# Patient Record
Sex: MALE | Race: WHITE | Employment: UNEMPLOYED | URBAN - METROPOLITAN AREA
[De-identification: names, ages, dates, MRNs, and addresses within clinical notes are randomized per-mention and may not be internally consistent; named-entity substitution may affect disease eponyms.]

---

## 2019-04-27 ENCOUNTER — APPOINTMENT (OUTPATIENT)
Dept: GENERAL RADIOLOGY | Age: 64
DRG: 432 | End: 2019-04-27

## 2019-04-27 ENCOUNTER — HOSPITAL ENCOUNTER (INPATIENT)
Age: 64
LOS: 9 days | Discharge: NURSING, REHAB OR LONG-TERM CARE | DRG: 432 | End: 2019-05-06
Attending: EMERGENCY MEDICINE | Admitting: FAMILY MEDICINE
Payer: COMMERCIAL

## 2019-04-27 ENCOUNTER — APPOINTMENT (OUTPATIENT)
Dept: CT IMAGING | Age: 64
DRG: 432 | End: 2019-04-27

## 2019-04-27 DIAGNOSIS — E72.20 HYPERAMMONEMIA (HCC): ICD-10-CM

## 2019-04-27 DIAGNOSIS — E87.20 LACTIC ACIDOSIS: ICD-10-CM

## 2019-04-27 DIAGNOSIS — F10.929 ACUTE ALCOHOLIC INTOXICATION WITH COMPLICATION (HCC): ICD-10-CM

## 2019-04-27 DIAGNOSIS — T79.6XXA TRAUMATIC RHABDOMYOLYSIS, INITIAL ENCOUNTER (HCC): Primary | ICD-10-CM

## 2019-04-27 DIAGNOSIS — E86.0 DEHYDRATION: ICD-10-CM

## 2019-04-27 PROBLEM — M62.82 RHABDOMYOLYSIS: Status: ACTIVE | Noted: 2019-04-27

## 2019-04-27 LAB
ACETAMINOPHEN LEVEL: <5 MCG/ML (ref 10–30)
ALBUMIN SERPL-MCNC: 3.4 G/DL (ref 3.5–5.2)
ALP BLD-CCNC: 143 U/L (ref 40–129)
ALT SERPL-CCNC: 46 U/L (ref 0–40)
AMMONIA: 73 UMOL/L (ref 16–60)
AMPHETAMINE SCREEN, URINE: NOT DETECTED
ANION GAP SERPL CALCULATED.3IONS-SCNC: 23 MMOL/L (ref 7–16)
APTT: 41.3 SEC (ref 24.5–35.1)
AST SERPL-CCNC: 135 U/L (ref 0–39)
BARBITURATE SCREEN URINE: NOT DETECTED
BASOPHILS ABSOLUTE: 0.07 E9/L (ref 0–0.2)
BASOPHILS RELATIVE PERCENT: 0.9 % (ref 0–2)
BENZODIAZEPINE SCREEN, URINE: NOT DETECTED
BILIRUB SERPL-MCNC: 4.2 MG/DL (ref 0–1.2)
BUN BLDV-MCNC: 11 MG/DL (ref 8–23)
BURR CELLS: ABNORMAL
CALCIUM SERPL-MCNC: 8.6 MG/DL (ref 8.6–10.2)
CANNABINOID SCREEN URINE: NOT DETECTED
CHLORIDE BLD-SCNC: 99 MMOL/L (ref 98–107)
CO2: 19 MMOL/L (ref 22–29)
COCAINE METABOLITE SCREEN URINE: NOT DETECTED
CREAT SERPL-MCNC: 0.5 MG/DL (ref 0.7–1.2)
EKG ATRIAL RATE: 101 BPM
EKG P AXIS: 54 DEGREES
EKG P-R INTERVAL: 192 MS
EKG Q-T INTERVAL: 380 MS
EKG QRS DURATION: 86 MS
EKG QTC CALCULATION (BAZETT): 492 MS
EKG R AXIS: 17 DEGREES
EKG T AXIS: 67 DEGREES
EKG VENTRICULAR RATE: 101 BPM
EOSINOPHILS ABSOLUTE: 0 E9/L (ref 0.05–0.5)
EOSINOPHILS RELATIVE PERCENT: 0 % (ref 0–6)
ETHANOL: 168 MG/DL (ref 0–0.08)
GFR AFRICAN AMERICAN: >60
GFR NON-AFRICAN AMERICAN: >60 ML/MIN/1.73
GLUCOSE BLD-MCNC: 123 MG/DL (ref 74–99)
HCT VFR BLD CALC: 39 % (ref 37–54)
HEMOGLOBIN: 13.1 G/DL (ref 12.5–16.5)
IMMATURE GRANULOCYTES #: 0.09 E9/L
IMMATURE GRANULOCYTES %: 1.1 % (ref 0–5)
INR BLD: 1.6
LACTIC ACID: 7.7 MMOL/L (ref 0.5–2.2)
LACTIC ACID: 7.9 MMOL/L (ref 0.5–2.2)
LACTIC ACID: 9.7 MMOL/L (ref 0.5–2.2)
LIPASE: 31 U/L (ref 13–60)
LYMPHOCYTES ABSOLUTE: 0.6 E9/L (ref 1.5–4)
LYMPHOCYTES RELATIVE PERCENT: 7.5 % (ref 20–42)
MAGNESIUM: 1.7 MG/DL (ref 1.6–2.6)
MCH RBC QN AUTO: 36.6 PG (ref 26–35)
MCHC RBC AUTO-ENTMCNC: 33.6 % (ref 32–34.5)
MCV RBC AUTO: 108.9 FL (ref 80–99.9)
METHADONE SCREEN, URINE: NOT DETECTED
MONOCYTES ABSOLUTE: 1.1 E9/L (ref 0.1–0.95)
MONOCYTES RELATIVE PERCENT: 13.8 % (ref 2–12)
NEUTROPHILS ABSOLUTE: 6.13 E9/L (ref 1.8–7.3)
NEUTROPHILS RELATIVE PERCENT: 76.7 % (ref 43–80)
OPIATE SCREEN URINE: NOT DETECTED
PDW BLD-RTO: 15.1 FL (ref 11.5–15)
PHENCYCLIDINE SCREEN URINE: NOT DETECTED
PHOSPHORUS: 3.1 MG/DL (ref 2.5–4.5)
PLATELET # BLD: 87 E9/L (ref 130–450)
PLATELET CONFIRMATION: NORMAL
PMV BLD AUTO: 8.4 FL (ref 7–12)
POIKILOCYTES: ABNORMAL
POTASSIUM SERPL-SCNC: 4.3 MMOL/L (ref 3.5–5)
PROPOXYPHENE SCREEN: NOT DETECTED
PROTHROMBIN TIME: 18 SEC (ref 9.3–12.4)
RBC # BLD: 3.58 E12/L (ref 3.8–5.8)
SALICYLATE, SERUM: <0.3 MG/DL (ref 0–30)
SODIUM BLD-SCNC: 141 MMOL/L (ref 132–146)
TOTAL CK: 2754 U/L (ref 20–200)
TOTAL PROTEIN: 8 G/DL (ref 6.4–8.3)
TRICYCLIC ANTIDEPRESSANTS SCREEN SERUM: NEGATIVE NG/ML
TROPONIN: <0.01 NG/ML (ref 0–0.03)
WBC # BLD: 8 E9/L (ref 4.5–11.5)

## 2019-04-27 PROCEDURE — 71260 CT THORAX DX C+: CPT

## 2019-04-27 PROCEDURE — 85025 COMPLETE CBC W/AUTO DIFF WBC: CPT

## 2019-04-27 PROCEDURE — 85610 PROTHROMBIN TIME: CPT

## 2019-04-27 PROCEDURE — 96361 HYDRATE IV INFUSION ADD-ON: CPT

## 2019-04-27 PROCEDURE — 84484 ASSAY OF TROPONIN QUANT: CPT

## 2019-04-27 PROCEDURE — 6370000000 HC RX 637 (ALT 250 FOR IP): Performed by: FAMILY MEDICINE

## 2019-04-27 PROCEDURE — G0480 DRUG TEST DEF 1-7 CLASSES: HCPCS

## 2019-04-27 PROCEDURE — 83605 ASSAY OF LACTIC ACID: CPT

## 2019-04-27 PROCEDURE — 6360000004 HC RX CONTRAST MEDICATION: Performed by: RADIOLOGY

## 2019-04-27 PROCEDURE — 83690 ASSAY OF LIPASE: CPT

## 2019-04-27 PROCEDURE — 6370000000 HC RX 637 (ALT 250 FOR IP): Performed by: EMERGENCY MEDICINE

## 2019-04-27 PROCEDURE — 36415 COLL VENOUS BLD VENIPUNCTURE: CPT

## 2019-04-27 PROCEDURE — 70450 CT HEAD/BRAIN W/O DYE: CPT

## 2019-04-27 PROCEDURE — 2580000003 HC RX 258: Performed by: FAMILY MEDICINE

## 2019-04-27 PROCEDURE — 2060000000 HC ICU INTERMEDIATE R&B

## 2019-04-27 PROCEDURE — 72125 CT NECK SPINE W/O DYE: CPT

## 2019-04-27 PROCEDURE — 85730 THROMBOPLASTIN TIME PARTIAL: CPT

## 2019-04-27 PROCEDURE — 84100 ASSAY OF PHOSPHORUS: CPT

## 2019-04-27 PROCEDURE — 80053 COMPREHEN METABOLIC PANEL: CPT

## 2019-04-27 PROCEDURE — 96360 HYDRATION IV INFUSION INIT: CPT

## 2019-04-27 PROCEDURE — 99285 EMERGENCY DEPT VISIT HI MDM: CPT

## 2019-04-27 PROCEDURE — 70486 CT MAXILLOFACIAL W/O DYE: CPT

## 2019-04-27 PROCEDURE — 2580000003 HC RX 258: Performed by: EMERGENCY MEDICINE

## 2019-04-27 PROCEDURE — 83735 ASSAY OF MAGNESIUM: CPT

## 2019-04-27 PROCEDURE — 2500000003 HC RX 250 WO HCPCS: Performed by: EMERGENCY MEDICINE

## 2019-04-27 PROCEDURE — 71045 X-RAY EXAM CHEST 1 VIEW: CPT

## 2019-04-27 PROCEDURE — 74177 CT ABD & PELVIS W/CONTRAST: CPT

## 2019-04-27 PROCEDURE — 80307 DRUG TEST PRSMV CHEM ANLYZR: CPT

## 2019-04-27 PROCEDURE — 82550 ASSAY OF CK (CPK): CPT

## 2019-04-27 PROCEDURE — 93005 ELECTROCARDIOGRAM TRACING: CPT | Performed by: EMERGENCY MEDICINE

## 2019-04-27 PROCEDURE — 82140 ASSAY OF AMMONIA: CPT

## 2019-04-27 PROCEDURE — 6360000002 HC RX W HCPCS: Performed by: EMERGENCY MEDICINE

## 2019-04-27 RX ORDER — 0.9 % SODIUM CHLORIDE 0.9 %
1000 INTRAVENOUS SOLUTION INTRAVENOUS ONCE
Status: COMPLETED | OUTPATIENT
Start: 2019-04-27 | End: 2019-04-27

## 2019-04-27 RX ORDER — SODIUM CHLORIDE 0.9 % (FLUSH) 0.9 %
10 SYRINGE (ML) INJECTION PRN
Status: DISCONTINUED | OUTPATIENT
Start: 2019-04-27 | End: 2019-05-06 | Stop reason: HOSPADM

## 2019-04-27 RX ORDER — AMLODIPINE BESYLATE 5 MG/1
5 TABLET ORAL DAILY
Status: DISCONTINUED | OUTPATIENT
Start: 2019-04-27 | End: 2019-04-29

## 2019-04-27 RX ORDER — CLINDAMYCIN PHOSPHATE 300 MG/50ML
300 INJECTION INTRAVENOUS ONCE
Status: COMPLETED | OUTPATIENT
Start: 2019-04-27 | End: 2019-04-27

## 2019-04-27 RX ORDER — ACETAMINOPHEN 325 MG/1
650 TABLET ORAL EVERY 4 HOURS PRN
Status: DISCONTINUED | OUTPATIENT
Start: 2019-04-27 | End: 2019-04-27 | Stop reason: SDUPTHER

## 2019-04-27 RX ORDER — ACETAMINOPHEN 325 MG/1
650 TABLET ORAL EVERY 4 HOURS PRN
Status: DISCONTINUED | OUTPATIENT
Start: 2019-04-27 | End: 2019-05-06 | Stop reason: HOSPADM

## 2019-04-27 RX ORDER — LACTULOSE 10 G/15ML
30 SOLUTION ORAL ONCE
Status: COMPLETED | OUTPATIENT
Start: 2019-04-27 | End: 2019-04-27

## 2019-04-27 RX ORDER — DEXTROSE, SODIUM CHLORIDE, SODIUM LACTATE, POTASSIUM CHLORIDE, AND CALCIUM CHLORIDE 5; .6; .31; .03; .02 G/100ML; G/100ML; G/100ML; G/100ML; G/100ML
INJECTION, SOLUTION INTRAVENOUS CONTINUOUS
Status: DISCONTINUED | OUTPATIENT
Start: 2019-04-27 | End: 2019-04-29

## 2019-04-27 RX ORDER — SODIUM CHLORIDE 0.9 % (FLUSH) 0.9 %
10 SYRINGE (ML) INJECTION EVERY 12 HOURS SCHEDULED
Status: DISCONTINUED | OUTPATIENT
Start: 2019-04-27 | End: 2019-05-06 | Stop reason: HOSPADM

## 2019-04-27 RX ADMIN — LACTULOSE 30 G: 20 SOLUTION ORAL at 18:03

## 2019-04-27 RX ADMIN — AMLODIPINE BESYLATE 5 MG: 5 TABLET ORAL at 19:09

## 2019-04-27 RX ADMIN — FOLIC ACID: 5 INJECTION, SOLUTION INTRAMUSCULAR; INTRAVENOUS; SUBCUTANEOUS at 14:07

## 2019-04-27 RX ADMIN — ACETAMINOPHEN 650 MG: 325 TABLET ORAL at 19:10

## 2019-04-27 RX ADMIN — CLINDAMYCIN PHOSPHATE 300 MG: 6 INJECTION, SOLUTION INTRAMUSCULAR; INTRAVENOUS at 16:56

## 2019-04-27 RX ADMIN — SODIUM CHLORIDE, SODIUM LACTATE, POTASSIUM CHLORIDE, CALCIUM CHLORIDE AND DEXTROSE MONOHYDRATE: 5; 600; 310; 30; 20 INJECTION, SOLUTION INTRAVENOUS at 19:09

## 2019-04-27 RX ADMIN — SODIUM CHLORIDE 1000 ML: 9 INJECTION, SOLUTION INTRAVENOUS at 13:18

## 2019-04-27 RX ADMIN — SODIUM CHLORIDE 1000 ML: 9 INJECTION, SOLUTION INTRAVENOUS at 16:07

## 2019-04-27 RX ADMIN — IOPAMIDOL 80 ML: 755 INJECTION, SOLUTION INTRAVENOUS at 15:53

## 2019-04-27 ASSESSMENT — PAIN DESCRIPTION - PAIN TYPE
TYPE: CHRONIC PAIN
TYPE: ACUTE PAIN

## 2019-04-27 ASSESSMENT — PAIN SCALES - GENERAL
PAINLEVEL_OUTOF10: 10
PAINLEVEL_OUTOF10: 6

## 2019-04-27 ASSESSMENT — PAIN DESCRIPTION - LOCATION
LOCATION: GENERALIZED
LOCATION: GENERALIZED

## 2019-04-27 NOTE — ED NOTES
Bed: 09  Expected date:   Expected time:   Means of arrival:   Comments:  [id number], RN  04/27/19 1231

## 2019-04-27 NOTE — ED PROVIDER NOTES
Patient is a 55-year-old male who presents via EMS from home with a chief complaint of confusion and disordered behavior. Per EMS, they were called to the house because the patient states that he was lost in the woods. When they came to his house, he was walking around the house naked and did not know was going on. The paramedics found multiple empty liquor bottles. The patient had scattered abrasions of his upper or lower extremities, as well as his back. He does have some ecchymosis on his right anterior chest wall per EMS. He had some dry blood around his mouth. The patient states he does not know if he fell or not. He does admit to drinking last night. He complains of body wide pain. The history is provided by the EMS personnel and the patient. No  was used. Review of Systems   Unable to perform ROS: Mental status change       Physical Exam   Constitutional: He is oriented to person, place, and time. He appears well-developed and well-nourished. No distress. Patient is alert and oriented, he keeps complaining that he feels short of breath. HENT:   Head: Normocephalic. She has no obvious signs of head trauma. No hemotympanum present no septal hematoma. Patient does have dried blood around his lips and inside his mouth. Dry oral mucosa. There is dried emesis around bilateral nares. Eyes: Conjunctivae are normal. Right eye exhibits no discharge. Left eye exhibits no discharge. No scleral icterus. Pupils are equal and reactive, no scleral icterus present. Neck: Normal range of motion. Neck supple. No spinous process tenderness palpation. Normal range of motion. No step-offs palpated. Cardiovascular: Regular rhythm. Tachycardia present. No murmur heard. Pulmonary/Chest: Effort normal and breath sounds normal. No stridor. No respiratory distress. He exhibits tenderness. Clear breath sounds in all lung fields. No acute respiratory distress. Pulse ox 98% on room air. Abdominal: Soft. Bowel sounds are normal. He exhibits no distension. There is generalized tenderness. There is no rigidity and no guarding. Rotund abdomen, patient complains of generalized abdominal tenderness. No guarding or rigidity. Abdomen is soft. Musculoskeletal: Normal range of motion. He exhibits no edema or tenderness. Chronic lymphedema bilateral lower extremities. No pitting edema. Lymphadenopathy:     He has no cervical adenopathy. Neurological: He is alert and oriented to person, place, and time. Patient is alert and oriented ×3. He has no focal neurological deficits. Patient follows all commands appropriately. Skin: Skin is warm. Capillary refill takes less than 2 seconds. He is not diaphoretic. No erythema. No pallor. Procedures    MDM         --------------------------------------------- PAST HISTORY ---------------------------------------------  Past Medical History:  has a past medical history of Acute diastolic CHF (congestive heart failure) ([address] Dermatitis, Heart valve problem, Hx of cardiovascular stress test, Hypertension, and Obesity. Past Surgical History:  has a past surgical history that includes hernia repair; Neck surgery; and Carpal tunnel release. Social History:  reports that he has never smoked. He has never used smokeless tobacco. He reports that he drinks alcohol. He reports that he does not use drugs. Family History: family history includes COPD in his father; Parkinsonism in his mother. The patients home medications have been reviewed. Allergies: Latex;  Aldactone [spironolactone]; and Thimerosal    -------------------------------------------------- RESULTS -------------------------------------------------    LABS:  Results for orders placed or performed during the hospital encounter of 04/27/19   CBC Auto Differential   Result Value Ref Range    WBC 8.0 4.5 - 11.5 E9/L    RBC 3.58 (L) 3.80 - 5.80 E12/L    Hemoglobin 13.1 12.5 - 16.5 g/dL Range    Amphetamine Screen, Urine NOT DETECTED Negative <1000 ng/mL    Barbiturate Screen, Ur NOT DETECTED Negative < 200 ng/mL    Benzodiazepine Screen, Urine NOT DETECTED Negative < 200 ng/mL    Cannabinoid Scrn, Ur NOT DETECTED Negative < 50ng/mL    Cocaine Metabolite Screen, Urine NOT DETECTED Negative < 300 ng/mL    Opiate Scrn, Ur NOT DETECTED Negative < 300ng/mL    PCP Screen, Urine NOT DETECTED Negative < 25 ng/mL    Methadone Screen, Urine NOT DETECTED Negative <300 ng/mL    Propoxyphene Scrn, Ur NOT DETECTED Negative <300 ng/mL   Serum Drug Screen   Result Value Ref Range    Ethanol Lvl 168 mg/dL    Acetaminophen Level <5.0 (L) 10.0 - 83.4 mcg/mL    Salicylate, Serum <1.4 0.0 - 30.0 mg/dL   Ammonia   Result Value Ref Range    Ammonia 73.0 (H) 16.0 - 60.0 umol/L   Platelet Confirmation   Result Value Ref Range    Platelet Confirmation CONFIRMED    EKG 12 Lead   Result Value Ref Range    Ventricular Rate 101 BPM    Atrial Rate 101 BPM    P-R Interval 192 ms    QRS Duration 86 ms    Q-T Interval 380 ms    QTc Calculation (Bazett) 492 ms    P Axis 54 degrees    R Axis 17 degrees    T Axis 67 degrees       RADIOLOGY:  Ct Head Wo Contrast    Result Date: 4/27/2019  Location:200 Exam: CT HEAD WO CONTRAST Comparison: None History:  Trauma from fall TECHNIQUE: Spiral CT the brain without contrast. Coronal and sagittal reconstructions were obtained. Automated dose exposure control was utilized for this examination. FINDINGS: The ventricles are normal in size, shape and configuration. The midline structures are midline. The subarachnoid spaces and sulci are also normal in size and configuration. No abnormal parenchymal densities are identified and no abnormal calcifications are seen. There is no mass effect and no abnormal subdural fluid collections are identified. The calvarium as visualized is unremarkable. The visualized sinuses, mastoid air cells, and middle ears are clear.      Computed tomography of the reconstructions were obtained. Automated dose exposure control was utilized for this examination. FINDINGS: No soft tissue swelling or hematoma identified. Cervical vertebrae maintain normal stature and alignment. No evidence of fracture or subluxation. Extensive anterior cervical fusion C3-C7 with corpectomy and bone graft C4-C7. There is no spinal or foraminal stenosis. Facets are normal alignment. C1-C2 articulation is normal. Disc space narrowing at C2-3. All the other disc spaces are surgically absent. Intact anterior cervical fusion from C3 through C7. No evidence of acute fracture or subluxation. Ct Abdomen Pelvis W Iv Contrast Additional Contrast? None    Result Date: 4/27/2019  Location:200 Exam: CT ABDOMEN PELVIS W IV CONTRAST Comparison: February 10, 2016 History:  Found unconscious Contrast: Isovue-370 80 mL IV. FINDINGS:  Spiral CT scan of the abdomen and pelvis performed after IV contrast from the lower chest to symphysis pubis. Automatated dose exposure control was utilized for this examination. Postop changes: None Enlargement left lobe of liver with slight nodularity suggesting cirrhosis. Contracted gallbladder with multiple stones. The spleen, pancreas, adrenal glands, and kidneys are normal.  There is no evidence of adenopathy or ascites. The small bowel is normal. The colon is normal. The appendix is normal. The abdominal aorta is normal in size. The urinary bladder, pelvic organs are normal. No pelvic masses. No fractures of the lumbar spine or pelvic bones. 1. Negative CT scan abdomen pelvis for acute trauma. 2. Probable cirrhotic liver. 3. Contracted gallbladder with gallstones. Xr Chest Portable    Result Date: 4/27/2019  Location:200 Exam: XR CHEST PORTABLE Comparison: July 26, 2017 History:   Short of breath Findings: A single frontal portable view of the chest demonstrates patchy airspace disease in the upper lobes. Large the heart. No pleural effusion. No pneumothorax.  No displaced fractures. Patchy upper lobe airspace disease. No pneumothorax. EKG: This EKG is signed and interpreted by me. Rate: 101  Rhythm: Sinus  Interpretation: Sinus rhythm, prolonged QT. Comparison: No acute ischemic changes. Comparison EKG on 7/26/17, there is slight increase in the prolonged QT on today's EKG.      ------------------------- NURSING NOTES AND VITALS REVIEWED ---------------------------  The nursing notes within the ED encounter and vital signs as below have been reviewed. Patient Vitals for the past 24 hrs:   BP Temp Temp src Pulse Resp SpO2 Height Weight   04/27/19 1610 (!) 150/67 97.9 °F (36.6 °C) CORE 110 28 97 % -- --   04/27/19 1411 137/79 96.6 °F (35.9 °C) CORE 105 20 96 % -- --   04/27/19 1320 (!) 159/105 94.8 °F (34.9 °C) CORE 103 24 100 % -- --   04/27/19 1246 (!) 172/89 -- -- 101 17 100 % 5' 9\" (1.753 m) (!) 364 lb 1 oz (165.1 kg)       Oxygen Saturation Interpretation: Normal    ------------------------------------------ PROGRESS NOTES ------------------------------------------  Re-evaluation(s):  Time: 15:29. History is being taken to CT this time. He is awake and alert. He is conversing. No acute distress. Time: 16:44. Patient continues to be alert and oriented ×3. I ordered lactulose for his elevated ammonia. There is also clindamycin that was ordered, for suspected aspiration pneumonia. Patient did have emesis from both nares. Counseling:  I have spoken with the patient and discussed todays results, in addition to providing specific details for the plan of care and counseling regarding the diagnosis and prognosis. Their questions are answered at this time and they are agreeable with the plan of admission.    --------------------------------- ADDITIONAL PROVIDER NOTES ---------------------------------  Consultations:  Time: 16:53. Spoke with Dr. Yessy Nichols. Discussed case. They will admit the patient.   This patient's ED course included: a personal

## 2019-04-28 ENCOUNTER — APPOINTMENT (OUTPATIENT)
Dept: ULTRASOUND IMAGING | Age: 64
DRG: 432 | End: 2019-04-28

## 2019-04-28 PROBLEM — K70.30 ALCOHOLIC CIRRHOSIS OF LIVER WITHOUT ASCITES (HCC): Status: ACTIVE | Noted: 2019-04-28

## 2019-04-28 PROBLEM — R41.0 MENTAL CONFUSION: Status: ACTIVE | Noted: 2019-04-28

## 2019-04-28 LAB
ABO/RH: NORMAL
ALBUMIN SERPL-MCNC: 2.7 G/DL (ref 3.5–5.2)
ALP BLD-CCNC: 95 U/L (ref 40–129)
ALT SERPL-CCNC: 42 U/L (ref 0–40)
ANION GAP SERPL CALCULATED.3IONS-SCNC: 9 MMOL/L (ref 7–16)
ANTIBODY SCREEN: NORMAL
AST SERPL-CCNC: 140 U/L (ref 0–39)
BILIRUB SERPL-MCNC: 4.2 MG/DL (ref 0–1.2)
BILIRUBIN DIRECT: 1.4 MG/DL (ref 0–0.3)
BUN BLDV-MCNC: 14 MG/DL (ref 8–23)
CALCIUM SERPL-MCNC: 8.4 MG/DL (ref 8.6–10.2)
CHLORIDE BLD-SCNC: 99 MMOL/L (ref 98–107)
CO2: 26 MMOL/L (ref 22–29)
CREAT SERPL-MCNC: 0.5 MG/DL (ref 0.7–1.2)
FERRITIN: 759 NG/ML
GFR AFRICAN AMERICAN: >60
GFR NON-AFRICAN AMERICAN: >60 ML/MIN/1.73
GLUCOSE BLD-MCNC: 193 MG/DL (ref 74–99)
HBA1C MFR BLD: 4.9 % (ref 4–5.6)
HCT VFR BLD CALC: 32.1 % (ref 37–54)
HCT VFR BLD CALC: 32.6 % (ref 37–54)
HCT VFR BLD CALC: 33 % (ref 37–54)
HEMOGLOBIN: 11 G/DL (ref 12.5–16.5)
HEMOGLOBIN: 11 G/DL (ref 12.5–16.5)
HEMOGLOBIN: 11.2 G/DL (ref 12.5–16.5)
IRON SATURATION: 110 % (ref 20–55)
IRON: 191 MCG/DL (ref 59–158)
LACTIC ACID: 1.6 MMOL/L (ref 0.5–2.2)
LACTIC ACID: 1.8 MMOL/L (ref 0.5–2.2)
LACTIC ACID: 2.2 MMOL/L (ref 0.5–2.2)
MCH RBC QN AUTO: 36.5 PG (ref 26–35)
MCHC RBC AUTO-ENTMCNC: 34.3 % (ref 32–34.5)
MCV RBC AUTO: 106.6 FL (ref 80–99.9)
METER GLUCOSE: 183 MG/DL (ref 74–99)
PDW BLD-RTO: 14.7 FL (ref 11.5–15)
PLATELET # BLD: 55 E9/L (ref 130–450)
PLATELET CONFIRMATION: NORMAL
PMV BLD AUTO: 9.2 FL (ref 7–12)
POTASSIUM SERPL-SCNC: 3.8 MMOL/L (ref 3.5–5)
RBC # BLD: 3.01 E12/L (ref 3.8–5.8)
SODIUM BLD-SCNC: 134 MMOL/L (ref 132–146)
TOTAL IRON BINDING CAPACITY: 174 MCG/DL (ref 250–450)
TOTAL PROTEIN: 6.5 G/DL (ref 6.4–8.3)
WBC # BLD: 7 E9/L (ref 4.5–11.5)

## 2019-04-28 PROCEDURE — 80053 COMPREHEN METABOLIC PANEL: CPT

## 2019-04-28 PROCEDURE — 86900 BLOOD TYPING SEROLOGIC ABO: CPT

## 2019-04-28 PROCEDURE — C9113 INJ PANTOPRAZOLE SODIUM, VIA: HCPCS | Performed by: INTERNAL MEDICINE

## 2019-04-28 PROCEDURE — 2580000003 HC RX 258: Performed by: INTERNAL MEDICINE

## 2019-04-28 PROCEDURE — 83605 ASSAY OF LACTIC ACID: CPT

## 2019-04-28 PROCEDURE — 86850 RBC ANTIBODY SCREEN: CPT

## 2019-04-28 PROCEDURE — 85014 HEMATOCRIT: CPT

## 2019-04-28 PROCEDURE — 85027 COMPLETE CBC AUTOMATED: CPT

## 2019-04-28 PROCEDURE — 82962 GLUCOSE BLOOD TEST: CPT

## 2019-04-28 PROCEDURE — 83036 HEMOGLOBIN GLYCOSYLATED A1C: CPT

## 2019-04-28 PROCEDURE — 6360000002 HC RX W HCPCS: Performed by: INTERNAL MEDICINE

## 2019-04-28 PROCEDURE — 36415 COLL VENOUS BLD VENIPUNCTURE: CPT

## 2019-04-28 PROCEDURE — 82105 ALPHA-FETOPROTEIN SERUM: CPT

## 2019-04-28 PROCEDURE — 83540 ASSAY OF IRON: CPT

## 2019-04-28 PROCEDURE — 76705 ECHO EXAM OF ABDOMEN: CPT

## 2019-04-28 PROCEDURE — 99223 1ST HOSP IP/OBS HIGH 75: CPT | Performed by: FAMILY MEDICINE

## 2019-04-28 PROCEDURE — 85018 HEMOGLOBIN: CPT

## 2019-04-28 PROCEDURE — 6370000000 HC RX 637 (ALT 250 FOR IP): Performed by: FAMILY MEDICINE

## 2019-04-28 PROCEDURE — 2060000000 HC ICU INTERMEDIATE R&B

## 2019-04-28 PROCEDURE — 6370000000 HC RX 637 (ALT 250 FOR IP): Performed by: INTERNAL MEDICINE

## 2019-04-28 PROCEDURE — 82728 ASSAY OF FERRITIN: CPT

## 2019-04-28 PROCEDURE — 83550 IRON BINDING TEST: CPT

## 2019-04-28 PROCEDURE — 2580000003 HC RX 258: Performed by: FAMILY MEDICINE

## 2019-04-28 PROCEDURE — 86901 BLOOD TYPING SEROLOGIC RH(D): CPT

## 2019-04-28 PROCEDURE — 82248 BILIRUBIN DIRECT: CPT

## 2019-04-28 RX ORDER — 0.9 % SODIUM CHLORIDE 0.9 %
10 VIAL (ML) INJECTION DAILY
Status: DISCONTINUED | OUTPATIENT
Start: 2019-04-28 | End: 2019-05-06 | Stop reason: HOSPADM

## 2019-04-28 RX ORDER — THIAMINE MONONITRATE (VIT B1) 100 MG
100 TABLET ORAL DAILY
Status: DISCONTINUED | OUTPATIENT
Start: 2019-04-28 | End: 2019-05-06 | Stop reason: HOSPADM

## 2019-04-28 RX ORDER — CHLORDIAZEPOXIDE HYDROCHLORIDE 5 MG/1
20 CAPSULE, GELATIN COATED ORAL 3 TIMES DAILY
Status: DISCONTINUED | OUTPATIENT
Start: 2019-04-28 | End: 2019-04-30

## 2019-04-28 RX ORDER — PANTOPRAZOLE SODIUM 40 MG/10ML
40 INJECTION, POWDER, LYOPHILIZED, FOR SOLUTION INTRAVENOUS DAILY
Status: DISCONTINUED | OUTPATIENT
Start: 2019-04-28 | End: 2019-05-06 | Stop reason: HOSPADM

## 2019-04-28 RX ADMIN — OCTREOTIDE ACETATE 25 MCG/HR: 500 INJECTION, SOLUTION INTRAVENOUS; SUBCUTANEOUS at 16:32

## 2019-04-28 RX ADMIN — AMLODIPINE BESYLATE 5 MG: 5 TABLET ORAL at 08:02

## 2019-04-28 RX ADMIN — WATER 1 G: 1 INJECTION INTRAMUSCULAR; INTRAVENOUS; SUBCUTANEOUS at 15:28

## 2019-04-28 RX ADMIN — THIAMINE HCL TAB 100 MG 100 MG: 100 TAB at 13:05

## 2019-04-28 RX ADMIN — ACETAMINOPHEN 650 MG: 325 TABLET ORAL at 00:23

## 2019-04-28 RX ADMIN — PANTOPRAZOLE SODIUM 40 MG: 40 INJECTION, POWDER, LYOPHILIZED, FOR SOLUTION INTRAVENOUS at 15:30

## 2019-04-28 RX ADMIN — SODIUM CHLORIDE, SODIUM LACTATE, POTASSIUM CHLORIDE, CALCIUM CHLORIDE AND DEXTROSE MONOHYDRATE: 5; 600; 310; 30; 20 INJECTION, SOLUTION INTRAVENOUS at 10:18

## 2019-04-28 RX ADMIN — RIFAXIMIN 550 MG: 550 TABLET ORAL at 20:30

## 2019-04-28 RX ADMIN — CHLORDIAZEPOXIDE HYDROCHLORIDE 20 MG: 5 CAPSULE ORAL at 13:09

## 2019-04-28 RX ADMIN — ACETAMINOPHEN 650 MG: 325 TABLET ORAL at 04:44

## 2019-04-28 RX ADMIN — CHLORDIAZEPOXIDE HYDROCHLORIDE 20 MG: 5 CAPSULE ORAL at 20:24

## 2019-04-28 RX ADMIN — ACETAMINOPHEN 650 MG: 325 TABLET ORAL at 10:19

## 2019-04-28 RX ADMIN — Medication 10 ML: at 15:34

## 2019-04-28 RX ADMIN — RIFAXIMIN 550 MG: 550 TABLET ORAL at 15:30

## 2019-04-28 ASSESSMENT — PAIN - FUNCTIONAL ASSESSMENT: PAIN_FUNCTIONAL_ASSESSMENT: PREVENTS OR INTERFERES SOME ACTIVE ACTIVITIES AND ADLS

## 2019-04-28 ASSESSMENT — PAIN DESCRIPTION - DESCRIPTORS
DESCRIPTORS: ACHING;CONSTANT;DISCOMFORT
DESCRIPTORS: ACHING;CONSTANT;DISCOMFORT
DESCRIPTORS: ACHING;DISCOMFORT

## 2019-04-28 ASSESSMENT — PAIN DESCRIPTION - FREQUENCY: FREQUENCY: CONTINUOUS

## 2019-04-28 ASSESSMENT — PAIN SCALES - GENERAL
PAINLEVEL_OUTOF10: 6
PAINLEVEL_OUTOF10: 5
PAINLEVEL_OUTOF10: 6
PAINLEVEL_OUTOF10: 0

## 2019-04-28 ASSESSMENT — PAIN DESCRIPTION - LOCATION
LOCATION: BACK
LOCATION: BACK

## 2019-04-28 ASSESSMENT — PAIN DESCRIPTION - ORIENTATION
ORIENTATION: RIGHT
ORIENTATION: RIGHT

## 2019-04-28 ASSESSMENT — PAIN DESCRIPTION - PAIN TYPE: TYPE: CHRONIC PAIN

## 2019-04-28 NOTE — PLAN OF CARE
Problem: Risk for Impaired Skin Integrity  Goal: Tissue integrity - skin and mucous membranes  Description  Structural intactness and normal physiological function of skin and  mucous membranes.   Outcome: Met This Shift     Problem: Pain:  Goal: Pain level will decrease  Description  Pain level will decrease  Outcome: Met This Shift     Problem: Pain:  Goal: Control of acute pain  Description  Control of acute pain  Outcome: Met This Shift     Problem: Pain:  Goal: Control of chronic pain  Description  Control of chronic pain  Outcome: Met This Shift

## 2019-04-28 NOTE — H&P
History and Physical    Patient:  Teodora Bower  MRN: [id number]    CHIEF COMPLAINT: mental confusion and weakness for 1 day. History Obtained From:  patient, electronic medical record  PCP: Toro Carlos MD    HISTORY OF PRESENT ILLNESS:   Patient is a 77-year-old male who is admitted VIA ER  with a chief complaint of confusion and disordered behavior. Per EMS, they were called to the house because the patient states that he was lost in the woods. When they came to his house, he was walking around the house naked and did not know was going on. The paramedics found multiple empty liquor bottles. The patient had scattered abrasions of his upper or lower extremities, as well as his back. He does have some ecchymosis on his right anterior chest wall per EMS. He had some dry blood around his mouth. The patient states he does not know if he fell or not. He does admit to drinking last night. He complains of body wide pain. Patient has not been to the office for long time. He does have multiple problems. He has not been taking his medications either,       Past Medical History:        Diagnosis Date    Acute diastolic CHF (congestive heart failure) ([address] 11/17/15    Echo 11/18/15 EF 73%    Dermatitis     due to thimersol    Heart valve problem     leaky    Hx of cardiovascular stress test 12/9/2015    Lexiscan    Hypertension     Obesity        Past Surgical History:        Procedure Laterality Date    CARPAL TUNNEL RELEASE      HERNIA REPAIR      mesh in abd    NECK SURGERY         Medications Prior to Admission:    Prior to Admission medications    Not on File       Allergies:  Latex; Aldactone [spironolactone]; and Thimerosal    Social History:   TOBACCO:   reports that he has never smoked. He has never used smokeless tobacco.  ETOH:   reports that he drinks alcohol.   OCCUPATION:      Family History:       Problem Relation Age of Onset   [facility] Parkinsonism Mother     COPD

## 2019-04-28 NOTE — CONSULTS
Nephrology Consult Note  Patient's Name: Jax Paredes  1:45 PM  4/28/2019    Nephrologist: Franc Lopez MD    Reason for Consult:  Lactic acidosis  Requesting Physician:  Joceline Woodall MD    Chief Complaint:  Confusion    History Obtained From:  patient    History of Present Ilness:    Jax Paredes is a 61 y.o. male with no known history of Lactic Acidosis. Pt states he has chronic neck and back pain post a work accidennt necessitating surgery and he has been on diability since that time. Pt states he does not like to use narcotic pain meds so he drinks Vodka. He says [employer] on Fridays, Saturdays and Sundays. He presented to the ED with confusion per the ED report he called 911 and stated he was lost when the 1st responders came to his house he was wondering about in the house naked and was confused . Several liquior bottles were seen. In the ED initial lactic acid was 9.7 and a repeat was 7.9. He denies any Hx of DM but an AM glucose today was 193 Pt today is awake alert and states in addition to chronic neck and back pain he has bilat flank pain. Past Medical History:   Diagnosis Date    Acute diastolic CHF (congestive heart failure) ([address] 11/17/15    Echo 11/18/15 EF 73%    Dermatitis     due to thimersol    Heart valve problem     leaky    Hx of cardiovascular stress test 12/9/2015    Lexiscan    Hypertension     Obesity        Past Surgical History:   Procedure Laterality Date    CARPAL TUNNEL RELEASE      HERNIA REPAIR      mesh in abd    NECK SURGERY         Family History   Problem Relation Age of Onset    Parkinsonism Mother     COPD Father         reports that he has never smoked. He has never used smokeless tobacco. He reports that he drinks alcohol. He reports that he does not use drugs. Allergies:  Latex;  Aldactone [spironolactone]; and Thimerosal    Current Medications:      vitamin B-1 (THIAMINE) tablet 100 mg Daily   chlordiazePOXIDE (LIBRIUM) capsule 20 mg TID   pantoprazole (PROTONIX) injection 40 mg Daily   And    sodium chloride (PF) 0.9 % injection 10 mL Daily   cefTRIAXone (ROCEPHIN) 1 g in sterile water 10 mL IV syringe Q24H   rifaximin (XIFAXAN) tablet 550 mg BID   octreotide (SANDOSTATIN) 500 mcg in sodium chloride 0.9 % 100 mL infusion Continuous   sodium chloride flush 0.9 % injection 10 mL 2 times per day   sodium chloride flush 0.9 % injection 10 mL PRN   amLODIPine (NORVASC) tablet 5 mg Daily   dextrose 5 % in lactated ringers infusion Continuous   acetaminophen (TYLENOL) tablet 650 mg Q4H PRN       Review of Systems:   Pertinent items are noted in HPI. Remainder of a complete review ofm systems is (-) other than stated above    Physical exam:   Constitutional: Morbidly Obese  Elderly male in NAD  Vitals:   VITALS:  /75   Pulse 101   Temp 98.2 °F (36.8 °C) (Oral)   Resp 10   Ht 5' 9\" (1.753 m)   Wt (!) 359 lb (162.8 kg)   SpO2 95%   BMI 53.02 kg/m²   24HR INTAKE/OUTPUT:    Intake/Output Summary (Last 24 hours) at 4/28/2019 1346  Last data filed at 4/28/2019 0859  Gross per 24 hour   Intake 300 ml   Output 1600 ml   Net -1300 ml     URINARY CATHETER OUTPUT ([place]):  Urethral Catheter Double-lumen-Output (mL): 300 mL  DRAIN/TUBE OUTPUT:     VENT SETTINGS:     Additional Respiratory  Assessments  Pulse: 101  Resp: 10  SpO2: 95 %    Skin: erythema of the legs bilat  Heent:  eomi, mmm, nc,at  Neck: no bruits or jvd noted  Cardiovascular:PMI not able o be appreciated due top obesity  S1, S2 without S3 or a rub  Respiratory: CTA B without w/r/r  Abdomen:  +bs, soft, tender diffusely  Nd, unable to palpate the liver or spleen due to obesity  Ext: 2-3+ bilat LE extending up on to the  Thigh and sacrum   Psychiatric: mood and affect appropriate, cr nr 2-12 grossly intact      Data:   Labs:  CBC:   Lab Results   Component Value Date    WBC 7.0 04/28/2019    RBC 3.01 04/28/2019    HGB 11.0 04/28/2019    HCT 32.1 04/28/2019    .6 04/28/2019    MCH 36.5 04/28/2019 MCHC 34.3 04/28/2019    RDW 14.7 04/28/2019    PLT 55 04/28/2019    MPV 9.2 04/28/2019     CBC with Differential:    Lab Results   Component Value Date    WBC 7.0 04/28/2019    RBC 3.01 04/28/2019    HGB 11.0 04/28/2019    HCT 32.1 04/28/2019    PLT 55 04/28/2019    .6 04/28/2019    MCH 36.5 04/28/2019    MCHC 34.3 04/28/2019    RDW 14.7 04/28/2019    LYMPHOPCT 7.5 04/27/2019    MONOPCT 13.8 04/27/2019    BASOPCT 0.9 04/27/2019    MONOSABS 1.10 04/27/2019    LYMPHSABS 0.60 04/27/2019    EOSABS 0.00 04/27/2019    BASOSABS 0.07 04/27/2019     Platelets:    Lab Results   Component Value Date    PLT 55 04/28/2019     Hemoglobin/Hematocrit:    Lab Results   Component Value Date    HGB 11.0 04/28/2019    HCT 32.1 04/28/2019     CMP:    Lab Results   Component Value Date     04/28/2019    K 3.8 04/28/2019    CL 99 04/28/2019    CO2 26 04/28/2019    BUN 14 04/28/2019    CREATININE 0.5 04/28/2019    GFRAA >60 04/28/2019    LABGLOM >60 04/28/2019    GLUCOSE 193 04/28/2019    PROT 6.5 04/28/2019    LABALBU 2.7 04/28/2019    CALCIUM 8.4 04/28/2019    BILITOT 4.2 04/28/2019    ALKPHOS 95 04/28/2019     04/28/2019    ALT 42 04/28/2019     BMP:    Lab Results   Component Value Date     04/28/2019    K 3.8 04/28/2019    CL 99 04/28/2019    CO2 26 04/28/2019    BUN 14 04/28/2019    LABALBU 2.7 04/28/2019    CREATININE 0.5 04/28/2019    CALCIUM 8.4 04/28/2019    GFRAA >60 04/28/2019    LABGLOM >60 04/28/2019    GLUCOSE 193 04/28/2019     BUN/Creatinine:    Lab Results   Component Value Date    BUN 14 04/28/2019    CREATININE 0.5 04/28/2019     Hepatic Function Panel:    Lab Results   Component Value Date    ALKPHOS 95 04/28/2019    ALT 42 04/28/2019     04/28/2019    PROT 6.5 04/28/2019    BILITOT 4.2 04/28/2019    LABALBU 2.7 04/28/2019     Albumin:    Lab Results   Component Value Date    LABALBU 2.7 04/28/2019     Calcium:    Lab Results   Component Value Date    CALCIUM 8.4 04/28/2019     Ionized Calcium:  No results found for: IONCA  Magnesium:    Lab Results   Component Value Date    MG 1.7 04/27/2019     Phosphorus:    Lab Results   Component Value Date    PHOS 3.1 04/27/2019     LDH:  No results found for: LDH  Uric Acid:  No results found for: LABURIC, URICACID  PT/INR:    Lab Results   Component Value Date    PROTIME 18.0 04/27/2019    INR 1.6 04/27/2019     PTT:    Lab Results   Component Value Date    APTT 41.3 04/27/2019   [APTT}  Troponin:    Lab Results   Component Value Date    TROPONINI <0.01 04/27/2019     U/A:    Lab Results   Component Value Date    NITRITE pos 12/30/2016    COLORU Yellow 03/02/2017    PROTEINU Negative 03/02/2017    PHUR 5.5 03/02/2017    WBCUA NONE 03/02/2017    WBCUA NONE 10/28/2010    RBCUA 5-10 03/02/2017    RBCUA NONE 10/28/2010    MUCUS Present 09/01/2015    BACTERIA NONE 03/02/2017    CLARITYU Clear 03/02/2017    SPECGRAV 1.025 03/02/2017    LEUKOCYTESUR Negative 03/02/2017    UROBILINOGEN 0.2 03/02/2017    BILIRUBINUR Negative 03/02/2017    BILIRUBINUR small 12/30/2016    BILIRUBINUR NEGATIVE 10/28/2010    BLOODU LARGE 03/02/2017    GLUCOSEU 500 03/02/2017    GLUCOSEU NEGATIVE 10/28/2010     ABG:  No results found for: PH, PCO2, PO2, HCO3, BE, THGB, TCO2, O2SAT  HgBA1c:  No results found for: LABA1C  Microalbumen/Creatinine ratio:  No components found for: RUCREAT  FLP:    Lab Results   Component Value Date    TRIG 117 11/03/2016    HDL 65 11/03/2016    LDLCALC 109 11/03/2016    LABVLDL 23 11/03/2016     TSH:    Lab Results   Component Value Date    TSH 2.530 11/18/2015     VITAMIN B12: No components found for: B12  FOLATE:  No results found for: FOLATE  Iron Saturation:  No components found for: PERCENTFE  FERRITIN:  No results found for: FERRITIN  RPR:  No results found for: RPR  HIV:  No results found for: HIV  AMYLASE:  No results found for: AMYLASE  LIPASE:    Lab Results   Component Value Date    LIPASE 31 04/27/2019     Fibrinogen Level:  No components found from the lower chest to symphysis pubis. Automatated dose   exposure control was utilized for this examination.               Postop changes: None       Enlargement left lobe of liver with slight nodularity suggesting   cirrhosis. Contracted gallbladder with multiple stones. The spleen, pancreas, adrenal glands, and kidneys are normal.  There   is no evidence of adenopathy or ascites. The small bowel is normal.   The colon is normal. The appendix is normal. The abdominal aorta is   normal in size. The urinary bladder, pelvic organs are normal. No   pelvic masses. No fractures of the lumbar spine or pelvic bones.           Impression   1. Negative CT scan abdomen pelvis for acute trauma. 2. Probable cirrhotic liver. 3. Contracted gallbladder with gallstones.          Assessment  1-Lactic Acidosis-there is no evidence of hypotension, fever, WBC to suggest sepsis or decreased organ perfusion to generate a Type A Lactic Acidosis-this may represent a Type B Lactic acidosis due to chronic liver dysfunction inn the setting the Cirrhosis and ETOH abuse also with the BMI and the elevated BS this AM he may have DM2 which when uncontrolled will also lead to a Type B Lactic aci,dosis-recheck level this PM continue IVF-check HgB A1c    2-Benign Essn HTN-BP improved today follow on amlodipine    3-Cirrhosis on rifaximin and Octreotide    4- Anemia-Fe++ pending        Thank you Dr. Yumiko Powell MD for allowing us to participate in care of Geraldine Lopez  1:45 PM  4/28/2019

## 2019-04-28 NOTE — PLAN OF CARE
Problem: Pain:  Goal: Control of acute pain  Description  Control of acute pain  4/28/2019 0743 by Farhad Ledezma RN  Outcome: Met This Shift

## 2019-04-29 LAB
ALBUMIN SERPL-MCNC: 2.8 G/DL (ref 3.5–5.2)
ALP BLD-CCNC: 88 U/L (ref 40–129)
ALT SERPL-CCNC: 39 U/L (ref 0–40)
AMMONIA: 105 UMOL/L (ref 16–60)
ANION GAP SERPL CALCULATED.3IONS-SCNC: 5 MMOL/L (ref 7–16)
AST SERPL-CCNC: 108 U/L (ref 0–39)
BILIRUB SERPL-MCNC: 4.5 MG/DL (ref 0–1.2)
BUN BLDV-MCNC: 15 MG/DL (ref 8–23)
CALCIUM SERPL-MCNC: 8.4 MG/DL (ref 8.6–10.2)
CHLORIDE BLD-SCNC: 102 MMOL/L (ref 98–107)
CO2: 29 MMOL/L (ref 22–29)
CREAT SERPL-MCNC: 0.5 MG/DL (ref 0.7–1.2)
GFR AFRICAN AMERICAN: >60
GFR NON-AFRICAN AMERICAN: >60 ML/MIN/1.73
GLUCOSE BLD-MCNC: 210 MG/DL (ref 74–99)
HCT VFR BLD CALC: 33 % (ref 37–54)
HCT VFR BLD CALC: 33.2 % (ref 37–54)
HCT VFR BLD CALC: 37.2 % (ref 37–54)
HEMOGLOBIN: 11 G/DL (ref 12.5–16.5)
HEMOGLOBIN: 11.2 G/DL (ref 12.5–16.5)
HEMOGLOBIN: 12.6 G/DL (ref 12.5–16.5)
MAGNESIUM: 1.7 MG/DL (ref 1.6–2.6)
MCH RBC QN AUTO: 36.6 PG (ref 26–35)
MCHC RBC AUTO-ENTMCNC: 33.7 % (ref 32–34.5)
MCV RBC AUTO: 108.5 FL (ref 80–99.9)
PDW BLD-RTO: 14.6 FL (ref 11.5–15)
PHOSPHORUS: 1.8 MG/DL (ref 2.5–4.5)
PLATELET # BLD: 63 E9/L (ref 130–450)
PLATELET CONFIRMATION: NORMAL
PMV BLD AUTO: 9.7 FL (ref 7–12)
POTASSIUM SERPL-SCNC: 4 MMOL/L (ref 3.5–5)
RBC # BLD: 3.06 E12/L (ref 3.8–5.8)
SODIUM BLD-SCNC: 136 MMOL/L (ref 132–146)
TOTAL PROTEIN: 6.6 G/DL (ref 6.4–8.3)
WBC # BLD: 5.6 E9/L (ref 4.5–11.5)

## 2019-04-29 PROCEDURE — 2060000000 HC ICU INTERMEDIATE R&B

## 2019-04-29 PROCEDURE — 6360000002 HC RX W HCPCS: Performed by: FAMILY MEDICINE

## 2019-04-29 PROCEDURE — 2580000003 HC RX 258: Performed by: FAMILY MEDICINE

## 2019-04-29 PROCEDURE — 97530 THERAPEUTIC ACTIVITIES: CPT

## 2019-04-29 PROCEDURE — 97165 OT EVAL LOW COMPLEX 30 MIN: CPT

## 2019-04-29 PROCEDURE — 2580000003 HC RX 258: Performed by: INTERNAL MEDICINE

## 2019-04-29 PROCEDURE — 85027 COMPLETE CBC AUTOMATED: CPT

## 2019-04-29 PROCEDURE — 97530 THERAPEUTIC ACTIVITIES: CPT | Performed by: PHYSICAL THERAPIST

## 2019-04-29 PROCEDURE — 85014 HEMATOCRIT: CPT

## 2019-04-29 PROCEDURE — 80053 COMPREHEN METABOLIC PANEL: CPT

## 2019-04-29 PROCEDURE — 6370000000 HC RX 637 (ALT 250 FOR IP): Performed by: FAMILY MEDICINE

## 2019-04-29 PROCEDURE — 83735 ASSAY OF MAGNESIUM: CPT

## 2019-04-29 PROCEDURE — 85018 HEMOGLOBIN: CPT

## 2019-04-29 PROCEDURE — 84100 ASSAY OF PHOSPHORUS: CPT

## 2019-04-29 PROCEDURE — 2700000000 HC OXYGEN THERAPY PER DAY

## 2019-04-29 PROCEDURE — 82140 ASSAY OF AMMONIA: CPT

## 2019-04-29 PROCEDURE — 6370000000 HC RX 637 (ALT 250 FOR IP): Performed by: INTERNAL MEDICINE

## 2019-04-29 PROCEDURE — 97162 PT EVAL MOD COMPLEX 30 MIN: CPT | Performed by: PHYSICAL THERAPIST

## 2019-04-29 PROCEDURE — 36415 COLL VENOUS BLD VENIPUNCTURE: CPT

## 2019-04-29 PROCEDURE — 99222 1ST HOSP IP/OBS MODERATE 55: CPT | Performed by: FAMILY MEDICINE

## 2019-04-29 PROCEDURE — 6360000002 HC RX W HCPCS: Performed by: INTERNAL MEDICINE

## 2019-04-29 PROCEDURE — C9113 INJ PANTOPRAZOLE SODIUM, VIA: HCPCS | Performed by: INTERNAL MEDICINE

## 2019-04-29 RX ORDER — FUROSEMIDE 10 MG/ML
40 INJECTION INTRAMUSCULAR; INTRAVENOUS 2 TIMES DAILY
Status: DISCONTINUED | OUTPATIENT
Start: 2019-04-29 | End: 2019-04-30

## 2019-04-29 RX ORDER — AMLODIPINE BESYLATE 2.5 MG/1
2.5 TABLET ORAL DAILY
Status: DISCONTINUED | OUTPATIENT
Start: 2019-04-30 | End: 2019-04-30

## 2019-04-29 RX ADMIN — FUROSEMIDE 40 MG: 10 INJECTION, SOLUTION INTRAMUSCULAR; INTRAVENOUS at 19:18

## 2019-04-29 RX ADMIN — CHLORDIAZEPOXIDE HYDROCHLORIDE 20 MG: 5 CAPSULE ORAL at 13:19

## 2019-04-29 RX ADMIN — Medication 10 ML: at 09:31

## 2019-04-29 RX ADMIN — RIFAXIMIN 550 MG: 550 TABLET ORAL at 09:30

## 2019-04-29 RX ADMIN — Medication 10 ML: at 21:56

## 2019-04-29 RX ADMIN — AMLODIPINE BESYLATE 5 MG: 5 TABLET ORAL at 09:30

## 2019-04-29 RX ADMIN — RIFAXIMIN 550 MG: 550 TABLET ORAL at 21:56

## 2019-04-29 RX ADMIN — ACETAMINOPHEN 650 MG: 325 TABLET ORAL at 06:02

## 2019-04-29 RX ADMIN — CHLORDIAZEPOXIDE HYDROCHLORIDE 20 MG: 5 CAPSULE ORAL at 09:30

## 2019-04-29 RX ADMIN — Medication 10 ML: at 09:30

## 2019-04-29 RX ADMIN — FUROSEMIDE 40 MG: 10 INJECTION, SOLUTION INTRAMUSCULAR; INTRAVENOUS at 13:20

## 2019-04-29 RX ADMIN — OCTREOTIDE ACETATE 25 MCG/HR: 500 INJECTION, SOLUTION INTRAVENOUS; SUBCUTANEOUS at 09:29

## 2019-04-29 RX ADMIN — PANTOPRAZOLE SODIUM 40 MG: 40 INJECTION, POWDER, LYOPHILIZED, FOR SOLUTION INTRAVENOUS at 09:30

## 2019-04-29 RX ADMIN — THIAMINE HCL TAB 100 MG 100 MG: 100 TAB at 09:30

## 2019-04-29 RX ADMIN — CHLORDIAZEPOXIDE HYDROCHLORIDE 20 MG: 5 CAPSULE ORAL at 21:55

## 2019-04-29 RX ADMIN — SODIUM CHLORIDE, SODIUM LACTATE, POTASSIUM CHLORIDE, CALCIUM CHLORIDE AND DEXTROSE MONOHYDRATE: 5; 600; 310; 30; 20 INJECTION, SOLUTION INTRAVENOUS at 08:44

## 2019-04-29 RX ADMIN — WATER 1 G: 1 INJECTION INTRAMUSCULAR; INTRAVENOUS; SUBCUTANEOUS at 13:20

## 2019-04-29 RX ADMIN — ACETAMINOPHEN 650 MG: 325 TABLET ORAL at 15:03

## 2019-04-29 ASSESSMENT — PAIN DESCRIPTION - DESCRIPTORS: DESCRIPTORS: ACHING;DISCOMFORT

## 2019-04-29 ASSESSMENT — PAIN SCALES - GENERAL
PAINLEVEL_OUTOF10: 0
PAINLEVEL_OUTOF10: 6
PAINLEVEL_OUTOF10: 0
PAINLEVEL_OUTOF10: 6

## 2019-04-29 ASSESSMENT — PAIN DESCRIPTION - PAIN TYPE: TYPE: CHRONIC PAIN

## 2019-04-29 ASSESSMENT — PAIN DESCRIPTION - ONSET: ONSET: ON-GOING

## 2019-04-29 ASSESSMENT — PAIN DESCRIPTION - PROGRESSION: CLINICAL_PROGRESSION: GRADUALLY WORSENING

## 2019-04-29 ASSESSMENT — PAIN DESCRIPTION - LOCATION: LOCATION: GENERALIZED

## 2019-04-29 ASSESSMENT — PAIN - FUNCTIONAL ASSESSMENT: PAIN_FUNCTIONAL_ASSESSMENT: PREVENTS OR INTERFERES WITH MANY ACTIVE NOT PASSIVE ACTIVITIES

## 2019-04-29 ASSESSMENT — PAIN DESCRIPTION - FREQUENCY: FREQUENCY: CONTINUOUS

## 2019-04-29 NOTE — PROGRESS NOTES
04/29/2019     CMP:    Lab Results   Component Value Date     04/29/2019    K 4.0 04/29/2019     04/29/2019    CO2 29 04/29/2019    BUN 15 04/29/2019    CREATININE 0.5 04/29/2019    GFRAA >60 04/29/2019    LABGLOM >60 04/29/2019    GLUCOSE 210 04/29/2019    PROT 6.6 04/29/2019    LABALBU 2.8 04/29/2019    CALCIUM 8.4 04/29/2019    BILITOT 4.5 04/29/2019    ALKPHOS 88 04/29/2019     04/29/2019    ALT 39 04/29/2019         ASSESSMENT:    Active Hospital Problems    Diagnosis Date Noted    Mental confusion [R41.0] 04/28/2019     Priority: High     Class: Acute    Alcoholic cirrhosis of liver without ascites ([facility] [K70.30] 04/28/2019     Priority: High     Class: Chronic    Morbid obesity with BMI of 45.0-49.9, adult ([facility] [E66.01, Z68.42] 01/12/2015     Priority: High     Class: Chronic    HTN (hypertension) [I10] 01/12/2015     Priority: High     Class: Chronic    Rhabdomyolysis [M62.82] 04/27/2019       PLAN: CONTINUE CURRENT MEDICATIONS AND Rx.AWAIT GI EVALUATION. D/C IV FLUIDS. START LASIX TO REDUCE GENERALIZED  EDEMA.       Electronically signed by Toro Carlos MD on 4/29/19 at 12:16 PM

## 2019-04-29 NOTE — CARE COORDINATION
Patient on Xifaxan, however is a self-pay. Received script and had physician sign an assistance application; will complete and fax. Patient is a , called [facility] and per [place] patient is not in their system. Patient to have financial assistance screening, however patient did state to me that he lives alone and gets an SSI check for about $1700 to $1800 a month.

## 2019-04-29 NOTE — CONSULTS
Naomy Baires M.D. [facility]  Dr. Ruby Brown M.D.,  Dr. Anthony Constantino M.D.,  Dr. Juliette Dean D.O.,  Dr Delmy Jarvis D.O. ,  Dr Nasir Holland M.D. Claudeen Pale  61 y.o.  male      Re: Cirrhosis of liver  Requesting physician: Dr. Atilio Hurley  Date:2:58 PM 4/29/2019          HPI: 57-year-old male patient seen in the hospital for above described issues. Patient apparently presented yesterday after being found confused at home. Apparently patient was home and acting erratically with paramedics finding multiple empty liquor bottles. Patient admits to drinking liquor shots mostly on the weekend but he cannot be more specific in regards to his alcohol intake. Patient currently appeared to be significantly more coherent. He complains of back and diffuse abdominal pain. He denies nausea vomiting. He reports bowel movements however he is unsure of the exact frequency or quality of his BMs. Patient currently denies any significant chest pain or palpitations. He denies dysphagia but reportedly in the past had trouble swallowing after a neck surgery and has upper endoscopy done in the past however according to the patient woke time ago and he cannot provide details. Patient is unsure of colonoscopy history as well. Upon presentation to the hospital he was noted to have abnormal CT appearance of the liver consistent with cirrhosis. MELD score on presentation 4/27 is calculated at 17. Patient also was noted to be newly anemic with hemoglobin remaining stable since yesterday at the 11 with hematocrit of 33. MCV is 108.5 consistent with history of alcohol abuse. His platelet count is 63. Previous iron studies showed increased iron level of 191 with increased ferritin of 759. Liver profile shows mildly elevated transaminases and normal alkaline phosphatase.  Ultrasound of the liver and gallbladder done yesterday is of poor technical quality with report of common bile duct at 4 nando.    Information sources:   -Patient  -medical record  -health care team    PMHx:  Past Medical History:   Diagnosis Date    Acute diastolic CHF (congestive heart failure) ([address] 11/17/15    Echo 11/18/15 EF 73%    Dermatitis     due to thimersol    Heart valve problem     leaky    Hx of cardiovascular stress test 12/9/2015    Lexiscan    Hypertension     Obesity        PSHx:  Past Surgical History:   Procedure Laterality Date    CARPAL TUNNEL RELEASE      HERNIA REPAIR      mesh in abd    NECK SURGERY         Meds:  Current Facility-Administered Medications   Medication Dose Route Frequency Provider Last Rate Last Dose    furosemide (LASIX) injection 40 mg  40 mg Intravenous BID Marty Flaherty MD   40 mg at 04/29/19 1320    [START ON 4/30/2019] amLODIPine (NORVASC) tablet 2.5 mg  2.5 mg Oral Daily Darylene Reid, MD        vitamin B-1 (THIAMINE) tablet 100 mg  100 mg Oral Daily Marty Flaherty MD   100 mg at 04/29/19 0930    chlordiazePOXIDE (LIBRIUM) capsule 20 mg  20 mg Oral TID Yumiko Powell MD   20 mg at 04/29/19 1319    pantoprazole (PROTONIX) injection 40 mg  40 mg Intravenous Daily Radha Righter, DO   40 mg at 04/29/19 0930    And    sodium chloride (PF) 0.9 % injection 10 mL  10 mL Intravenous Daily Radha Righter, DO   10 mL at 04/29/19 0931    cefTRIAXone (ROCEPHIN) 1 g in sterile water 10 mL IV syringe  1 g Intravenous Q24H Radha Righter, DO   1 g at 04/29/19 1320    rifaximin (XIFAXAN) tablet 550 mg  550 mg Oral BID Radha Righter, DO   550 mg at 04/29/19 0930    octreotide (SANDOSTATIN) 500 mcg in sodium chloride 0.9 % 100 mL infusion  25 mcg/hr Intravenous Continuous Radha Righter, DO 5 mL/hr at 04/29/19 0929 25 mcg/hr at 04/29/19 0929    sodium chloride flush 0.9 % injection 10 mL  10 mL Intravenous 2 times per day Yumiko Powell MD   10 mL at 04/29/19 0930    sodium chloride flush 0.9 % injection 10 mL  10 mL Intravenous PRN Yumiko Powell MD        acetaminophen (TYLENOL) tablet 650 mg  650 mg Oral Q4H PRN aMria Del Rosario Chauhan MD   650 mg at 04/29/19 1503       SocHx:  Social History     Socioeconomic History    Marital status: Single     Spouse name: Not on file    Number of children: Not on file    Years of education: Not on file    Highest education level: Not on file   Occupational History    Not on file   Social Needs    Financial resource strain: Not on file    Food insecurity:     Worry: Not on file     Inability: Not on file    Transportation needs:     Medical: Not on file     Non-medical: Not on file   Tobacco Use    Smoking status: Never Smoker    Smokeless tobacco: Never Used   Substance and Sexual Activity    Alcohol use: Yes    Drug use: No    Sexual activity: Not on file   Lifestyle    Physical activity:     Days per week: Not on file     Minutes per session: Not on file    Stress: Not on file   Relationships    Social connections:     Talks on phone: Not on file     Gets together: Not on file     Attends [employer] service: Not on file     Active member of club or organization: Not on file     Attends meetings of clubs or organizations: Not on file     Relationship status: Not on file    Intimate partner violence:     Fear of current or ex partner: Not on file     Emotionally abused: Not on file     Physically abused: Not on file     Forced sexual activity: Not on file   Other Topics Concern    Not on file   Social History Narrative    Not on file       FamHx:  Family History   Problem Relation Age of Onset    Parkinsonism Mother     COPD Father        Allergy:  Allergies   Allergen Reactions    Latex Itching    Aldactone [Spironolactone] Other (See Comments)     Confusion    Thimerosal Hives     **    ROS: As described in the HPI and in addition is negative upon detailed review of systems or unobtainable unless otherwise stated in this dictation.     PE:  /62   Pulse 92   Temp 98.8 °F (37.1 °C)   Resp 18   Ht 5' 9\" (1.753 m)   Wt (!) 359 lb (162.8 kg)   SpO2 95%   BMI 53.02 kg/m²     Gen. : Morbidly obese  male. NAD. Oriented to self, place and time  Head: Atraumatic/normocephalic  Eyes: EOMI/scleral icterus noted  ENT: Moist oral mucosa/no discharge from nose or ears  Neck: Supple with trachea midline  Chest: CTA B/symmetrical excursions  Cor: Regular/S1 and S2 appreciated without gallop. Distant heart sounds  Abd.: Obese and appearing diffusely tender. BS +. No rebound tenderness or guarding  Extr.: BLE 3+ edema with chronic induration and skin discoloration  Muscles: Decreased tone and bulk throughout  Skin: Warm and dry  Other: Burgess cath is noted with dark yellow urine      DATA:  Stool (measured) : 0 mL  Lab Results   Component Value Date    WBC 5.6 04/29/2019    RBC 3.06 04/29/2019    HGB 11.0 04/29/2019    HCT 33.0 04/29/2019    .5 04/29/2019    MCH 36.6 04/29/2019    MCHC 33.7 04/29/2019    RDW 14.6 04/29/2019    PLT 63 04/29/2019    MPV 9.7 04/29/2019     Lab Results   Component Value Date     04/29/2019    K 4.0 04/29/2019     04/29/2019    CO2 29 04/29/2019    BUN 15 04/29/2019    CREATININE 0.5 04/29/2019    CALCIUM 8.4 04/29/2019    PROT 6.6 04/29/2019    LABALBU 2.8 04/29/2019    BILITOT 4.5 04/29/2019    ALKPHOS 88 04/29/2019     04/29/2019    ALT 39 04/29/2019     Lab Results   Component Value Date    LIPASE 31 04/27/2019     No results found for: AMYLASE    Monitor data reviewed showing normal sinus rhythm    ASSESSMENT/PLAN:  1. Cirrhosis  -Likely alcoholic however additional liver workup/serology will be obtained  -DF calculate the 31.8 based on laboratory work from 27 April - repeat lab work to recalculate DF  -MELD 4/27 calculated at 17 - recalculate    2. Anemia  -new anemia with stable H&H  -Further evaluation with upper endoscopy during hospital admission with plan for colonoscopy as outpatient  -Obtain FOBT and iron studies. - monitor H&H; defer transfusion to admitting    3. Coagulopathy  -Secondary to cirrhosis  -Obtain repeat INR    4. Thrombocytopenia  -Secondary to above with possible direct effect from alcohol  -Monitor labs    5. Morbid obesity  -Per admitting    6. Abnormal 2-D echo  -Consider repeating as likely contributing to patient presentation    7. Lactic acidosis  -Appears improved and likely type B  -Nephrology input appreciated    Thank you for the opportunity to see this patient in consultation    NOTE:  This report was transcribed using voice recognition software. Every effort was made to ensure accuracy; however, inadvertent computerized transcription errors may be present.     Dipak Govea MD  4/29/2019  2:58 PM

## 2019-04-29 NOTE — PROGRESS NOTES
OCCUPATIONAL THERAPY INITIAL EVALUATION      Date:2019  Patient Name: New Salcedo  MRN: [id number]  : 1955  Room: [address] [place] [address]      Evaluating OT: Love Love OTR/L #[id number]    Placement Recommendations: MURALI    AM-PAC Daily Activity Raw Score:     Recommended Adaptive Equipment: To be further assessed  On rehab    Diagnosis:    1. Traumatic rhabdomyolysis, initial encounter ([facility]    2. Dehydration    3. Lactic acidosis    4. [facility] intoxication with complication ([facility]    5. Hyperammonemia (HCC)          Pertinent Medical History: HTN, CHF     Precautions:  Falls, O2,      Home Living: Pt lives alone in a 1 story with 4 step(s) to enter and 2 rail(s); bed/bath on 1st   Bathroom setup: tub shower , raised commode  Equipment owned: ww  Prior Level of Function: Independent  with ADLs , Independent  with IADLs; using no AD for ambulation. Driving: yes  Occupation: retired     Pain Level: 9/10 in R rib/chest area, kidneys, scrotum, pt agreeable to therapy despite pain,  nursing is aware  Cognition: A&O: 3/4; Follows 2 step directions   Memory:  fair    Sequencing:  fair    Problem solving:  poor   Judgement/safety:  fair      Functional Assessment:   Initial Eval Status  Date: 19 Treatment Status  Date: Short Term Goals  Treatment frequency: PRN    Feeding Stand by Assist   Modified [place]    Grooming Moderate Assist   Stand by Assist    UB Dressing Moderate Assist   Stand by Assist    LB Dressing Dependent   Maximal Assist    Bathing Maximal Assist  Minimal Assist    Toileting Dependent       Bed Mobility  Supine to sit: Maximal Assist x 2  Sit to supine: Maximal Assist x 2  Supine to sit: Moderate Assist   Sit to supine: Moderate Assist    Functional Transfers Sit to stand:  Moderate Assist x 2  Stand to sit: Moderate Assist X 2  Stand pivot: NT   Minimal Assist    Functional Mobility Mod A with ww  3 side steps close to bed  Min A with ww     Balance Sitting:     Static: [x]  Therapeutic Exercise  [x]  Visual/Perceptual: []    Delirium prevention/treatment  []   Other:  []    Rehab Potential: Good for established goals    Patient / Family Goal:  Not stated     Patient and/or family were instructed diagnosis, prognosis/goals and plan of care. Demonstrated good- understanding. [] Malnutrition indicators have been identified and nursing has been notified to ensure a dietitian consult is ordered.      Low Evaluation + 23 timed treatment minutes  Treatment Time in: 1017  Treatment Time out: [facility] [address] [place] [address] [id number]

## 2019-04-29 NOTE — PROGRESS NOTES
understanding of education/techniques, requiring additional training/education. At end of session, patient in bed with   call light and phone within reach,   all lines and tubes intact, nursing notified. Pt would benefit f rom continued skilled PT to increase functional independence and quality of life. Rehab Potential: good     Patients Goal: get stronger      ASSESSMENT  Patient exhibits decreased strength, balance, coordination impairing functional mobility. GOALS to be met in 3 days. Bed mobility-  Minimal assist        Transfers-Sit to stand-Minimal assist     Gait:  Patient to ambulate 50 feet using wheeled walker with Minimal assist           Increase rom in affected joints by 10%  Increase strength in affected mm groups by 1/3 grade  Increase balance to allow for improvement towards functional goals. Increase endurance to allow for improvement towards functional goals.         Renan Yen, PT

## 2019-04-29 NOTE — PROGRESS NOTES
Department of Internal Medicine  Nephrology Attending Progress Note        SUBJECTIVE:  The patient complaining of lower back pain over both kidneys states rt side worst than lt, no energy to eat,  States he normally sleeps in chair because of prior back surgery and bed killing his back .      Physical Exam:    Vitals:    04/29/19 0810   BP:    Pulse:    Resp:    Temp:    SpO2: 95%       I/O last 24 hours:  Intake/Output 180/475 inaccurate    Weight:364 (states he was 330 at Dr Conchita Cesar ' office)    General Appearance:  awake, alert, oriented, in mild distress  Skin:  Bilateral stasis dermatitis of lower extremities, bruise over chest wall  Neck:  neck- supple, no mass, non-tender  Lungs:  Distant bs  Heart:  Heart regular rate and rhythm     Abdominal: distended tender to touch + edema of abdominal wall, +penile enlargement and swelling of testicles  Extremities: 3+ edema of  bilateral lower extremities   Peripheral Pulses:  +2     DATA:    CBC with Differential:    Lab Results   Component Value Date    WBC 5.6 04/29/2019    RBC 3.06 04/29/2019    HGB 11.0 04/29/2019    HCT 33.0 04/29/2019    PLT 63 04/29/2019    .5 04/29/2019    MCH 36.6 04/29/2019    MCHC 33.7 04/29/2019    RDW 14.6 04/29/2019    LYMPHOPCT 7.5 04/27/2019    MONOPCT 13.8 04/27/2019    BASOPCT 0.9 04/27/2019    MONOSABS 1.10 04/27/2019    LYMPHSABS 0.60 04/27/2019    EOSABS 0.00 04/27/2019    BASOSABS 0.07 04/27/2019     BMP:    Lab Results   Component Value Date     04/29/2019    K 4.0 04/29/2019     04/29/2019    CO2 29 04/29/2019    BUN 15 04/29/2019    LABALBU 2.8 04/29/2019    CREATININE 0.5 04/29/2019    CALCIUM 8.4 04/29/2019    GFRAA >60 04/29/2019    LABGLOM >60 04/29/2019    GLUCOSE 210 04/29/2019     Magnesium:    Lab Results   Component Value Date    MG 1.7 04/29/2019     Phosphorus:    Lab Results   Component Value Date    PHOS 1.8 04/29/2019            furosemide  40 mg Intravenous BID    vitamin B-1  100 mg Oral Daily    chlordiazePOXIDE  20 mg Oral TID    pantoprazole  40 mg Intravenous Daily    And    sodium chloride (PF)  10 mL Intravenous Daily    cefTRIAXone (ROCEPHIN) IV  1 g Intravenous Q24H    rifaximin  550 mg Oral BID    sodium chloride flush  10 mL Intravenous 2 times per day    amLODIPine  5 mg Oral Daily      octreotide (SANDOSTATIN) infusion 25 mcg/hr (04/29/19 0929)    dextrose 5% in lactated ringers 50 mL/hr at 04/29/19 1317     sodium chloride flush, acetaminophen    IMPRESSION/RECOMMENDATIONS:      Lactic acidosis improving will stop iv fluids  Hypophosphatemia   Hx of cirrhosis on rifaximin and octreotide  HTN some of his edema may be from amlodipine as minimal ascites seen on ct, might benefit from change to beta blocker to lower portal pressures, hopefully octreotide can be stopped soon  Anemia suspect some hemoconcentration on admission      Nedra Osorio MD  4/29/2019 1:58 PM

## 2019-04-30 ENCOUNTER — ANESTHESIA (OUTPATIENT)
Dept: ENDOSCOPY | Age: 64
DRG: 432 | End: 2019-04-30

## 2019-04-30 ENCOUNTER — ANESTHESIA EVENT (OUTPATIENT)
Dept: ENDOSCOPY | Age: 64
DRG: 432 | End: 2019-04-30

## 2019-04-30 VITALS
OXYGEN SATURATION: 92 % | DIASTOLIC BLOOD PRESSURE: 64 MMHG | RESPIRATION RATE: 30 BRPM | SYSTOLIC BLOOD PRESSURE: 132 MMHG

## 2019-04-30 PROBLEM — J18.9 PNEUMONIA OF RIGHT UPPER LOBE DUE TO INFECTIOUS ORGANISM: Status: ACTIVE | Noted: 2019-04-30

## 2019-04-30 LAB
AFP-TUMOR MARKER: 6 NG/ML (ref 0–9)
ALBUMIN SERPL-MCNC: 2.5 G/DL (ref 3.5–5.2)
ALP BLD-CCNC: 90 U/L (ref 40–129)
ALT SERPL-CCNC: 36 U/L (ref 0–40)
AMMONIA: 61 UMOL/L (ref 16–60)
ANION GAP SERPL CALCULATED.3IONS-SCNC: 5 MMOL/L (ref 7–16)
AST SERPL-CCNC: 88 U/L (ref 0–39)
BASOPHILS ABSOLUTE: 0.05 E9/L (ref 0–0.2)
BASOPHILS RELATIVE PERCENT: 0.9 % (ref 0–2)
BILIRUB SERPL-MCNC: 3.7 MG/DL (ref 0–1.2)
BLOOD BANK DISPENSE STATUS: NORMAL
BLOOD BANK DISPENSE STATUS: NORMAL
BLOOD BANK PRODUCT CODE: NORMAL
BLOOD BANK PRODUCT CODE: NORMAL
BPU ID: NORMAL
BPU ID: NORMAL
BUN BLDV-MCNC: 14 MG/DL (ref 8–23)
CALCIUM SERPL-MCNC: 8.4 MG/DL (ref 8.6–10.2)
CHLORIDE BLD-SCNC: 101 MMOL/L (ref 98–107)
CO2: 32 MMOL/L (ref 22–29)
CREAT SERPL-MCNC: 0.6 MG/DL (ref 0.7–1.2)
DESCRIPTION BLOOD BANK: NORMAL
DESCRIPTION BLOOD BANK: NORMAL
EOSINOPHILS ABSOLUTE: 0.11 E9/L (ref 0.05–0.5)
EOSINOPHILS RELATIVE PERCENT: 1.8 % (ref 0–6)
GFR AFRICAN AMERICAN: >60
GFR NON-AFRICAN AMERICAN: >60 ML/MIN/1.73
GLUCOSE BLD-MCNC: 140 MG/DL (ref 74–99)
HAV IGM SER IA-ACNC: NORMAL
HCT VFR BLD CALC: 34.8 % (ref 37–54)
HCT VFR BLD CALC: 35.5 % (ref 37–54)
HCT VFR BLD CALC: 35.6 % (ref 37–54)
HCT VFR BLD CALC: 35.8 % (ref 37–54)
HEMOGLOBIN: 11.6 G/DL (ref 12.5–16.5)
HEMOGLOBIN: 11.7 G/DL (ref 12.5–16.5)
HEMOGLOBIN: 11.9 G/DL (ref 12.5–16.5)
HEMOGLOBIN: 12.1 G/DL (ref 12.5–16.5)
HEPATITIS B CORE IGM ANTIBODY: NORMAL
HEPATITIS B SURFACE ANTIGEN INTERPRETATION: NORMAL
HEPATITIS C ANTIBODY INTERPRETATION: NORMAL
INR BLD: 2
LYMPHOCYTES ABSOLUTE: 1.18 E9/L (ref 1.5–4)
LYMPHOCYTES RELATIVE PERCENT: 19.6 % (ref 20–42)
MAGNESIUM: 1.6 MG/DL (ref 1.6–2.6)
MCH RBC QN AUTO: 36.6 PG (ref 26–35)
MCHC RBC AUTO-ENTMCNC: 32.6 % (ref 32–34.5)
MCV RBC AUTO: 112.3 FL (ref 80–99.9)
MONOCYTES ABSOLUTE: 0.24 E9/L (ref 0.1–0.95)
MONOCYTES RELATIVE PERCENT: 3.6 % (ref 2–12)
NEUTROPHILS ABSOLUTE: 4.37 E9/L (ref 1.8–7.3)
NEUTROPHILS RELATIVE PERCENT: 74.1 % (ref 43–80)
PDW BLD-RTO: 14.6 FL (ref 11.5–15)
PHOSPHORUS: 3.1 MG/DL (ref 2.5–4.5)
PLATELET # BLD: 73 E9/L (ref 130–450)
PLATELET CONFIRMATION: NORMAL
PMV BLD AUTO: 9.1 FL (ref 7–12)
POTASSIUM SERPL-SCNC: 3.8 MMOL/L (ref 3.5–5)
PROTHROMBIN TIME: 22.1 SEC (ref 9.3–12.4)
RBC # BLD: 3.17 E12/L (ref 3.8–5.8)
SODIUM BLD-SCNC: 138 MMOL/L (ref 132–146)
TOTAL PROTEIN: 6.2 G/DL (ref 6.4–8.3)
WBC # BLD: 5.9 E9/L (ref 4.5–11.5)

## 2019-04-30 PROCEDURE — 2700000000 HC OXYGEN THERAPY PER DAY

## 2019-04-30 PROCEDURE — 99232 SBSQ HOSP IP/OBS MODERATE 35: CPT | Performed by: FAMILY MEDICINE

## 2019-04-30 PROCEDURE — 6360000002 HC RX W HCPCS: Performed by: NURSE ANESTHETIST, CERTIFIED REGISTERED

## 2019-04-30 PROCEDURE — 82140 ASSAY OF AMMONIA: CPT

## 2019-04-30 PROCEDURE — 3700000000 HC ANESTHESIA ATTENDED CARE: Performed by: INTERNAL MEDICINE

## 2019-04-30 PROCEDURE — 36430 TRANSFUSION BLD/BLD COMPNT: CPT

## 2019-04-30 PROCEDURE — 6370000000 HC RX 637 (ALT 250 FOR IP): Performed by: INTERNAL MEDICINE

## 2019-04-30 PROCEDURE — 2580000003 HC RX 258: Performed by: NURSE ANESTHETIST, CERTIFIED REGISTERED

## 2019-04-30 PROCEDURE — 82390 ASSAY OF CERULOPLASMIN: CPT

## 2019-04-30 PROCEDURE — 97530 THERAPEUTIC ACTIVITIES: CPT

## 2019-04-30 PROCEDURE — 81256 HFE GENE: CPT

## 2019-04-30 PROCEDURE — 85610 PROTHROMBIN TIME: CPT

## 2019-04-30 PROCEDURE — 80053 COMPREHEN METABOLIC PANEL: CPT

## 2019-04-30 PROCEDURE — 2580000003 HC RX 258: Performed by: INTERNAL MEDICINE

## 2019-04-30 PROCEDURE — P9059 PLASMA, FRZ BETWEEN 8-24HOUR: HCPCS

## 2019-04-30 PROCEDURE — 85018 HEMOGLOBIN: CPT

## 2019-04-30 PROCEDURE — 0DJ08ZZ INSPECTION OF UPPER INTESTINAL TRACT, VIA NATURAL OR ARTIFICIAL OPENING ENDOSCOPIC: ICD-10-PCS | Performed by: INTERNAL MEDICINE

## 2019-04-30 PROCEDURE — 84100 ASSAY OF PHOSPHORUS: CPT

## 2019-04-30 PROCEDURE — 6370000000 HC RX 637 (ALT 250 FOR IP): Performed by: FAMILY MEDICINE

## 2019-04-30 PROCEDURE — 97110 THERAPEUTIC EXERCISES: CPT

## 2019-04-30 PROCEDURE — 2060000000 HC ICU INTERMEDIATE R&B

## 2019-04-30 PROCEDURE — 3700000001 HC ADD 15 MINUTES (ANESTHESIA): Performed by: INTERNAL MEDICINE

## 2019-04-30 PROCEDURE — 6360000002 HC RX W HCPCS: Performed by: FAMILY MEDICINE

## 2019-04-30 PROCEDURE — 7100000011 HC PHASE II RECOVERY - ADDTL 15 MIN: Performed by: INTERNAL MEDICINE

## 2019-04-30 PROCEDURE — 85025 COMPLETE CBC W/AUTO DIFF WBC: CPT

## 2019-04-30 PROCEDURE — 2580000003 HC RX 258: Performed by: HOSPITALIST

## 2019-04-30 PROCEDURE — 7100000010 HC PHASE II RECOVERY - FIRST 15 MIN: Performed by: INTERNAL MEDICINE

## 2019-04-30 PROCEDURE — 3609017100 HC EGD: Performed by: INTERNAL MEDICINE

## 2019-04-30 PROCEDURE — 6360000002 HC RX W HCPCS: Performed by: INTERNAL MEDICINE

## 2019-04-30 PROCEDURE — C9113 INJ PANTOPRAZOLE SODIUM, VIA: HCPCS | Performed by: INTERNAL MEDICINE

## 2019-04-30 PROCEDURE — 85014 HEMATOCRIT: CPT

## 2019-04-30 PROCEDURE — 83735 ASSAY OF MAGNESIUM: CPT

## 2019-04-30 PROCEDURE — 2580000003 HC RX 258: Performed by: FAMILY MEDICINE

## 2019-04-30 PROCEDURE — 80074 ACUTE HEPATITIS PANEL: CPT

## 2019-04-30 PROCEDURE — 2709999900 HC NON-CHARGEABLE SUPPLY: Performed by: INTERNAL MEDICINE

## 2019-04-30 PROCEDURE — 36415 COLL VENOUS BLD VENIPUNCTURE: CPT

## 2019-04-30 PROCEDURE — 97535 SELF CARE MNGMENT TRAINING: CPT

## 2019-04-30 RX ORDER — 0.9 % SODIUM CHLORIDE 0.9 %
250 INTRAVENOUS SOLUTION INTRAVENOUS ONCE
Status: COMPLETED | OUTPATIENT
Start: 2019-04-30 | End: 2019-05-01

## 2019-04-30 RX ORDER — PROPOFOL 10 MG/ML
INJECTION, EMULSION INTRAVENOUS CONTINUOUS PRN
Status: DISCONTINUED | OUTPATIENT
Start: 2019-04-30 | End: 2019-04-30 | Stop reason: SDUPTHER

## 2019-04-30 RX ORDER — SODIUM CHLORIDE 9 MG/ML
INJECTION, SOLUTION INTRAVENOUS CONTINUOUS PRN
Status: DISCONTINUED | OUTPATIENT
Start: 2019-04-30 | End: 2019-04-30 | Stop reason: SDUPTHER

## 2019-04-30 RX ORDER — CHLORDIAZEPOXIDE HYDROCHLORIDE 5 MG/1
10 CAPSULE, GELATIN COATED ORAL 3 TIMES DAILY
Status: DISCONTINUED | OUTPATIENT
Start: 2019-04-30 | End: 2019-05-02

## 2019-04-30 RX ORDER — METOPROLOL SUCCINATE 25 MG/1
25 TABLET, EXTENDED RELEASE ORAL DAILY
Status: DISCONTINUED | OUTPATIENT
Start: 2019-04-30 | End: 2019-05-06 | Stop reason: HOSPADM

## 2019-04-30 RX ORDER — FUROSEMIDE 10 MG/ML
40 INJECTION INTRAMUSCULAR; INTRAVENOUS DAILY
Status: DISCONTINUED | OUTPATIENT
Start: 2019-05-01 | End: 2019-05-03

## 2019-04-30 RX ADMIN — Medication 10 ML: at 22:13

## 2019-04-30 RX ADMIN — SODIUM CHLORIDE 250 ML: 0.9 INJECTION, SOLUTION INTRAVENOUS at 18:23

## 2019-04-30 RX ADMIN — AMLODIPINE BESYLATE 2.5 MG: 2.5 TABLET ORAL at 10:13

## 2019-04-30 RX ADMIN — SODIUM CHLORIDE: 9 INJECTION, SOLUTION INTRAVENOUS at 16:13

## 2019-04-30 RX ADMIN — FUROSEMIDE 40 MG: 10 INJECTION, SOLUTION INTRAMUSCULAR; INTRAVENOUS at 09:18

## 2019-04-30 RX ADMIN — THIAMINE HCL TAB 100 MG 100 MG: 100 TAB at 10:14

## 2019-04-30 RX ADMIN — PANTOPRAZOLE SODIUM 40 MG: 40 INJECTION, POWDER, LYOPHILIZED, FOR SOLUTION INTRAVENOUS at 09:14

## 2019-04-30 RX ADMIN — CHLORDIAZEPOXIDE HYDROCHLORIDE 10 MG: 5 CAPSULE ORAL at 22:12

## 2019-04-30 RX ADMIN — OCTREOTIDE ACETATE 25 MCG/HR: 500 INJECTION, SOLUTION INTRAVENOUS; SUBCUTANEOUS at 01:27

## 2019-04-30 RX ADMIN — Medication 10 ML: at 10:17

## 2019-04-30 RX ADMIN — RIFAXIMIN 550 MG: 550 TABLET ORAL at 10:13

## 2019-04-30 RX ADMIN — CHLORDIAZEPOXIDE HYDROCHLORIDE 20 MG: 5 CAPSULE ORAL at 10:14

## 2019-04-30 RX ADMIN — RIFAXIMIN 550 MG: 550 TABLET ORAL at 22:13

## 2019-04-30 RX ADMIN — CHLORDIAZEPOXIDE HYDROCHLORIDE 10 MG: 5 CAPSULE ORAL at 18:22

## 2019-04-30 RX ADMIN — Medication 10 ML: at 10:20

## 2019-04-30 RX ADMIN — WATER 1 G: 1 INJECTION INTRAMUSCULAR; INTRAVENOUS; SUBCUTANEOUS at 18:23

## 2019-04-30 RX ADMIN — PROPOFOL 50 MCG/KG/MIN: 10 INJECTION, EMULSION INTRAVENOUS at 16:16

## 2019-04-30 RX ADMIN — METOPROLOL SUCCINATE 25 MG: 25 TABLET, EXTENDED RELEASE ORAL at 12:25

## 2019-04-30 ASSESSMENT — PAIN DESCRIPTION - LOCATION
LOCATION: ABDOMEN
LOCATION: FLANK

## 2019-04-30 ASSESSMENT — PAIN SCALES - GENERAL
PAINLEVEL_OUTOF10: 0

## 2019-04-30 ASSESSMENT — PAIN DESCRIPTION - FREQUENCY: FREQUENCY: INTERMITTENT

## 2019-04-30 ASSESSMENT — PAIN DESCRIPTION - DESCRIPTORS: DESCRIPTORS: SPASM;THROBBING

## 2019-04-30 ASSESSMENT — PAIN DESCRIPTION - PAIN TYPE
TYPE: VISCERAL PAIN
TYPE: SURGICAL PAIN

## 2019-04-30 ASSESSMENT — PAIN DESCRIPTION - ORIENTATION: ORIENTATION: LOWER

## 2019-04-30 NOTE — PLAN OF CARE
Problem: Pain:  Goal: Pain level will decrease  Description  Pain level will decrease  4/30/2019 0428 by Jhonatan Parada RN  Outcome: Met This Shift     Problem: Pain:  Goal: Control of acute pain  Description  Control of acute pain  4/30/2019 0428 by Jhonatan Parada RN  Outcome: Met This Shift     Problem: Falls - Risk of:  Goal: Will remain free from falls  Description  Will remain free from falls  4/30/2019 0428 by Jhonatan Parada RN  Outcome: Met This Shift     Problem: Falls - Risk of:  Goal: Absence of physical injury  Description  Absence of physical injury  4/30/2019 0428 by Jhonatan Parada RN  Outcome: Met This Shift

## 2019-04-30 NOTE — PROGRESS NOTES
Blood pressure 115/61, pulse 84, temperature 98 °F (36.7 °C), temperature source Oral, resp. rate 20, height 5' 9\" (1.753 m), weight (!) 359 lb (162.8 kg), SpO2 95 %. Patient seen and examined. H&P review with no changes. Plan:   EGD    Patient is seen and examined. Discussed plan of care with the resident and agreed with assessment and plan outlined above.   Maureen Kenny MD  Gastroenterology

## 2019-04-30 NOTE — PROCEDURES
Bruce Davidson is a 61 y.o. male patient. 1. Traumatic rhabdomyolysis, initial encounter ([facility]    2. Dehydration    3. Lactic acidosis    4. Acute alcoholic intoxication with complication ([facility]    5. Hyperammonemia (HCC)      Past Medical History:   Diagnosis Date    Acute diastolic CHF (congestive heart failure) ([facility] 11/17/15    Echo 11/18/15 EF 73%    Dermatitis     due to thimersol    Heart valve problem     leaky    Hx of cardiovascular stress test 12/9/2015    Lexiscan    Hypertension     Obesity      Blood pressure 120/60, pulse 72, temperature 98.1 °F (36.7 °C), resp. rate 16, height 5' 9\" (1.753 m), weight (!) 359 lb (162.8 kg), SpO2 97 %. Procedures  Procedure:  Esophagogastroduodenoscopy    Indication:    Anemia/Variceal screen  Sedation:  MAC      Endoscope was advanced easily through mouth to second portion of duodenum      Oropharynx views are limited but grossly normal.    Esophagus:   Mucosa is normal.  GEJ at 38 cm. No esophageal varices were noted. Stomach:   Antrum demonstrated moderate gastritis. Gastric body is normal.    Retroflexed views show mosaic pattern of the mucosa throughout the fundus and cardia. No gastric varices noted. Duodenum: Bulb demonstrated duodenitis. Second portion of duodenum is normal.    No biopsies were taken secondary to patient increase INR and thrombocytopenia. IMPRESSION AND PLAN:     1. Gastritis and duodenitis: No obvious source of bleeding or active bleeding noted. No biopsies taken secondary to increase INR and thrombocytopenia. No evidence of gastric or esophageal varices. PHG noted in the cardia and fundus with no active bleeding. Continue to monitor Hgb. Patient will require outpatient Colonoscopy for further evaluation. Continue Protonix 40 mg po daily, general diet, and d/c octreotide.        Pt was seen and procedure was performed with Dr. Cuco Leyva present for the entire procedure    [id number], DO  4/30/2019    I was present for entire duration of procedure; discussed findings with resident and agree with recommendations above    Toro Schwab MD  Gastroenterology

## 2019-04-30 NOTE — H&P
Patient seen and examined. H&P reviewed - no new changes. Vitals:    04/30/19 1200   BP: 115/61   Pulse: 84   Resp: 20   Temp: 98 °F (36.7 °C)   SpO2: 95%       A/P:  Cirrhosis/anemia    -Proceed with upper endoscopy later today    Initial H&P as below:  \"   History and Physical     Patient:  New Salcedo  MRN: [id number]     CHIEF COMPLAINT: mental confusion and weakness for 1 day.     History Obtained From:  patient, electronic medical record  PCP: Cathy Negro MD     HISTORY OF PRESENT ILLNESS:   Patient is a 35-year-old male who is admitted VIA ER  with a chief complaint of confusion and disordered behavior. Per EMS, they were called to the house because the patient states that he was lost in the woods. When they came to his house, he was walking around the house naked and did not know was going on. The paramedics found multiple empty liquor bottles. The patient had scattered abrasions of his upper or lower extremities, as well as his back. He does have some ecchymosis on his right anterior chest wall per EMS. He had some dry blood around his mouth. The patient states he does not know if he fell or not. He does admit to drinking last night. He complains of body wide pain. Patient has not been to the office for long time. He does have multiple problems.  He has not been taking his medications either,         Past Medical History:    Past Medical History             Diagnosis Date    Acute diastolic CHF (congestive heart failure) ([facility] 11/17/15     Echo 11/18/15 EF 73%    Dermatitis       due to thimersol    Heart valve problem       leaky    Hx of cardiovascular stress test 12/9/2015     Lexiscan    Hypertension      Obesity              Past Surgical History:    Past Surgical History             Procedure Laterality Date    CARPAL TUNNEL RELEASE        HERNIA REPAIR         mesh in abd    NECK SURGERY                Medications Prior to Admission:    Home Medications   Prior to Admission medications    Not on File            Allergies:  Latex; Aldactone [spironolactone]; and Thimerosal     Social History:   TOBACCO:   reports that he has never smoked. He has never used smokeless tobacco.  ETOH:   reports that he drinks alcohol. OCCUPATION:       Family History:   Family History             Problem Relation Age of Onset    Parkinsonism Mother      COPD Father              REVIEW OF SYSTEMS:  Negative except for   Pertinent items are noted in HPI. Physical Exam:    Vitals: /75   Pulse 101   Temp 98.2 °F (36.8 °C) (Oral)   Resp 10   Ht 5' 9\" (1.753 m)   Wt (!) 359 lb (162.8 kg)   SpO2 95%   BMI 53.02 kg/m²   General appearance: alert, appears stated age and cooperative  Skin: Skin color, texture, turgor normal. No rashes or lesions  HEENT: Head: Normocephalic, no lesions, without obvious abnormality. Neck: no adenopathy, no carotid bruit, no JVD, supple, symmetrical, trachea midline and thyroid not enlarged, symmetric, no tenderness/mass/nodules  Lungs: clear to auscultation bilaterally  Heart: regular rate and rhythm, S1, S2 normal, no murmur, click, rub or gallop  Abdomen: soft, non-tender; bowel sounds normal; no masses,  no organomegaly  Extremities: extremities normal, atraumatic, no cyanosis . MINIMAL LEG EDEMA.   Neurologic: Mental status: Alert, oriented, thought content appropriate     CBC:        Recent Labs     04/27/19  1310 04/28/19  0623   WBC 8.0 7.0   HGB 13.1 11.0*   PLT 87* 55*      BMP:         Recent Labs     04/27/19  1310 04/28/19  0623    134   K 4.3 3.8   CL 99 99   CO2 19* 26   BUN 11 14   CREATININE 0.5* 0.5*   GLUCOSE 123* 193*      Hepatic:        Recent Labs     04/27/19  1310 04/28/19  0623   * 140*   ALT 46* 42*   BILITOT 4.2* 4.2*   ALKPHOS 143* 95      Troponin:       Recent Labs     04/27/19 1310   TROPONINI <0.01      CT OF HEAD:  Impression:     Computed tomography of the brain within normal limits, as  above.      CT OF CHEST:  Impression:     1. 4 mm subpleural nodules right lung. Recommend follow-up in one  year. 2. Otherwise negative CT of the chest.      CT ABDOMEN:  Impression:     1. Negative CT scan abdomen pelvis for acute trauma. 2. Probable cirrhotic liver. 3. Contracted gallbladder with gallstones.            ASSESSMENT:)        Active Hospital Problems     Diagnosis Date Noted    Mental confusion [R41.0] 04/28/2019       Priority: High       Class: Acute    Morbid obesity with BMI of 45.0-49.9, adult (HCC) [E66.01, Z68.42] 01/12/2015       Priority: High       Class: Chronic    HTN (hypertension) [I10] 01/12/2015       Priority: High       Class: Chronic    Rhabdomyolysis [M62.82] 04/27/2019         PLAN: CONTINUE CURRENT MEDICATIONS. IV FLUIDS, WATCH FOR ALCOHOL WITHDRAWAL SYMPTOMS, NEPHROLOGY INPUT FOR RHABDOMYOLYSIS.     Electronically signed by Mara Pepe MD on 4/28/19 at 12:04 PM\"                    Mona Santana MD  4/30/2019  12:31 PM

## 2019-04-30 NOTE — PROGRESS NOTES
HOSPITAL   PROGRESS NOTE. SUBJECTIVE:  Dariana Lepe, 61 y.o., male C/O  FEELS WEAK BUT SLIGHTLY BETTER TODAY. STILL NOT OUT OF BED. CONDITION DISCUSSED WITH  PATIENT  AND NURSING   STAFF. CONSULT NOTES REVIEWED. [address] . BOWEL MOVEMENTS- NORMAL. NO URINE    PROBLEM. EENT: NORMAL            CVS: NORMAL. NO CHEST PAIN OR PALPITATION. RESPIRATORY:NO SOB. GI/: NO ABDOMINAL PAINS            MUSCULOSKELETAL: NO COMPLAIN            CNS: NO  COMPLAIN            OBJECTIVE:    GENERAL:Temperature:  Current - Temp: 97.9 °F (36.6 °C); Max - Temp  Av °F (36.7 °C)  Min: 97.9 °F (36.6 °C)  Max: 98.3 °F (36.8 °C)  Respiratory Rate : Resp  Av.7  Min: 18  Max: 20  Pulse Range: Pulse  Av.7  Min: 71  Max: 90  Blood Presuure Range:  Systolic (47VNK), UVM:819 , Min:128 , EGU:648   ; Diastolic (17TRV), GBP:81, Min:60, Max:74    Pulse ox Range: SpO2  Av %  Min: 94 %  Max: 97 %  24hr I & O:      Intake/Output Summary (Last 24 hours) at 2019 1133  Last data filed at 2019 0616  Gross per 24 hour   Intake --   Output 4225 ml   Net -4225 ml       CONSTITUTIONALl:OBESE,ORIENTED,CO-OPERATIVE  HEENT:NORMAL. NECK:  supple, symmetrical, trachea midline,Carotids- normal,JVP- flat  HEMATOLOGIC/LYMPHATICS:  no cervical lymphadenopathy  LUNGS:clear  CARDIOVASCULAR:  Normal apical impulse, regular rate and rhythm, normal S1 and S2, no S3 or S4, and no murmur noted  ABDOMEN:  normal bowel sounds, non-distended, non-tender, no masses palpated  EXTREMITIES: ARTHRITIC CHANGES. MOVEMENTS-LIMITED. PEDAL PULSES-FAIR. BILATERAL LEG EDEMA  LESS. NO EDEMA ON HANDS.   SKIN:  normal skin color, texture, turgor    Data    CBC:   Lab Results   Component Value Date    WBC 5.9 2019    RBC 3.17 2019    HGB 11.6 2019    HCT 35.6 2019    .3 2019    MCH 36.6 2019    MCHC 32.6 04/30/2019    RDW 14.6 04/30/2019    PLT 73 04/30/2019    MPV 9.1 04/30/2019     CMP:    Lab Results   Component Value Date     04/30/2019    K 3.8 04/30/2019     04/30/2019    CO2 32 04/30/2019    BUN 14 04/30/2019    CREATININE 0.6 04/30/2019    GFRAA >60 04/30/2019    LABGLOM >60 04/30/2019    GLUCOSE 140 04/30/2019    PROT 6.2 04/30/2019    LABALBU 2.5 04/30/2019    CALCIUM 8.4 04/30/2019    BILITOT 3.7 04/30/2019    ALKPHOS 90 04/30/2019    AST 88 04/30/2019    ALT 36 04/30/2019         ASSESSMENT:    Active Hospital Problems    Diagnosis Date Noted    Pneumonia of right upper lobe due to infectious organism ([facility] [J18.1] 04/30/2019     Priority: High     Class: Acute    Mental confusion [R41.0] 04/28/2019     Priority: High     Class: Acute    Alcoholic cirrhosis of liver without ascites ([facility] [K70.30] 04/28/2019     Priority: High     Class: Chronic    Morbid obesity with BMI of 45.0-49.9, adult ([facility] [E66.01, Z68.42] 01/12/2015     Priority: High     Class: Chronic    HTN (hypertension) [I10] 01/12/2015     Priority: High     Class: Chronic    Rhabdomyolysis [M62.82] 04/27/2019       PLAN: CONTINUE CURRENT MEDICATIONS AND Rx. CHANGE AMLODIPINE TO METOPROLOL AS PER NEPHROLOGY RECOMMENDATION. DECREASE LIBRIUM DOSAGE. SIT UP IN CHAIR AS TOLERATED.        Electronically signed by Rafael Arellano MD on 4/30/19 at 11:33 AM

## 2019-04-30 NOTE — PROGRESS NOTES
Department of Internal Medicine  Nephrology Attending Progress Note        SUBJECTIVE:  The patient up in chair, walked to cart to go for endoscopy, states he feels better in chair    Physical Exam:    Vitals:    04/30/19 1542   BP: 120/60   Pulse: 72   Resp: 16   Temp: 98.1 °F (36.7 °C)   SpO2: 97%       I/O last 24 hours:  Intake/Output intake ?/4225    Weight: not done    General Appearance:  awake, alert, oriented, in mild distress  Skin:  Bilateral stasis dermatitis of lower extremities, bruise over chest wall  Neck:  neck- supple, no mass, non-tender  Lungs:  Distant bs  Heart:  Heart regular rate and rhythm     Abdominal: distended tender to touch + edema of abdominal wall, +penile enlargement and swelling of testicles  Extremities: 3+ edema of  bilateral lower extremities   Peripheral Pulses:  +2         DATA:    CBC with Differential:    Lab Results   Component Value Date    WBC 5.9 04/30/2019    RBC 3.17 04/30/2019    HGB 11.9 04/30/2019    HCT 35.5 04/30/2019    PLT 73 04/30/2019    .3 04/30/2019    MCH 36.6 04/30/2019    MCHC 32.6 04/30/2019    RDW 14.6 04/30/2019    LYMPHOPCT 19.6 04/30/2019    MONOPCT 3.6 04/30/2019    BASOPCT 0.9 04/30/2019    MONOSABS 0.24 04/30/2019    LYMPHSABS 1.18 04/30/2019    EOSABS 0.11 04/30/2019    BASOSABS 0.05 04/30/2019     BMP:    Lab Results   Component Value Date     04/30/2019    K 3.8 04/30/2019     04/30/2019    CO2 32 04/30/2019    BUN 14 04/30/2019    LABALBU 2.5 04/30/2019    CREATININE 0.6 04/30/2019    CALCIUM 8.4 04/30/2019    GFRAA >60 04/30/2019    LABGLOM >60 04/30/2019    GLUCOSE 140 04/30/2019     Magnesium:    Lab Results   Component Value Date    MG 1.6 04/30/2019     Phosphorus:    Lab Results   Component Value Date    PHOS 3.1 04/30/2019            chlordiazePOXIDE  10 mg Oral TID    metoprolol succinate  25 mg Oral Daily    sodium chloride  250 mL Intravenous Once    furosemide  40 mg Intravenous BID    vitamin B-1  100 mg

## 2019-04-30 NOTE — ANESTHESIA PRE PROCEDURE
Department of Anesthesiology  Preprocedure Note       Name:  Charanjit Hahn   Age:  61 y.o.  :  1955                                          MRN:  [id number]         Date:  2019      Surgeon: Robbie Mayorga):  Jess Partida MD    Procedure: EGD ESOPHAGOGASTRODUODENOSCOPY (N/A )    Medications prior to admission:   Prior to Admission medications    Medication Sig Start Date End Date Taking?  Authorizing Provider   rifaximin (XIFAXAN) 550 MG tablet Take 1 tablet by mouth 2 times daily 19  Yes Janelle Mabry MD       Current medications:    Current Facility-Administered Medications   Medication Dose Route Frequency Provider Last Rate Last Dose    chlordiazePOXIDE (LIBRIUM) capsule 10 mg  10 mg Oral TID Silver Reeder MD        metoprolol succinate (TOPROL XL) extended release tablet 25 mg  25 mg Oral Daily Silver Reeder MD   25 mg at 19 1225    0.9 % sodium chloride bolus  250 mL Intravenous Once Janelle Mabry MD        furosemide (LASIX) injection 40 mg  40 mg Intravenous BID Cherry Weinert Stephanie Fernandez MD   40 mg at 19 7632    vitamin B-1 (THIAMINE) tablet 100 mg  100 mg Oral Daily Marty Stephanie Fernandez MD   100 mg at 19 1014    pantoprazole (PROTONIX) injection 40 mg  40 mg Intravenous Daily Dana Deck, DO   40 mg at 19 0078    And    sodium chloride (PF) 0.9 % injection 10 mL  10 mL Intravenous Daily Dana Deck, DO   10 mL at 19 1020    cefTRIAXone (ROCEPHIN) 1 g in sterile water 10 mL IV syringe  1 g Intravenous Q24H Dana Deck, DO   1 g at 19 1320    rifaximin (XIFAXAN) tablet 550 mg  550 mg Oral BID Dana Deck, DO   550 mg at 19 1013    octreotide (SANDOSTATIN) 500 mcg in sodium chloride 0.9 % 100 mL infusion  25 mcg/hr Intravenous Continuous Dana Deck, DO 5 mL/hr at 19 0127 25 mcg/hr at 19 0127    sodium chloride flush 0.9 % injection 10 mL  10 mL Intravenous 2 times per day Silver Reeder MD   10 mL at 19 1017    sodium chloride flush 0.9 % injection 10 mL  10 mL Intravenous PRN Marty Fenton MD        acetaminophen (TYLENOL) tablet 650 mg  650 mg Oral Q4H PRN Marty Fenton MD   650 mg at 04/29/19 1503       Allergies: Allergies   Allergen Reactions    Latex Itching    Aldactone [Spironolactone] Other (See Comments)     Confusion    Thimerosal Hives       Problem List:    Patient Active Problem List   Diagnosis Code    HTN (hypertension) I10    Morbid obesity with BMI of 45.0-49.9, adult ([facility] E66.01, Z68.42    Bilateral leg edema R60.0    Gout M10.9    Acute diastolic CHF (congestive heart failure) (HCC) I50.31    Chest pain R07.9    Bilateral low back pain without sciatica M54.5    Rhabdomyolysis M62.82    Mental confusion C81.3    Alcoholic cirrhosis of liver without ascites (HCC) K70.30    Pneumonia of right upper lobe due to infectious organism ([facility] J18.1       Past Medical History:        Diagnosis Date    Acute diastolic CHF (congestive heart failure) ([facility] 11/17/15    Echo 11/18/15 EF 73%    Dermatitis     due to thimersol    Heart valve problem     leaky    Hx of cardiovascular stress test 12/9/2015    Lexiscan    Hypertension     Obesity        Past Surgical History:        Procedure Laterality Date    CARPAL TUNNEL RELEASE      HERNIA REPAIR      mesh in abd    NECK SURGERY         Social History:    Social History     Tobacco Use    Smoking status: Never Smoker    Smokeless tobacco: Never Used   Substance Use Topics    Alcohol use:  Yes                                Counseling given: Not Answered      Vital Signs (Current):   Vitals:    04/30/19 0830 04/30/19 1200 04/30/19 1530 04/30/19 1542   BP: 128/70 115/61 (!) 116/58 120/60   Pulse: 71 84 71 72   Resp: 20 20 16 16   Temp: 97.9 °F (36.6 °C) 98 °F (36.7 °C) 98.2 °F (36.8 °C) 98.1 °F (36.7 °C)   TempSrc: Oral Oral Oral    SpO2: 97% 95% 97% 97%   Weight:       Height:                                                  BP Readings from Last 3 Encounters:   04/30/19 120/60   04/30/19 (!) 148/72   09/18/17 132/68       NPO Status:                                                                                 BMI:   Wt Readings from Last 3 Encounters:   04/27/19 (!) 359 lb (162.8 kg)   09/18/17 (!) 318 lb (144.2 kg)   09/11/17 (!) 318 lb (144.2 kg)     Body mass index is 53.02 kg/m². CBC:   Lab Results   Component Value Date    WBC 5.9 04/30/2019    RBC 3.17 04/30/2019    HGB 11.9 04/30/2019    HCT 35.5 04/30/2019    .3 04/30/2019    RDW 14.6 04/30/2019    PLT 73 04/30/2019       CMP:   Lab Results   Component Value Date     04/30/2019    K 3.8 04/30/2019     04/30/2019    CO2 32 04/30/2019    BUN 14 04/30/2019    CREATININE 0.6 04/30/2019    GFRAA >60 04/30/2019    LABGLOM >60 04/30/2019    GLUCOSE 140 04/30/2019    PROT 6.2 04/30/2019    CALCIUM 8.4 04/30/2019    BILITOT 3.7 04/30/2019    ALKPHOS 90 04/30/2019    AST 88 04/30/2019    ALT 36 04/30/2019       POC Tests: No results for input(s): POCGLU, POCNA, POCK, POCCL, POCBUN, POCHEMO, POCHCT in the last 72 hours.     Coags:   Lab Results   Component Value Date    PROTIME 22.1 04/30/2019    INR 2.0 04/30/2019    APTT 41.3 04/27/2019       HCG (If Applicable): No results found for: PREGTESTUR, PREGSERUM, HCG, HCGQUANT     ABGs: No results found for: PHART, PO2ART, IHX9BCQ, CEV1BQU, BEART, W4YQPIIY     Type & Screen (If Applicable):  No results found for: LABABO, [address]    Anesthesia Evaluation  Patient summary reviewed  Airway: Mallampati: III  TM distance: >3 FB   Neck ROM: full  Mouth opening: > = 3 FB Dental: normal exam         Pulmonary:normal exam    (+) pneumonia:                             Cardiovascular:    (+) hypertension:, CHF: diastolic,                   Neuro/Psych:   (+) neuromuscular disease:, psychiatric history:            GI/Hepatic/Renal:   (+) liver disease: coagulopathy from hepatic dysfunction, hepatic encephalopathy, morbid obesity

## 2019-04-30 NOTE — PROGRESS NOTES
Physical Therapy  Physical Therapy  Daily Treatment Note  4/30/2019  [id number]                      Mallika Bojorquez   [id number]                              1955    Patient Active Problem List   Diagnosis    HTN (hypertension)    Morbid obesity with BMI of 45.0-49.9, adult ([facility]    Bilateral leg edema    Gout    Acute diastolic CHF (congestive heart failure) (HCC)    Chest pain    Bilateral low back pain without sciatica    Rhabdomyolysis    Mental confusion    Alcoholic cirrhosis of liver without ascites ([facility]    Pneumonia of right upper lobe due to infectious organism [facility])       Recommendation for discharge: Subacute rehab  Equipment prescriptions needed:to be determined    AM-[facility] Mobility Inpatient   How much difficulty turning over in bed?: A Lot  How much difficulty sitting down on / standing up from a chair with arms?: A Lot  How much difficulty moving from lying on back to sitting on side of bed?: A Lot  How much help from another person moving to and from a bed to a chair?: A Lot  How much help from another person needed to walk in hospital room?: Total  How much help from another person for climbing 3-5 steps with a railing?: Total  AM-PAC Inpatient Mobility Raw Score : 10  AM-PAC Inpatient T-Scale Score : 32.29  Mobility Inpatient CMS 0-100% Score: 76.75  Mobility Inpatient CMS G-Code Modifier : CL      Precautions: falls, skin and low air loss bed          S: Patient cleared by nursing for treatment. Patient is agreeable to treatment. Pain status: (measured on a visual analog scale with 0=no pain and 10=excruciating pain) 0/10. O: Pt was instructed in and performed the following:   Bed Mobility- Supine to sit-Maximal A 2   Scooting- Maximal Assist  2   Sit to supine-na   Transfers-sit to stand- Moderate assistance 2     Gait:  Patient ambulated 2 x 15 feet using bariatric Wheeled Walker with Minimal assists 2. Comments:  SpO2 at 94% after ambulation.    Steps:  na  Treatment: Pt. Ambulated to the bathroom, requires assist for hygiene and  Mod/Max A 2 to transfer off commode. Ambulated back to a bariatric chair. Pt. Performed ankle pumps and laq x 10 reps each. Comment: OT also present to work with pt. Comment: Call light left by patient. A: Mary Jane. Well, moves slow and guarded, pleased to have a chair to sit in, states feels better when sitting up. P: Continue with physical therapy   Brown Sanchez PTA  GOALS to be met in 3 days. Bed mobility-  Minimal assist                                                  Transfers-Sit to stand-Minimal assist                Gait:  Patient to ambulate 50 feet using wheeled walker with Minimal assist                       Increase rom in affected joints by 10%  Increase strength in affected mm groups by 1/3 grade  Increase balance to allow for improvement towards functional goals.   Increase endurance to allow for improvement towards functional goals.    '

## 2019-05-01 LAB
ALBUMIN SERPL-MCNC: 2.9 G/DL (ref 3.5–5.2)
ALP BLD-CCNC: 82 U/L (ref 40–129)
ALT SERPL-CCNC: 34 U/L (ref 0–40)
AMMONIA: 40 UMOL/L (ref 16–60)
ANION GAP SERPL CALCULATED.3IONS-SCNC: 7 MMOL/L (ref 7–16)
AST SERPL-CCNC: 80 U/L (ref 0–39)
BILIRUB SERPL-MCNC: 3.2 MG/DL (ref 0–1.2)
BUN BLDV-MCNC: 17 MG/DL (ref 8–23)
CALCIUM SERPL-MCNC: 8.5 MG/DL (ref 8.6–10.2)
CHLORIDE BLD-SCNC: 99 MMOL/L (ref 98–107)
CO2: 33 MMOL/L (ref 22–29)
CREAT SERPL-MCNC: 0.7 MG/DL (ref 0.7–1.2)
GFR AFRICAN AMERICAN: >60
GFR NON-AFRICAN AMERICAN: >60 ML/MIN/1.73
GLUCOSE BLD-MCNC: 122 MG/DL (ref 74–99)
HCT VFR BLD CALC: 35.6 % (ref 37–54)
HEMOGLOBIN: 12.1 G/DL (ref 12.5–16.5)
LACTIC ACID: 1.4 MMOL/L (ref 0.5–2.2)
MAGNESIUM: 1.6 MG/DL (ref 1.6–2.6)
MCH RBC QN AUTO: 37.9 PG (ref 26–35)
MCHC RBC AUTO-ENTMCNC: 34 % (ref 32–34.5)
MCV RBC AUTO: 111.6 FL (ref 80–99.9)
PDW BLD-RTO: 14.4 FL (ref 11.5–15)
PHOSPHORUS: 3.7 MG/DL (ref 2.5–4.5)
PLATELET # BLD: 82 E9/L (ref 130–450)
PLATELET CONFIRMATION: NORMAL
PMV BLD AUTO: 9.1 FL (ref 7–12)
POTASSIUM SERPL-SCNC: 4.7 MMOL/L (ref 3.5–5)
RBC # BLD: 3.19 E12/L (ref 3.8–5.8)
REASON FOR REJECTION: NORMAL
REJECTED TEST: NORMAL
SODIUM BLD-SCNC: 139 MMOL/L (ref 132–146)
TOTAL PROTEIN: 6.8 G/DL (ref 6.4–8.3)
WBC # BLD: 5.5 E9/L (ref 4.5–11.5)

## 2019-05-01 PROCEDURE — 83735 ASSAY OF MAGNESIUM: CPT

## 2019-05-01 PROCEDURE — 6370000000 HC RX 637 (ALT 250 FOR IP): Performed by: INTERNAL MEDICINE

## 2019-05-01 PROCEDURE — 2580000003 HC RX 258: Performed by: FAMILY MEDICINE

## 2019-05-01 PROCEDURE — 1200000000 HC SEMI PRIVATE

## 2019-05-01 PROCEDURE — 99232 SBSQ HOSP IP/OBS MODERATE 35: CPT | Performed by: FAMILY MEDICINE

## 2019-05-01 PROCEDURE — 85027 COMPLETE CBC AUTOMATED: CPT

## 2019-05-01 PROCEDURE — 2700000000 HC OXYGEN THERAPY PER DAY

## 2019-05-01 PROCEDURE — C9113 INJ PANTOPRAZOLE SODIUM, VIA: HCPCS | Performed by: INTERNAL MEDICINE

## 2019-05-01 PROCEDURE — 80053 COMPREHEN METABOLIC PANEL: CPT

## 2019-05-01 PROCEDURE — 97530 THERAPEUTIC ACTIVITIES: CPT

## 2019-05-01 PROCEDURE — 36415 COLL VENOUS BLD VENIPUNCTURE: CPT

## 2019-05-01 PROCEDURE — 97116 GAIT TRAINING THERAPY: CPT

## 2019-05-01 PROCEDURE — 6360000002 HC RX W HCPCS: Performed by: INTERNAL MEDICINE

## 2019-05-01 PROCEDURE — 84100 ASSAY OF PHOSPHORUS: CPT

## 2019-05-01 PROCEDURE — 6370000000 HC RX 637 (ALT 250 FOR IP): Performed by: FAMILY MEDICINE

## 2019-05-01 PROCEDURE — 83605 ASSAY OF LACTIC ACID: CPT

## 2019-05-01 PROCEDURE — 82140 ASSAY OF AMMONIA: CPT

## 2019-05-01 PROCEDURE — 2580000003 HC RX 258: Performed by: INTERNAL MEDICINE

## 2019-05-01 RX ADMIN — PANTOPRAZOLE SODIUM 40 MG: 40 INJECTION, POWDER, LYOPHILIZED, FOR SOLUTION INTRAVENOUS at 09:01

## 2019-05-01 RX ADMIN — RIFAXIMIN 550 MG: 550 TABLET ORAL at 09:00

## 2019-05-01 RX ADMIN — Medication 10 ML: at 09:01

## 2019-05-01 RX ADMIN — Medication 10 ML: at 09:09

## 2019-05-01 RX ADMIN — CHLORDIAZEPOXIDE HYDROCHLORIDE 10 MG: 5 CAPSULE ORAL at 15:24

## 2019-05-01 RX ADMIN — THIAMINE HCL TAB 100 MG 100 MG: 100 TAB at 09:00

## 2019-05-01 RX ADMIN — CHLORDIAZEPOXIDE HYDROCHLORIDE 10 MG: 5 CAPSULE ORAL at 09:00

## 2019-05-01 RX ADMIN — METOPROLOL SUCCINATE 25 MG: 25 TABLET, EXTENDED RELEASE ORAL at 09:00

## 2019-05-01 RX ADMIN — FUROSEMIDE 40 MG: 10 INJECTION, SOLUTION INTRAMUSCULAR; INTRAVENOUS at 09:01

## 2019-05-01 RX ADMIN — WATER 1 G: 1 INJECTION INTRAMUSCULAR; INTRAVENOUS; SUBCUTANEOUS at 14:04

## 2019-05-01 RX ADMIN — CHLORDIAZEPOXIDE HYDROCHLORIDE 10 MG: 5 CAPSULE ORAL at 20:17

## 2019-05-01 RX ADMIN — RIFAXIMIN 550 MG: 550 TABLET ORAL at 20:17

## 2019-05-01 ASSESSMENT — PAIN SCALES - GENERAL
PAINLEVEL_OUTOF10: 0

## 2019-05-01 NOTE — PLAN OF CARE
Problem: Pain:  Goal: Pain level will decrease  Description  Pain level will decrease  Outcome: Met This Shift     Problem: Falls - Risk of:  Goal: Will remain free from falls  Description  Will remain free from falls  Outcome: Met This Shift     Problem: Falls - Risk of:  Goal: Absence of physical injury  Description  Absence of physical injury  Outcome: Met This Shift

## 2019-05-01 NOTE — PROGRESS NOTES
Consult received,chart reviewed. Discussed with Dr. David Weathers. Patient does not meet criteria for an ARU stay. Will rec: MURALI level of care. Social work, Alex Contreras notified.

## 2019-05-01 NOTE — PROGRESS NOTES
HOSPITAL   PROGRESS NOTE. SUBJECTIVE:  Carey Obrien, 61 y.o., male C/O  FEELS BETTER BUT STILL, TOO WEAK. CONDITION DISCUSSED WITH  PATIENT  AND NURSING   STAFF. CONSULT NOTES REVIEWED. ROS:GENERAL- APPETITE- GOOD. BOWEL MOVEMENTS- NORMAL. NO URINE    PROBLEM. EENT: NORMAL            CVS: NORMAL. NO CHEST PAIN OR PALPITATION. RESPIRATORY:NO SOB. GI/: NO ABDOMINAL PAINS            MUSCULOSKELETAL: NO COMPLAIN            CNS: NO  COMPLAIN            OBJECTIVE:    GENERAL:Temperature:  Current - Temp: 98 °F (36.7 °C); Max - Temp  Av.2 °F (36.8 °C)  Min: 98 °F (36.7 °C)  Max: 98.5 °F (36.9 °C)  Respiratory Rate : Resp  Av.5  Min: 0  Max: 38  Pulse Range: Pulse  Av.7  Min: 62  Max: 78  Blood Presuure Range:  Systolic (13MON), YBQ:551 , Min:114 , YVR:302   ; Diastolic (91UQR), MWX:50, Min:58, Max:74    Pulse ox Range: SpO2  Av.5 %  Min: 90 %  Max: 100 %  24hr I & O:      Intake/Output Summary (Last 24 hours) at 2019 1212  Last data filed at 2019 0549  Gross per 24 hour   Intake 635 ml   Output 850 ml   Net -215 ml       CONSTITUTIONALl:MORBIDLY OBESE,ORIENTED,CO-OPERATIVE  HEENT:NORMAL. NECK:  supple, symmetrical, trachea midline,Carotids- normal,JVP- flat  HEMATOLOGIC/LYMPHATICS:  no cervical lymphadenopathy  LUNGS:clear  CARDIOVASCULAR:  Normal apical impulse, regular rate and rhythm, normal S1 and S2, no S3 or S4, and no murmur noted  ABDOMEN:  normal bowel sounds, non-distended, non-tender, no masses palpated  EXTREMITIES: ARTHRITIC CHANGES. MOVEMENTS-LIMITED. PEDAL PULSES-FAIR.   SKIN:  normal skin color, texture, turgor    Data    CBC:   Lab Results   Component Value Date    WBC 5.5 2019    RBC 3.19 2019    HGB 12.1 2019    HCT 35.6 2019    .6 2019    MCH 37.9 2019    MCHC 34.0 2019    RDW 14.4 2019    PLT 82 2019    MPV 9.1 05/01/2019     CMP:    Lab Results   Component Value Date     05/01/2019    K 4.7 05/01/2019    CL 99 05/01/2019    CO2 33 05/01/2019    BUN 17 05/01/2019    CREATININE 0.7 05/01/2019    GFRAA >60 05/01/2019    LABGLOM >60 05/01/2019    GLUCOSE 122 05/01/2019    PROT 6.8 05/01/2019    LABALBU 2.9 05/01/2019    CALCIUM 8.5 05/01/2019    BILITOT 3.2 05/01/2019    ALKPHOS 82 05/01/2019    AST 80 05/01/2019    ALT 34 05/01/2019         ASSESSMENT:    Active Hospital Problems    Diagnosis Date Noted    Pneumonia of right upper lobe due to infectious organism ([facility] [J18.1] 04/30/2019     Priority: High     Class: Acute    Mental confusion [R41.0] 04/28/2019     Priority: High     Class: Acute    Alcoholic cirrhosis of liver without ascites ([facility] [K70.30] 04/28/2019     Priority: High     Class: Chronic    Morbid obesity with BMI of 45.0-49.9, adult ([facility] [E66.01, Z68.42] 01/12/2015     Priority: High     Class: Chronic    HTN (hypertension) [I10] 01/12/2015     Priority: High     Class: Chronic    Rhabdomyolysis [M62.82] 04/27/2019       PLAN: CONTINUE CURRENT MEDICATIONS AND Rx.ENCOURAGE SITTING UP IN CHAIR.       Electronically signed by Marco Antonio Coronel MD on 5/1/19 at 12:12 PM

## 2019-05-01 NOTE — PROGRESS NOTES
Nephrology Note  Cherri Martin MD          Patient seen and examined. Chart reviewed. No family member is present at bedside. Patient is feeling better. Denies shortness of breath. Appetite good. No nausea or dysguesia. Awake and alert . In no acute distress. Vital SignsBP 132/69   Pulse 62   Temp 98 °F (36.7 °C) (Oral)   Resp 18   Ht 5' 9\" (1.753 m)   Wt (!) 351 lb 11.2 oz (159.5 kg)   SpO2 96%   BMI 51.94 kg/m²   24HR INTAKE/OUTPUT:    Intake/Output Summary (Last 24 hours) at 5/1/2019 0948  Last data filed at 5/1/2019 0549  Gross per 24 hour   Intake 635 ml   Output 4050 ml   Net -3415 ml         Physical Exam    Neck: No JVD  Lungs: Breath sounds decreased at the bases. No rales or ronchi. Heart: Regular rate and rhythm. No S3 gallop. No  murmrur. Sounds are distant  Abdomen: Soft non distended, non tender and normal bowel sounds. Extremeties: +2   bipedal edema with skin changes of chronic venous stasis dermatitis.       ROS:  RESPIRATORY:  negative  CARDIOVASCULAR:  negative  GASTROINTESTINAL:  negative  GENITOURINARY:  Positive scrotal edema    Current Facility-Administered Medications   Medication Dose Route Frequency Provider Last Rate Last Dose    chlordiazePOXIDE (LIBRIUM) capsule 10 mg  10 mg Oral TID Marty Velez MD   10 mg at 05/01/19 0900    metoprolol succinate (TOPROL XL) extended release tablet 25 mg  25 mg Oral Daily Marty Velez MD   25 mg at 05/01/19 0900    furosemide (LASIX) injection 40 mg  40 mg Intravenous Daily Luiza Mueller MD   40 mg at 05/01/19 0901    vitamin B-1 (THIAMINE) tablet 100 mg  100 mg Oral Daily Marty Velez MD   100 mg at 05/01/19 0900    pantoprazole (PROTONIX) injection 40 mg  40 mg Intravenous Daily García Osier, DO   40 mg at 05/01/19 0901    And    sodium chloride (PF) 0.9 % injection 10 mL  10 mL Intravenous Daily García Osier, DO   10 mL at 05/01/19 0901    cefTRIAXone (ROCEPHIN) 1 g in sterile water 10 mL IV syringe  1 g Intravenous Q24H Adam Stuart DO   1 g at 04/30/19 1823    rifaximin (XIFAXAN) tablet 550 mg  550 mg Oral BID Adam Davidson DO   550 mg at 05/01/19 0900    sodium chloride flush 0.9 % injection 10 mL  10 mL Intravenous 2 times per day Jayleen Paz MD   10 mL at 05/01/19 0909    sodium chloride flush 0.9 % injection 10 mL  10 mL Intravenous PRN Marty Miles MD        acetaminophen (TYLENOL) tablet 650 mg  650 mg Oral Q4H PRN Marty Miles MD   650 mg at 04/29/19 1503         Labs:    CBC:   Recent Labs     04/29/19  0522  04/30/19  0500 04/30/19  1225 04/30/19  1948 05/01/19  0734   WBC 5.6  --  5.9  --   --  5.5   HGB 11.2*   < > 11.6* 11.9* 11.7* 12.1*   PLT 63*  --  73*  --   --  82*    < > = values in this interval not displayed. Lab Results   Component Value Date    IRON 191 (H) 04/28/2019    TIBC 174 (L) 04/28/2019    FERRITIN 759 04/28/2019       Lab Results   Component Value Date    CALCIUM 8.5 (L) 05/01/2019    CALCIUM 8.4 (L) 04/30/2019    CALCIUM 8.4 (L) 04/29/2019    PHOS 3.7 05/01/2019    PHOS 3.1 04/30/2019    PHOS 1.8 (L) 04/29/2019    MG 1.6 05/01/2019    MG 1.6 04/30/2019    MG 1.7 04/29/2019       BMP:   Recent Labs     04/29/19  0522 04/30/19  0501 05/01/19  0541    138 139   K 4.0 3.8 4.7    101 99   CO2 29 32* 33*   BUN 15 14 17   CREATININE 0.5* 0.6* 0.7   GLUCOSE 210* 140* 122*             Assessment: / Plan:    1. Lactic acidosis. Lactic acidosis probably secondary to diabetes lactic acidosis due to chronic liver dysfunction in the setting of cirrhosis and alcohol abuse. We'll recheck lactic acid levels. 2.  Benign essential hypertension. Blood pressures are at target. 3.   Anemia. Iron studies fair. 4.   Edema. Continue diuretics.         Comfort Recinos MD  Nephrology  Electronically signed by Comfort Recinos MD on 5/1/2019 at 9:48 AM

## 2019-05-01 NOTE — PLAN OF CARE
Problem: Risk for Impaired Skin Integrity  Goal: Tissue integrity - skin and mucous membranes  Description  Structural intactness and normal physiological function of skin and  mucous membranes.   Outcome: Met This Shift     Problem: Pain:  Goal: Pain level will decrease  Description  Pain level will decrease  5/1/2019 1619 by Jazmine Rutledge RN  Outcome: Met This Shift     Problem: Pain:  Goal: Control of acute pain  Description  Control of acute pain  Outcome: Met This Shift     Problem: Falls - Risk of:  Goal: Will remain free from falls  Description  Will remain free from falls  5/1/2019 1619 by Jazmine Rutledge RN  Outcome: Met This Shift     Problem: Falls - Risk of:  Goal: Absence of physical injury  Description  Absence of physical injury  5/1/2019 1619 by Jazmine Rutledge RN  Outcome: Met This Shift     Problem: Discharge Planning:  Goal: Discharged to appropriate level of care  Description  Discharged to appropriate level of care  Outcome: Met This Shift  Note:   Discharge planning made with patient MD and therapy

## 2019-05-01 NOTE — PROGRESS NOTES
PROGRESS NOTE  By Truman Joseph M.D. [facility]  Dr. Alondra Mai M.D.,  Dr. Camila Galvan M.D.,   Dr. Iraj Biswas D.O.,  Dr. Brown Trotter M.D.,  Dr Viv Bruno D.O. Meliza Anguianos  61 y.o.  male    SUBJECTIVE:  Denies abdominal pain. Denies nausea vomiting    OBJECTIVE:    /69   Pulse 62   Temp 98 °F (36.7 °C) (Oral)   Resp 18   Ht 5' 9\" (1.753 m)   Wt (!) 351 lb 11.2 oz (159.5 kg)   SpO2 96%   BMI 51.94 kg/m²     General: Morbidly obese  male. NAD. AAO ×3  HEENT: Anicteric sclerae/moist oromucosa  Neck: Supple/trachea midline  Chest: Symmetrical excursions/nonlabored respirations  Abd.: Soft and obese. Appears nontender  Extr.:>3+ BLE edema with chronic intervention  Skin: Warm and dry/anicteric    DATA:    Monitor data reviewed - sinus rhythm noted. Stool (measured) : 0 mL  Lab Results   Component Value Date    WBC 5.5 05/01/2019    RBC 3.19 05/01/2019    HGB 12.1 05/01/2019    HCT 35.6 05/01/2019    .6 05/01/2019    MCH 37.9 05/01/2019    MCHC 34.0 05/01/2019    RDW 14.4 05/01/2019    PLT 82 05/01/2019    MPV 9.1 05/01/2019     Lab Results   Component Value Date     05/01/2019    K 4.7 05/01/2019    CL 99 05/01/2019    CO2 33 05/01/2019    BUN 17 05/01/2019    CREATININE 0.7 05/01/2019    CALCIUM 8.5 05/01/2019    PROT 6.8 05/01/2019    LABALBU 2.9 05/01/2019    BILITOT 3.2 05/01/2019    ALKPHOS 82 05/01/2019    AST 80 05/01/2019    ALT 34 05/01/2019     Lab Results   Component Value Date    LIPASE 31 04/27/2019     No results found for: AMYLASE      ASSESSMENT/PLAN:  1. Cirrhosis  -Likely alcoholic however   -additional liver workup/serology . Negative  -DF  31.8 4/27 -   -MELD 4/27 calculated at 17   -Improved ammonia level     2.  Anemia  -new anemia with stable H&H  -S/P EGD 4/30 revealing moderate gastritis but no varices  -Plan for outpatient colonoscopy unless precipitous drop or overt bleed  - monitor H&H; defer transfusion to admitting     3. Coagulopathy  -Secondary to cirrhosis  -Monitor INR     4. Thrombocytopenia  -Secondary to above with possible direct effect from alcohol  -Monitor labs     5. Morbid obesity  -Per admitting     6. Abnormal 2-D echo  -Consider repeating as likely contributing to patient presentation     7. Lactic acidosis  -Appears improved and likely type B  -Nephrology input appreciated        NOTE:  This report was transcribed using voice recognition software. Every effort was made to ensure accuracy; however, inadvertent computerized transcription errors may be present.     Junior Velasquez MD  5/1/2019  12:37 PM

## 2019-05-01 NOTE — CARE COORDINATION
SOCIAL WORK / DISCHARGE PLANNING:  Sw met with pt at bedside to discuss transition to care. Pt resides alone and reports prior to admission pt was ambulatory at times with cane as well as has a walker. He was independent with ADLS/IADLS including driving. PT/OT eval completed , recommend rehab. Pt's insurance has termed and Public Benefits have assessed and determine pt does not qualify for medicaid. Pt is unable to turn self well in bed and would need rehab prior to return home. He is open to this but options are limited due to lack of insurance. Possible consider [facility], referral called to [facility]. Await review for possible acceptance.                    Electronically signed by EVON Reynolds on 5/1/2019 at 12:58 PM

## 2019-05-01 NOTE — PROGRESS NOTES
Physical Therapy  Physical Therapy  Daily Treatment Note  5/1/2019  [id number]                      Bessie Woodall   [id number]                              1955    Patient Active Problem List   Diagnosis    HTN (hypertension)    Morbid obesity with BMI of 45.0-49.9, adult ([facility]    Bilateral leg edema    Gout    Acute diastolic CHF (congestive heart failure) (HCC)    Chest pain    Bilateral low back pain without sciatica    Rhabdomyolysis    Mental confusion    Alcoholic cirrhosis of liver without ascites ([facility]    Pneumonia of right upper lobe due to infectious organism [facility])       Recommendation for discharge: Subacute rehab  Equipment prescriptions needed:to be determined    AM-[facility] Mobility Inpatient   How much difficulty turning over in bed?: A Lot  How much difficulty sitting down on / standing up from a chair with arms?: A Lot  How much difficulty moving from lying on back to sitting on side of bed?: A Lot  How much help from another person moving to and from a bed to a chair?: A Lot  How much help from another person needed to walk in hospital room?: Total  How much help from another person for climbing 3-5 steps with a railing?: Total  AM-P[facility] Inpatient Mobility Raw Score : 10  AM-PAC Inpatient T-Scale Score : 32.29  Mobility Inpatient CMS 0-100% Score: 76.75  Mobility Inpatient CMS G-Code Modifier : CL      Precautions: falls, skin and low air loss bed          S: Patient cleared by nursing for treatment. Patient is agreeable to treatment. Pain status: (measured on a visual analog scale with 0=no pain and 10=excruciating pain) 0/10. O: Pt was instructed in and performed the following:   Bed Mobility- Supine to sit-Maximal A 2   Scooting- Maximal Assist  2   Sit to supine-na   Transfers-sit to stand- Moderate assistance 2     Gait:  Patient ambulated 3-4 feet using bariatric Wheeled Walker with Minimal assists 2.     Steps:  na  Treatment: Pt. Stood at eob x 3-4 minutes for hygiene Ambulated to chair, requires assist for hygiene and  Mod/Max A 2 to transfer out of bed. Comment: Call light left by patient. A: Mary Jane. Well, moves slow and guarded, states feels better when sitting up, wants to eat lunch in chair. P: Continue with physical therapy   Tammy Thompson, CHRISTINA  GOALS to be met in 3 days. Bed mobility-  Minimal assist                                                  Transfers-Sit to stand-Minimal assist                Gait:  Patient to ambulate 50 feet using wheeled walker with Minimal assist                       Increase rom in affected joints by 10%  Increase strength in affected mm groups by 1/3 grade  Increase balance to allow for improvement towards functional goals.   Increase endurance to allow for improvement towards functional goals.    '

## 2019-05-02 PROBLEM — K76.82 HEPATIC ENCEPHALOPATHY: Status: ACTIVE | Noted: 2019-05-02

## 2019-05-02 LAB
ALBUMIN SERPL-MCNC: 2.5 G/DL (ref 3.5–5.2)
ALP BLD-CCNC: 94 U/L (ref 40–129)
ALT SERPL-CCNC: 32 U/L (ref 0–40)
AMMONIA: 61 UMOL/L (ref 16–60)
ANION GAP SERPL CALCULATED.3IONS-SCNC: 4 MMOL/L (ref 7–16)
ANISOCYTOSIS: ABNORMAL
AST SERPL-CCNC: 71 U/L (ref 0–39)
BASOPHILS ABSOLUTE: 0.05 E9/L (ref 0–0.2)
BASOPHILS RELATIVE PERCENT: 0.9 % (ref 0–2)
BILIRUB SERPL-MCNC: 2.8 MG/DL (ref 0–1.2)
BUN BLDV-MCNC: 18 MG/DL (ref 8–23)
CALCIUM SERPL-MCNC: 8.2 MG/DL (ref 8.6–10.2)
CERULOPLASMIN: 23 MG/DL (ref 15–30)
CHLORIDE BLD-SCNC: 98 MMOL/L (ref 98–107)
CO2: 37 MMOL/L (ref 22–29)
CREAT SERPL-MCNC: 0.6 MG/DL (ref 0.7–1.2)
EOSINOPHILS ABSOLUTE: 0.4 E9/L (ref 0.05–0.5)
EOSINOPHILS RELATIVE PERCENT: 7.1 % (ref 0–6)
GFR AFRICAN AMERICAN: >60
GFR NON-AFRICAN AMERICAN: >60 ML/MIN/1.73
GLUCOSE BLD-MCNC: 121 MG/DL (ref 74–99)
HCT VFR BLD CALC: 34.3 % (ref 37–54)
HEMOGLOBIN: 11.3 G/DL (ref 12.5–16.5)
INR BLD: 1.9
LYMPHOCYTES ABSOLUTE: 1.08 E9/L (ref 1.5–4)
LYMPHOCYTES RELATIVE PERCENT: 18.8 % (ref 20–42)
MAGNESIUM: 1.5 MG/DL (ref 1.6–2.6)
MCH RBC QN AUTO: 36.5 PG (ref 26–35)
MCHC RBC AUTO-ENTMCNC: 32.9 % (ref 32–34.5)
MCV RBC AUTO: 110.6 FL (ref 80–99.9)
MONOCYTES ABSOLUTE: 0.57 E9/L (ref 0.1–0.95)
MONOCYTES RELATIVE PERCENT: 9.8 % (ref 2–12)
NEUTROPHILS ABSOLUTE: 3.59 E9/L (ref 1.8–7.3)
NEUTROPHILS RELATIVE PERCENT: 63.4 % (ref 43–80)
PDW BLD-RTO: 14.6 FL (ref 11.5–15)
PHOSPHORUS: 4.2 MG/DL (ref 2.5–4.5)
PLATELET # BLD: 79 E9/L (ref 130–450)
PLATELET CONFIRMATION: NORMAL
PMV BLD AUTO: 8.9 FL (ref 7–12)
POLYCHROMASIA: ABNORMAL
POTASSIUM SERPL-SCNC: 3.5 MMOL/L (ref 3.5–5)
PROTHROMBIN TIME: 21 SEC (ref 9.3–12.4)
RBC # BLD: 3.1 E12/L (ref 3.8–5.8)
SODIUM BLD-SCNC: 139 MMOL/L (ref 132–146)
T4 TOTAL: 5 MCG/DL (ref 4.5–11.7)
TOTAL PROTEIN: 6 G/DL (ref 6.4–8.3)
TSH SERPL DL<=0.05 MIU/L-ACNC: 4.85 UIU/ML (ref 0.27–4.2)
WBC # BLD: 5.7 E9/L (ref 4.5–11.5)

## 2019-05-02 PROCEDURE — 94150 VITAL CAPACITY TEST: CPT

## 2019-05-02 PROCEDURE — 84443 ASSAY THYROID STIM HORMONE: CPT

## 2019-05-02 PROCEDURE — 97535 SELF CARE MNGMENT TRAINING: CPT

## 2019-05-02 PROCEDURE — 2580000003 HC RX 258: Performed by: INTERNAL MEDICINE

## 2019-05-02 PROCEDURE — 97110 THERAPEUTIC EXERCISES: CPT

## 2019-05-02 PROCEDURE — 6370000000 HC RX 637 (ALT 250 FOR IP): Performed by: FAMILY MEDICINE

## 2019-05-02 PROCEDURE — 85025 COMPLETE CBC W/AUTO DIFF WBC: CPT

## 2019-05-02 PROCEDURE — C9113 INJ PANTOPRAZOLE SODIUM, VIA: HCPCS | Performed by: INTERNAL MEDICINE

## 2019-05-02 PROCEDURE — 6360000002 HC RX W HCPCS: Performed by: INTERNAL MEDICINE

## 2019-05-02 PROCEDURE — 6370000000 HC RX 637 (ALT 250 FOR IP): Performed by: INTERNAL MEDICINE

## 2019-05-02 PROCEDURE — 84100 ASSAY OF PHOSPHORUS: CPT

## 2019-05-02 PROCEDURE — 2580000003 HC RX 258: Performed by: FAMILY MEDICINE

## 2019-05-02 PROCEDURE — 36415 COLL VENOUS BLD VENIPUNCTURE: CPT

## 2019-05-02 PROCEDURE — 84436 ASSAY OF TOTAL THYROXINE: CPT

## 2019-05-02 PROCEDURE — 97530 THERAPEUTIC ACTIVITIES: CPT

## 2019-05-02 PROCEDURE — 82140 ASSAY OF AMMONIA: CPT

## 2019-05-02 PROCEDURE — 97116 GAIT TRAINING THERAPY: CPT

## 2019-05-02 PROCEDURE — 80053 COMPREHEN METABOLIC PANEL: CPT

## 2019-05-02 PROCEDURE — 99232 SBSQ HOSP IP/OBS MODERATE 35: CPT | Performed by: FAMILY MEDICINE

## 2019-05-02 PROCEDURE — 2700000000 HC OXYGEN THERAPY PER DAY

## 2019-05-02 PROCEDURE — 1200000000 HC SEMI PRIVATE

## 2019-05-02 PROCEDURE — 85610 PROTHROMBIN TIME: CPT

## 2019-05-02 PROCEDURE — 83735 ASSAY OF MAGNESIUM: CPT

## 2019-05-02 RX ADMIN — Medication 10 ML: at 08:30

## 2019-05-02 RX ADMIN — RIFAXIMIN 550 MG: 550 TABLET ORAL at 20:33

## 2019-05-02 RX ADMIN — RIFAXIMIN 550 MG: 550 TABLET ORAL at 08:29

## 2019-05-02 RX ADMIN — BENZOCAINE AND MENTHOL 1 LOZENGE: 15; 3.6 LOZENGE ORAL at 13:18

## 2019-05-02 RX ADMIN — METOPROLOL SUCCINATE 25 MG: 25 TABLET, EXTENDED RELEASE ORAL at 08:29

## 2019-05-02 RX ADMIN — FUROSEMIDE 40 MG: 10 INJECTION, SOLUTION INTRAMUSCULAR; INTRAVENOUS at 08:29

## 2019-05-02 RX ADMIN — CHLORDIAZEPOXIDE HYDROCHLORIDE 10 MG: 5 CAPSULE ORAL at 08:29

## 2019-05-02 RX ADMIN — THIAMINE HCL TAB 100 MG 100 MG: 100 TAB at 08:29

## 2019-05-02 RX ADMIN — WATER 1 G: 1 INJECTION INTRAMUSCULAR; INTRAVENOUS; SUBCUTANEOUS at 13:18

## 2019-05-02 RX ADMIN — PANTOPRAZOLE SODIUM 40 MG: 40 INJECTION, POWDER, LYOPHILIZED, FOR SOLUTION INTRAVENOUS at 08:29

## 2019-05-02 ASSESSMENT — PAIN DESCRIPTION - LOCATION: LOCATION: GENERALIZED

## 2019-05-02 ASSESSMENT — PAIN DESCRIPTION - DESCRIPTORS: DESCRIPTORS: ACHING

## 2019-05-02 ASSESSMENT — PAIN DESCRIPTION - PAIN TYPE: TYPE: ACUTE PAIN

## 2019-05-02 ASSESSMENT — PAIN SCALES - GENERAL
PAINLEVEL_OUTOF10: 0
PAINLEVEL_OUTOF10: 0
PAINLEVEL_OUTOF10: 6

## 2019-05-02 NOTE — PROGRESS NOTES
Nephrology Note  Cesar Irvin MD          Patient seen and examined. Chart reviewed. No family member is present at bedside. Patient is feeling better. Denies shortness of breath. Appetite good. No nausea or dysguesia. Awake and alert . In no acute distress. Vital SignsBP 132/63   Pulse 65   Temp 98.2 °F (36.8 °C)   Resp 17   Ht 5' 9\" (1.753 m)   Wt (!) 353 lb 11.2 oz (160.4 kg)   SpO2 99%   BMI 52.23 kg/m²   24HR INTAKE/OUTPUT:    Intake/Output Summary (Last 24 hours) at 5/2/2019 1009  Last data filed at 5/2/2019 0549  Gross per 24 hour   Intake 1320 ml   Output 3075 ml   Net -1755 ml         Physical Exam    Neck: No JVD  Lungs: Breath sounds decreased at the bases. No rales or ronchi. Heart: Regular rate and rhythm. No S3 gallop. No  murmrur. Sounds are distant  Abdomen: Soft non distended, non tender and normal bowel sounds.   Extremeties: +2   bipedal edema with skin changes of chronic venous stasis dermatitis.        ROS:  RESPIRATORY:  negative  CARDIOVASCULAR:  negative  GASTROINTESTINAL:  negative  GENITOURINARY:   improved scrotal edema        Current Facility-Administered Medications   Medication Dose Route Frequency Provider Last Rate Last Dose    chlordiazePOXIDE (LIBRIUM) capsule 10 mg  10 mg Oral TID Vazquez Delgado MD   10 mg at 05/02/19 0829    metoprolol succinate (TOPROL XL) extended release tablet 25 mg  25 mg Oral Daily Marty Ornelas MD   25 mg at 05/02/19 0829    furosemide (LASIX) injection 40 mg  40 mg Intravenous Daily Chalo Canela MD   40 mg at 05/02/19 0829    vitamin B-1 (THIAMINE) tablet 100 mg  100 mg Oral Daily Marty Ornelas MD   100 mg at 05/02/19 0829    pantoprazole (PROTONIX) injection 40 mg  40 mg Intravenous Daily Lucetta Dago, DO   40 mg at 05/02/19 1691    And    sodium chloride (PF) 0.9 % injection 10 mL  10 mL Intravenous Daily Lucetta Dago, DO   10 mL at 05/01/19 0901    cefTRIAXone (ROCEPHIN) 1 g in sterile water 10 mL IV syringe  1 g Intravenous Q24H Dana Welsh, DO   1 g at 05/01/19 1404    rifaximin (XIFAXAN) tablet 550 mg  550 mg Oral BID Dana Welsh DO   550 mg at 05/02/19 0829    sodium chloride flush 0.9 % injection 10 mL  10 mL Intravenous 2 times per day Silver Reeder MD   10 mL at 05/02/19 0830    sodium chloride flush 0.9 % injection 10 mL  10 mL Intravenous PRN Marty Fernandez MD        acetaminophen (TYLENOL) tablet 650 mg  650 mg Oral Q4H PRN Marty Fernandez MD   650 mg at 04/29/19 1503         Labs:    CBC:   Recent Labs     04/30/19  0500  04/30/19  1948 05/01/19  0734 05/02/19  0524   WBC 5.9  --   --  5.5 5.7   HGB 11.6*   < > 11.7* 12.1* 11.3*   PLT 73*  --   --  82* 79*    < > = values in this interval not displayed. Lab Results   Component Value Date    IRON 191 (H) 04/28/2019    TIBC 174 (L) 04/28/2019    FERRITIN 759 04/28/2019       Lab Results   Component Value Date    CALCIUM 8.2 (L) 05/02/2019    CALCIUM 8.5 (L) 05/01/2019    CALCIUM 8.4 (L) 04/30/2019    PHOS 4.2 05/02/2019    PHOS 3.7 05/01/2019    PHOS 3.1 04/30/2019    MG 1.5 (L) 05/02/2019    MG 1.6 05/01/2019    MG 1.6 04/30/2019       BMP:   Recent Labs     04/30/19  0501 05/01/19  0541 05/02/19  0524    139 139   K 3.8 4.7 3.5    99 98   CO2 32* 33* 37*   BUN 14 17 18   CREATININE 0.6* 0.7 0.6*   GLUCOSE 140* 122* 121*           Assessment: / Plan:    1. Lactic acidosis. Lactic acidosis probably secondary to diabetes lactic acidosis due to chronic liver dysfunction in the setting of cirrhosis and alcohol abuse. Improved.       2. Benign essential hypertension. Blood pressures are at target.     3. Anemia. Iron studies fair.     4.   Edema.  Continue diuretics.           Iliana Barnes MD  Nephrology  Electronically signed by Iliana Barnes MD on 5/2/2019 at 10:08 AM

## 2019-05-02 NOTE — CARE COORDINATION
SOCIAL WORK / DISCHARGE PLANNING:  Sw met with pt at bedside, cognition continues to appear to improve daily. He did recall this Sw speaking with him about rehab [facility]. [facility], noted to not meet criteria. Sw reviewed this and discussed pt not qualifying for medicaid. Pt recalled meeting with Public Benefits and not qualifying. Sw discussed assets and pt did not divulge exact amount but he did state he has greater than 15K dollars. He is worried about the amount of this hospital bill but did agree needs rehab and will consider [employer] as private pay. He states his emergency contacts are Caitlyn Be, former co worker and Mr. Lei Arteaga who is the person he trusts completely and is executor of his will. Pt states he has a blank check in his wallet which is with security. Discussed [facility] choices, he is familiar with two from placing his father, [address] (does not want) and former [address] [facility] ([address] 65). Agreeable to [facility] [place], referral made to [facility] rep. Await review for acceptance.                      Electronically signed by EVON Portillo on 5/2/2019 at 2:43 PM

## 2019-05-02 NOTE — PROGRESS NOTES
HOSPITAL   PROGRESS NOTE. SUBJECTIVE:  Tracy Moreno, 61 y.o., male C/O . SITTING UP IN [facility]. STILL FEELS WEAK. WANTS LIBRIUM DISCONTINUED AS IT MAKES HIM GROGGY. ALSO REQUEST THROAT LOZANGES BECAUSE OF COUGHING. CONDITION DISCUSSED WITH  PATIENT  AND NURSING   STAFF. CONSULT NOTES REVIEWED. ROS:GENERAL- APPETITE- GOOD. BOWEL MOVEMENTS- NORMAL. NO URINE    PROBLEM. EENT: NORMAL            CVS: NORMAL. NO CHEST PAIN OR PALPITATION. RESPIRATORY:NO SOB. GI/: NO ABDOMINAL PAINS            MUSCULOSKELETAL: NO COMPLAIN            CNS: NO  COMPLAIN            OBJECTIVE:    GENERAL:Temperature:  Current - Temp: 98.2 °F (36.8 °C); Max - Temp  Av.1 °F (36.7 °C)  Min: 98 °F (36.7 °C)  Max: 98.2 °F (36.8 °C)  Respiratory Rate : Resp  Av.7  Min: 17  Max: 18  Pulse Range: Pulse  Av  Min: 65  Max: 73  Blood Presuure Range:  Systolic (02UAS), TXK:094 , Min:109 , QLM:619   ; Diastolic (85OPO), GXN:17, Min:58, Max:63    Pulse ox Range: SpO2  Av.7 %  Min: 93 %  Max: 99 %  24hr I & O:      Intake/Output Summary (Last 24 hours) at 2019 1153  Last data filed at 2019 0549  Gross per 24 hour   Intake 1320 ml   Output 3075 ml   Net -1755 ml       CONSTITUTIONAL: MORBIDLY OBESE,ORIENTED,CO-OPERATIVE  HEENT:NORMAL. NECK:  supple, symmetrical, trachea midline,Carotids- normal,JVP- flat  HEMATOLOGIC/LYMPHATICS:  no cervical lymphadenopathy  LUNGS:clear  CARDIOVASCULAR:  Normal apical impulse, regular rate and rhythm, normal S1 and S2, no S3 or S4, and no murmur noted  ABDOMEN:  normal bowel sounds, non-distended, non-tender, no masses palpated  EXTREMITIES: ARTHRITIC CHANGES. MOVEMENTS-LIMITED. PEDAL PULSES-FAIR.   SKIN:  normal skin color, texture, turgor    Data    CBC:   Lab Results   Component Value Date    WBC 5.7 2019    RBC 3.10 2019    HGB 11.3 2019    HCT 34.3 2019    .6 05/02/2019    MCH 36.5 05/02/2019    MCHC 32.9 05/02/2019    RDW 14.6 05/02/2019    PLT 79 05/02/2019    MPV 8.9 05/02/2019     CMP:    Lab Results   Component Value Date     05/02/2019    K 3.5 05/02/2019    CL 98 05/02/2019    CO2 37 05/02/2019    BUN 18 05/02/2019    CREATININE 0.6 05/02/2019    GFRAA >60 05/02/2019    LABGLOM >60 05/02/2019    GLUCOSE 121 05/02/2019    PROT 6.0 05/02/2019    LABALBU 2.5 05/02/2019    CALCIUM 8.2 05/02/2019    BILITOT 2.8 05/02/2019    ALKPHOS 94 05/02/2019    AST 71 05/02/2019    ALT 32 05/02/2019         ASSESSMENT:    Active Hospital Problems    Diagnosis Date Noted    [facility] [K72.90] 05/02/2019     Priority: High     Class: Acute    Pneumonia of right upper lobe due to infectious organism ([facility] [J18.1] 04/30/2019     Priority: High     Class: Acute    Mental confusion [R41.0] 04/28/2019     Priority: High     Class: Acute    Alcoholic cirrhosis of liver without ascites ([facility] [K70.30] 04/28/2019     Priority: High     Class: Chronic    Morbid obesity with BMI of 45.0-49.9, adult ([facility] [E66.01, Z68.42] 01/12/2015     Priority: High     Class: Chronic    HTN (hypertension) [I10] 01/12/2015     Priority: High     Class: Chronic    Rhabdomyolysis [M62.82] 04/27/2019       PLAN: CONTINUE CURRENT MEDICATIONS AND Rx.CEPACOL LOZENGES. DISCONTINUE LIBRIUM.       Electronically signed by Moriah Oleary MD on 5/2/19 at 11:53 AM

## 2019-05-02 NOTE — PROGRESS NOTES
PROGRESS NOTE  By Yuniel Osorio M.D. [facility]  Dr. Bridger Tafoya M.D.,  Dr. Genny Martinez M.D.,   Dr. Althea Wood DDarrylO.,  Dr. Keshawn Ibarra M.D.,  Dr Oretha Barthel, D.O. Aga Gayle  61 y.o.  male    SUBJECTIVE:  Denies abdominal pain. Denies nausea or vomiting    OBJECTIVE:    BP (!) 111/48   Pulse 72   Temp 97.9 °F (36.6 °C) (Oral)   Resp 24   Ht 5' 9\" (1.753 m)   Wt (!) 353 lb 11.2 oz (160.4 kg)   SpO2 97%   BMI 52.23 kg/m²     General: NAD/morbidly obese  male  HEENT: Moist oral mucosa/anicteric sclera  Neck: Supple with trachea midline  Abd.: Obese/soft/NT  Extr.: BLE 3+ edema with chronic changes  Skin: Warm and dry        DATA:.    Stool (measured) : 0 mL  Lab Results   Component Value Date    WBC 5.7 05/02/2019    RBC 3.10 05/02/2019    HGB 11.3 05/02/2019    HCT 34.3 05/02/2019    .6 05/02/2019    MCH 36.5 05/02/2019    MCHC 32.9 05/02/2019    RDW 14.6 05/02/2019    PLT 79 05/02/2019    MPV 8.9 05/02/2019     Lab Results   Component Value Date     05/02/2019    K 3.5 05/02/2019    CL 98 05/02/2019    CO2 37 05/02/2019    BUN 18 05/02/2019    CREATININE 0.6 05/02/2019    CALCIUM 8.2 05/02/2019    PROT 6.0 05/02/2019    LABALBU 2.5 05/02/2019    BILITOT 2.8 05/02/2019    ALKPHOS 94 05/02/2019    AST 71 05/02/2019    ALT 32 05/02/2019     Lab Results   Component Value Date    LIPASE 31 04/27/2019     No results found for: AMYLASE      ASSESSMENT/PLAN:  1. Cirrhosis  -Likely alcoholic however   -additional liver workup/serology . Negative  -DF  31.8 4/27 -   -MELD 4/27 calculated at 17   -Improved ammonia level     2. Anemia  -new anemia with stable H&H  -S/P EGD 4/30 revealing moderate gastritis but no varices  -Plan for outpatient colonoscopy unless precipitous drop or overt bleed  - monitor H&H; defer transfusion to admitting     3. Coagulopathy  -Secondary to cirrhosis  -Monitor INR     4.  Thrombocytopenia  -Secondary to above with possible direct effect from alcohol  -Monitor labs     5. Morbid obesity  -Per admitting     6. Abnormal 2-D echo  -Consider repeating as likely contributing to patient presentation     7. Lactic acidosis  -Appears improved and likely type B  -Nephrology input appreciated    OK to be discharged from GI POV. Follow-up in the office in 2-3 weeks after discharge or earlier as needed - please, call with questions/concerns for appointment at [phone]. No immediate plans for intervention from GI POV. Will see PRN in the hospital - please, call with questions/concerns. Thank you for the opportunity to participate in the care of Mr. Jose Lockett        NOTE:  This report was transcribed using voice recognition software. Every effort was made to ensure accuracy; however, inadvertent computerized transcription errors may be present.     Shreyas Nicole MD  5/2/2019  2:22 PM

## 2019-05-02 NOTE — PROGRESS NOTES
bariatric wheeled walker. Ambulated to door, and transferred onto cart in hallway. Remains in chair. Comment: Call light left by patient. A: Mary Jane. Well, decreased assist this afternoon. P: Continue with physical therapy   Jorge Alberto Franklin, CHRISTINA  GOALS to be met in 3 days. Bed mobility-  Minimal assist                                                  Transfers-Sit to stand-Minimal assist                Gait:  Patient to ambulate 50 feet using wheeled walker with Minimal assist                       Increase rom in affected joints by 10%  Increase strength in affected mm groups by 1/3 grade  Increase balance to allow for improvement towards functional goals.   Increase endurance to allow for improvement towards functional goals.    '

## 2019-05-02 NOTE — PROGRESS NOTES
Moderate Assist x 2  Stand to sit: Moderate Assist X 2  Stand pivot: NT  MIN A x 2 for sit<>stand from chair to w/w v/c's for hand placement  Minimal Assist    Functional Mobility Mod A with ww  3 side steps close to bed MIN A 1-2 with w/w house hold distances v/c's for walker management   Min A with ww      Balance Sitting:     Static:  SBA    Dynamic:mod A  Standing: mod A Sitting:   Static: SBA  Dynamic: MOD A   Standing: MIN A with w/w      Activity Tolerance poor Fair- fair   Visual/  Perceptual Glasses: yes    Glasses yes                  Pt seated in chair completed UE ex's 1 x 10 reps shoulder flexion/extension, scapula protraction/retraction focusing on AROM, strength/endurance for increased independence for ADL tasks. Comments: Upon arrival pt seated in bed side chair. Pt educated with regards to  functional transfers, functional mobility, hand placement, sequencing, walker safety, bathroom safety, LE dressing AE,  bathing/toilleting AE, DME, grooming, ADL retraining. At end of session seated up in bed side chair all lines and tubes intact, call light within reach. · Pt has made fair progress towards set goals.    · Continue with current plan of care    Time in: 0920  Time out:0950  Total Tx Time: 30 minutes     Leslie YEH/JUAN [id number]

## 2019-05-02 NOTE — PROGRESS NOTES
Physical Therapy  Physical Therapy  Daily Treatment Note  5/2/2019  [id number]                      New Salcedo   [id number]                              1955    Patient Active Problem List   Diagnosis    HTN (hypertension)    Morbid obesity with BMI of 45.0-49.9, adult ([facility]    Bilateral leg edema    Gout    Acute diastolic CHF (congestive heart failure) (HCC)    Chest pain    Bilateral low back pain without sciatica    Rhabdomyolysis    Mental confusion    Alcoholic cirrhosis of liver without ascites ([facility]    Pneumonia of right upper lobe due to infectious organism ([facility]    Hepatic encephalopathy ([facility]       Recommendation for discharge: Subacute rehab  Equipment prescriptions needed:to be determined    [facility]   How much difficulty turning over in bed?: A Lot  How much difficulty sitting down on / standing up from a chair with arms?: A Little  How much difficulty moving from lying on back to sitting on side of bed?: A Lot  How much help from another person moving to and from a bed to a chair?: A Lot  How much help from another person needed to walk in hospital room?: A Lot  How much help from another person for climbing 3-5 steps with a railing?: A Lot  AM-[facility] Inpatient Mobility Raw Score : 13  AM-[facility] Inpatient T-Scale Score : 36.74  Mobility Inpatient CMS 0-100% Score: 64.91  Mobility Inpatient CMS G-Code Modifier : CL      Precautions: falls, skin and low air loss bed          S: Patient cleared by nursing for treatment. Patient is agreeable to treatment. Pain status: (measured on a visual analog scale with 0=no pain and 10=excruciating pain) 0/10. O: Pt was instructed in and performed the following:   Bed Mobility- Supine to sit-na   Scooting- na   Sit to supine-na   Transfers-sit to stand- Minimal assistance 2     Gait:  Patient ambulated 2 x 20 feet using bariatric Wheeled Walker with Minimal assists 1- 2.     Steps:  na  Treatment: Pt. Stood up from chair using bariatric wheeled walker. Ambulated to door, and returned to chair as above. Pt. Performed ankle pumps, laq heel raises x 10-15 reps. Remains in chair. Comment: Call light left by patient. A: Mary Jane. Well, increased ambulation distance today. P: Continue with physical therapy   Yobani Griffin, PTA  GOALS to be met in 3 days. Bed mobility-  Minimal assist                                                  Transfers-Sit to stand-Minimal assist                Gait:  Patient to ambulate 50 feet using wheeled walker with Minimal assist                       Increase rom in affected joints by 10%  Increase strength in affected mm groups by 1/3 grade  Increase balance to allow for improvement towards functional goals.   Increase endurance to allow for improvement towards functional goals.    '

## 2019-05-02 NOTE — DISCHARGE INSTR - COC
Isolation            Nurse Assessment:  Last Vital Signs: BP (!) 111/48   Pulse 72   Temp 97.9 °F (36.6 °C) (Oral)   Resp 24   Ht 5' 9\" (1.753 m)   Wt (!) 353 lb 11.2 oz (160.4 kg)   SpO2 97%   BMI 52.23 kg/m²     Last documented pain score (0-10 scale): Pain Level: 6  Last Weight:   Wt Readings from Last 1 Encounters:   05/01/19 (!) 353 lb 11.2 oz (160.4 kg)     Mental Status:  alert    IV Access:  - None    Nursing Mobility/ADLs:  Walking   Assisted  Transfer  Assisted  Bathing  Assisted  Dressing  Assisted  Toileting  Assisted  Feeding  Assisted  Med Admin  Assisted  Med Delivery   whole    Wound Care Documentation and Therapy:        Elimination:  Continence:   · Bowel: Yes  · Bladder: Yes  Urinary Catheter: None   Colostomy/Ileostomy/Ileal Conduit: No       Date of Last BM: 5/6/2019    Intake/Output Summary (Last 24 hours) at 5/2/2019 1449  Last data filed at 5/2/2019 1355  Gross per 24 hour   Intake 600 ml   Output 2175 ml   Net -1575 ml     I/O last 3 completed shifts: In: 1800 [P.O.:1800]  Out: 3075 [Urine:3075]    Safety Concerns: At Risk for Falls    Impairments/Disabilities:      None    Nutrition Therapy:  Current Nutrition Therapy:   - Oral Diet:  Low Sodium (2gm)    Routes of Feeding: Oral  Liquids:  Thin Liquids  Daily Fluid Restriction: no  Last Modified Barium Swallow with Video (Video Swallowing Test): not done    Rehab Therapies: Physical Therapy and Occupational Therapy  Weight Bearing Status/Restrictions: No weight bearing restirctions  Other Medical Equipment (for information only, NOT a DME order):  walker    Patient's personal belongings (please select all that are sent with patient):  Glasses    RN SIGNATURE:  Electronically signed by Andrew Don RN on 5/6/19 at 11:37 AM    CASE MANAGEMENT/SOCIAL WORK SECTION    Inpatient Status Date: 04/27/19    Readmission Risk Assessment Score:  Readmission Risk              Risk of Unplanned Readmission:        14           Discharging to Facility/ Agency   Name: [facility], [address] [place] [address], fax [phone],  · Address:  · Phone:  · Fax:    Dialysis Facility (if applicable)   · Name:  · Address:  · Dialysis Schedule:  · Phone:  · Fax:    / signature: Electronically signed by EVON Patrick on 5/2/19 at 2:49 PM    PHYSICIAN SECTION    Prognosis: Good    Condition at Discharge: Stable    Rehab Potential (if transferring to Rehab): [address]    Physician Certification: I certify the above information and transfer of Ulysses Garcia  is necessary for the continuing treatment of the diagnosis listed and that [facility] for less 30 days.      Update Admission H&P: No change in H&P    PHYSICIAN SIGNATURE: Electronically signed by Nick Silva MD on 05/06/19 at 11:25 AM

## 2019-05-03 LAB
ALBUMIN SERPL-MCNC: 2.7 G/DL (ref 3.5–5.2)
ALP BLD-CCNC: 91 U/L (ref 40–129)
ALT SERPL-CCNC: 34 U/L (ref 0–40)
AMMONIA: 66 UMOL/L (ref 16–60)
ANION GAP SERPL CALCULATED.3IONS-SCNC: 5 MMOL/L (ref 7–16)
AST SERPL-CCNC: 76 U/L (ref 0–39)
BILIRUB SERPL-MCNC: 3.3 MG/DL (ref 0–1.2)
BUN BLDV-MCNC: 17 MG/DL (ref 8–23)
CALCIUM SERPL-MCNC: 8.2 MG/DL (ref 8.6–10.2)
CHLORIDE BLD-SCNC: 97 MMOL/L (ref 98–107)
CO2: 37 MMOL/L (ref 22–29)
CREAT SERPL-MCNC: 0.6 MG/DL (ref 0.7–1.2)
GFR AFRICAN AMERICAN: >60
GFR NON-AFRICAN AMERICAN: >60 ML/MIN/1.73
GLUCOSE BLD-MCNC: 110 MG/DL (ref 74–99)
HCT VFR BLD CALC: 35 % (ref 37–54)
HEMOGLOBIN: 11.7 G/DL (ref 12.5–16.5)
MAGNESIUM: 1.5 MG/DL (ref 1.6–2.6)
MCH RBC QN AUTO: 36.7 PG (ref 26–35)
MCHC RBC AUTO-ENTMCNC: 33.4 % (ref 32–34.5)
MCV RBC AUTO: 109.7 FL (ref 80–99.9)
PDW BLD-RTO: 14.4 FL (ref 11.5–15)
PHOSPHORUS: 3.7 MG/DL (ref 2.5–4.5)
PLATELET # BLD: 85 E9/L (ref 130–450)
PLATELET CONFIRMATION: NORMAL
PMV BLD AUTO: 8.8 FL (ref 7–12)
POTASSIUM SERPL-SCNC: 3.8 MMOL/L (ref 3.5–5)
RBC # BLD: 3.19 E12/L (ref 3.8–5.8)
SODIUM BLD-SCNC: 139 MMOL/L (ref 132–146)
TOTAL PROTEIN: 6.4 G/DL (ref 6.4–8.3)
WBC # BLD: 5.4 E9/L (ref 4.5–11.5)

## 2019-05-03 PROCEDURE — 6370000000 HC RX 637 (ALT 250 FOR IP): Performed by: INTERNAL MEDICINE

## 2019-05-03 PROCEDURE — 97530 THERAPEUTIC ACTIVITIES: CPT

## 2019-05-03 PROCEDURE — 85027 COMPLETE CBC AUTOMATED: CPT

## 2019-05-03 PROCEDURE — 82140 ASSAY OF AMMONIA: CPT

## 2019-05-03 PROCEDURE — 6360000002 HC RX W HCPCS: Performed by: INTERNAL MEDICINE

## 2019-05-03 PROCEDURE — 97535 SELF CARE MNGMENT TRAINING: CPT

## 2019-05-03 PROCEDURE — 2580000003 HC RX 258: Performed by: INTERNAL MEDICINE

## 2019-05-03 PROCEDURE — C9113 INJ PANTOPRAZOLE SODIUM, VIA: HCPCS | Performed by: INTERNAL MEDICINE

## 2019-05-03 PROCEDURE — 97110 THERAPEUTIC EXERCISES: CPT

## 2019-05-03 PROCEDURE — 84100 ASSAY OF PHOSPHORUS: CPT

## 2019-05-03 PROCEDURE — 99222 1ST HOSP IP/OBS MODERATE 55: CPT | Performed by: FAMILY MEDICINE

## 2019-05-03 PROCEDURE — 2580000003 HC RX 258: Performed by: FAMILY MEDICINE

## 2019-05-03 PROCEDURE — 80053 COMPREHEN METABOLIC PANEL: CPT

## 2019-05-03 PROCEDURE — 36415 COLL VENOUS BLD VENIPUNCTURE: CPT

## 2019-05-03 PROCEDURE — 1200000000 HC SEMI PRIVATE

## 2019-05-03 PROCEDURE — 83735 ASSAY OF MAGNESIUM: CPT

## 2019-05-03 PROCEDURE — 2700000000 HC OXYGEN THERAPY PER DAY

## 2019-05-03 PROCEDURE — 97116 GAIT TRAINING THERAPY: CPT

## 2019-05-03 PROCEDURE — 6370000000 HC RX 637 (ALT 250 FOR IP): Performed by: FAMILY MEDICINE

## 2019-05-03 RX ORDER — FUROSEMIDE 10 MG/ML
40 INJECTION INTRAMUSCULAR; INTRAVENOUS 2 TIMES DAILY
Status: DISCONTINUED | OUTPATIENT
Start: 2019-05-03 | End: 2019-05-06 | Stop reason: HOSPADM

## 2019-05-03 RX ADMIN — THIAMINE HCL TAB 100 MG 100 MG: 100 TAB at 10:08

## 2019-05-03 RX ADMIN — RIFAXIMIN 550 MG: 550 TABLET ORAL at 10:07

## 2019-05-03 RX ADMIN — FUROSEMIDE 40 MG: 10 INJECTION, SOLUTION INTRAMUSCULAR; INTRAVENOUS at 18:32

## 2019-05-03 RX ADMIN — RIFAXIMIN 550 MG: 550 TABLET ORAL at 20:26

## 2019-05-03 RX ADMIN — Medication 400 MG: at 10:08

## 2019-05-03 RX ADMIN — FUROSEMIDE 40 MG: 10 INJECTION, SOLUTION INTRAMUSCULAR; INTRAVENOUS at 10:06

## 2019-05-03 RX ADMIN — METOPROLOL SUCCINATE 25 MG: 25 TABLET, EXTENDED RELEASE ORAL at 10:08

## 2019-05-03 RX ADMIN — PANTOPRAZOLE SODIUM 40 MG: 40 INJECTION, POWDER, LYOPHILIZED, FOR SOLUTION INTRAVENOUS at 10:06

## 2019-05-03 RX ADMIN — WATER 1 G: 1 INJECTION INTRAMUSCULAR; INTRAVENOUS; SUBCUTANEOUS at 14:12

## 2019-05-03 RX ADMIN — Medication 10 ML: at 10:07

## 2019-05-03 RX ADMIN — Medication 10 ML: at 10:08

## 2019-05-03 ASSESSMENT — PAIN SCALES - GENERAL
PAINLEVEL_OUTOF10: 0
PAINLEVEL_OUTOF10: 0

## 2019-05-03 NOTE — PROGRESS NOTES
Type and Reason for Visit: [facility] Nutrition Re-Screen(LOS)    Nutrition Screen:   · Have you recently lost weight without trying? - 0 to 1 pound (0 points)   · Have you been eating poorly because of a decreased appetite? - No (0 points)   · Malnutrition Screening Tool Score - 0    Dietitian Assessment of Nutrition Re-Screen: Pt adequately nourished AEB pt report of good appetite/intake. RUCHI for wt loss d/t frequent wt fluctuations with fluid status.          Electronically signed by Wilton Gay RD, KEM on 5/3/19 at 2:51 PM    Contact Number: [phone] Zofran 1mg administered orally as instructed by MD.

## 2019-05-03 NOTE — PROGRESS NOTES
[facility] Quality Flow/Interdisciplinary Rounds Progress Note        Quality Flow Rounds held on May 3, 2019    Disciplines Attending:  Bedside Nurse, ,  and Nursing Unit Leadership    Allegra Curtis was admitted on 4/27/2019 12:36 PM    Anticipated Discharge Date:  Expected Discharge Date: 05/03/19    Disposition:    Yohan Score:  Yohan Scale Score: 18    Readmission Risk              Risk of Unplanned Readmission:        15           Discussed patient goal for the day, patient clinical progression, and barriers to discharge.   The following Goal(s) of the Day/Commitment(s) have been identified:  [facility] to [facility]  May 3, 2019

## 2019-05-03 NOTE — PLAN OF CARE
Problem: Excessive mineral intake (NI-5.10.2)  Goal: Food and/or Nutrient Delivery  Description  Individualized approach for food/nutrient provision.   Outcome: Met This Shift

## 2019-05-03 NOTE — PROGRESS NOTES
transfer bench. Pt provided with LH sponge   Minimal Assist    Toileting Dependent  Max A for toileting hygiene after use of low commode; cuing for hand placement      Bed Mobility  Supine to sit: Maximal Assist x 2  Sit to supine: Maximal Assist x 2 N/t pt on toilet on arrival and in chair at end of session; nsg aware; all needs within reach Supine to sit: Moderate Assist   Sit to supine: Moderate Assist    Functional Transfers Sit to stand: Moderate Assist x 2  Stand to sit: Moderate Assist X 2  Stand pivot: NT  Mod A for sit to stand transfer from low toilet; min A for  sit to stand transfers to/from w/c and multiple surfaces in clinic with FWW; min A for simulated car transfer with increased time to bring LE's into/out of car Minimal Assist    Functional Mobility Mod A with ww  3 side steps close to bed 10 ft in room; 40 ft in hallway; 20 ft x 2 and 6 ft x 2 in room with FWW and min A  Min A with ww      Balance Sitting:     Static:  SBA    Dynamic:mod A  Standing: mod A Sitting:   Static: Min A progressing to supervision  Dynamic: Min A   Standing: MIN A with w/w      Activity Tolerance poor Fair- fair   Visual/  Perceptual Glasses: yes    Glasses yes                    Comments: Upon arrival pt seated on toilet. Pt educated with regards to  functional transfers, functional mobility, hand placement, sequencing, walker safety, bathroom safety, LE dressing AE,  bathing/toilleting AE, DME,bathroom safety, car transfers,  grooming, ADL retraining. At end of session seated up in bed side chair all lines and tubes intact, call light within reach. Increased time d/t decreased pace, multiple rest breaks and multiple questions answered from therapy standpoint. Liason from SNF present at end of session. · Pt has made fair progress towards set goals.    · Continue with current plan of care    Total Tx Time: 75 minutes     Yulissa YEH/JUAN [id number]

## 2019-05-03 NOTE — PROGRESS NOTES
Physical Therapy  Physical Therapy  Daily Treatment Note  5/3/2019  0315/0315-01                      Jenn Carrillo   [id number]                              1955    Patient Active Problem List   Diagnosis    HTN (hypertension)    Morbid obesity with BMI of 45.0-49.9, adult ([address]    Bilateral leg edema    Gout    Acute diastolic CHF (congestive heart failure) (HCC)    Chest pain    Bilateral low back pain without sciatica    Rhabdomyolysis    Mental confusion    [facility] without ascites [facility]    Pneumonia of right upper lobe due to infectious organism ([facility]    Hepatic encephalopathy ([facility]       Recommendation for discharge: Subacute rehab  Equipment prescriptions needed: to be determined    AM-[facility] Mobility Inpatient   How much difficulty turning over in bed?: A Little  How much difficulty sitting down on / standing up from a chair with arms?: A Little  How much difficulty moving from lying on back to sitting on side of bed?: A Little  How much help from another person moving to and from a bed to a chair?: A Little  How much help from another person needed to walk in hospital room?: A Little  How much help from another person for climbing 3-5 steps with a railing?: A Little  AM-[facility] Inpatient Mobility Raw Score : 18  AM-[facility] Inpatient T-Scale Score : 43.63  Mobility Inpatient CMS 0-100% Score: 46.58  Mobility Inpatient CMS G-Code Modifier : CK      Precautions: falls, skin and low air loss bed    S: Patient cleared by nursing for treatment. Patient is agreeable to treatment. Pt c/o pain in BLE. Pt was sitting on commode in bathroom at start of treatment; nursing was present. Pain status: (measured on a visual analog scale with 0=no pain and 10=excruciating pain) No number assigned to pain/10.    O: Pt was instructed in and performed the following:   Bed Mobility- Supine to sit- na   Scooting- na   Sit to supine- na   Transfers-sit to stand- Minimal assistance     Gait: Patient

## 2019-05-03 NOTE — PROGRESS NOTES
Nephrology Note  Asuncion Sousa MD          Patient seen and examined. Chart reviewed. No family member is present at bedside. Patient is ambulating with physical therapy. Denies shortness of breath. Appetite good. No nausea or dysguesia. Awake and alert . In no acute distress. Vital SignsBP (!) 111/59   Pulse 67   Temp 97.8 °F (36.6 °C) (Oral)   Resp 20   Ht 5' 9\" (1.753 m)   Wt (!) 353 lb (160.1 kg)   SpO2 96%   BMI 52.13 kg/m²   24HR INTAKE/OUTPUT:    Intake/Output Summary (Last 24 hours) at 5/3/2019 0903  Last data filed at 5/3/2019 0524  Gross per 24 hour   Intake 0 ml   Output 1700 ml   Net -1700 ml         Physical Exam    Neck: No JVD  Lungs: Breath sounds decreased at the bases. No rales or ronchi. Heart: Regular rate and rhythm. No S3 gallop. No  murmrur. Sounds are distant  Abdomen: Soft non distended, non tender and normal bowel sounds.   Extremeties: +2   bipedal edema with skin changes of chronic venous stasis dermatitis.        ROS:  RESPIRATORY:  negative  CARDIOVASCULAR:  negative  GASTROINTESTINAL:  negative  GENITOURINARY:   improved scrotal edema           Current Facility-Administered Medications   Medication Dose Route Frequency Provider Last Rate Last Dose    benzocaine-menthol (CEPACOL SORE THROAT) lozenge 1 lozenge  1 lozenge Oral Q2H PRN Santiago Paniagua MD   1 lozenge at 05/02/19 1318    metoprolol succinate (TOPROL XL) extended release tablet 25 mg  25 mg Oral Daily Marty Lewis MD   25 mg at 05/02/19 0829    furosemide (LASIX) injection 40 mg  40 mg Intravenous Daily Jael Bowie MD   40 mg at 05/02/19 0829    vitamin B-1 (THIAMINE) tablet 100 mg  100 mg Oral Daily Marty Lewis MD   100 mg at 05/02/19 0829    pantoprazole (PROTONIX) injection 40 mg  40 mg Intravenous Daily Varsha Barnard DO   40 mg at 05/02/19 5636    And    sodium chloride (PF) 0.9 % injection 10 mL  10 mL Intravenous Daily Varsha Barnard DO   10 mL at 05/01/19 0901    cefTRIAXone

## 2019-05-03 NOTE — CARE COORDINATION
SS Note/Discharge plan:  Notified by CM that per [facility] admissions liaison for [facility] pt did sign a check to private pay for 2 weeks at their facility and may be admitted when medically discharged, SUKUMAR initiated and HENS exemption completed, nursing notified. Electronically signed by EVON Davis on 5/3/2019 at 3:01 PM

## 2019-05-03 NOTE — PROGRESS NOTES
05/03/2019    RDW 14.4 05/03/2019    PLT 85 05/03/2019    MPV 8.8 05/03/2019     CMP:    Lab Results   Component Value Date     05/03/2019    K 3.8 05/03/2019    CL 97 05/03/2019    CO2 37 05/03/2019    BUN 17 05/03/2019    CREATININE 0.6 05/03/2019    GFRAA >60 05/03/2019    LABGLOM >60 05/03/2019    GLUCOSE 110 05/03/2019    PROT 6.4 05/03/2019    LABALBU 2.7 05/03/2019    CALCIUM 8.2 05/03/2019    BILITOT 3.3 05/03/2019    ALKPHOS 91 05/03/2019    AST 76 05/03/2019    ALT 34 05/03/2019         ASSESSMENT:    Active Hospital Problems    Diagnosis Date Noted    Hepatic encephalopathy ([facility] [K72.90] 05/02/2019     Priority: High     Class: Acute    Pneumonia of right upper lobe due to infectious organism ([facility] [J18.1] 04/30/2019     Priority: High     Class: Acute    Mental confusion [R41.0] 04/28/2019     Priority: High     Class: Acute    Alcoholic cirrhosis [facility] liver without ascites ([facility] [K70.30] 04/28/2019     Priority: High     Class: Chronic    Morbid obesity with BMI of 45.0-49.9, adult ([facility] [E66.01, Z68.42] 01/12/2015     Priority: High     Class: Chronic    HTN (hypertension) [I10] 01/12/2015     Priority: High     Class: Chronic    Rhabdomyolysis [M62.82] 04/27/2019       PLAN: CONTINUE CURRENT MEDICATIONS AND Rx.DISCUSS PLANNING WITH NEPHROLOGIST.       Electronically signed by Le Johnson MD on 5/3/19 at 12:20 PM

## 2019-05-04 LAB
ALBUMIN SERPL-MCNC: 2.5 G/DL (ref 3.5–5.2)
ALP BLD-CCNC: 103 U/L (ref 40–129)
ALT SERPL-CCNC: 34 U/L (ref 0–40)
AMMONIA: 72 UMOL/L (ref 16–60)
ANION GAP SERPL CALCULATED.3IONS-SCNC: 4 MMOL/L (ref 7–16)
AST SERPL-CCNC: 75 U/L (ref 0–39)
BILIRUB SERPL-MCNC: 2.7 MG/DL (ref 0–1.2)
BUN BLDV-MCNC: 17 MG/DL (ref 8–23)
CALCIUM SERPL-MCNC: 7.9 MG/DL (ref 8.6–10.2)
CHLORIDE BLD-SCNC: 94 MMOL/L (ref 98–107)
CO2: 38 MMOL/L (ref 22–29)
CREAT SERPL-MCNC: 0.6 MG/DL (ref 0.7–1.2)
GFR AFRICAN AMERICAN: >60
GFR NON-AFRICAN AMERICAN: >60 ML/MIN/1.73
GLUCOSE BLD-MCNC: 129 MG/DL (ref 74–99)
HCT VFR BLD CALC: 32.9 % (ref 37–54)
HEMOGLOBIN: 11.1 G/DL (ref 12.5–16.5)
LACTIC ACID: 1.1 MMOL/L (ref 0.5–2.2)
Lab: NORMAL
MAGNESIUM: 1.6 MG/DL (ref 1.6–2.6)
MCH RBC QN AUTO: 36.9 PG (ref 26–35)
MCHC RBC AUTO-ENTMCNC: 33.7 % (ref 32–34.5)
MCV RBC AUTO: 109.3 FL (ref 80–99.9)
PDW BLD-RTO: 14.2 FL (ref 11.5–15)
PHOSPHORUS: 2.7 MG/DL (ref 2.5–4.5)
PLATELET # BLD: 77 E9/L (ref 130–450)
PLATELET CONFIRMATION: NORMAL
PMV BLD AUTO: 9.2 FL (ref 7–12)
POTASSIUM SERPL-SCNC: 3.5 MMOL/L (ref 3.5–5)
RBC # BLD: 3.01 E12/L (ref 3.8–5.8)
REPORT: NORMAL
SODIUM BLD-SCNC: 136 MMOL/L (ref 132–146)
THIS TEST SENT TO: NORMAL
TOTAL PROTEIN: 6.1 G/DL (ref 6.4–8.3)
WBC # BLD: 4.7 E9/L (ref 4.5–11.5)

## 2019-05-04 PROCEDURE — 84100 ASSAY OF PHOSPHORUS: CPT

## 2019-05-04 PROCEDURE — 85027 COMPLETE CBC AUTOMATED: CPT

## 2019-05-04 PROCEDURE — 1200000000 HC SEMI PRIVATE

## 2019-05-04 PROCEDURE — 6370000000 HC RX 637 (ALT 250 FOR IP): Performed by: FAMILY MEDICINE

## 2019-05-04 PROCEDURE — 82140 ASSAY OF AMMONIA: CPT

## 2019-05-04 PROCEDURE — C9113 INJ PANTOPRAZOLE SODIUM, VIA: HCPCS | Performed by: INTERNAL MEDICINE

## 2019-05-04 PROCEDURE — 36415 COLL VENOUS BLD VENIPUNCTURE: CPT

## 2019-05-04 PROCEDURE — 6360000002 HC RX W HCPCS: Performed by: INTERNAL MEDICINE

## 2019-05-04 PROCEDURE — 6370000000 HC RX 637 (ALT 250 FOR IP): Performed by: INTERNAL MEDICINE

## 2019-05-04 PROCEDURE — 83605 ASSAY OF LACTIC ACID: CPT

## 2019-05-04 PROCEDURE — 2700000000 HC OXYGEN THERAPY PER DAY

## 2019-05-04 PROCEDURE — 99232 SBSQ HOSP IP/OBS MODERATE 35: CPT | Performed by: FAMILY MEDICINE

## 2019-05-04 PROCEDURE — 80053 COMPREHEN METABOLIC PANEL: CPT

## 2019-05-04 PROCEDURE — 2580000003 HC RX 258: Performed by: FAMILY MEDICINE

## 2019-05-04 PROCEDURE — 83735 ASSAY OF MAGNESIUM: CPT

## 2019-05-04 PROCEDURE — 2580000003 HC RX 258: Performed by: INTERNAL MEDICINE

## 2019-05-04 RX ORDER — LACTULOSE 10 G/15ML
20 SOLUTION ORAL 2 TIMES DAILY
Status: DISCONTINUED | OUTPATIENT
Start: 2019-05-04 | End: 2019-05-06

## 2019-05-04 RX ADMIN — PANTOPRAZOLE SODIUM 40 MG: 40 INJECTION, POWDER, LYOPHILIZED, FOR SOLUTION INTRAVENOUS at 10:11

## 2019-05-04 RX ADMIN — LACTULOSE 20 G: 20 SOLUTION ORAL at 13:00

## 2019-05-04 RX ADMIN — Medication 10 ML: at 09:00

## 2019-05-04 RX ADMIN — FUROSEMIDE 40 MG: 10 INJECTION, SOLUTION INTRAMUSCULAR; INTRAVENOUS at 09:30

## 2019-05-04 RX ADMIN — WATER 1 G: 1 INJECTION INTRAMUSCULAR; INTRAVENOUS; SUBCUTANEOUS at 13:30

## 2019-05-04 RX ADMIN — LACTULOSE 20 G: 20 SOLUTION ORAL at 20:42

## 2019-05-04 RX ADMIN — RIFAXIMIN 550 MG: 550 TABLET ORAL at 20:42

## 2019-05-04 RX ADMIN — THIAMINE HCL TAB 100 MG 100 MG: 100 TAB at 09:00

## 2019-05-04 RX ADMIN — Medication 10 ML: at 21:41

## 2019-05-04 RX ADMIN — Medication 400 MG: at 10:11

## 2019-05-04 RX ADMIN — FUROSEMIDE 40 MG: 10 INJECTION, SOLUTION INTRAMUSCULAR; INTRAVENOUS at 17:23

## 2019-05-04 RX ADMIN — RIFAXIMIN 550 MG: 550 TABLET ORAL at 10:11

## 2019-05-04 RX ADMIN — METOPROLOL SUCCINATE 25 MG: 25 TABLET, EXTENDED RELEASE ORAL at 10:11

## 2019-05-04 ASSESSMENT — PAIN DESCRIPTION - PAIN TYPE: TYPE: ACUTE PAIN

## 2019-05-04 ASSESSMENT — PAIN DESCRIPTION - PROGRESSION: CLINICAL_PROGRESSION: GRADUALLY WORSENING

## 2019-05-04 ASSESSMENT — PAIN SCALES - GENERAL
PAINLEVEL_OUTOF10: 0
PAINLEVEL_OUTOF10: 0
PAINLEVEL_OUTOF10: 5
PAINLEVEL_OUTOF10: 0

## 2019-05-04 ASSESSMENT — PAIN DESCRIPTION - FREQUENCY: FREQUENCY: CONTINUOUS

## 2019-05-04 ASSESSMENT — PAIN DESCRIPTION - ONSET: ONSET: ON-GOING

## 2019-05-04 ASSESSMENT — PAIN DESCRIPTION - ORIENTATION: ORIENTATION: RIGHT;LEFT

## 2019-05-04 ASSESSMENT — PAIN DESCRIPTION - DESCRIPTORS: DESCRIPTORS: ACHING;DISCOMFORT;DULL

## 2019-05-04 ASSESSMENT — PAIN DESCRIPTION - LOCATION: LOCATION: RIB CAGE

## 2019-05-04 NOTE — PLAN OF CARE
Problem: Risk for Impaired Skin Integrity  Goal: Tissue integrity - skin and mucous membranes  Description  Structural intactness and normal physiological function of skin and  mucous membranes.   5/3/2019 2133 by Kristan Mcconnell RN  Outcome: Met This Shift     Problem: Pain:  Goal: Pain level will decrease  Description  Pain level will decrease  5/3/2019 2133 by Kristan Mcconnell RN  Outcome: Met This Shift     Problem: Pain:  Goal: Control of acute pain  Description  Control of acute pain  5/3/2019 2133 by Kristan Mcconnell RN  Outcome: Met This Shift     Problem: Pain:  Goal: Control of chronic pain  Description  Control of chronic pain  5/3/2019 2133 by Kristan Mcconnell RN  Outcome: Met This Shift     Problem: Falls - Risk of:  Goal: Will remain free from falls  Description  Will remain free from falls  5/3/2019 2133 by Kristan Mcconnell RN  Outcome: Met This Shift     Problem: Falls - Risk of:  Goal: Absence of physical injury  Description  Absence of physical injury  5/3/2019 2133 by Kristan Mcconnell RN  Outcome: Met This Shift     Problem: Discharge Planning:  Goal: Discharged to appropriate level of care  Description  Discharged to appropriate level of care  5/3/2019 2133 by Kristan Mcconnell RN  Outcome: Met This Shift

## 2019-05-04 NOTE — PLAN OF CARE
Problem: Pain:  Goal: Pain level will decrease  Description  Pain level will decrease  5/4/2019 3042 by Gee Quiros RN  Outcome: Met This Shift     Problem: Pain:  Goal: Control of acute pain  Description  Control of acute pain  5/4/2019 0623 by Gee Quiros RN  Outcome: Met This Shift     Problem: Pain:  Goal: Control of chronic pain  Description  Control of chronic pain  5/4/2019 0623 by Gee Quiros RN  Outcome: Met This Shift     Problem: Falls - Risk of:  Goal: Will remain free from falls  Description  Will remain free from falls  5/4/2019 2930 by Gee Quiros RN  Outcome: Met This Shift

## 2019-05-04 NOTE — PROGRESS NOTES
Nephrology Note  Danielle Rose MD          Patient seen and examined. Chart reviewed. No family member is present at bedside. Patient is feeling better. Denies shortness of breath. Appetite good. No nausea or dysguesia. Awake and alert . In no acute distress. Vital SignsBP 123/76   Pulse 68   Temp 97.9 °F (36.6 °C) (Oral)   Resp 24   Ht 5' 9\" (1.753 m)   Wt (!) 350 lb (158.8 kg)   SpO2 97%   BMI 51.69 kg/m²   24HR INTAKE/OUTPUT:    Intake/Output Summary (Last 24 hours) at 5/4/2019 1416  Last data filed at 5/4/2019 1231  Gross per 24 hour   Intake 780 ml   Output 2250 ml   Net -1470 ml         Physical Exam    Neck: No JVD  Lungs: Breath sounds decreased at the bases. No rales or ronchi. Heart: Regular rate and rhythm. No S3 gallop. No  murmrur. Sounds are distant  Abdomen: Soft non distended, non tender and normal bowel sounds.   Extremeties: +2   bipedal edema with skin changes of chronic venous stasis dermatitis.        ROS:  RESPIRATORY:  negative  CARDIOVASCULAR:  negative  GASTROINTESTINAL:  negative  GENITOURINARY:   improved scrotal edema           Current Facility-Administered Medications   Medication Dose Route Frequency Provider Last Rate Last Dose    lactulose (CHRONULAC) 10 GM/15ML solution 20 g  20 g Oral BID Be Boucher MD        magnesium oxide (MAG-OX) tablet 400 mg  400 mg Oral Daily Ethan Wolf MD   400 mg at 05/04/19 1011    furosemide (LASIX) injection 40 mg  40 mg Intravenous BID Ethan Wolf MD   40 mg at 05/04/19 0930    benzocaine-menthol (CEPACOL SORE THROAT) lozenge 1 lozenge  1 lozenge Oral Q2H PRN Bipin Claudean Fendt, MD   1 lozenge at 05/02/19 1318    metoprolol succinate (TOPROL XL) extended release tablet 25 mg  25 mg Oral Daily Bipin Claudean Fendt, MD   25 mg at 05/04/19 1011    vitamin B-1 (THIAMINE) tablet 100 mg  100 mg Oral Daily Bipin Claudean Fendt, MD   100 mg at 05/04/19 0900    pantoprazole (PROTONIX) injection 40 mg  40 mg Intravenous Daily Reather Reading, DO   40 CHEST W CONTRAST   Final Result   1. 4 mm subpleural nodules right lung. Recommend follow-up in one   year. 2. Otherwise negative CT of the chest.      CT ABDOMEN PELVIS W IV CONTRAST Additional Contrast? None   Final Result   1. Negative CT scan abdomen pelvis for acute trauma. 2. Probable cirrhotic liver. 3. Contracted gallbladder with gallstones. XR CHEST PORTABLE   Final Result   Patchy upper lobe airspace disease. No pneumothorax. Assessment: / Plan:    1.  Lactic acidosis. Lactic acidosis probably secondary to diabetes lactic acidosis due to chronic liver dysfunction in the setting of cirrhosis and alcohol abuse. Improved.   recheck lactic acid in the morning     2.  Benign essential hypertension. Blood pressures are at target.     3.   Anemia. Iron studies fair.     4.  Hypomagnesemia. Hypomagnesemia probably secondary to use of loop diuretics and proton pump inhibitors. Supplement.      5. Edema. Diuretics increased.             Rei Cook MD  Nephrology  Electronically signed by Rei Cook MD on 5/4/2019 at 2:15 PM

## 2019-05-04 NOTE — PROGRESS NOTES
HOSPITAL   PROGRESS NOTE. SUBJECTIVE:  Grey Oppenheim, 61 y.o., male C/O  STILL FEELS WEAK AND TIRED. SITTING UP  IN CHAIR AND WALKS TO THE REST ROOM WITH ASSIST. CONDITION DISCUSSED WITH  PATIENT  AND NURSING   STAFF. CONSULT NOTES REVIEWED. ROS:GENERAL- APPETITE- GOOD. BOWEL MOVEMENTS- NORMAL. NO URINE    PROBLEM. EENT: NORMAL            CVS: NORMAL. NO CHEST PAIN OR PALPITATION. RESPIRATORY:NO SOB. GI/: NO ABDOMINAL PAINS            MUSCULOSKELETAL: NO COMPLAIN            CNS: NO  COMPLAIN            OBJECTIVE:    GENERAL:Temperature:  Current - Temp: 97.9 °F (36.6 °C); Max - Temp  Av.1 °F (36.7 °C)  Min: 97.9 °F (36.6 °C)  Max: 98.3 °F (36.8 °C)  Respiratory Rate : Resp  Av  Min: 20  Max: 24  Pulse Range: Pulse  Av  Min: 66  Max: 68  Blood Presuure Range:  Systolic (60IZI), BENJA:960 , Min:100 , BAB:293   ; Diastolic (51SHL), XKM:70, Min:47, Max:76    Pulse ox Range: SpO2  Av.5 %  Min: 97 %  Max: 98 %  24hr I & O:      Intake/Output Summary (Last 24 hours) at 2019 1124  Last data filed at 2019 0824  Gross per 24 hour   Intake 540 ml   Output 3050 ml   Net -2510 ml       CONSTITUTIONALl:MORBIDLY OBESE,ORIENTED,CO-OPERATIVE  HEENT:NORMAL. NECK:  supple, symmetrical, trachea midline,Carotids- normal,JVP- flat  HEMATOLOGIC/LYMPHATICS:  no cervical lymphadenopathy  LUNGS:clear  CARDIOVASCULAR:  Normal apical impulse, regular rate and rhythm, normal S1 and S2, no S3 or S4, and no murmur noted  ABDOMEN:  normal bowel sounds, non-distended, non-tender, no masses palpated  EXTREMITIES: ARTHRITIC CHANGES. MOVEMENTS-LIMITED. PEDAL PULSES-FAIR.   SKIN:  normal skin color, texture, turgor    Data    CBC:   Lab Results   Component Value Date    WBC 4.7 2019    RBC 3.01 2019    HGB 11.1 2019    HCT 32.9 2019    .3 2019    MCH 36.9 2019    MCHC 33.7

## 2019-05-05 LAB
ALBUMIN SERPL-MCNC: 2.8 G/DL (ref 3.5–5.2)
ALP BLD-CCNC: 99 U/L (ref 40–129)
ALT SERPL-CCNC: 34 U/L (ref 0–40)
AMMONIA: 81 UMOL/L (ref 16–60)
ANION GAP SERPL CALCULATED.3IONS-SCNC: 6 MMOL/L (ref 7–16)
AST SERPL-CCNC: 80 U/L (ref 0–39)
BILIRUB SERPL-MCNC: 2.8 MG/DL (ref 0–1.2)
BUN BLDV-MCNC: 14 MG/DL (ref 8–23)
CALCIUM SERPL-MCNC: 8 MG/DL (ref 8.6–10.2)
CHLORIDE BLD-SCNC: 96 MMOL/L (ref 98–107)
CO2: 36 MMOL/L (ref 22–29)
CREAT SERPL-MCNC: 0.6 MG/DL (ref 0.7–1.2)
GFR AFRICAN AMERICAN: >60
GFR NON-AFRICAN AMERICAN: >60 ML/MIN/1.73
GLUCOSE BLD-MCNC: 124 MG/DL (ref 74–99)
HCT VFR BLD CALC: 34.1 % (ref 37–54)
HEMOGLOBIN: 11.4 G/DL (ref 12.5–16.5)
MAGNESIUM: 1.8 MG/DL (ref 1.6–2.6)
MCH RBC QN AUTO: 36.4 PG (ref 26–35)
MCHC RBC AUTO-ENTMCNC: 33.4 % (ref 32–34.5)
MCV RBC AUTO: 108.9 FL (ref 80–99.9)
PDW BLD-RTO: 14.3 FL (ref 11.5–15)
PHOSPHORUS: 2.8 MG/DL (ref 2.5–4.5)
PLATELET # BLD: 84 E9/L (ref 130–450)
PLATELET CONFIRMATION: NORMAL
PMV BLD AUTO: 9.4 FL (ref 7–12)
POTASSIUM SERPL-SCNC: 3.5 MMOL/L (ref 3.5–5)
RBC # BLD: 3.13 E12/L (ref 3.8–5.8)
SODIUM BLD-SCNC: 138 MMOL/L (ref 132–146)
TOTAL PROTEIN: 6.7 G/DL (ref 6.4–8.3)
WBC # BLD: 4 E9/L (ref 4.5–11.5)

## 2019-05-05 PROCEDURE — 6360000002 HC RX W HCPCS: Performed by: INTERNAL MEDICINE

## 2019-05-05 PROCEDURE — 82140 ASSAY OF AMMONIA: CPT

## 2019-05-05 PROCEDURE — 1200000000 HC SEMI PRIVATE

## 2019-05-05 PROCEDURE — 80053 COMPREHEN METABOLIC PANEL: CPT

## 2019-05-05 PROCEDURE — 85027 COMPLETE CBC AUTOMATED: CPT

## 2019-05-05 PROCEDURE — 6370000000 HC RX 637 (ALT 250 FOR IP): Performed by: FAMILY MEDICINE

## 2019-05-05 PROCEDURE — 83735 ASSAY OF MAGNESIUM: CPT

## 2019-05-05 PROCEDURE — 84100 ASSAY OF PHOSPHORUS: CPT

## 2019-05-05 PROCEDURE — 2580000003 HC RX 258: Performed by: FAMILY MEDICINE

## 2019-05-05 PROCEDURE — 2580000003 HC RX 258: Performed by: INTERNAL MEDICINE

## 2019-05-05 PROCEDURE — 99232 SBSQ HOSP IP/OBS MODERATE 35: CPT | Performed by: FAMILY MEDICINE

## 2019-05-05 PROCEDURE — C9113 INJ PANTOPRAZOLE SODIUM, VIA: HCPCS | Performed by: INTERNAL MEDICINE

## 2019-05-05 PROCEDURE — 6370000000 HC RX 637 (ALT 250 FOR IP): Performed by: INTERNAL MEDICINE

## 2019-05-05 PROCEDURE — 36415 COLL VENOUS BLD VENIPUNCTURE: CPT

## 2019-05-05 RX ORDER — PETROLATUM 430 MG/G
OINTMENT TOPICAL 2 TIMES DAILY
Status: DISCONTINUED | OUTPATIENT
Start: 2019-05-05 | End: 2019-05-06 | Stop reason: HOSPADM

## 2019-05-05 RX ADMIN — THIAMINE HCL TAB 100 MG 100 MG: 100 TAB at 10:19

## 2019-05-05 RX ADMIN — PANTOPRAZOLE SODIUM 40 MG: 40 INJECTION, POWDER, LYOPHILIZED, FOR SOLUTION INTRAVENOUS at 10:25

## 2019-05-05 RX ADMIN — WATER 1 G: 1 INJECTION INTRAMUSCULAR; INTRAVENOUS; SUBCUTANEOUS at 13:30

## 2019-05-05 RX ADMIN — RIFAXIMIN 550 MG: 550 TABLET ORAL at 20:13

## 2019-05-05 RX ADMIN — FUROSEMIDE 40 MG: 10 INJECTION, SOLUTION INTRAMUSCULAR; INTRAVENOUS at 09:30

## 2019-05-05 RX ADMIN — FUROSEMIDE 40 MG: 10 INJECTION, SOLUTION INTRAMUSCULAR; INTRAVENOUS at 17:21

## 2019-05-05 RX ADMIN — Medication 10 ML: at 20:14

## 2019-05-05 RX ADMIN — ACETAMINOPHEN 650 MG: 325 TABLET ORAL at 10:24

## 2019-05-05 RX ADMIN — LACTULOSE 20 G: 20 SOLUTION ORAL at 20:14

## 2019-05-05 RX ADMIN — METOPROLOL SUCCINATE 25 MG: 25 TABLET, EXTENDED RELEASE ORAL at 10:19

## 2019-05-05 RX ADMIN — Medication 10 ML: at 09:00

## 2019-05-05 RX ADMIN — RIFAXIMIN 550 MG: 550 TABLET ORAL at 09:00

## 2019-05-05 RX ADMIN — Medication 400 MG: at 10:24

## 2019-05-05 RX ADMIN — LACTULOSE 20 G: 20 SOLUTION ORAL at 09:00

## 2019-05-05 RX ADMIN — Medication 10 ML: at 10:26

## 2019-05-05 ASSESSMENT — PAIN SCALES - GENERAL
PAINLEVEL_OUTOF10: 0
PAINLEVEL_OUTOF10: 5
PAINLEVEL_OUTOF10: 4
PAINLEVEL_OUTOF10: 0
PAINLEVEL_OUTOF10: 0
PAINLEVEL_OUTOF10: 1

## 2019-05-05 ASSESSMENT — PAIN DESCRIPTION - PAIN TYPE: TYPE: ACUTE PAIN

## 2019-05-05 ASSESSMENT — PAIN DESCRIPTION - DESCRIPTORS: DESCRIPTORS: ACHING;DISCOMFORT;DULL

## 2019-05-05 ASSESSMENT — PAIN DESCRIPTION - LOCATION: LOCATION: RIB CAGE

## 2019-05-05 ASSESSMENT — PAIN DESCRIPTION - PROGRESSION: CLINICAL_PROGRESSION: GRADUALLY WORSENING

## 2019-05-05 ASSESSMENT — PAIN DESCRIPTION - ORIENTATION: ORIENTATION: RIGHT;LEFT

## 2019-05-05 ASSESSMENT — PAIN DESCRIPTION - ONSET: ONSET: ON-GOING

## 2019-05-05 ASSESSMENT — PAIN DESCRIPTION - FREQUENCY: FREQUENCY: INTERMITTENT

## 2019-05-05 ASSESSMENT — PAIN - FUNCTIONAL ASSESSMENT: PAIN_FUNCTIONAL_ASSESSMENT: ACTIVITIES ARE NOT PREVENTED

## 2019-05-05 NOTE — PROGRESS NOTES
HOSPITAL   PROGRESS NOTE. SUBJECTIVE:  Ulysses Garcia, 61 y.o., male C/O STILL WEAK BUT GETTING BETTER. CONDITION DISCUSSED WITH  PATIENT  AND NURSING   STAFF. CONSULT NOTES REVIEWED. ROS:GENERAL- APPETITE- GOOD. BOWEL MOVEMENTS- NORMAL. NO URINE    PROBLEM. EENT: NORMAL            CVS: NORMAL. NO CHEST PAIN OR PALPITATION. RESPIRATORY:NO SOB. GI/: NO ABDOMINAL PAINS            MUSCULOSKELETAL: NO COMPLAIN            CNS: NO  COMPLAIN            OBJECTIVE:    GENERAL:Temperature:  Current - Temp: 97.9 °F (36.6 °C); Max - Temp  Av.8 °F (36.6 °C)  Min: 97.7 °F (36.5 °C)  Max: 97.9 °F (36.6 °C)  Respiratory Rate : Resp  Av.3  Min: 20  Max: 24  Pulse Range: Pulse  Av  Min: 66  Max: 70  Blood Presuure Range:  Systolic (42ABJ), NXU:617 , Min:114 , YBX:892   ; Diastolic (90NWN), ARF:83, Min:60, Max:76    Pulse ox Range: SpO2  Av.8 %  Min: 93 %  Max: 98 %  24hr I & O:      Intake/Output Summary (Last 24 hours) at 2019 0907  Last data filed at 2019 0845  Gross per 24 hour   Intake 810 ml   Output 2450 ml   Net -1640 ml       CONSTITUTIONALl:MORBIDLY OBESE,ORIENTED,CO-OPERATIVE  HEENT:NORMAL. NECK:  supple, symmetrical, trachea midline,Carotids- normal,JVP- flat  HEMATOLOGIC/LYMPHATICS:  no cervical lymphadenopathy  LUNGS:clear  CARDIOVASCULAR:  Normal apical impulse, regular rate and rhythm, normal S1 and S2, no S3 or S4, and no murmur noted  ABDOMEN:  normal bowel sounds, non-distended, non-tender, no masses palpated  EXTREMITIES: ARTHRITIC CHANGES. MOVEMENTS-LIMITED. PEDAL PULSES-FAIR.   SKIN:  normal skin color, texture, turgor    Data    CBC:   Lab Results   Component Value Date    WBC 4.0 2019    RBC 3.13 2019    HGB 11.4 2019    HCT 34.1 2019    .9 2019    MCH 36.4 2019    MCHC 33.4 2019    RDW 14.3 2019    PLT 84 2019    CHERELLE

## 2019-05-05 NOTE — PLAN OF CARE
Problem: Risk for Impaired Skin Integrity  Goal: Tissue integrity - skin and mucous membranes  Description  Structural intactness and normal physiological function of skin and  mucous membranes.   5/4/2019 2144 by Evert Lema RN  Outcome: Met This Shift     Problem: Pain:  Goal: Pain level will decrease  Description  Pain level will decrease  5/4/2019 2144 by Evert Lema RN  Outcome: Met This Shift     Problem: Falls - Risk of:  Goal: Will remain free from falls  Description  Will remain free from falls  5/4/2019 2144 by Evert Lema RN  Outcome: Met This Shift

## 2019-05-05 NOTE — PROGRESS NOTES
PROGRESS NOTE  By Margarita Latham M.D. [facility]  Dr. Rose Marie Constantino M.D.,  Dr. Werner Shields M.D.,   Dr. Alex Cantu D.O.,  Dr. Chloe Miner M.D.,  Dr Sherwin Johnson D.O. Ulysses Garcia  61 y.o.  male    SUBJECTIVE:  Denies abdominal pain. Denies nausea or vomiting. Patient reports bowel movements since yesterday. He reports that yesterday he felt somewhat confused and somnolent but today feels well and at his baseline    OBJECTIVE:    /64   Pulse 70   Temp 97.9 °F (36.6 °C) (Oral)   Resp 20   Ht 5' 9\" (1.753 m)   Wt (!) 345 lb 2 oz (156.5 kg)   SpO2 97%   BMI 50.97 kg/m²     General: NAD/morbidly obese  male. AAO ×3  HEENT: Anicteric sclerae/moist oromucosa  Neck:  supple with trachea midline   Chest: symmetrical excursions/nonlabored respirations   Abd.: Obese/soft/NT   Extr.: BLE over 3+ edema with chronic skin changes   Skin: warm and dry/anicteric       DATA:  Stool (measured) : 0 mL  Lab Results   Component Value Date    WBC 4.0 05/05/2019    RBC 3.13 05/05/2019    HGB 11.4 05/05/2019    HCT 34.1 05/05/2019    .9 05/05/2019    MCH 36.4 05/05/2019    MCHC 33.4 05/05/2019    RDW 14.3 05/05/2019    PLT 84 05/05/2019    MPV 9.4 05/05/2019     Lab Results   Component Value Date     05/05/2019    K 3.5 05/05/2019    CL 96 05/05/2019    CO2 36 05/05/2019    BUN 14 05/05/2019    CREATININE 0.6 05/05/2019    CALCIUM 8.0 05/05/2019    PROT 6.7 05/05/2019    LABALBU 2.8 05/05/2019    BILITOT 2.8 05/05/2019    ALKPHOS 99 05/05/2019    AST 80 05/05/2019    ALT 34 05/05/2019     Lab Results   Component Value Date    LIPASE 31 04/27/2019     No results found for: AMYLASE      ASSESSMENT/PLAN:  1. Cirrhosis  -Likely alcoholic however   -additional liver workup/serology negative  -SG  04.0 4/27   -MELD 4/27 calculated at 17   -Fluctuating ammonia level - mental status appears baseline  -Continue lactulose - increase slightly x1-2d -  and rifaximin     2. Anemia  -new anemia with stable H&H  -S/P EGD 4/30 revealing moderate gastritis but no varices  -Plan for outpatient colonoscopy unless precipitous drop or overt bleed  - monitor H&H; defer transfusion to admitting     3. Coagulopathy  -Secondary to cirrhosis  -Monitor INR     4. Thrombocytopenia  -Secondary to above with possible direct effect from alcohol  -Monitor labs     5. Morbid obesity  -Per admitting     6. Abnormal 2-D echo  -Consider repeating as likely contributing to patient presentation     7. Lactic acidosis  -Appears improved and likely type B  -Nephrology input appreciated           NOTE:  This report was transcribed using voice recognition software. Every effort was made to ensure accuracy; however, inadvertent computerized transcription errors may be present.     Larisa Salguero MD  5/5/2019  11:46 AM

## 2019-05-05 NOTE — PROGRESS NOTES
Nephrology Note  Demetri Rogel MD          Patient seen and examined. Chart reviewed. No family member is present at bedside. Patient is feeling better. Denies shortness of breath. Appetite good. No nausea or dysguesia. Awake and alert . In no acute distress. Vital SignsBP 124/64   Pulse 70   Temp 97.9 °F (36.6 °C) (Oral)   Resp 20   Ht 5' 9\" (1.753 m)   Wt (!) 345 lb 2 oz (156.5 kg)   SpO2 97%   BMI 50.97 kg/m²   24HR INTAKE/OUTPUT:    Intake/Output Summary (Last 24 hours) at 5/5/2019 1232  Last data filed at 5/5/2019 0845  Gross per 24 hour   Intake 570 ml   Output 1250 ml   Net -680 ml         Physical Exam    Neck: No JVD  Lungs: Breath sounds decreased at the bases. No rales or ronchi. Heart: Regular rate and rhythm. No S3 gallop. No  murmrur. Sounds are distant  Abdomen: Soft non distended, non tender and normal bowel sounds.   Extremeties: +2   bipedal edema with skin changes of chronic venous stasis dermatitis.        ROS:  RESPIRATORY:  negative  CARDIOVASCULAR:  negative  GASTROINTESTINAL:  negative  GENITOURINARY:   improved scrotal edema           Current Facility-Administered Medications   Medication Dose Route Frequency Provider Last Rate Last Dose    ALOE VESTA PROTECTIVE ointment OINT   Topical BID Marty Su MD        lactulose (CHRONULAC) 10 GM/15ML solution 20 g  20 g Oral BID Marty Su MD   20 g at 05/05/19 0900    magnesium oxide (MAG-OX) tablet 400 mg  400 mg Oral Daily Ethan Dodd MD   400 mg at 05/05/19 1024    furosemide (LASIX) injection 40 mg  40 mg Intravenous BID Ethan Dodd MD   40 mg at 05/05/19 0930    benzocaine-menthol (CEPACOL SORE THROAT) lozenge 1 lozenge  1 lozenge Oral Q2H PRN Marty Su MD   1 lozenge at 05/02/19 1318    metoprolol succinate (TOPROL XL) extended release tablet 25 mg  25 mg Oral Daily Marty Su MD   25 mg at 05/05/19 1019    vitamin B-1 (THIAMINE) tablet 100 mg  100 mg Oral Daily Marty Su MD   100 mg at 05/05/19 1019    pantoprazole (PROTONIX) injection 40 mg  40 mg Intravenous Daily Reather Reading, DO   40 mg at 05/05/19 1025    And    sodium chloride (PF) 0.9 % injection 10 mL  10 mL Intravenous Daily Reather Reading, DO   10 mL at 05/05/19 1026    cefTRIAXone (ROCEPHIN) 1 g in sterile water 10 mL IV syringe  1 g Intravenous Q24H Reather Reading, DO   1 g at 05/04/19 1330    rifaximin (XIFAXAN) tablet 550 mg  550 mg Oral BID Reather Reading, DO   550 mg at 05/05/19 0900    sodium chloride flush 0.9 % injection 10 mL  10 mL Intravenous 2 times per day Be Boucher MD   10 mL at 05/05/19 0900    sodium chloride flush 0.9 % injection 10 mL  10 mL Intravenous PRN Be Boucher MD        acetaminophen (TYLENOL) tablet 650 mg  650 mg Oral Q4H PRN Bipin Claudean Fendt, MD   650 mg at 05/05/19 1024         Labs:    CBC:   Recent Labs     05/03/19  0651 05/04/19  0426 05/05/19  0419   WBC 5.4 4.7 4.0*   HGB 11.7* 11.1* 11.4*   PLT 85* 77* 84*        Lab Results   Component Value Date    IRON 191 (H) 04/28/2019    TIBC 174 (L) 04/28/2019    FERRITIN 759 04/28/2019       Lab Results   Component Value Date    CALCIUM 8.0 (L) 05/05/2019    CALCIUM 7.9 (L) 05/04/2019    CALCIUM 8.2 (L) 05/03/2019    PHOS 2.8 05/05/2019    PHOS 2.7 05/04/2019    PHOS 3.7 05/03/2019    MG 1.8 05/05/2019    MG 1.6 05/04/2019    MG 1.5 (L) 05/03/2019       BMP:   Recent Labs     05/03/19  0651 05/04/19  0426 05/05/19  0419    136 138   K 3.8 3.5 3.5   CL 97* 94* 96*   CO2 37* 38* 36*   BUN 17 17 14   CREATININE 0.6* 0.6* 0.6*   GLUCOSE 110* 129* 124*       US GALLBLADDER RUQ   Final Result   Near nondiagnostic exam. Consider HIDA scan if there is a concern for   cholecystitis. CT Head WO Contrast   Final Result   Computed tomography of the brain within normal limits, as   above. CT Cervical Spine WO Contrast   Final Result   Intact anterior cervical fusion from C3 through C7. No   evidence of acute fracture or subluxation.

## 2019-05-06 VITALS
HEART RATE: 66 BPM | SYSTOLIC BLOOD PRESSURE: 108 MMHG | HEIGHT: 69 IN | OXYGEN SATURATION: 97 % | TEMPERATURE: 98.4 F | DIASTOLIC BLOOD PRESSURE: 66 MMHG | RESPIRATION RATE: 18 BRPM | BODY MASS INDEX: 46.65 KG/M2 | WEIGHT: 315 LBS

## 2019-05-06 LAB
ALBUMIN SERPL-MCNC: 2.4 G/DL (ref 3.5–5.2)
ALP BLD-CCNC: 107 U/L (ref 40–129)
ALT SERPL-CCNC: 36 U/L (ref 0–40)
AMMONIA: 83 UMOL/L (ref 16–60)
ANION GAP SERPL CALCULATED.3IONS-SCNC: 5 MMOL/L (ref 7–16)
AST SERPL-CCNC: 82 U/L (ref 0–39)
BILIRUB SERPL-MCNC: 2.9 MG/DL (ref 0–1.2)
BUN BLDV-MCNC: 12 MG/DL (ref 8–23)
CALCIUM SERPL-MCNC: 8.1 MG/DL (ref 8.6–10.2)
CHLORIDE BLD-SCNC: 95 MMOL/L (ref 98–107)
CO2: 36 MMOL/L (ref 22–29)
CREAT SERPL-MCNC: 0.5 MG/DL (ref 0.7–1.2)
GFR AFRICAN AMERICAN: >60
GFR NON-AFRICAN AMERICAN: >60 ML/MIN/1.73
GLUCOSE BLD-MCNC: 126 MG/DL (ref 74–99)
HCT VFR BLD CALC: 33.7 % (ref 37–54)
HEMOGLOBIN: 11.4 G/DL (ref 12.5–16.5)
MAGNESIUM: 1.7 MG/DL (ref 1.6–2.6)
MCH RBC QN AUTO: 36.8 PG (ref 26–35)
MCHC RBC AUTO-ENTMCNC: 33.8 % (ref 32–34.5)
MCV RBC AUTO: 108.7 FL (ref 80–99.9)
PDW BLD-RTO: 14.5 FL (ref 11.5–15)
PHOSPHORUS: 2.9 MG/DL (ref 2.5–4.5)
PLATELET # BLD: 92 E9/L (ref 130–450)
PLATELET CONFIRMATION: NORMAL
PMV BLD AUTO: 9.6 FL (ref 7–12)
POTASSIUM SERPL-SCNC: 3.5 MMOL/L (ref 3.5–5)
RBC # BLD: 3.1 E12/L (ref 3.8–5.8)
SODIUM BLD-SCNC: 136 MMOL/L (ref 132–146)
TOTAL PROTEIN: 6.2 G/DL (ref 6.4–8.3)
WBC # BLD: 3.7 E9/L (ref 4.5–11.5)

## 2019-05-06 PROCEDURE — 2580000003 HC RX 258: Performed by: FAMILY MEDICINE

## 2019-05-06 PROCEDURE — 2580000003 HC RX 258: Performed by: INTERNAL MEDICINE

## 2019-05-06 PROCEDURE — C9113 INJ PANTOPRAZOLE SODIUM, VIA: HCPCS | Performed by: INTERNAL MEDICINE

## 2019-05-06 PROCEDURE — 36415 COLL VENOUS BLD VENIPUNCTURE: CPT

## 2019-05-06 PROCEDURE — 6370000000 HC RX 637 (ALT 250 FOR IP): Performed by: INTERNAL MEDICINE

## 2019-05-06 PROCEDURE — 80053 COMPREHEN METABOLIC PANEL: CPT

## 2019-05-06 PROCEDURE — 99239 HOSP IP/OBS DSCHRG MGMT >30: CPT | Performed by: FAMILY MEDICINE

## 2019-05-06 PROCEDURE — 84100 ASSAY OF PHOSPHORUS: CPT

## 2019-05-06 PROCEDURE — 6360000002 HC RX W HCPCS: Performed by: INTERNAL MEDICINE

## 2019-05-06 PROCEDURE — 83735 ASSAY OF MAGNESIUM: CPT

## 2019-05-06 PROCEDURE — 82140 ASSAY OF AMMONIA: CPT

## 2019-05-06 PROCEDURE — 85027 COMPLETE CBC AUTOMATED: CPT

## 2019-05-06 PROCEDURE — 6370000000 HC RX 637 (ALT 250 FOR IP): Performed by: FAMILY MEDICINE

## 2019-05-06 RX ORDER — LANOLIN ALCOHOL/MO/W.PET/CERES
100 CREAM (GRAM) TOPICAL DAILY
Qty: 30 TABLET | Refills: 3 | Status: SHIPPED | OUTPATIENT
Start: 2019-05-07 | End: 2019-12-18 | Stop reason: ALTCHOICE

## 2019-05-06 RX ORDER — METOPROLOL SUCCINATE 25 MG/1
25 TABLET, EXTENDED RELEASE ORAL DAILY
Qty: 30 TABLET | Refills: 3 | Status: SHIPPED | OUTPATIENT
Start: 2019-05-07 | End: 2019-12-18 | Stop reason: ALTCHOICE

## 2019-05-06 RX ORDER — LACTULOSE 10 G/15ML
20 SOLUTION ORAL 3 TIMES DAILY
Status: DISCONTINUED | OUTPATIENT
Start: 2019-05-06 | End: 2019-05-06

## 2019-05-06 RX ORDER — LACTULOSE 10 G/15ML
20 SOLUTION ORAL 2 TIMES DAILY
Status: DISCONTINUED | OUTPATIENT
Start: 2019-05-06 | End: 2019-05-06 | Stop reason: HOSPADM

## 2019-05-06 RX ORDER — TRIAMTERENE AND HYDROCHLOROTHIAZIDE 37.5; 25 MG/1; MG/1
1 CAPSULE ORAL DAILY
Qty: 30 CAPSULE | Refills: 3 | Status: SHIPPED | OUTPATIENT
Start: 2019-05-06 | End: 2019-06-04 | Stop reason: SDUPTHER

## 2019-05-06 RX ORDER — LACTULOSE 10 G/15ML
20 SOLUTION ORAL 2 TIMES DAILY
Qty: 1 BOTTLE | Refills: 3 | Status: SHIPPED | OUTPATIENT
Start: 2019-05-06 | End: 2019-12-18 | Stop reason: ALTCHOICE

## 2019-05-06 RX ORDER — PETROLATUM 430 MG/G
1 OINTMENT TOPICAL 2 TIMES DAILY
Qty: 1 TUBE | Refills: 2 | Status: SHIPPED | OUTPATIENT
Start: 2019-05-06 | End: 2019-05-17 | Stop reason: ALTCHOICE

## 2019-05-06 RX ADMIN — ACETAMINOPHEN 650 MG: 325 TABLET ORAL at 10:27

## 2019-05-06 RX ADMIN — FUROSEMIDE 40 MG: 10 INJECTION, SOLUTION INTRAMUSCULAR; INTRAVENOUS at 10:28

## 2019-05-06 RX ADMIN — METOPROLOL SUCCINATE 25 MG: 25 TABLET, EXTENDED RELEASE ORAL at 10:27

## 2019-05-06 RX ADMIN — Medication 10 ML: at 10:29

## 2019-05-06 RX ADMIN — Medication 10 ML: at 10:28

## 2019-05-06 RX ADMIN — Medication 400 MG: at 10:27

## 2019-05-06 RX ADMIN — PANTOPRAZOLE SODIUM 40 MG: 40 INJECTION, POWDER, LYOPHILIZED, FOR SOLUTION INTRAVENOUS at 10:28

## 2019-05-06 RX ADMIN — RIFAXIMIN 550 MG: 550 TABLET ORAL at 10:27

## 2019-05-06 RX ADMIN — LACTULOSE 20 G: 20 SOLUTION ORAL at 10:28

## 2019-05-06 RX ADMIN — THIAMINE HCL TAB 100 MG 100 MG: 100 TAB at 10:27

## 2019-05-06 ASSESSMENT — PAIN SCALES - GENERAL
PAINLEVEL_OUTOF10: 5
PAINLEVEL_OUTOF10: 0

## 2019-05-06 NOTE — PLAN OF CARE
Problem: Risk for Impaired Skin Integrity  Goal: Tissue integrity - skin and mucous membranes  Description  Structural intactness and normal physiological function of skin and  mucous membranes.   5/6/2019 0439 by Juanita Greenberg RN  Outcome: Met This Shift     Problem: Pain:  Goal: Pain level will decrease  Description  Pain level will decrease  5/6/2019 0439 by Juanita Greenberg RN  Outcome: Met This Shift     Problem: Pain:  Goal: Control of acute pain  Description  Control of acute pain  Outcome: Met This Shift     Problem: Pain:  Goal: Control of chronic pain  Description  Control of chronic pain  Outcome: Met This Shift     Problem: Falls - Risk of:  Goal: Will remain free from falls  Description  Will remain free from falls  5/6/2019 0439 by Juanita Greenberg RN  Outcome: Met This Shift

## 2019-05-06 NOTE — PLAN OF CARE
Problem: Risk for Impaired Skin Integrity  Goal: Tissue integrity - skin and mucous membranes  Description  Structural intactness and normal physiological function of skin and  mucous membranes. 5/5/2019 2140 by Monroe Abel RN  Outcome: Met This Shift     Problem: Pain:  Goal: Pain level will decrease  Description  Pain level will decrease  5/5/2019 2140 by Monroe Abel RN  Outcome: Met This Shift     Problem: Falls - Risk of:  Goal: Will remain free from falls  Description  Will remain free from falls  5/5/2019 2140 by Monroe Abel RN  Outcome: Met This Shift  Note:   No falls. Bed alarm on.  Call light within reach

## 2019-05-06 NOTE — CARE COORDINATION
Discharge Planning:  arranged for 1:30pm [facility] bariatric ambulette to transport pt to [facility], [address] [place] [address], fax [phone]. SW advised pt, pt stated he has no family locally however he will call and notify a couple of his friends.   Electronically signed by EVON Rosa on 5/6/2019 at 12:30 PM

## 2019-05-06 NOTE — DISCHARGE SUMMARY
Discharge Summary    Valente Avina  :  1955  MRN:  [id number]    Admit date:  2019  Discharge date:      Admitting Physician:  Castillo Sloan MD    Admission Diagnoses: Rhabdomyolysis [M62.82]    Discharge Diagnoses: Active Hospital Problems    Diagnosis Date Noted    Hepatic encephalopathy ([facility] [K72.90] 2019     Priority: High     Class: Acute    Pneumonia of right upper lobe due to infectious organism ([facility] [J18.1] 2019     Priority: High     Class: Acute    Mental confusion [R41.0] 2019     Priority: High     Class: Acute    Alcoholic cirrhosis of liver without ascites ([facility] [K70.30] 2019     Priority: High     Class: Chronic    Morbid obesity with BMI of 45.0-49.9, adult ([facility] [E66.01, Z68.42] 2015     Priority: High     Class: Chronic    HTN (hypertension) [I10] 2015     Priority: High     Class: Chronic    Rhabdomyolysis [M62.82] 2019       Consults:  GI and nephrology    HPI/Hospital Course:   IIV DIURESIS, IV ANTIBIOTIC, ALCOHOL WITHDRAWAL CONTROL, LACTULOSE FOR AMMONIA LEVEL CONTROL, PT/OT EVALUATION AND MANAGEMENT.       Discharge Medications:        Medication List      START taking these medications    ALOE VESTA PROTECTIVE Oint ointment  Apply 1 inch topically 2 times daily     benzocaine-menthol 15-3.6 MG lozenge  Commonly known as:  CEPACOL SORE THROAT  Take 1 lozenge by mouth every 2 hours as needed for Sore Throat     lactulose 10 GM/15ML solution  Commonly known as:  CHRONULAC  Take 30 mLs by mouth 2 times daily     magnesium oxide 400 (241.3 Mg) MG Tabs tablet  Commonly known as:  MAG-OX  Take 1 tablet by mouth daily  Start taking on:  2019     metoprolol succinate 25 MG extended release tablet  Commonly known as:  TOPROL XL  Take 1 tablet by mouth daily  Start taking on:  2019     rifaximin 550 MG tablet  Commonly known as:  XIFAXAN  Take 1 tablet by mouth 2 times daily     thiamine 100 MG tablet  Take 1 tablet by mouth daily  Start taking on:  5/7/2019     triamterene-hydrochlorothiazide 37.5-25 MG per capsule  Commonly known as:  DYAZIDE  Take 1 capsule by mouth daily           Where to Get Your Medications      These medications were sent to [facility] [address], [place] - [address]  [address] [place] [address]    Phone:  [phone]   · ALOE VESTA PROTECTIVE Oint ointment  · benzocaine-menthol 15-3.6 MG lozenge  · lactulose 10 GM/15ML solution  · magnesium oxide 400 (241.3 Mg) MG Tabs tablet  · metoprolol succinate 25 MG extended release tablet  · thiamine 100 MG tablet  · triamterene-hydrochlorothiazide 37.5-25 MG per capsule     You can get these medications from any pharmacy    Bring a paper prescription for each of these medications  · rifaximin 550 MG tablet         Disposition:   SNF    CONDITION AT DISCHARGE: STABLE, IMPROVING. Patient Instructions:   Current Discharge Medication List      START taking these medications    Details   metoprolol succinate (TOPROL XL) 25 MG extended release tablet Take 1 tablet by mouth daily  Qty: 30 tablet, Refills: 3      Skin Protectants, Misc.  (ALOE VESTA PROTECTIVE) OINT ointment Apply 1 inch topically 2 times daily  Qty: 1 Tube, Refills: 2      lactulose (CHRONULAC) 10 GM/15ML solution Take 30 mLs by mouth 2 times daily  Qty: 1 Bottle, Refills: 3      magnesium oxide (MAG-OX) 400 (241.3 Mg) MG TABS tablet Take 1 tablet by mouth daily  Qty: 30 tablet, Refills: 1      benzocaine-menthol (CEPACOL SORE THROAT) 15-3.6 MG lozenge Take 1 lozenge by mouth every 2 hours as needed for Sore Throat  Qty: 40 lozenge, Refills: 0      vitamin B-1 100 MG tablet Take 1 tablet by mouth daily  Qty: 30 tablet, Refills: 3      triamterene-hydrochlorothiazide (DYAZIDE) 37.5-25 MG per capsule Take 1 capsule by mouth daily  Qty: 30 capsule, Refills: 3      rifaximin (XIFAXAN) 550 MG tablet Take 1 tablet by mouth 2 times daily  Qty: 60 tablet, Refills: 2         STOP taking these medications       ketoconazole (NIZORAL) 2 % cream Comments:   Reason for Stopping:         ketoconazole (NIZORAL) 2 % cream Comments:   Reason for Stopping:         lisinopril-hydrochlorothiazide (PRINZIDE;ZESTORETIC) 10-12.5 MG per tablet Comments:   Reason for Stopping:         Garlic 838 MG CAPS Comments:   Reason for Stopping:             Activity: activity as tolerated  Diet: diabetic diet    Follow-up with Joceline Woodall in 1 WEEK      Note that over 30 minutes was spent in preparing discharge papers, discussing discharge with patient, medication review, etc.      Signed:  Joceline Woodall  5/6/2019, 11:25 AM

## 2019-05-06 NOTE — PROGRESS NOTES
PROGRESS NOTE  By Stone Lance M.D. [facility]  Dr. Merced Oakley M.D.,  Dr. Aly Slaughter M.D.,   LUIS DANIEL StewartO.,  Dr. Kellie Hester M.D.,  Dr Xochitl Partida D.O. Luis Onofreheim  61 y.o.  male    SUBJECTIVE:  Denies abdominal pain. Denies nausea vomiting. Reportedly is experiencing persistent lower extremity and scrotal edema. OBJECTIVE:    /66   Pulse 66   Temp 98.4 °F (36.9 °C) (Oral)   Resp 18   Ht 5' 9\" (1.753 m)   Wt (!) 346 lb (156.9 kg)   SpO2 97%   BMI 51.10 kg/m²     General: NAD/morbidly obese  male. AAOx3  HEENT: Anicteric sclerae/moist oromucosa  Neck: Supple/trachea midline  Chest: Symmetrical excursions/nonlabored respirations  Abd.: Obese/soft/NT  Extr.: BLE over 3+ edema with chronic changes  Skin: Warm and dry        DATA:  Stool (measured) : 0 mL  Lab Results   Component Value Date    WBC 3.7 05/06/2019    RBC 3.10 05/06/2019    HGB 11.4 05/06/2019    HCT 33.7 05/06/2019    .7 05/06/2019    MCH 36.8 05/06/2019    MCHC 33.8 05/06/2019    RDW 14.5 05/06/2019    PLT 92 05/06/2019    MPV 9.6 05/06/2019     Lab Results   Component Value Date     05/06/2019    K 3.5 05/06/2019    CL 95 05/06/2019    CO2 36 05/06/2019    BUN 12 05/06/2019    CREATININE 0.5 05/06/2019    CALCIUM 8.1 05/06/2019    PROT 6.2 05/06/2019    LABALBU 2.4 05/06/2019    BILITOT 2.9 05/06/2019    ALKPHOS 107 05/06/2019    AST 82 05/06/2019    ALT 36 05/06/2019     Lab Results   Component Value Date    LIPASE 31 04/27/2019     No results found for: AMYLASE      ASSESSMENT/PLAN:  1. Cirrhosis  -Likely alcoholic however   -additional liver workup/serology negative  -KW  95.8 4/27   -MELD 4/27 calculated at 17   -Fluctuating ammonia level - mental status appears baseline  -Continue lactulose tid and rifaximin     2.  Anemia  -new anemia with stable H&H  -S/P EGD 4/30 revealing moderate gastritis but no varices  -Plan for outpatient colonoscopy unless precipitous drop or overt bleed  - monitor H&H; defer transfusion to admitting     3. Coagulopathy  -Secondary to cirrhosis  -Monitor INR     4. Thrombocytopenia  -Secondary to above with possible direct effect from alcohol  -Monitor labs     5. Morbid obesity  -Per admitting     6. Abnormal 2-D echo  -Consider repeating as likely contributing to patient presentation     7. Lactic acidosis  -Appears improved and likely type B  -Nephrology input appreciated    OK to be discharged from  POV. Follow in the office in 2-3 weeks after discharge - call for appointment that [phone]. NOTE:  This report wa transcribed using voice recognition software. Every effort was made to ensure accuracy; however, inadvertent computerized transcription errors may be present.     Link MD Kaushal  5/6/2019  1:31 PM

## 2019-05-10 ENCOUNTER — TELEPHONE (OUTPATIENT)
Dept: FAMILY MEDICINE CLINIC | Age: 64
End: 2019-05-10

## 2019-05-17 ENCOUNTER — HOSPITAL ENCOUNTER (OUTPATIENT)
Age: 64
Discharge: HOME | End: 2019-05-19

## 2019-05-17 ENCOUNTER — OFFICE VISIT (OUTPATIENT)
Dept: FAMILY MEDICINE CLINIC | Age: 64
End: 2019-05-17

## 2019-05-17 VITALS
HEART RATE: 74 BPM | DIASTOLIC BLOOD PRESSURE: 56 MMHG | BODY MASS INDEX: 46.65 KG/M2 | RESPIRATION RATE: 16 BRPM | OXYGEN SATURATION: 96 % | TEMPERATURE: 97.6 F | HEIGHT: 69 IN | SYSTOLIC BLOOD PRESSURE: 122 MMHG | WEIGHT: 315 LBS

## 2019-05-17 DIAGNOSIS — E66.01 MORBID OBESITY WITH BMI OF 45.0-49.9, ADULT (HCC): ICD-10-CM

## 2019-05-17 DIAGNOSIS — J18.9 PNEUMONIA OF RIGHT UPPER LOBE DUE TO INFECTIOUS ORGANISM: Primary | ICD-10-CM

## 2019-05-17 DIAGNOSIS — M62.82 NON-TRAUMATIC RHABDOMYOLYSIS: ICD-10-CM

## 2019-05-17 DIAGNOSIS — J18.9 PNEUMONIA OF RIGHT UPPER LOBE DUE TO INFECTIOUS ORGANISM: ICD-10-CM

## 2019-05-17 DIAGNOSIS — I10 ESSENTIAL HYPERTENSION: ICD-10-CM

## 2019-05-17 DIAGNOSIS — I50.31 ACUTE DIASTOLIC CHF (CONGESTIVE HEART FAILURE) (HCC): ICD-10-CM

## 2019-05-17 DIAGNOSIS — Z13.31 SCREENING FOR DEPRESSION: ICD-10-CM

## 2019-05-17 DIAGNOSIS — R60.0 BILATERAL LEG EDEMA: ICD-10-CM

## 2019-05-17 DIAGNOSIS — K70.30 ALCOHOLIC CIRRHOSIS OF LIVER WITHOUT ASCITES (HCC): ICD-10-CM

## 2019-05-17 PROCEDURE — 1111F DSCHRG MED/CURRENT MED MERGE: CPT | Performed by: FAMILY MEDICINE

## 2019-05-17 PROCEDURE — G0444 DEPRESSION SCREEN ANNUAL: HCPCS | Performed by: FAMILY MEDICINE

## 2019-05-17 PROCEDURE — 99214 OFFICE O/P EST MOD 30 MIN: CPT | Performed by: FAMILY MEDICINE

## 2019-05-17 RX ORDER — ACETAMINOPHEN 325 MG/1
650 TABLET ORAL EVERY 6 HOURS PRN
COMMUNITY
End: 2019-12-18 | Stop reason: DRUGHIGH

## 2019-05-17 ASSESSMENT — PATIENT HEALTH QUESTIONNAIRE - PHQ9
SUM OF ALL RESPONSES TO PHQ QUESTIONS 1-9: 0
2. FEELING DOWN, DEPRESSED OR HOPELESS: 0
SUM OF ALL RESPONSES TO PHQ9 QUESTIONS 1 & 2: 0
1. LITTLE INTEREST OR PLEASURE IN DOING THINGS: 0
SUM OF ALL RESPONSES TO PHQ QUESTIONS 1-9: 0

## 2019-05-17 NOTE — PATIENT INSTRUCTIONS
LOW SALT,LOW CARB. AND LOW FAT DIET. CONTINUE CURRENT MEDICATIONS TAKING REGULARLY. REGULAR WALKING ADVISED. ADVISED WEIGHT REDUCTION. BLOOD DRAW FOR LAB. TESTING. NEXT APPOINTMENT IN 2 WEEKS.

## 2019-05-17 NOTE — PROGRESS NOTES
1 tablet by mouth 2 times daily             Skin Protectants, Misc. (ALOE VESTA PROTECTIVE) OINT ointment  Apply 1 inch topically 2 times daily             triamterene-hydrochlorothiazide (DYAZIDE) 37.5-25 MG per capsule  Take 1 capsule by mouth daily             vitamin B-1 100 MG tablet  Take 1 tablet by mouth daily                   Medications marked \"taking\" at this time  Outpatient Medications Marked as Taking for the 5/17/19 encounter (Office Visit) with Tonia Hicks MD   Medication Sig Dispense Refill    metoprolol succinate (TOPROL XL) 25 MG extended release tablet Take 1 tablet by mouth daily 30 tablet 3    Skin Protectants, Misc. (ALOE VESTA PROTECTIVE) OINT ointment Apply 1 inch topically 2 times daily 1 Tube 2    lactulose (CHRONULAC) 10 GM/15ML solution Take 30 mLs by mouth 2 times daily 1 Bottle 3    magnesium oxide (MAG-OX) 400 (241.3 Mg) MG TABS tablet Take 1 tablet by mouth daily 30 tablet 1    benzocaine-menthol (CEPACOL SORE THROAT) 15-3.6 MG lozenge Take 1 lozenge by mouth every 2 hours as needed for Sore Throat 40 lozenge 0    vitamin B-1 100 MG tablet Take 1 tablet by mouth daily 30 tablet 3    triamterene-hydrochlorothiazide (DYAZIDE) 37.5-25 MG per capsule Take 1 capsule by mouth daily 30 capsule 3    rifaximin (XIFAXAN) 550 MG tablet Take 1 tablet by mouth 2 times daily 60 tablet 2        Medications patient taking as of now reconciled against medications ordered at time of hospital discharge: [address] REQUESTED. Chief Complaint   Patient presents with    Follow-Up from Hospital     pt d/c on 5/13 but we were not notified so this is reagular fu       History of Present illness - Follow up of Hospital diagnosis(es): [address]. WAS TRANSFERRED TO [facility] AFTER STABILIZATION. WAS RELEASED FROM THE NURSING FACILITY ON 5/13/2019.     Inpatient course: Discharge summary reviewed- see normal    Assessment/Plan:  1. Screening for depression  - RI DEPRESSION SCREEN ANNUAL    2. Pneumonia of right upper lobe due to infectious organism ([address]  RESOLVING. 3. Non-traumatic rhabdomyolysis  IMPROVING    4. Essential hypertension  CONTROLLED    5. Morbid obesity with BMI of 45.0-49.9, adult (HCC)  STABLE    6. Bilateral leg edema  PERSIST    7. Alcoholic cirrhosis of liver without ascites (HCC)  STABLE    8. Acute diastolic CHF (congestive heart failure) (HCC)  STABLE.         Medical Decision Making: high complexity

## 2019-06-04 ENCOUNTER — HOSPITAL ENCOUNTER (OUTPATIENT)
Age: 64
Discharge: HOME | End: 2019-06-06

## 2019-06-04 ENCOUNTER — OFFICE VISIT (OUTPATIENT)
Dept: FAMILY MEDICINE CLINIC | Age: 64
End: 2019-06-04

## 2019-06-04 VITALS
BODY MASS INDEX: 46.65 KG/M2 | RESPIRATION RATE: 19 BRPM | WEIGHT: 315 LBS | SYSTOLIC BLOOD PRESSURE: 116 MMHG | HEIGHT: 69 IN | HEART RATE: 92 BPM | DIASTOLIC BLOOD PRESSURE: 56 MMHG | TEMPERATURE: 98 F | OXYGEN SATURATION: 96 %

## 2019-06-04 DIAGNOSIS — E66.01 MORBID OBESITY WITH BMI OF 45.0-49.9, ADULT (HCC): ICD-10-CM

## 2019-06-04 DIAGNOSIS — R60.0 BILATERAL LEG EDEMA: ICD-10-CM

## 2019-06-04 DIAGNOSIS — N50.89 SWELLING OF SCROTUM: ICD-10-CM

## 2019-06-04 DIAGNOSIS — J18.9 PNEUMONIA OF RIGHT UPPER LOBE DUE TO INFECTIOUS ORGANISM: ICD-10-CM

## 2019-06-04 DIAGNOSIS — K70.30 ALCOHOLIC CIRRHOSIS OF LIVER WITHOUT ASCITES (HCC): ICD-10-CM

## 2019-06-04 DIAGNOSIS — I10 ESSENTIAL HYPERTENSION: Primary | ICD-10-CM

## 2019-06-04 LAB
ALBUMIN SERPL-MCNC: 2.8 G/DL (ref 3.5–5.2)
ALP BLD-CCNC: 117 U/L (ref 40–129)
ALT SERPL-CCNC: 23 U/L (ref 0–40)
ANION GAP SERPL CALCULATED.3IONS-SCNC: 7 MMOL/L (ref 7–16)
AST SERPL-CCNC: 82 U/L (ref 0–39)
BILIRUB SERPL-MCNC: 1.8 MG/DL (ref 0–1.2)
BUN BLDV-MCNC: 12 MG/DL (ref 8–23)
CALCIUM SERPL-MCNC: 8.6 MG/DL (ref 8.6–10.2)
CHLORIDE BLD-SCNC: 101 MMOL/L (ref 98–107)
CO2: 25 MMOL/L (ref 22–29)
CREAT SERPL-MCNC: 0.6 MG/DL (ref 0.7–1.2)
GFR AFRICAN AMERICAN: >60
GFR NON-AFRICAN AMERICAN: >60 ML/MIN/1.73
GLUCOSE BLD-MCNC: 113 MG/DL (ref 74–99)
POTASSIUM SERPL-SCNC: 4.8 MMOL/L (ref 3.5–5)
SODIUM BLD-SCNC: 133 MMOL/L (ref 132–146)
TOTAL PROTEIN: 7.8 G/DL (ref 6.4–8.3)

## 2019-06-04 PROCEDURE — 36415 COLL VENOUS BLD VENIPUNCTURE: CPT

## 2019-06-04 PROCEDURE — 80053 COMPREHEN METABOLIC PANEL: CPT

## 2019-06-04 PROCEDURE — 99214 OFFICE O/P EST MOD 30 MIN: CPT | Performed by: FAMILY MEDICINE

## 2019-06-04 RX ORDER — TRIAMTERENE AND HYDROCHLOROTHIAZIDE 37.5; 25 MG/1; MG/1
1 CAPSULE ORAL DAILY
Qty: 30 CAPSULE | Refills: 3 | Status: SHIPPED | OUTPATIENT
Start: 2019-06-04 | End: 2019-12-18 | Stop reason: ALTCHOICE

## 2019-06-04 NOTE — PATIENT INSTRUCTIONS
LOW SALT,LOW CARB. AND LOW FAT DIET. CONTINUE CURRENT MEDICATIONS TAKING REGULARLY. REGULAR WALKING ADVISED. ADVISED WEIGHT REDUCTION. SEE DR. GALINDO AS SCHEDULED. XR CHEST AS ADVISED  FASTING FOR LAB WORK ONE MORNING. NEXT APPOINTMENT IN 1 MONTH.

## 2019-06-04 NOTE — PROGRESS NOTES
loss  Endocrine: no heat or cold intolerance and no polyphagia, polydipsia, or polyuria        OBJECTIVE:     VS:  Wt Readings from Last 3 Encounters:   06/04/19 (!) 363 lb (164.7 kg)   05/17/19 (!) 327 lb 6.4 oz (148.5 kg)   05/06/19 (!) 346 lb (156.9 kg)     Temp Readings from Last 3 Encounters:   06/04/19 98 °F (36.7 °C) (Temporal)   05/17/19 97.6 °F (36.4 °C) (Temporal)   05/06/19 98.4 °F (36.9 °C) (Oral)     BP Readings from Last 3 Encounters:   06/04/19 (!) 116/56   05/17/19 (!) 122/56   05/06/19 108/66        General appearance: Alert, Awake, Oriented times 3, no distress  Skin: Warm and dry  Head: Normocephalic. No masses, lesions or tenderness noted  Eyes: Conjunctivae appear normal. PERLE  Ears: External ears normal  Nose/Sinuses: Nares normal. Septum midline. Mucosa normal. No drainage  Oropharynx: Oropharynx clear with no exudate seen  Neck: Neck supple. No jugular venous distension, lymphadenopathy or thyromegaly Trachea midline  Chest:  Normal. Movements are Normal and Equal.  Lungs: Lungs clear to auscultation bilaterally. No ronchi, crackles or wheezes  Heart: S1 S2  Regular rate and rhythm. No rub, murmur or gallop  Abdomen: Abdomen soft, non-tender. BS normal. No masses, organomegaly. Back: Grossly Normal and Equal. DTR are Normal. SLR is Normal.  Extremities: Arthritic changes are noted. Movements are limited. Pedal pulses are normal.EDEMA BOTH LOWER EXTREMITIES AND LOWER ABDOMEN. Musculoskeletal: Muscular strength appears intact.  No joint effusion, tenderness, swelling or warmth  Neuro:  No focal motor or sensory deficits        ASSESSMENT     Patient Active Problem List    Diagnosis Date Noted    Hepatic encephalopathy ([facility] 05/02/2019     Priority: High     Class: Acute    Pneumonia of right upper lobe due to infectious organism ([facility] 04/30/2019     Priority: High     Class: Acute    Mental confusion 04/28/2019     Priority: High     Class: Acute    Alcoholic cirrhosis of liver without ascites ([facility] 04/28/2019     Priority: High     Class: Chronic    Bilateral low back pain without sciatica 12/03/2015     Priority: High     Class: Chronic    Acute diastolic CHF (congestive heart failure) ([facility] 11/23/2015     Priority: High     Class: Acute    Gout 02/02/2015     Priority: High     Class: Acute    HTN (hypertension) 01/12/2015     Priority: High     Class: Chronic    Morbid obesity with BMI of 45.0-49.9, adult ([facility] 01/12/2015     Priority: High     Class: Chronic    Bilateral leg edema 01/12/2015     Priority: High    Rhabdomyolysis 04/27/2019        Diagnosis:     ICD-10-CM    1. Essential hypertension I10    2. Morbid obesity with BMI of 45.0-49.9, adult ([facility] E66.01     Z68.42    3. Pneumonia of right upper lobe due to infectious organism ([facility] J18.1 XR CHEST STANDARD (2 VW)   4. Bilateral leg edema R60.0    5. Alcoholic cirrhosis of liver without ascites (HCC) K70.30    6. Swelling of scrotum N50.89 External Referral To Urology       PLAN:           Patient Instructions   LOW SALT,LOW CARB. AND LOW FAT DIET. CONTINUE CURRENT MEDICATIONS TAKING REGULARLY. REGULAR WALKING ADVISED. ADVISED WEIGHT REDUCTION. SEE DR. GALINDO AS SCHEDULED. XR CHEST AS ADVISED  FASTING FOR LAB WORK ONE MORNING. NEXT APPOINTMENT IN 1 MONTH. Return in about 1 month (around 7/4/2019). I have reviewed my findings and recommendations with Grey Oppenheim.     Electronically signed by Jayjay Crabtree MD on 6/4/19 at 1:53 PM

## 2019-07-19 ENCOUNTER — TELEPHONE (OUTPATIENT)
Dept: FAMILY MEDICINE CLINIC | Age: 64
End: 2019-07-19

## 2019-08-06 ENCOUNTER — HOSPITAL ENCOUNTER (INPATIENT)
Age: 64
LOS: 5 days | Discharge: HOME | DRG: 728 | End: 2019-08-11
Attending: EMERGENCY MEDICINE | Admitting: HOSPITALIST
Payer: OTHER GOVERNMENT

## 2019-08-06 ENCOUNTER — APPOINTMENT (OUTPATIENT)
Dept: GENERAL RADIOLOGY | Age: 64
DRG: 728 | End: 2019-08-06
Payer: OTHER GOVERNMENT

## 2019-08-06 ENCOUNTER — APPOINTMENT (OUTPATIENT)
Dept: ULTRASOUND IMAGING | Age: 64
DRG: 728 | End: 2019-08-06
Payer: OTHER GOVERNMENT

## 2019-08-06 DIAGNOSIS — K74.60 CIRRHOSIS OF LIVER WITH ASCITES, UNSPECIFIED HEPATIC CIRRHOSIS TYPE (HCC): ICD-10-CM

## 2019-08-06 DIAGNOSIS — R18.8 CIRRHOSIS OF LIVER WITH ASCITES, UNSPECIFIED HEPATIC CIRRHOSIS TYPE (HCC): ICD-10-CM

## 2019-08-06 DIAGNOSIS — E65 ABDOMINAL PANNICULUS: ICD-10-CM

## 2019-08-06 DIAGNOSIS — N49.2 CELLULITIS OF SCROTUM: ICD-10-CM

## 2019-08-06 DIAGNOSIS — I50.9 ACUTE DECOMPENSATED HEART FAILURE (HCC): Primary | ICD-10-CM

## 2019-08-06 DIAGNOSIS — N28.9 RENAL INSUFFICIENCY: ICD-10-CM

## 2019-08-06 LAB
ABO/RH: NORMAL
ACETAMINOPHEN LEVEL: ABNORMAL MCG/ML (ref 10–30)
ALBUMIN SERPL-MCNC: 3.2 G/DL (ref 3.5–5.2)
ALBUMIN SERPL-MCNC: ABNORMAL G/DL (ref 3.5–5.2)
ALP BLD-CCNC: 102 U/L (ref 40–129)
ALP BLD-CCNC: ABNORMAL U/L (ref 40–129)
ALT SERPL-CCNC: 32 U/L (ref 0–40)
ALT SERPL-CCNC: ABNORMAL U/L (ref 0–40)
AMMONIA: 45 UMOL/L (ref 16–60)
AMMONIA: 55 UMOL/L (ref 16–60)
ANGLE (CLOT STRENGTH): ABNORMAL DEGREE (ref 59–74)
ANION GAP SERPL CALCULATED.3IONS-SCNC: 7 MMOL/L (ref 7–16)
ANION GAP SERPL CALCULATED.3IONS-SCNC: ABNORMAL MMOL/L (ref 7–16)
ANISOCYTOSIS: ABNORMAL
ANTIBODY SCREEN: NORMAL
APTT: ABNORMAL SEC (ref 24.5–35.1)
AST SERPL-CCNC: 79 U/L (ref 0–39)
AST SERPL-CCNC: ABNORMAL U/L (ref 0–39)
BASOPHILIC STIPPLING: ABNORMAL
BASOPHILS ABSOLUTE: 0.08 E9/L (ref 0–0.2)
BASOPHILS ABSOLUTE: 0.08 E9/L (ref 0–0.2)
BASOPHILS ABSOLUTE: ABNORMAL E9/L (ref 0–0.2)
BASOPHILS RELATIVE PERCENT: 2.1 % (ref 0–2)
BASOPHILS RELATIVE PERCENT: 2.1 % (ref 0–2)
BASOPHILS RELATIVE PERCENT: ABNORMAL % (ref 0–2)
BILIRUB SERPL-MCNC: 4.4 MG/DL (ref 0–1.2)
BILIRUB SERPL-MCNC: ABNORMAL MG/DL (ref 0–1.2)
BUN BLDV-MCNC: 15 MG/DL (ref 8–23)
BUN BLDV-MCNC: ABNORMAL MG/DL (ref 8–23)
CALCIUM SERPL-MCNC: 9.1 MG/DL (ref 8.6–10.2)
CALCIUM SERPL-MCNC: ABNORMAL MG/DL (ref 8.6–10.2)
CHLORIDE BLD-SCNC: 100 MMOL/L (ref 98–107)
CHLORIDE BLD-SCNC: ABNORMAL MMOL/L (ref 98–107)
CO2: 32 MMOL/L (ref 22–29)
CO2: ABNORMAL MMOL/L (ref 22–29)
CREAT SERPL-MCNC: 0.6 MG/DL (ref 0.7–1.2)
CREAT SERPL-MCNC: ABNORMAL MG/DL (ref 0.7–1.2)
EOSINOPHILS ABSOLUTE: 0.32 E9/L (ref 0.05–0.5)
EOSINOPHILS ABSOLUTE: 0.38 E9/L (ref 0.05–0.5)
EOSINOPHILS ABSOLUTE: ABNORMAL E9/L (ref 0.05–0.5)
EOSINOPHILS RELATIVE PERCENT: 8.2 % (ref 0–6)
EOSINOPHILS RELATIVE PERCENT: 9.8 % (ref 0–6)
EOSINOPHILS RELATIVE PERCENT: ABNORMAL % (ref 0–6)
EPL-TEG: ABNORMAL % (ref 0–15)
ETHANOL: ABNORMAL MG/DL (ref 0–0.08)
G-TEG: ABNORMAL K D/SC (ref 4.5–11)
GFR AFRICAN AMERICAN: >60
GFR AFRICAN AMERICAN: ABNORMAL
GFR NON-AFRICAN AMERICAN: >60 ML/MIN/1.73
GFR NON-AFRICAN AMERICAN: ABNORMAL ML/MIN/1.73
GLUCOSE BLD-MCNC: 104 MG/DL (ref 74–99)
GLUCOSE BLD-MCNC: ABNORMAL MG/DL (ref 74–99)
HCT VFR BLD CALC: 31.3 % (ref 37–54)
HCT VFR BLD CALC: 34.3 % (ref 37–54)
HCT VFR BLD CALC: ABNORMAL % (ref 37–54)
HCT VFR BLD CALC: ABNORMAL % (ref 37–54)
HEMOGLOBIN: 10.6 G/DL (ref 12.5–16.5)
HEMOGLOBIN: 11.4 G/DL (ref 12.5–16.5)
HEMOGLOBIN: ABNORMAL G/DL (ref 12.5–16.5)
HEMOGLOBIN: ABNORMAL G/DL (ref 12.5–16.5)
HYPOCHROMIA: ABNORMAL
IMMATURE GRANULOCYTES #: 0.01 E9/L
IMMATURE GRANULOCYTES #: 0.01 E9/L
IMMATURE GRANULOCYTES %: 0.3 % (ref 0–5)
IMMATURE GRANULOCYTES %: 0.3 % (ref 0–5)
INR BLD: 2
INR BLD: 2.2
INR BLD: ABNORMAL
K (CLOTTING TIME): ABNORMAL MIN (ref 1–3)
LACTIC ACID: ABNORMAL MMOL/L (ref 0.5–2.2)
LIPASE: 72 U/L (ref 13–60)
LY30 (FIBRINOLYSIS): ABNORMAL % (ref 0–8)
LYMPHOCYTES ABSOLUTE: 1.49 E9/L (ref 1.5–4)
LYMPHOCYTES ABSOLUTE: 1.57 E9/L (ref 1.5–4)
LYMPHOCYTES ABSOLUTE: ABNORMAL E9/L (ref 1.5–4)
LYMPHOCYTES RELATIVE PERCENT: 38.2 % (ref 20–42)
LYMPHOCYTES RELATIVE PERCENT: 40.7 % (ref 20–42)
LYMPHOCYTES RELATIVE PERCENT: ABNORMAL % (ref 20–42)
MA (MAX AMPLITUDE): ABNORMAL MM (ref 50–70)
MCH RBC QN AUTO: 37.1 PG (ref 26–35)
MCH RBC QN AUTO: 37.2 PG (ref 26–35)
MCH RBC QN AUTO: ABNORMAL PG (ref 26–35)
MCH RBC QN AUTO: ABNORMAL PG (ref 26–35)
MCHC RBC AUTO-ENTMCNC: 33.2 % (ref 32–34.5)
MCHC RBC AUTO-ENTMCNC: 33.9 % (ref 32–34.5)
MCHC RBC AUTO-ENTMCNC: ABNORMAL % (ref 32–34.5)
MCHC RBC AUTO-ENTMCNC: ABNORMAL % (ref 32–34.5)
MCV RBC AUTO: 109.8 FL (ref 80–99.9)
MCV RBC AUTO: 111.7 FL (ref 80–99.9)
MCV RBC AUTO: ABNORMAL FL (ref 80–99.9)
MCV RBC AUTO: ABNORMAL FL (ref 80–99.9)
METAMYELOCYTES RELATIVE PERCENT: ABNORMAL % (ref 0–1)
MONOCYTES ABSOLUTE: 0.69 E9/L (ref 0.1–0.95)
MONOCYTES ABSOLUTE: 0.71 E9/L (ref 0.1–0.95)
MONOCYTES ABSOLUTE: ABNORMAL E9/L (ref 0.1–0.95)
MONOCYTES RELATIVE PERCENT: 17.9 % (ref 2–12)
MONOCYTES RELATIVE PERCENT: 18.2 % (ref 2–12)
MONOCYTES RELATIVE PERCENT: ABNORMAL % (ref 2–12)
MYELOCYTE PERCENT: ABNORMAL % (ref 0–0)
NEUTROPHILS ABSOLUTE: 1.13 E9/L (ref 1.8–7.3)
NEUTROPHILS ABSOLUTE: 1.29 E9/L (ref 1.8–7.3)
NEUTROPHILS ABSOLUTE: ABNORMAL E9/L (ref 1.8–7.3)
NEUTROPHILS RELATIVE PERCENT: 29.2 % (ref 43–80)
NEUTROPHILS RELATIVE PERCENT: 33 % (ref 43–80)
NEUTROPHILS RELATIVE PERCENT: ABNORMAL % (ref 43–80)
NUCLEATED RED BLOOD CELLS: ABNORMAL /100 WBC
PDW BLD-RTO: 15.9 FL (ref 11.5–15)
PDW BLD-RTO: 16.1 FL (ref 11.5–15)
PDW BLD-RTO: ABNORMAL FL (ref 11.5–15)
PDW BLD-RTO: ABNORMAL FL (ref 11.5–15)
PLATELET # BLD: 91 E9/L (ref 130–450)
PLATELET # BLD: 93 E9/L (ref 130–450)
PLATELET # BLD: ABNORMAL E9/L (ref 130–450)
PLATELET # BLD: ABNORMAL E9/L (ref 130–450)
PLATELET CONFIRMATION: 1
PLATELET CONFIRMATION: NORMAL
PLATELET CONFIRMATION: NORMAL
PMV BLD AUTO: 9.1 FL (ref 7–12)
PMV BLD AUTO: 9.4 FL (ref 7–12)
PMV BLD AUTO: ABNORMAL FL (ref 7–12)
PMV BLD AUTO: ABNORMAL FL (ref 7–12)
POLYCHROMASIA: ABNORMAL
POTASSIUM REFLEX MAGNESIUM: 3.9 MMOL/L (ref 3.5–5)
POTASSIUM SERPL-SCNC: ABNORMAL MMOL/L (ref 3.5–5)
PRO-BNP: 283 PG/ML (ref 0–125)
PROMYELOCYTES PERCENT: ABNORMAL % (ref 0–0)
PROTHROMBIN TIME: 23.2 SEC (ref 9.3–12.4)
PROTHROMBIN TIME: 25.1 SEC (ref 9.3–12.4)
PROTHROMBIN TIME: ABNORMAL SEC (ref 9.3–12.4)
R (REACTION TIME): ABNORMAL MIN (ref 5–10)
RBC # BLD: 2.85 E12/L (ref 3.8–5.8)
RBC # BLD: 3.07 E12/L (ref 3.8–5.8)
RBC # BLD: ABNORMAL E12/L (ref 3.8–5.8)
RBC # BLD: ABNORMAL E12/L (ref 3.8–5.8)
RBC # BLD: NORMAL 10*6/UL
REASON FOR REJECTION: NORMAL
REJECTED TEST: NORMAL
SALICYLATE, SERUM: ABNORMAL MG/DL (ref 0–30)
SODIUM BLD-SCNC: 139 MMOL/L (ref 132–146)
SODIUM BLD-SCNC: ABNORMAL MMOL/L (ref 132–146)
TARGET CELLS: ABNORMAL
TOTAL PROTEIN: 7.9 G/DL (ref 6.4–8.3)
TOTAL PROTEIN: ABNORMAL G/DL (ref 6.4–8.3)
TRICYCLIC ANTIDEPRESSANTS SCREEN SERUM: ABNORMAL NG/ML
TROPONIN: <0.01 NG/ML (ref 0–0.03)
WBC # BLD: 3.9 E9/L (ref 4.5–11.5)
WBC # BLD: 3.9 E9/L (ref 4.5–11.5)
WBC # BLD: ABNORMAL E9/L (ref 4.5–11.5)
WBC # BLD: ABNORMAL E9/L (ref 4.5–11.5)

## 2019-08-06 PROCEDURE — 84484 ASSAY OF TROPONIN QUANT: CPT

## 2019-08-06 PROCEDURE — 94761 N-INVAS EAR/PLS OXIMETRY MLT: CPT

## 2019-08-06 PROCEDURE — 96366 THER/PROPH/DIAG IV INF ADDON: CPT

## 2019-08-06 PROCEDURE — 2580000003 HC RX 258: Performed by: NURSE PRACTITIONER

## 2019-08-06 PROCEDURE — 96368 THER/DIAG CONCURRENT INF: CPT

## 2019-08-06 PROCEDURE — 71045 X-RAY EXAM CHEST 1 VIEW: CPT

## 2019-08-06 PROCEDURE — 51702 INSERT TEMP BLADDER CATH: CPT

## 2019-08-06 PROCEDURE — 93005 ELECTROCARDIOGRAM TRACING: CPT | Performed by: NURSE PRACTITIONER

## 2019-08-06 PROCEDURE — 83880 ASSAY OF NATRIURETIC PEPTIDE: CPT

## 2019-08-06 PROCEDURE — 87040 BLOOD CULTURE FOR BACTERIA: CPT

## 2019-08-06 PROCEDURE — 1200000000 HC SEMI PRIVATE

## 2019-08-06 PROCEDURE — 6360000002 HC RX W HCPCS: Performed by: NURSE PRACTITIONER

## 2019-08-06 PROCEDURE — 80053 COMPREHEN METABOLIC PANEL: CPT

## 2019-08-06 PROCEDURE — 99285 EMERGENCY DEPT VISIT HI MDM: CPT

## 2019-08-06 PROCEDURE — 96375 TX/PRO/DX INJ NEW DRUG ADDON: CPT

## 2019-08-06 PROCEDURE — 76870 US EXAM SCROTUM: CPT

## 2019-08-06 PROCEDURE — 96365 THER/PROPH/DIAG IV INF INIT: CPT

## 2019-08-06 PROCEDURE — 85025 COMPLETE CBC W/AUTO DIFF WBC: CPT

## 2019-08-06 PROCEDURE — 85610 PROTHROMBIN TIME: CPT

## 2019-08-06 PROCEDURE — 87149 DNA/RNA DIRECT PROBE: CPT

## 2019-08-06 PROCEDURE — 93975 VASCULAR STUDY: CPT

## 2019-08-06 PROCEDURE — 83690 ASSAY OF LIPASE: CPT

## 2019-08-06 PROCEDURE — 36415 COLL VENOUS BLD VENIPUNCTURE: CPT

## 2019-08-06 PROCEDURE — 82140 ASSAY OF AMMONIA: CPT

## 2019-08-06 RX ORDER — FUROSEMIDE 10 MG/ML
40 INJECTION INTRAMUSCULAR; INTRAVENOUS ONCE
Status: COMPLETED | OUTPATIENT
Start: 2019-08-06 | End: 2019-08-06

## 2019-08-06 RX ADMIN — FUROSEMIDE 40 MG: 10 INJECTION, SOLUTION INTRAMUSCULAR; INTRAVENOUS at 23:12

## 2019-08-06 RX ADMIN — PIPERACILLIN SODIUM,TAZOBACTAM SODIUM 3.38 G: 3; .375 INJECTION, POWDER, FOR SOLUTION INTRAVENOUS at 23:13

## 2019-08-06 ASSESSMENT — PAIN SCALES - GENERAL: PAINLEVEL_OUTOF10: 7

## 2019-08-06 ASSESSMENT — PAIN DESCRIPTION - LOCATION
LOCATION: GROIN
LOCATION: SCROTUM

## 2019-08-06 ASSESSMENT — PAIN DESCRIPTION - PAIN TYPE: TYPE: ACUTE PAIN

## 2019-08-06 NOTE — ED PROVIDER NOTES
Range    WBC SEE NOTE (AA) 4.5 - 11.5 E9/L    RBC SEE NOTE (AA) 3.80 - 5.80 E12/L    Hemoglobin SEE NOTE (AA) 12.5 - 16.5 g/dL    Hematocrit SEE NOTE (AA) 37.0 - 54.0 %    MCV SEE NOTE (AA) 80.0 - 99.9 fL    MCH SEE NOTE (AA) 26.0 - 35.0 pg    MCHC SEE NOTE (AA) 32.0 - 34.5 %    RDW SEE NOTE (AA) 11.5 - 15.0 fL    Platelets SEE NOTE (AA) 130 - 450 E9/L    MPV SEE NOTE (AA) 7.0 - 12.0 fL   Protime-INR   Result Value Ref Range    Protime SEE NOTE (AA) 9.3 - 12.4 sec    INR SEE NOTE (AA)    APTT   Result Value Ref Range    aPTT SEE NOTE (AA) 24.5 - 35.1 sec   Lactic Acid, Plasma   Result Value Ref Range    Lactic Acid SEE NOTE (AA) 0.5 - 2.2 mmol/L   TEG lab test   Result Value Ref Range    R (Reaction Time) SEE NOTE (AA) 5.0 - 10.0 min    K (Clotting Time) SEE NOTE (AA) 1.0 - 3.0 min    Angle (Clot Strength) SEE NOTE (AA) 59.0 - 74.0 degree    MA (Max Amplitude) SEE NOTE (AA) 50.0 - 70.0 mm    G-TEG SEE NOTE (AA) 4.5 - 11.0 K d/sc    EPL-TEG SEE NOTE (AA) 0.0 - 15.0 %    LY30 (Fibrinolysis) SEE NOTE (AA) 0.0 - 8.0 %   Serum Drug Screen   Result Value Ref Range    Ethanol Lvl SEE NOTE (AA) mg/dL    Acetaminophen Level SEE NOTE (AA) 10.0 - 54.7 mcg/mL    Salicylate, Serum SEE NOTE (AA) 0.0 - 30.0 mg/dL    TCA Scrn SEE NOTE Cutoff:300 ng/mL   Platelet Confirmation   Result Value Ref Range    Platelet Confirmation SEE NOTE    CBC Auto Differential   Result Value Ref Range    WBC 3.9 (L) 4.5 - 11.5 E9/L    RBC 3.07 (L) 3.80 - 5.80 E12/L    Hemoglobin 11.4 (L) 12.5 - 16.5 g/dL    Hematocrit 34.3 (L) 37.0 - 54.0 %    .7 (H) 80.0 - 99.9 fL    MCH 37.1 (H) 26.0 - 35.0 pg    MCHC 33.2 32.0 - 34.5 %    RDW 16.1 (H) 11.5 - 15.0 fL    Platelets 93 (L) 472 - 450 E9/L    MPV 9.1 7.0 - 12.0 fL    Neutrophils % 33.0 (L) 43.0 - 80.0 %    Immature Granulocytes % 0.3 0.0 - 5.0 %    Lymphocytes % 38.2 20.0 - 42.0 %    Monocytes % 18.2 (H) 2.0 - 12.0 %    Eosinophils % 8.2 (H) 0.0 - 6.0 %    Basophils % 2.1 (H) 0.0 - 2.0 % mg/dL   Magnesium   Result Value Ref Range    Magnesium 1.6 1.6 - 2.6 mg/dL   CBC auto differential   Result Value Ref Range    WBC 3.8 (L) 4.5 - 11.5 E9/L    RBC 2.77 (L) 3.80 - 5.80 E12/L    Hemoglobin 10.3 (L) 12.5 - 16.5 g/dL    Hematocrit 30.5 (L) 37.0 - 54.0 %    .1 (H) 80.0 - 99.9 fL    MCH 37.2 (H) 26.0 - 35.0 pg    MCHC 33.8 32.0 - 34.5 %    RDW 15.8 (H) 11.5 - 15.0 fL    Platelets 94 (L) 639 - 450 E9/L    MPV 9.2 7.0 - 12.0 fL    Neutrophils % 46.1 43.0 - 80.0 %    Lymphocytes % 34.8 20.0 - 42.0 %    Monocytes % 3.5 2.0 - 12.0 %    Eosinophils % 13.0 (H) 0.0 - 6.0 %    Basophils % 2.6 (H) 0.0 - 2.0 %    Neutrophils # 1.75 (L) 1.80 - 7.30 E9/L    Lymphocytes # 1.33 (L) 1.50 - 4.00 E9/L    Monocytes # 0.15 0.10 - 0.95 E9/L    Eosinophils # 0.49 0.05 - 0.50 E9/L    Basophils # 0.10 0.00 - 0.20 E9/L    Anisocytosis 2+    Vitamin B12   Result Value Ref Range    Vitamin B-12 958 (H) 211 - 946 pg/mL   Comprehensive metabolic panel   Result Value Ref Range    Potassium 3.3 (L) 3.5 - 5.0 mmol/L    Total Protein 7.0 6.4 - 8.3 g/dL    Alb 3.2 (L) 3.5 - 5.2 g/dL    Total Bilirubin 3.7 (H) 0.0 - 1.2 mg/dL    Alkaline Phosphatase 75 40 - 129 U/L    ALT 25 0 - 40 U/L    AST 65 (H) 0 - 39 U/L   Platelet Confirmation   Result Value Ref Range    Platelet Confirmation CONFIRMED    EKG 12 Lead   Result Value Ref Range    Ventricular Rate 86 BPM    Atrial Rate 86 BPM    P-R Interval 202 ms    QRS Duration 86 ms    Q-T Interval 396 ms    QTc Calculation (Bazett) 473 ms    P Axis 56 degrees    T Axis 69 degrees   TYPE AND SCREEN   Result Value Ref Range    ABO/Rh A POS     Antibody Screen NEG      Imaging: All Radiology results interpreted by Radiologist unless otherwise noted. US SCROTUM AND TESTICLES   Final Result      Severe thickening of the scrotal wall likely reflecting cellulitis. Bilateral hydroceles. No evidence of torsion.       US DUP ABD PEL RETRO SCROT COMPLETE   Final Result      Severe not have to urinate. Denies any changes to his diet, lifestyle or medication regimen otherwise. He admits to some shortness of breath which is positional and worse with supination. Respiratory examination is limited due to the patient's body habitus however chest x-ray does show some pulmonary vascular congestion. He is noted to have abdominal fluid collection as well as lower extremity edema, the abdominal pannus and scrotum are erythematous and warm to the touch with possible superimposed panniculitis and scrotal cellulitis superimposed. Labs are obtained showing worsening renal insufficiency, elevated BNP and abnormal liver functions consistent with patient's history of liver cirrhosis. No other significant abnormalities are noted. Burgess catheter is placed due to the patient's complaints of urinary retention with good urine return although he does urinate around the catheter during insertion as well and accurate collection of the initial volume is not able to be obtained. Plan is to admit for IV diuresis, accurate intake and output management, further care management. Counseling:   I have spoken with the patient and discussed todays results, in addition to providing specific details for the plan of care and counseling regarding the diagnosis and prognosis and are agreeable with the plan. Assessment      1. Acute decompensated heart failure ([address]    2. Renal insufficiency    3. Cirrhosis of liver with ascites, unspecified hepatic cirrhosis type ([address]    4. Abdominal panniculus    5. Cellulitis of scrotum      This patient's ED course included: a personal history and physicial examination, re-evaluation prior to disposition, multiple bedside re-evaluations, IV medications, cardiac monitoring and complex medical decision making and emergency management  This patient has remained hemodynamically stable, improved and been closely monitored during their ED course. Plan   Admit to telemetry.   Patient

## 2019-08-07 LAB
EKG ATRIAL RATE: 86 BPM
EKG P AXIS: 56 DEGREES
EKG P-R INTERVAL: 202 MS
EKG Q-T INTERVAL: 396 MS
EKG QRS DURATION: 86 MS
EKG QTC CALCULATION (BAZETT): 473 MS
EKG T AXIS: 69 DEGREES
EKG VENTRICULAR RATE: 86 BPM

## 2019-08-07 PROCEDURE — 97162 PT EVAL MOD COMPLEX 30 MIN: CPT

## 2019-08-07 PROCEDURE — 2580000003 HC RX 258: Performed by: HOSPITALIST

## 2019-08-07 PROCEDURE — 99222 1ST HOSP IP/OBS MODERATE 55: CPT | Performed by: INTERNAL MEDICINE

## 2019-08-07 PROCEDURE — 97530 THERAPEUTIC ACTIVITIES: CPT

## 2019-08-07 PROCEDURE — 6360000002 HC RX W HCPCS: Performed by: INTERNAL MEDICINE

## 2019-08-07 PROCEDURE — 93010 ELECTROCARDIOGRAM REPORT: CPT | Performed by: INTERNAL MEDICINE

## 2019-08-07 PROCEDURE — 6360000002 HC RX W HCPCS: Performed by: HOSPITALIST

## 2019-08-07 PROCEDURE — P9047 ALBUMIN (HUMAN), 25%, 50ML: HCPCS | Performed by: INTERNAL MEDICINE

## 2019-08-07 PROCEDURE — 6370000000 HC RX 637 (ALT 250 FOR IP): Performed by: SPECIALIST

## 2019-08-07 PROCEDURE — 97535 SELF CARE MNGMENT TRAINING: CPT

## 2019-08-07 PROCEDURE — 2500000003 HC RX 250 WO HCPCS: Performed by: SPECIALIST

## 2019-08-07 PROCEDURE — 6370000000 HC RX 637 (ALT 250 FOR IP): Performed by: HOSPITALIST

## 2019-08-07 PROCEDURE — 97165 OT EVAL LOW COMPLEX 30 MIN: CPT

## 2019-08-07 PROCEDURE — 1200000000 HC SEMI PRIVATE

## 2019-08-07 RX ORDER — CLOTRIMAZOLE AND BETAMETHASONE DIPROPIONATE 10; .64 MG/G; MG/G
CREAM TOPICAL DAILY
Status: DISCONTINUED | OUTPATIENT
Start: 2019-08-07 | End: 2019-08-11 | Stop reason: HOSPADM

## 2019-08-07 RX ORDER — ALBUMIN (HUMAN) 12.5 G/50ML
25 SOLUTION INTRAVENOUS 3 TIMES DAILY
Status: COMPLETED | OUTPATIENT
Start: 2019-08-07 | End: 2019-08-08

## 2019-08-07 RX ORDER — CIPROFLOXACIN 750 MG/1
750 TABLET, FILM COATED ORAL DAILY
Status: ON HOLD | COMMUNITY
End: 2019-08-11 | Stop reason: HOSPADM

## 2019-08-07 RX ORDER — TRIAMTERENE AND HYDROCHLOROTHIAZIDE 37.5; 25 MG/1; MG/1
1 TABLET ORAL DAILY
Status: DISCONTINUED | OUTPATIENT
Start: 2019-08-07 | End: 2019-08-11 | Stop reason: HOSPADM

## 2019-08-07 RX ORDER — NAPROXEN 250 MG/1
250 TABLET ORAL PRN
Status: ON HOLD | COMMUNITY
End: 2019-08-11 | Stop reason: HOSPADM

## 2019-08-07 RX ORDER — SODIUM CHLORIDE 0.9 % (FLUSH) 0.9 %
10 SYRINGE (ML) INJECTION EVERY 12 HOURS SCHEDULED
Status: DISCONTINUED | OUTPATIENT
Start: 2019-08-07 | End: 2019-08-11 | Stop reason: HOSPADM

## 2019-08-07 RX ORDER — ACETAMINOPHEN 325 MG/1
650 TABLET ORAL EVERY 6 HOURS PRN
Status: DISCONTINUED | OUTPATIENT
Start: 2019-08-07 | End: 2019-08-11 | Stop reason: HOSPADM

## 2019-08-07 RX ORDER — AMMONIUM LACTATE 12 G/100G
LOTION TOPICAL DAILY
Status: DISCONTINUED | OUTPATIENT
Start: 2019-08-07 | End: 2019-08-11 | Stop reason: HOSPADM

## 2019-08-07 RX ORDER — FUROSEMIDE 10 MG/ML
40 INJECTION INTRAMUSCULAR; INTRAVENOUS 3 TIMES DAILY
Status: DISCONTINUED | OUTPATIENT
Start: 2019-08-07 | End: 2019-08-09

## 2019-08-07 RX ORDER — METOPROLOL SUCCINATE 25 MG/1
25 TABLET, EXTENDED RELEASE ORAL DAILY
Status: DISCONTINUED | OUTPATIENT
Start: 2019-08-07 | End: 2019-08-11 | Stop reason: HOSPADM

## 2019-08-07 RX ORDER — LACTULOSE 10 G/15ML
20 SOLUTION ORAL 2 TIMES DAILY
Status: DISCONTINUED | OUTPATIENT
Start: 2019-08-07 | End: 2019-08-11 | Stop reason: HOSPADM

## 2019-08-07 RX ORDER — LANOLIN ALCOHOL/MO/W.PET/CERES
400 CREAM (GRAM) TOPICAL DAILY
Status: DISCONTINUED | OUTPATIENT
Start: 2019-08-07 | End: 2019-08-11 | Stop reason: HOSPADM

## 2019-08-07 RX ORDER — CIPROFLOXACIN 750 MG/1
750 TABLET, FILM COATED ORAL DAILY
Status: DISCONTINUED | OUTPATIENT
Start: 2019-08-07 | End: 2019-08-07

## 2019-08-07 RX ORDER — SODIUM CHLORIDE 0.9 % (FLUSH) 0.9 %
10 SYRINGE (ML) INJECTION PRN
Status: DISCONTINUED | OUTPATIENT
Start: 2019-08-07 | End: 2019-08-11 | Stop reason: HOSPADM

## 2019-08-07 RX ORDER — ONDANSETRON 2 MG/ML
4 INJECTION INTRAMUSCULAR; INTRAVENOUS EVERY 6 HOURS PRN
Status: DISCONTINUED | OUTPATIENT
Start: 2019-08-07 | End: 2019-08-11 | Stop reason: HOSPADM

## 2019-08-07 RX ORDER — FLUCONAZOLE 100 MG/1
100 TABLET ORAL DAILY
Status: DISCONTINUED | OUTPATIENT
Start: 2019-08-07 | End: 2019-08-11 | Stop reason: HOSPADM

## 2019-08-07 RX ORDER — FUROSEMIDE 10 MG/ML
60 INJECTION INTRAMUSCULAR; INTRAVENOUS 3 TIMES DAILY
Status: DISCONTINUED | OUTPATIENT
Start: 2019-08-07 | End: 2019-08-07

## 2019-08-07 RX ADMIN — ALBUMIN (HUMAN) 25 G: 0.25 INJECTION, SOLUTION INTRAVENOUS at 16:35

## 2019-08-07 RX ADMIN — Medication 10 ML: at 09:59

## 2019-08-07 RX ADMIN — MICONAZOLE NITRATE: 20.6 POWDER TOPICAL at 21:01

## 2019-08-07 RX ADMIN — CLOTRIMAZOLE AND BETAMETHASONE DIPROPIONATE: 10; .5 CREAM TOPICAL at 20:59

## 2019-08-07 RX ADMIN — CEFAZOLIN SODIUM 2 G: 10 INJECTION, POWDER, FOR SOLUTION INTRAVENOUS at 16:01

## 2019-08-07 RX ADMIN — CIPROFLOXACIN 750 MG: 750 TABLET, FILM COATED ORAL at 09:55

## 2019-08-07 RX ADMIN — CEFAZOLIN SODIUM 2 G: 10 INJECTION, POWDER, FOR SOLUTION INTRAVENOUS at 06:49

## 2019-08-07 RX ADMIN — FUROSEMIDE 60 MG: 10 INJECTION, SOLUTION INTRAMUSCULAR; INTRAVENOUS at 13:37

## 2019-08-07 RX ADMIN — FUROSEMIDE 40 MG: 10 INJECTION, SOLUTION INTRAMUSCULAR; INTRAVENOUS at 21:00

## 2019-08-07 RX ADMIN — FUROSEMIDE 60 MG: 10 INJECTION, SOLUTION INTRAMUSCULAR; INTRAVENOUS at 09:58

## 2019-08-07 RX ADMIN — TRIAMTERENE AND HYDROCHLOROTHIAZIDE 1 TABLET: 37.5; 25 TABLET ORAL at 09:55

## 2019-08-07 RX ADMIN — METOPROLOL SUCCINATE 25 MG: 25 TABLET, EXTENDED RELEASE ORAL at 09:55

## 2019-08-07 RX ADMIN — CEFAZOLIN SODIUM 2 G: 10 INJECTION, POWDER, FOR SOLUTION INTRAVENOUS at 23:43

## 2019-08-07 RX ADMIN — Medication 10 ML: at 20:59

## 2019-08-07 RX ADMIN — RIFAXIMIN 550 MG: 550 TABLET ORAL at 21:00

## 2019-08-07 RX ADMIN — ENOXAPARIN SODIUM 40 MG: 40 INJECTION SUBCUTANEOUS at 09:55

## 2019-08-07 RX ADMIN — Medication 400 MG: at 09:55

## 2019-08-07 RX ADMIN — RIFAXIMIN 550 MG: 550 TABLET ORAL at 09:57

## 2019-08-07 RX ADMIN — FLUCONAZOLE 100 MG: 100 TABLET ORAL at 12:52

## 2019-08-07 RX ADMIN — Medication 10 ML: at 06:49

## 2019-08-07 RX ADMIN — ALBUMIN (HUMAN) 25 G: 0.25 INJECTION, SOLUTION INTRAVENOUS at 20:59

## 2019-08-07 RX ADMIN — Medication: at 20:59

## 2019-08-07 RX ADMIN — MICONAZOLE NITRATE: 20.6 POWDER TOPICAL at 12:48

## 2019-08-07 ASSESSMENT — PAIN DESCRIPTION - PROGRESSION
CLINICAL_PROGRESSION: NOT CHANGED

## 2019-08-07 ASSESSMENT — PAIN - FUNCTIONAL ASSESSMENT: PAIN_FUNCTIONAL_ASSESSMENT: PREVENTS OR INTERFERES SOME ACTIVE ACTIVITIES AND ADLS

## 2019-08-07 ASSESSMENT — PAIN DESCRIPTION - PAIN TYPE: TYPE: ACUTE PAIN

## 2019-08-07 ASSESSMENT — PAIN DESCRIPTION - ONSET: ONSET: ON-GOING

## 2019-08-07 ASSESSMENT — PAIN SCALES - GENERAL
PAINLEVEL_OUTOF10: 0
PAINLEVEL_OUTOF10: 0
PAINLEVEL_OUTOF10: 7

## 2019-08-07 ASSESSMENT — PAIN DESCRIPTION - ORIENTATION: ORIENTATION: MID;RIGHT;LEFT

## 2019-08-07 ASSESSMENT — PAIN DESCRIPTION - FREQUENCY: FREQUENCY: CONTINUOUS

## 2019-08-07 ASSESSMENT — PAIN DESCRIPTION - LOCATION: LOCATION: GROIN

## 2019-08-07 ASSESSMENT — PAIN DESCRIPTION - DESCRIPTORS: DESCRIPTORS: TENDER;SHARP

## 2019-08-07 NOTE — CARE COORDINATION
Received call from Sydney from [facility]. Requested clinicals faxed to [phone].  The documents will be reviewed and it will be determined if the pt will be transferred to [place]

## 2019-08-07 NOTE — PLAN OF CARE
Problem: Pain:  Goal: Pain level will decrease  8/7/2019 1541 by Nicholas George RN  Outcome: Met This Shift  8/7/2019 0945 by Nicholas George RN  Outcome: Met This Shift  8/7/2019 0731 by Clare Estrella RN  Outcome: Ongoing  Goal: Control of acute pain  8/7/2019 1541 by Nicholas George RN  Outcome: Met This Shift  8/7/2019 0945 by Nicholas George RN  Outcome: Met This Shift  8/7/2019 0731 by Clare Estrella RN  Outcome: Met This Shift  Goal: Control of chronic pain  Outcome: Met This Shift     Problem: Risk for Impaired Skin Integrity  Goal: Tissue integrity - skin and mucous membranes  8/7/2019 1541 by Nicholas George RN  Outcome: Met This Shift  8/7/2019 0945 by Nicholas George RN  Outcome: Met This Shift  8/7/2019 0731 by Clare Estrella RN  Outcome: Ongoing     Problem: Falls - Risk of:  Goal: Will remain free from falls  8/7/2019 0731 by Clare Estrella RN  Outcome: Met This Shift

## 2019-08-07 NOTE — PLAN OF CARE
Problem: Pain:  Goal: Pain level will decrease  8/7/2019 0945 by Josh Kearns RN  Outcome: Met This Shift  8/7/2019 0731 by Tristan Gabriel RN  Outcome: Ongoing  Goal: Control of acute pain  8/7/2019 0945 by oJsh Kearns RN  Outcome: Met This Shift  8/7/2019 0731 by Tristan Gabriel RN  Outcome: Met This Shift     Problem: Risk for Impaired Skin Integrity  Goal: Tissue integrity - skin and mucous membranes  8/7/2019 0945 by Josh Kearns RN  Outcome: Met This Shift  8/7/2019 0731 by Tristan Gabriel RN  Outcome: Ongoing     Problem: Falls - Risk of:  Goal: Will remain free from falls  8/7/2019 0731 by Tristan Gabriel RN  Outcome: Met This Shift

## 2019-08-07 NOTE — H&P
bilaterally. Full range of motion without deformity. Skin: anasarca edema, scrotal mild redness, severe edema  Neurologic:  Neurovascularly intact without any focal sensory/motor deficits. Cranial nerves: II-XII intact, grossly non-focal.  Psychiatric:  Alert and oriented, thought content appropriate, normal insight        Labs:     Recent Labs     08/06/19  1625 08/06/19  1655 08/06/19  2152   WBC 3.9*  SEE NOTE* SEE NOTE* 3.9*   HGB 11.4*  SEE NOTE* SEE NOTE* 10.6*   HCT 34.3*  SEE NOTE* SEE NOTE* 31.3*   PLT 93*  SEE NOTE* SEE NOTE* 91*     Recent Labs     08/06/19  1625 08/06/19  1655    SEE NOTE*   K 3.9 SEE NOTE*    SEE NOTE*   CO2 32* SEE NOTE*   BUN 15 SEE NOTE*   CREATININE 0.6* SEE NOTE*   CALCIUM 9.1 SEE NOTE*     Recent Labs     08/06/19  1625 08/06/19  1655   AST 79* SEE NOTE*   ALT 32 SEE NOTE*   BILITOT 4.4* SEE NOTE*   ALKPHOS 102 SEE NOTE*     Recent Labs     08/06/19  1625 08/06/19  1655 08/06/19  2152   INR 2.0 SEE NOTE* 2.2     Recent Labs     08/06/19  1625   TROPONINI <0.01       Urinalysis:      Lab Results   Component Value Date    NITRU Negative 03/02/2017    WBCUA NONE 03/02/2017    WBCUA NONE 10/28/2010    BACTERIA NONE 03/02/2017    RBCUA 5-10 03/02/2017    RBCUA NONE 10/28/2010    BLOODU LARGE 03/02/2017    SPECGRAV 1.025 03/02/2017    GLUCOSEU 500 03/02/2017    GLUCOSEU NEGATIVE 10/28/2010       Radiology:         US SCROTUM AND TESTICLES   Final Result      Severe thickening of the scrotal wall likely reflecting cellulitis. Bilateral hydroceles. No evidence of torsion. US DUP ABD PEL RETRO SCROT COMPLETE   Final Result      Severe thickening of the scrotal wall likely reflecting cellulitis. Bilateral hydroceles. No evidence of torsion.       XR CHEST PORTABLE   Final Result      Cardiomegaly      Mild pulmonary venous congestion             ASSESSMENT:    Active Hospital Problems    Diagnosis Date Noted    Cellulitis of scrotum [N49.2] 08/06/2019       62 yo morbidly obese male hx etoh cirrhosis  chf diastolic, htn came to er with recent progressive swelling of whole body, more legs and unable to sleep , walk due to edema and sob, denies fever chills, no n/v/d/constipation, no confusion on RA, no distress , per ER team he is being admitted for scrotal cellulitis, after given iv abx, INR 2.2 us scrotum showed Severe thickening of the scrotal wall likely reflecting cellulitis. cxr cardiomagaly chf, trop negative    SCROTAL CELLULITIS  ANASARCA EDEMA  CIRRHOSIS  PAIGE CHF  HTN     Admit  Iv diuresing  Renal/id/urology/cards c/s  Home meds/  Iv abx for cellulitis  Cont phx abx for liver cirrhosis    DVT Prophylaxis: lovenox  Diet: DIET LOW SODIUM 2 GM;  Code Status: Full Code    PT/OT Eval Status: ordered    Dispo - ip       Chilango Batista MD    Thank you Liz Olivarez MD for the opportunity to be involved in this patient's care. If you have any questions or concerns please feel free to contact me at [phone].

## 2019-08-07 NOTE — CONSULTS
[address] [address] [facility]    Consultation Note     Admit Date: 8/6/2019  4:42 PM    Reason for Consult:   Cellulitis lower extremity; scrotal cellulitis    Attending Physician:  Toyin Irizarry DO     Chief Complaint: Unable to ambulate  HISTORY OF PRESENT ILLNESS:   The patient is a 61 y.o.  man not known to the Infectious Diseases service. The patient is admitted for lower extremity swelling and inability to ambulate. He mentions he does have pain when he does ambulate because of the swelling of lower extremities. Patient was interviewed and said that he called the [place] they took a look at his legs and admitted him for further management. He lives alone except for his cats. He says he had some fevers and chills he also had complaints of scrotal swelling and scrotal cellulitis. Ultrasound showed thickening of the scrotal wall and chest x-ray showed cardiomegaly. He had some pulmonary venous congestion as well. Patient has alcoholic liver disease stopped drinking about 6 months ago but has blood work that is consistent with liver disease. His white count was 3.9 hemoglobin 10.6 MCV was 109.8 and his platelet count was 81,639. His blood work was not able to be obtained but his ammonia level did show that it was elevated in the 40s. 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   Started on a bizarre combination of antimicrobials including ceftezole and Zosyn vancomycin and Cipro    Past Medical History:        Diagnosis Date    Acute diastolic CHF (congestive heart failure) ([address] 11/17/15    Echo 11/18/15 EF 73%    Dermatitis     due to thimersol    Heart valve problem     leaky    Hx of cardiovascular stress test 12/9/2015    Lexiscan    Hypertension     Obesity      Past Surgical History:        Procedure Laterality Date    CARPAL TUNNEL RELEASE     

## 2019-08-08 LAB
ALBUMIN SERPL-MCNC: 3.2 G/DL (ref 3.5–5.2)
ALP BLD-CCNC: 75 U/L (ref 40–129)
ALT SERPL-CCNC: 25 U/L (ref 0–40)
ANION GAP SERPL CALCULATED.3IONS-SCNC: 11 MMOL/L (ref 7–16)
ANISOCYTOSIS: ABNORMAL
AST SERPL-CCNC: 65 U/L (ref 0–39)
BASOPHILS ABSOLUTE: 0.1 E9/L (ref 0–0.2)
BASOPHILS RELATIVE PERCENT: 2.6 % (ref 0–2)
BILIRUB SERPL-MCNC: 3.7 MG/DL (ref 0–1.2)
BUN BLDV-MCNC: 14 MG/DL (ref 8–23)
CALCIUM SERPL-MCNC: 8.8 MG/DL (ref 8.6–10.2)
CHLORIDE BLD-SCNC: 97 MMOL/L (ref 98–107)
CO2: 31 MMOL/L (ref 22–29)
CREAT SERPL-MCNC: 0.7 MG/DL (ref 0.7–1.2)
EOSINOPHILS ABSOLUTE: 0.49 E9/L (ref 0.05–0.5)
EOSINOPHILS RELATIVE PERCENT: 13 % (ref 0–6)
GFR AFRICAN AMERICAN: >60
GFR NON-AFRICAN AMERICAN: >60 ML/MIN/1.73
GLUCOSE BLD-MCNC: 98 MG/DL (ref 74–99)
HCT VFR BLD CALC: 30.5 % (ref 37–54)
HEMOGLOBIN: 10.3 G/DL (ref 12.5–16.5)
LV EF: 68 %
LVEF MODALITY: NORMAL
LYMPHOCYTES ABSOLUTE: 1.33 E9/L (ref 1.5–4)
LYMPHOCYTES RELATIVE PERCENT: 34.8 % (ref 20–42)
MAGNESIUM: 1.6 MG/DL (ref 1.6–2.6)
MCH RBC QN AUTO: 37.2 PG (ref 26–35)
MCHC RBC AUTO-ENTMCNC: 33.8 % (ref 32–34.5)
MCV RBC AUTO: 110.1 FL (ref 80–99.9)
MONOCYTES ABSOLUTE: 0.15 E9/L (ref 0.1–0.95)
MONOCYTES RELATIVE PERCENT: 3.5 % (ref 2–12)
NEUTROPHILS ABSOLUTE: 1.75 E9/L (ref 1.8–7.3)
NEUTROPHILS RELATIVE PERCENT: 46.1 % (ref 43–80)
PDW BLD-RTO: 15.8 FL (ref 11.5–15)
PLATELET # BLD: 94 E9/L (ref 130–450)
PLATELET CONFIRMATION: NORMAL
PMV BLD AUTO: 9.2 FL (ref 7–12)
POTASSIUM REFLEX MAGNESIUM: 3.3 MMOL/L (ref 3.5–5)
POTASSIUM SERPL-SCNC: 3.3 MMOL/L (ref 3.5–5)
RBC # BLD: 2.77 E12/L (ref 3.8–5.8)
SODIUM BLD-SCNC: 139 MMOL/L (ref 132–146)
TOTAL PROTEIN: 7 G/DL (ref 6.4–8.3)
VITAMIN B-12: 958 PG/ML (ref 211–946)
WBC # BLD: 3.8 E9/L (ref 4.5–11.5)

## 2019-08-08 PROCEDURE — 80053 COMPREHEN METABOLIC PANEL: CPT

## 2019-08-08 PROCEDURE — 83735 ASSAY OF MAGNESIUM: CPT

## 2019-08-08 PROCEDURE — 6360000004 HC RX CONTRAST MEDICATION: Performed by: INTERNAL MEDICINE

## 2019-08-08 PROCEDURE — 6370000000 HC RX 637 (ALT 250 FOR IP): Performed by: INTERNAL MEDICINE

## 2019-08-08 PROCEDURE — 6360000002 HC RX W HCPCS: Performed by: HOSPITALIST

## 2019-08-08 PROCEDURE — 93306 TTE W/DOPPLER COMPLETE: CPT

## 2019-08-08 PROCEDURE — P9047 ALBUMIN (HUMAN), 25%, 50ML: HCPCS | Performed by: INTERNAL MEDICINE

## 2019-08-08 PROCEDURE — 36415 COLL VENOUS BLD VENIPUNCTURE: CPT

## 2019-08-08 PROCEDURE — 80048 BASIC METABOLIC PNL TOTAL CA: CPT

## 2019-08-08 PROCEDURE — 6370000000 HC RX 637 (ALT 250 FOR IP): Performed by: SPECIALIST

## 2019-08-08 PROCEDURE — 82607 VITAMIN B-12: CPT

## 2019-08-08 PROCEDURE — 6360000002 HC RX W HCPCS: Performed by: INTERNAL MEDICINE

## 2019-08-08 PROCEDURE — 1200000000 HC SEMI PRIVATE

## 2019-08-08 PROCEDURE — 2580000003 HC RX 258: Performed by: HOSPITALIST

## 2019-08-08 PROCEDURE — 85025 COMPLETE CBC W/AUTO DIFF WBC: CPT

## 2019-08-08 PROCEDURE — 6370000000 HC RX 637 (ALT 250 FOR IP): Performed by: HOSPITALIST

## 2019-08-08 RX ORDER — POTASSIUM CHLORIDE 20 MEQ/1
20 TABLET, EXTENDED RELEASE ORAL 2 TIMES DAILY WITH MEALS
Status: DISCONTINUED | OUTPATIENT
Start: 2019-08-08 | End: 2019-08-11 | Stop reason: HOSPADM

## 2019-08-08 RX ADMIN — RIFAXIMIN 550 MG: 550 TABLET ORAL at 22:10

## 2019-08-08 RX ADMIN — RIFAXIMIN 550 MG: 550 TABLET ORAL at 09:51

## 2019-08-08 RX ADMIN — Medication 10 ML: at 22:11

## 2019-08-08 RX ADMIN — POTASSIUM CHLORIDE 20 MEQ: 20 TABLET, EXTENDED RELEASE ORAL at 16:51

## 2019-08-08 RX ADMIN — CLOTRIMAZOLE AND BETAMETHASONE DIPROPIONATE: 10; .5 CREAM TOPICAL at 10:01

## 2019-08-08 RX ADMIN — FUROSEMIDE 40 MG: 10 INJECTION, SOLUTION INTRAMUSCULAR; INTRAVENOUS at 13:22

## 2019-08-08 RX ADMIN — METOPROLOL SUCCINATE 25 MG: 25 TABLET, EXTENDED RELEASE ORAL at 09:51

## 2019-08-08 RX ADMIN — ALBUMIN (HUMAN) 25 G: 0.25 INJECTION, SOLUTION INTRAVENOUS at 09:51

## 2019-08-08 RX ADMIN — TRIAMTERENE AND HYDROCHLOROTHIAZIDE 1 TABLET: 37.5; 25 TABLET ORAL at 09:52

## 2019-08-08 RX ADMIN — CEFAZOLIN SODIUM 2 G: 10 INJECTION, POWDER, FOR SOLUTION INTRAVENOUS at 06:36

## 2019-08-08 RX ADMIN — FUROSEMIDE 40 MG: 10 INJECTION, SOLUTION INTRAMUSCULAR; INTRAVENOUS at 09:53

## 2019-08-08 RX ADMIN — MICONAZOLE NITRATE: 20.6 POWDER TOPICAL at 10:01

## 2019-08-08 RX ADMIN — Medication: at 10:01

## 2019-08-08 RX ADMIN — FUROSEMIDE 40 MG: 10 INJECTION, SOLUTION INTRAMUSCULAR; INTRAVENOUS at 22:09

## 2019-08-08 RX ADMIN — ENOXAPARIN SODIUM 40 MG: 40 INJECTION SUBCUTANEOUS at 09:51

## 2019-08-08 RX ADMIN — MICONAZOLE NITRATE: 20.6 POWDER TOPICAL at 22:19

## 2019-08-08 RX ADMIN — Medication 10 ML: at 09:52

## 2019-08-08 RX ADMIN — ALBUMIN (HUMAN) 25 G: 0.25 INJECTION, SOLUTION INTRAVENOUS at 13:22

## 2019-08-08 RX ADMIN — FLUCONAZOLE 100 MG: 100 TABLET ORAL at 09:52

## 2019-08-08 RX ADMIN — Medication 400 MG: at 09:52

## 2019-08-08 RX ADMIN — PERFLUTREN 1.65 MG: 6.52 INJECTION, SUSPENSION INTRAVENOUS at 09:51

## 2019-08-08 ASSESSMENT — PAIN SCALES - GENERAL
PAINLEVEL_OUTOF10: 0
PAINLEVEL_OUTOF10: 0

## 2019-08-08 ASSESSMENT — PAIN DESCRIPTION - PROGRESSION: CLINICAL_PROGRESSION: NOT CHANGED

## 2019-08-08 NOTE — PLAN OF CARE
Problem: Pain:  Goal: Pain level will decrease  Outcome: Met This Shift  Goal: Control of acute pain  Outcome: Met This Shift     Problem: Risk for Impaired Skin Integrity  Goal: Tissue integrity - skin and mucous membranes  Outcome: Met This Shift

## 2019-08-08 NOTE — CARE COORDINATION
Received call from Griselda Merle,  from [facility]. [phone] ext [phone]. She is requesting plan for discharge. Would like to know if the pt will be discharged on PO antibiotics. If so, the pt will remain at [facility]. If IV antibiotics are required, the pt will be transferred to [facility].  Will follow and report back to [place] when ID plan has been determined

## 2019-08-08 NOTE — PROGRESS NOTES
isolated  Final       ASSESSMENT:  · Scrotal edema and cellulitis -tinea  · lymphedema    PLAN:  · Continue diflucan and topical  · Stop cefazolin  · Check final cultures  · Monitor labs    Kalen Daniel  12:09 PM  8/8/2019

## 2019-08-08 NOTE — PROGRESS NOTES
[facility] PROGRESS NOTE                                                                       8/8/2019          SUBJECTIVE:    Pt comfortable with anna  No c/o of scrotal pain    OBJECTIVE:    BP (!) 133/54   Pulse 68   Temp 97.8 °F (36.6 °C) (Temporal)   Resp 18   Ht 5' 9\" (1.753 m)   Wt (!) 360 lb (163.3 kg)   SpO2 95%   BMI 53.16 kg/m²     PHYSICAL EXAMINATION:  Skin: dry, without rashes  Respirations: non-labored, intact  Abdomen: massive with indurated skin  Scrotum grossly enlarged  Skin ok  Anna in place   Legs edematous      Lab Results   Component Value Date    WBC 3.8 (L) 08/08/2019    HGB 10.3 (L) 08/08/2019    HCT 30.5 (L) 08/08/2019    .1 (H) 08/08/2019    PLT 94 (L) 08/08/2019       Lab Results   Component Value Date    CREATININE 0.7 08/08/2019       No results found for: PSA    REVIEW OF SYSTEMS:    CONSTITUTIONAL: negative  HEENT: negative  HEMATOLOGIC: negative  ENDOCRINE: negative  RESPIRATORY: negative  CV: negative  GI: negative  NEURO: negative  ORTHOPEDICS: negative  PSYCHIATRIC: negative  : as above    PAST FAMILY HISTORY:    Family History   Problem Relation Age of Onset    Parkinsonism Mother     COPD Father      PAST SOCIAL HISTORY:    Social History     Socioeconomic History    Marital status: Single     Spouse name: None    Number of children: None    Years of education: None    Highest education level: None   Occupational History    None   Social Needs    Financial resource strain: None    Food insecurity:     Worry: None     Inability: None    Transportation needs:     Medical: None     Non-medical: None   Tobacco Use    Smoking status: Never Smoker    Smokeless tobacco: Never Used   Substance and Sexual Activity    Alcohol use:  Yes    Drug use: No    Sexual activity: None   Lifestyle    Physical activity:     Days per week: None

## 2019-08-08 NOTE — PROGRESS NOTES
diuretics; fluid balance  -10L since admission; continue current dose   2. Scrotal cellulitis   -on diflucan and topical ; ID following   3.  Obesity      Olga Liu MD  2:24 PM  8/8/2019

## 2019-08-09 LAB
ANION GAP SERPL CALCULATED.3IONS-SCNC: 13 MMOL/L (ref 7–16)
BUN BLDV-MCNC: 18 MG/DL (ref 8–23)
CALCIUM SERPL-MCNC: 9.1 MG/DL (ref 8.6–10.2)
CHLORIDE BLD-SCNC: 93 MMOL/L (ref 98–107)
CO2: 30 MMOL/L (ref 22–29)
CREAT SERPL-MCNC: 0.9 MG/DL (ref 0.7–1.2)
GFR AFRICAN AMERICAN: >60
GFR NON-AFRICAN AMERICAN: >60 ML/MIN/1.73
GLUCOSE BLD-MCNC: 121 MG/DL (ref 74–99)
HCT VFR BLD CALC: 31.5 % (ref 37–54)
HEMOGLOBIN: 10.7 G/DL (ref 12.5–16.5)
MCH RBC QN AUTO: 37.7 PG (ref 26–35)
MCHC RBC AUTO-ENTMCNC: 34 % (ref 32–34.5)
MCV RBC AUTO: 110.9 FL (ref 80–99.9)
PDW BLD-RTO: 16 FL (ref 11.5–15)
PLATELET # BLD: 92 E9/L (ref 130–450)
PLATELET CONFIRMATION: NORMAL
PMV BLD AUTO: 9.7 FL (ref 7–12)
POTASSIUM SERPL-SCNC: 4.3 MMOL/L (ref 3.5–5)
RBC # BLD: 2.84 E12/L (ref 3.8–5.8)
SODIUM BLD-SCNC: 136 MMOL/L (ref 132–146)
WBC # BLD: 4.5 E9/L (ref 4.5–11.5)

## 2019-08-09 PROCEDURE — 6360000002 HC RX W HCPCS: Performed by: HOSPITALIST

## 2019-08-09 PROCEDURE — 6360000002 HC RX W HCPCS: Performed by: INTERNAL MEDICINE

## 2019-08-09 PROCEDURE — 97535 SELF CARE MNGMENT TRAINING: CPT

## 2019-08-09 PROCEDURE — 6370000000 HC RX 637 (ALT 250 FOR IP): Performed by: INTERNAL MEDICINE

## 2019-08-09 PROCEDURE — 1200000000 HC SEMI PRIVATE

## 2019-08-09 PROCEDURE — 85027 COMPLETE CBC AUTOMATED: CPT

## 2019-08-09 PROCEDURE — 6370000000 HC RX 637 (ALT 250 FOR IP): Performed by: HOSPITALIST

## 2019-08-09 PROCEDURE — 36415 COLL VENOUS BLD VENIPUNCTURE: CPT

## 2019-08-09 PROCEDURE — 97530 THERAPEUTIC ACTIVITIES: CPT

## 2019-08-09 PROCEDURE — 80048 BASIC METABOLIC PNL TOTAL CA: CPT

## 2019-08-09 PROCEDURE — 2580000003 HC RX 258: Performed by: HOSPITALIST

## 2019-08-09 PROCEDURE — 6370000000 HC RX 637 (ALT 250 FOR IP): Performed by: SPECIALIST

## 2019-08-09 RX ORDER — FUROSEMIDE 40 MG/1
40 TABLET ORAL 2 TIMES DAILY
Status: DISCONTINUED | OUTPATIENT
Start: 2019-08-09 | End: 2019-08-11 | Stop reason: HOSPADM

## 2019-08-09 RX ORDER — FLUCONAZOLE 100 MG/1
100 TABLET ORAL DAILY
Qty: 14 TABLET | Refills: 0 | Status: SHIPPED | OUTPATIENT
Start: 2019-08-10 | End: 2019-08-24

## 2019-08-09 RX ORDER — FUROSEMIDE 10 MG/ML
40 INJECTION INTRAMUSCULAR; INTRAVENOUS 2 TIMES DAILY
Status: DISCONTINUED | OUTPATIENT
Start: 2019-08-09 | End: 2019-08-09

## 2019-08-09 RX ADMIN — POTASSIUM CHLORIDE 20 MEQ: 20 TABLET, EXTENDED RELEASE ORAL at 08:49

## 2019-08-09 RX ADMIN — MICONAZOLE NITRATE: 20.6 POWDER TOPICAL at 21:13

## 2019-08-09 RX ADMIN — FUROSEMIDE 40 MG: 10 INJECTION, SOLUTION INTRAMUSCULAR; INTRAVENOUS at 08:49

## 2019-08-09 RX ADMIN — POTASSIUM CHLORIDE 20 MEQ: 20 TABLET, EXTENDED RELEASE ORAL at 17:40

## 2019-08-09 RX ADMIN — TRIAMTERENE AND HYDROCHLOROTHIAZIDE 1 TABLET: 37.5; 25 TABLET ORAL at 08:49

## 2019-08-09 RX ADMIN — ENOXAPARIN SODIUM 40 MG: 40 INJECTION SUBCUTANEOUS at 08:48

## 2019-08-09 RX ADMIN — FLUCONAZOLE 100 MG: 100 TABLET ORAL at 08:49

## 2019-08-09 RX ADMIN — RIFAXIMIN 550 MG: 550 TABLET ORAL at 21:14

## 2019-08-09 RX ADMIN — Medication 10 ML: at 21:14

## 2019-08-09 RX ADMIN — Medication 400 MG: at 08:49

## 2019-08-09 RX ADMIN — MICONAZOLE NITRATE: 20.6 POWDER TOPICAL at 08:51

## 2019-08-09 RX ADMIN — RIFAXIMIN 550 MG: 550 TABLET ORAL at 08:49

## 2019-08-09 RX ADMIN — Medication 10 ML: at 08:50

## 2019-08-09 RX ADMIN — METOPROLOL SUCCINATE 25 MG: 25 TABLET, EXTENDED RELEASE ORAL at 08:49

## 2019-08-09 RX ADMIN — ONDANSETRON HYDROCHLORIDE 4 MG: 2 SOLUTION INTRAMUSCULAR; INTRAVENOUS at 17:39

## 2019-08-09 RX ADMIN — CLOTRIMAZOLE AND BETAMETHASONE DIPROPIONATE: 10; .5 CREAM TOPICAL at 08:50

## 2019-08-09 ASSESSMENT — PAIN DESCRIPTION - PROGRESSION: CLINICAL_PROGRESSION: NOT CHANGED

## 2019-08-09 ASSESSMENT — PAIN SCALES - GENERAL
PAINLEVEL_OUTOF10: 0
PAINLEVEL_OUTOF10: 0

## 2019-08-09 NOTE — PROGRESS NOTES
Physical Therapy  Facility/Department: [facility]  Daily Treatment Note  NAME: Savanah Hassan  : 1955  MRN: [id number]    Date of Service: 2019  Evaluating PT:  Wilber Rodriguez PT, DPT [id number]  Room #:  4819/9315-Q  Diagnosis:  Cellulitis of Scrotum  PMHx:  Dermatitis, CHF, Cirrhosis  Precautions:  Falls, Anasarca Edema  Procedures:  Equipment Recommended: Bariatric WW     Pt lives with alone in a mobile home with 4 + 1 stairs to enter and bilateral rail. Pt ambulated with SPC independently PTA. Pt reports he sleeps in recliner chair.      Other equipment patient owns: [facility], [employer]                Initial Evaluation  Date: 19 Treatment   Short Term/ Long Term   Goals   AM-PAC 6 Clicks 69/32  34/43     Does pt have pain? Moderate pain in scrotal region due to edema  tolerable pain in scrotum     Bed Mobility  Rolling: Tom  Supine to sit: Tom  Sit to supine: Tom  Scooting: Tom  sba Rolling: Independent     Supine to sit: Independent     Sit to supine: Independent     Scooting: Independent      Transfers Sit to stand: Tom  Stand to sit: Tom  Stand pivot: Tom using SPC  Tom using [employer]  sba Sit to stand: Mod I  Stand to sit: Mod I  Stand pivot: Mod I   Ambulation    50 feet x2 with Tom using WW     75ft with ww sba >150 feet with Mod I using aAD   Stair negotiation: ascended and descended  4 steps with bilateral rail Tom NT  >4 steps with single rail Mod I   ROM BLE:  WNL       Strength BLE:  4/5 grossly   Improve strength 1/3 MMT grade   Balance Sitting EOB:  Supervision  Dynamic Standing:  Tom using [employer]   Sitting EOB:  Independent     Dynamic Standing: Mod Independent         Patient education  Pt was educated on importance of mobility    Patient response to education:   Pt verbalized understanding Pt demonstrated skill Pt requires further education in this area   x x x     Additional Comments: pt supine upon entering room. Assisted pt with donning compression stockings before amb.

## 2019-08-09 NOTE — PROGRESS NOTES
Hospitalist Progress Note      PCP: Edison Leyden, MD    Date of Admission: 8/6/2019    Chief Complaint: edema / painfull testicles    Hospital Course: neg 13 liters since admit. Much less pain. Lasix 40 tid and maxide. Diflucan and topical anti fungals     Subjective: much less pain -- breathing is better       Medications:  Reviewed    Infusion Medications   Scheduled Medications    furosemide  40 mg Oral BID    potassium chloride  20 mEq Oral BID WC    lactulose  20 g Oral BID    magnesium oxide  400 mg Oral Daily    metoprolol succinate  25 mg Oral Daily    rifaximin  550 mg Oral BID    triamterene-hydrochlorothiazide  1 tablet Oral Daily    sodium chloride flush  10 mL Intravenous 2 times per day    enoxaparin  40 mg Subcutaneous Daily    fluconazole  100 mg Oral Daily    miconazole   Topical BID    ammonium lactate   Topical Daily    clotrimazole-betamethasone   Topical Daily     PRN Meds: acetaminophen, benzocaine-menthol, sodium chloride flush, magnesium hydroxide, ondansetron      Intake/Output Summary (Last 24 hours) at 8/9/2019 1459  Last data filed at 8/9/2019 1245  Gross per 24 hour   Intake 430 ml   Output 2550 ml   Net -2120 ml       Exam:    BP (!) 104/53   Pulse 67   Temp 97 °F (36.1 °C) (Temporal)   Resp 18   Ht 5' 9\" (1.753 m)   Wt (!) 341 lb 9.6 oz (154.9 kg)   SpO2 94%   BMI 50.45 kg/m²     General appearance: No apparent distress, appears stated age and cooperative. HEENT: Pupils equal, round, and reactive to light. Conjunctivae/corneas clear. Neck: Supple, with full range of motion. No jugular venous distention. Trachea midline. Respiratory:  Normal respiratory effort. Clear to auscultation, bilaterally without Rales/Wheezes/Rhonchi. Cardiovascular: Regular rate and rhythm with normal S1/S2 without murmurs, rubs or gallops. Abdomen: Soft, non-tender, non-distended with normal bowel sounds. Musculoskeletal: No clubbing, cyanosis or edema bilaterally.   Full range of motion without deformity. Skin: skin of scrotum less inflamed, scrotum still swollen  Neurologic:  Neurovascularly intact without any focal sensory/motor deficits.  Cranial nerves: II-XII intact, grossly non-focal.  Psychiatric: Alert and oriented, thought content appropriate, normal insight  Capillary Refill: Brisk,< 3 seconds   Peripheral Pulses: +2 palpable, equal bilaterally       Labs:   Recent Labs     08/06/19  2152 08/08/19  0534 08/09/19  0439   WBC 3.9* 3.8* 4.5   HGB 10.6* 10.3* 10.7*   HCT 31.3* 30.5* 31.5*   PLT 91* 94* 92*     Recent Labs     08/06/19  1625 08/06/19  1655 08/08/19  0534    SEE NOTE* 139   K 3.9 SEE NOTE* 3.3*  3.3*    SEE NOTE* 97*   CO2 32* SEE NOTE* 31*   BUN 15 SEE NOTE* 14   CREATININE 0.6* SEE NOTE* 0.7   CALCIUM 9.1 SEE NOTE* 8.8     Recent Labs     08/06/19  1625 08/06/19  1655 08/08/19  0534   AST 79* SEE NOTE* 65*   ALT 32 SEE NOTE* 25   BILITOT 4.4* SEE NOTE* 3.7*   ALKPHOS 102 SEE NOTE* 75     Recent Labs     08/06/19  1625 08/06/19  1655 08/06/19  2152   INR 2.0 SEE NOTE* 2.2     Recent Labs     08/06/19  1625   TROPONINI <0.01       Assessment/Plan:    Active Hospital Problems    Diagnosis Date Noted    Cellulitis of scrotum [N49.2] 08/06/2019   edema  Hypoalbuminemia  Obesity  Hypokalemia    Plan  Continue diuretics -- creatinine stable   Down 13 liters --- progressing well  Antifungals for scrotum  Monitor cultures closely  Obtain BMP    DVT Prophylaxis: lovenox  Diet: DIET LOW SODIUM 2 GM;  Code Status: Full Code    PT/OT Eval Status: consulted    Dispo - home    Brianna Oliveros MD

## 2019-08-09 NOTE — PLAN OF CARE
Problem: Pain:  Goal: Pain level will decrease  Outcome: Met This Shift  Goal: Control of acute pain  Outcome: Met This Shift  Goal: Control of chronic pain  Outcome: Met This Shift     Problem: Risk for Impaired Skin Integrity  Goal: Tissue integrity - skin and mucous membranes  Outcome: Met This Shift

## 2019-08-09 NOTE — PROGRESS NOTES
[address] [place] [address] [facility]  Progress Note    SUBJECTIVE:  Chief Complaint   Patient presents with    Shortness of Breath     states he has had a 30# increased in the last 3 weeks    Groin Swelling       Patient is tolerating medications. No reported adverse drug reactions. No nausea, vomiting, diarrhea. Macular rash- drug rash  Review of systems:  As stated above in the chief complaint, otherwise negative. Medications:  Scheduled Meds:   furosemide  40 mg Oral BID    potassium chloride  20 mEq Oral BID WC    lactulose  20 g Oral BID    magnesium oxide  400 mg Oral Daily    metoprolol succinate  25 mg Oral Daily    rifaximin  550 mg Oral BID    triamterene-hydrochlorothiazide  1 tablet Oral Daily    sodium chloride flush  10 mL Intravenous 2 times per day    enoxaparin  40 mg Subcutaneous Daily    fluconazole  100 mg Oral Daily    miconazole   Topical BID    ammonium lactate   Topical Daily    clotrimazole-betamethasone   Topical Daily     Continuous Infusions:  PRN Meds:acetaminophen, benzocaine-menthol, sodium chloride flush, magnesium hydroxide, ondansetron    OBJECTIVE:  BP (!) 104/53   Pulse 67   Temp 97 °F (36.1 °C) (Temporal)   Resp 18   Ht 5' 9\" (1.753 m)   Wt (!) 341 lb 9.6 oz (154.9 kg)   SpO2 94%   BMI 50.45 kg/m²   Temp  Av.6 °F (36.4 °C)  Min: 97 °F (36.1 °C)  Max: 98.1 °F (36.7 °C)  Constitutional: The patient is awake, alert, and oriented. Skin: Warm and dry. macular rashes were noted. Scrotal cellulitis resolved  HEENT: Round and reactive pupils. Moist mucous membranes. No ulcerations or thrush. Neck: Supple to movements. Chest: No use of accessory muscles to breathe. Symmetrical expansion. No wheezing, crackles or rhonchi. Cardiovascular: S1 and S2 are rhythmic and regular. No murmurs appreciated. Abdomen: Positive bowel sounds to auscultation. Benign to palpation. No masses felt. No hepatosplenomegaly.   Extremities: No clubbing, no cyanosis, lymph edema improved; scrotal edema improved  Lines: peripheral    Laboratory and Tests Review:  Lab Results   Component Value Date    WBC 4.5 08/09/2019    WBC 3.8 (L) 08/08/2019    WBC 3.9 (L) 08/06/2019    HGB 10.7 (L) 08/09/2019    HCT 31.5 (L) 08/09/2019    .9 (H) 08/09/2019    PLT 92 (L) 08/09/2019     Lab Results   Component Value Date    NEUTROABS 1.75 (L) 08/08/2019    NEUTROABS 1.13 (L) 08/06/2019    NEUTROABS SEE NOTE (AA) 08/06/2019    NEUTROABS 1.29 (L) 08/06/2019     No results found for: CRPHS  Lab Results   Component Value Date    ALT 25 08/08/2019    AST 65 (H) 08/08/2019    ALKPHOS 75 08/08/2019    BILITOT 3.7 (H) 08/08/2019     Lab Results   Component Value Date     08/08/2019    K 3.3 08/08/2019    K 3.3 08/08/2019    CL 97 08/08/2019    CO2 31 08/08/2019    BUN 14 08/08/2019    CREATININE 0.7 08/08/2019    CREATININE SEE NOTE 08/06/2019    CREATININE 0.6 08/06/2019    GFRAA >60 08/08/2019    LABGLOM >60 08/08/2019    GLUCOSE 98 08/08/2019    PROT 7.0 08/08/2019    LABALBU 3.2 08/08/2019    CALCIUM 8.8 08/08/2019    BILITOT 3.7 08/08/2019    ALKPHOS 75 08/08/2019    AST 65 08/08/2019    ALT 25 08/08/2019     No results found for: CRP  No results found for: [address]  Radiology:      Microbiology:   Lab Results   Component Value Date    [facility]  08/06/2019     24 Hours- no growth  Gram stain performed from blood culture bottle media  Gram positive cocci in clusters  Gram positive rods Diphtheroid-like      BC 24 Hours- no growth 08/06/2019     No results found for: BLOODCULT2, ORG  No results found for: WNDABS  No results found for: RESPSMEAR  No results found for: MPNEUMO, CLAMYDCU, LABLEGI, AFBCX, FUNGSM, LABFUNG  No results found for: CULTRESP  No results found for: CXCATHTIP  No results found for: BFCS  No results found for: CXSURG  Urine Culture, Routine   Date Value Ref Range Status   03/02/2017 Growth not present  Final   02/24/2017 Growth not present  Final

## 2019-08-10 LAB
ANION GAP SERPL CALCULATED.3IONS-SCNC: 8 MMOL/L (ref 7–16)
BUN BLDV-MCNC: 19 MG/DL (ref 8–23)
CALCIUM SERPL-MCNC: 8.8 MG/DL (ref 8.6–10.2)
CHLORIDE BLD-SCNC: 94 MMOL/L (ref 98–107)
CO2: 34 MMOL/L (ref 22–29)
CREAT SERPL-MCNC: 0.8 MG/DL (ref 0.7–1.2)
GFR AFRICAN AMERICAN: >60
GFR NON-AFRICAN AMERICAN: >60 ML/MIN/1.73
GLUCOSE BLD-MCNC: 97 MG/DL (ref 74–99)
HCT VFR BLD CALC: 33.6 % (ref 37–54)
HEMOGLOBIN: 10.8 G/DL (ref 12.5–16.5)
MCH RBC QN AUTO: 36.4 PG (ref 26–35)
MCHC RBC AUTO-ENTMCNC: 32.1 % (ref 32–34.5)
MCV RBC AUTO: 113.1 FL (ref 80–99.9)
PDW BLD-RTO: 15.8 FL (ref 11.5–15)
PLATELET # BLD: 105 E9/L (ref 130–450)
PMV BLD AUTO: 9.3 FL (ref 7–12)
POTASSIUM SERPL-SCNC: 4.2 MMOL/L (ref 3.5–5)
RBC # BLD: 2.97 E12/L (ref 3.8–5.8)
SODIUM BLD-SCNC: 136 MMOL/L (ref 132–146)
WBC # BLD: 4.6 E9/L (ref 4.5–11.5)

## 2019-08-10 PROCEDURE — 6370000000 HC RX 637 (ALT 250 FOR IP): Performed by: HOSPITALIST

## 2019-08-10 PROCEDURE — 6370000000 HC RX 637 (ALT 250 FOR IP): Performed by: INTERNAL MEDICINE

## 2019-08-10 PROCEDURE — 6360000002 HC RX W HCPCS: Performed by: HOSPITALIST

## 2019-08-10 PROCEDURE — 85027 COMPLETE CBC AUTOMATED: CPT

## 2019-08-10 PROCEDURE — 6370000000 HC RX 637 (ALT 250 FOR IP): Performed by: SPECIALIST

## 2019-08-10 PROCEDURE — 80048 BASIC METABOLIC PNL TOTAL CA: CPT

## 2019-08-10 PROCEDURE — 36415 COLL VENOUS BLD VENIPUNCTURE: CPT

## 2019-08-10 PROCEDURE — 1200000000 HC SEMI PRIVATE

## 2019-08-10 PROCEDURE — 2580000003 HC RX 258: Performed by: HOSPITALIST

## 2019-08-10 RX ADMIN — LACTULOSE 20 G: 20 SOLUTION ORAL at 20:10

## 2019-08-10 RX ADMIN — MICONAZOLE NITRATE: 20.6 POWDER TOPICAL at 08:28

## 2019-08-10 RX ADMIN — Medication 10 ML: at 20:10

## 2019-08-10 RX ADMIN — Medication 400 MG: at 08:26

## 2019-08-10 RX ADMIN — FLUCONAZOLE 100 MG: 100 TABLET ORAL at 08:26

## 2019-08-10 RX ADMIN — RIFAXIMIN 550 MG: 550 TABLET ORAL at 08:26

## 2019-08-10 RX ADMIN — POTASSIUM CHLORIDE 20 MEQ: 20 TABLET, EXTENDED RELEASE ORAL at 16:44

## 2019-08-10 RX ADMIN — MICONAZOLE NITRATE: 20.6 POWDER TOPICAL at 20:10

## 2019-08-10 RX ADMIN — RIFAXIMIN 550 MG: 550 TABLET ORAL at 20:10

## 2019-08-10 RX ADMIN — ENOXAPARIN SODIUM 40 MG: 40 INJECTION SUBCUTANEOUS at 08:25

## 2019-08-10 RX ADMIN — TRIAMTERENE AND HYDROCHLOROTHIAZIDE 1 TABLET: 37.5; 25 TABLET ORAL at 08:26

## 2019-08-10 RX ADMIN — FUROSEMIDE 40 MG: 40 TABLET ORAL at 16:44

## 2019-08-10 RX ADMIN — POTASSIUM CHLORIDE 20 MEQ: 20 TABLET, EXTENDED RELEASE ORAL at 08:26

## 2019-08-10 RX ADMIN — Medication 10 ML: at 08:28

## 2019-08-10 RX ADMIN — FUROSEMIDE 40 MG: 40 TABLET ORAL at 08:26

## 2019-08-10 ASSESSMENT — PAIN SCALES - GENERAL
PAINLEVEL_OUTOF10: 0

## 2019-08-10 NOTE — PROGRESS NOTES
Hospitalist Progress Note      PCP: Yusef Ramirez MD    Date of Admission: 8/6/2019    Chief Complaint: edema / painfull testicles    Hospital Course: neg 13 liters since admit. Much less pain. Diflucan and topical anti fungals     Subjective: much less pain -- breathing is better       Medications:  Reviewed    Infusion Medications   Scheduled Medications    furosemide  40 mg Oral BID    potassium chloride  20 mEq Oral BID WC    lactulose  20 g Oral BID    magnesium oxide  400 mg Oral Daily    metoprolol succinate  25 mg Oral Daily    rifaximin  550 mg Oral BID    triamterene-hydrochlorothiazide  1 tablet Oral Daily    sodium chloride flush  10 mL Intravenous 2 times per day    enoxaparin  40 mg Subcutaneous Daily    fluconazole  100 mg Oral Daily    miconazole   Topical BID    ammonium lactate   Topical Daily    clotrimazole-betamethasone   Topical Daily     PRN Meds: acetaminophen, benzocaine-menthol, sodium chloride flush, magnesium hydroxide, ondansetron      Intake/Output Summary (Last 24 hours) at 8/10/2019 1508  Last data filed at 8/10/2019 1315  Gross per 24 hour   Intake 120 ml   Output 1450 ml   Net -1330 ml       Exam:    BP (!) 116/58   Pulse 69   Temp 98.2 °F (36.8 °C) (Temporal)   Resp 18   Ht 5' 9\" (1.753 m)   Wt (!) 343 lb (155.6 kg)   SpO2 97%   BMI 50.65 kg/m²     General appearance: No apparent distress, appears stated age and cooperative. HEENT: Pupils equal, round, and reactive to light. Conjunctivae/corneas clear. Neck: Supple, with full range of motion. No jugular venous distention. Trachea midline. Respiratory:  Normal respiratory effort. Clear to auscultation, bilaterally without Rales/Wheezes/Rhonchi. Cardiovascular: Regular rate and rhythm with normal S1/S2 without murmurs, rubs or gallops. Abdomen: Soft, non-tender, non-distended with normal bowel sounds. Musculoskeletal: No clubbing, cyanosis or edema bilaterally.   Full range of motion without

## 2019-08-10 NOTE — PROGRESS NOTES
Nephrology Progress Note  Patient's Name: Stephanie Peters  10:01 AM  8/10/2019        Reason for Consult:  Volume overload, assist with diuretic management      8/7:History of Present Ilness:    Stephanie Peters is a 61 y.o. male with history of chronic diastolic CHF, obesity, and HTN. Patient presents to hosp with c/o progressively worse generalized edema more pronounced in the legs and scrotum. He has been bedridden for several weeks now. Whenever he attempted to walk he felt pain in the scrotum and knees. He called his [facility] doctors and was advised to come to ED. In the ED initial vitals were stable. Lab data showed WBC 3.9, Hb 11.4,  BUN 15, Cr 0.6 and Alb 3.2; pBNP was lonely 412 and CXR was eported as mild pulm vascjular congestion. He was admitted for further management.     Subjective:    8/8:No new c/o; brisk response to diuretics; says scrotal pain is less  8/9: States that he feels better  8/10: No  New c/o      Current Medications:      furosemide (LASIX) tablet 40 mg BID   potassium chloride (KLOR-CON M) extended release tablet 20 mEq BID WC   acetaminophen (TYLENOL) tablet 650 mg Q6H PRN   benzocaine-menthol (CEPACOL SORE THROAT) lozenge 1 lozenge Q2H PRN   lactulose (CHRONULAC) 10 GM/15ML solution 20 g BID   magnesium oxide (MAG-OX) tablet 400 mg Daily   metoprolol succinate (TOPROL XL) extended release tablet 25 mg Daily   rifaximin (XIFAXAN) tablet 550 mg BID   triamterene-hydrochlorothiazide (MAXZIDE-25) 37.5-25 MG per tablet 1 tablet Daily   sodium chloride flush 0.9 % injection 10 mL 2 times per day   sodium chloride flush 0.9 % injection 10 mL PRN   magnesium hydroxide (MILK OF MAGNESIA) 400 MG/5ML suspension 30 mL Daily PRN   ondansetron (ZOFRAN) injection 4 mg Q6H PRN   enoxaparin (LOVENOX) injection 40 mg Daily   fluconazole (DIFLUCAN) tablet 100 mg Daily   miconazole (MICOTIN) 2 % powder BID   ammonium lactate (LAC-HYDRIN) 12 % lotion Daily   clotrimazole-betamethasone (LOTRISONE) cream Daily bilaterally. There is appropriate Doppler flow within the testes and epididymis bilaterally. Testes and epididymis are normal and homogenous in echogenicity bilaterally. Right testicle measures 3.4 x 2.6 x 2.5 cm. Left testicle measures 3.5 x 2.4 x 2.8 cm. Right epididymis measures 2.0 x 1.1 x 0.7 cm. Left epididymis measures 1.3 x 0.7 x 1.3 cm. Severe thickening of the scrotal wall likely reflecting cellulitis. Bilateral hydroceles. No evidence of torsion. Xr Chest Portable    Result Date: 2019  Patient MRN: [id number] : 1955 Age:  61 years Gender: Male Order Date: 2019 5:00 PM Exam: XR CHEST PORTABLE Number of Images: 1 view Indication:   shortness of breath shortness of breath Comparison: Prior chest radiograph from 2019 is available Findings: Study demonstrate cardiomegaly which appears to be stable. The lung fields are clear. There is mild pulmonary venous congestion seen. There is no pleural effusion or airspace consolidation. There is anterior cervical fusion with orthopedic plate in place. The bony thorax demonstrate no gross abnormality. Cardiomegaly Mild pulmonary venous congestion     Us Dup Abd Pel Retro Scrot Complete    Result Date: 2019  Real-time and Doppler sonography of the scrotum and contents was carried out. There is severe thickening of the scrotal wall. There are bilateral hydroceles. There is no evidence of varicocele bilaterally. There is appropriate Doppler flow within the testes and epididymis bilaterally. Testes and epididymis are normal and homogenous in echogenicity bilaterally. Right testicle measures 3.4 x 2.6 x 2.5 cm. Left testicle measures 3.5 x 2.4 x 2.8 cm. Right epididymis measures 2.0 x 1.1 x 0.7 cm. Left epididymis measures 1.3 x 0.7 x 1.3 cm. Severe thickening of the scrotal wall likely reflecting cellulitis. Bilateral hydroceles. No evidence of torsion. Assessment/Plans   1.  Volume overload with scrotal edema in part due to hypoalbuminemia; component of lymphedema   -good response to diuretics; fluid balance  -14L since admission; wt down about 20lbs ; switched to oral diuretics  2. Scrotal cellulitis   -on diflucan and topical ; ID following   3. Obesity  4.  Hypokalemia, diuretic induced; improved with supplement      Jona Lewis MD  10:01 AM  8/10/2019

## 2019-08-11 VITALS
TEMPERATURE: 97.6 F | WEIGHT: 315 LBS | HEART RATE: 73 BPM | RESPIRATION RATE: 16 BRPM | SYSTOLIC BLOOD PRESSURE: 107 MMHG | OXYGEN SATURATION: 93 % | BODY MASS INDEX: 46.65 KG/M2 | HEIGHT: 69 IN | DIASTOLIC BLOOD PRESSURE: 59 MMHG

## 2019-08-11 LAB
ANION GAP SERPL CALCULATED.3IONS-SCNC: 8 MMOL/L (ref 7–16)
BLOOD CULTURE, ROUTINE: ABNORMAL
BUN BLDV-MCNC: 22 MG/DL (ref 8–23)
CALCIUM SERPL-MCNC: 8.8 MG/DL (ref 8.6–10.2)
CHLORIDE BLD-SCNC: 93 MMOL/L (ref 98–107)
CO2: 33 MMOL/L (ref 22–29)
CREAT SERPL-MCNC: 0.8 MG/DL (ref 0.7–1.2)
GFR AFRICAN AMERICAN: >60
GFR NON-AFRICAN AMERICAN: >60 ML/MIN/1.73
GLUCOSE BLD-MCNC: 89 MG/DL (ref 74–99)
HCT VFR BLD CALC: 33.5 % (ref 37–54)
HEMOGLOBIN: 10.8 G/DL (ref 12.5–16.5)
MCH RBC QN AUTO: 37 PG (ref 26–35)
MCHC RBC AUTO-ENTMCNC: 32.2 % (ref 32–34.5)
MCV RBC AUTO: 114.7 FL (ref 80–99.9)
ORGANISM: ABNORMAL
PDW BLD-RTO: 16 FL (ref 11.5–15)
PLATELET # BLD: 105 E9/L (ref 130–450)
PMV BLD AUTO: 9.3 FL (ref 7–12)
POTASSIUM SERPL-SCNC: 4 MMOL/L (ref 3.5–5)
RBC # BLD: 2.92 E12/L (ref 3.8–5.8)
SODIUM BLD-SCNC: 134 MMOL/L (ref 132–146)
WBC # BLD: 4.2 E9/L (ref 4.5–11.5)

## 2019-08-11 PROCEDURE — 6370000000 HC RX 637 (ALT 250 FOR IP): Performed by: HOSPITALIST

## 2019-08-11 PROCEDURE — 6370000000 HC RX 637 (ALT 250 FOR IP): Performed by: SPECIALIST

## 2019-08-11 PROCEDURE — 6370000000 HC RX 637 (ALT 250 FOR IP): Performed by: INTERNAL MEDICINE

## 2019-08-11 PROCEDURE — 36415 COLL VENOUS BLD VENIPUNCTURE: CPT

## 2019-08-11 PROCEDURE — 6360000002 HC RX W HCPCS: Performed by: HOSPITALIST

## 2019-08-11 PROCEDURE — 80048 BASIC METABOLIC PNL TOTAL CA: CPT

## 2019-08-11 PROCEDURE — 2580000003 HC RX 258: Performed by: HOSPITALIST

## 2019-08-11 PROCEDURE — 85027 COMPLETE CBC AUTOMATED: CPT

## 2019-08-11 RX ORDER — FUROSEMIDE 40 MG/1
40 TABLET ORAL 2 TIMES DAILY
Qty: 60 TABLET | Refills: 3 | Status: ON HOLD | OUTPATIENT
Start: 2019-08-11 | End: 2019-12-24 | Stop reason: HOSPADM

## 2019-08-11 RX ORDER — AMMONIUM LACTATE 12 G/100G
LOTION TOPICAL
Qty: 225 G | Refills: 0 | Status: SHIPPED | OUTPATIENT
Start: 2019-08-12 | End: 2019-12-18 | Stop reason: ALTCHOICE

## 2019-08-11 RX ORDER — POTASSIUM CHLORIDE 20 MEQ/1
20 TABLET, EXTENDED RELEASE ORAL 2 TIMES DAILY WITH MEALS
Qty: 60 TABLET | Refills: 3 | Status: ON HOLD | OUTPATIENT
Start: 2019-08-11 | End: 2020-03-16 | Stop reason: HOSPADM

## 2019-08-11 RX ORDER — CLOTRIMAZOLE AND BETAMETHASONE DIPROPIONATE 10; .64 MG/G; MG/G
CREAM TOPICAL
Qty: 45 G | Refills: 0 | Status: SHIPPED | OUTPATIENT
Start: 2019-08-12 | End: 2019-12-18 | Stop reason: ALTCHOICE

## 2019-08-11 RX ADMIN — ENOXAPARIN SODIUM 40 MG: 40 INJECTION SUBCUTANEOUS at 09:21

## 2019-08-11 RX ADMIN — POTASSIUM CHLORIDE 20 MEQ: 20 TABLET, EXTENDED RELEASE ORAL at 09:21

## 2019-08-11 RX ADMIN — Medication 400 MG: at 09:20

## 2019-08-11 RX ADMIN — TRIAMTERENE AND HYDROCHLOROTHIAZIDE 1 TABLET: 37.5; 25 TABLET ORAL at 09:21

## 2019-08-11 RX ADMIN — CLOTRIMAZOLE AND BETAMETHASONE DIPROPIONATE: 10; .5 CREAM TOPICAL at 09:25

## 2019-08-11 RX ADMIN — FLUCONAZOLE 100 MG: 100 TABLET ORAL at 09:22

## 2019-08-11 RX ADMIN — MICONAZOLE NITRATE: 20.6 POWDER TOPICAL at 09:23

## 2019-08-11 RX ADMIN — FUROSEMIDE 40 MG: 40 TABLET ORAL at 09:22

## 2019-08-11 RX ADMIN — RIFAXIMIN 550 MG: 550 TABLET ORAL at 09:26

## 2019-08-11 RX ADMIN — Medication 10 ML: at 09:21

## 2019-08-11 RX ADMIN — Medication: at 09:00

## 2019-08-11 ASSESSMENT — PAIN SCALES - GENERAL: PAINLEVEL_OUTOF10: 0

## 2019-08-11 NOTE — PROGRESS NOTES
[address] [place] [address] [facility]  Progress Note    SUBJECTIVE:  Chief Complaint   Patient presents with    Shortness of Breath     states he has had a 30# increased in the last 3 weeks    Groin Swelling       Patient has no fever chills, feels tired, overall improvement noticed in his scrotal swelling  Not much change from yesterday    Review of systems:  As stated above in the chief complaint, otherwise negative. Medications:  Scheduled Meds:   furosemide  40 mg Oral BID    potassium chloride  20 mEq Oral BID WC    lactulose  20 g Oral BID    magnesium oxide  400 mg Oral Daily    metoprolol succinate  25 mg Oral Daily    rifaximin  550 mg Oral BID    triamterene-hydrochlorothiazide  1 tablet Oral Daily    sodium chloride flush  10 mL Intravenous 2 times per day    enoxaparin  40 mg Subcutaneous Daily    fluconazole  100 mg Oral Daily    miconazole   Topical BID    ammonium lactate   Topical Daily    clotrimazole-betamethasone   Topical Daily     Continuous Infusions:  PRN Meds:acetaminophen, benzocaine-menthol, sodium chloride flush, magnesium hydroxide, ondansetron    OBJECTIVE:  BP (!) 107/59   Pulse 73   Temp 97.6 °F (36.4 °C) (Temporal)   Resp 16   Ht 5' 9\" (1.753 m)   Wt (!) 343 lb (155.6 kg)   SpO2 93%   BMI 50.65 kg/m²   Temp  Av.7 °F (36.5 °C)  Min: 97.4 °F (36.3 °C)  Max: 98.2 °F (36.8 °C)  Constitutional: The patient is awake, alert, and oriented. Skin: Warm and dry. macular rashes were noted. Scrotal cellulitis resolved  HEENT: Round and reactive pupils. Moist mucous membranes. No ulcerations or thrush. Neck: Supple to movements. Chest: No use of accessory muscles to breathe. Symmetrical expansion. No wheezing, crackles or rhonchi. Cardiovascular: S1 and S2 are rhythmic and regular. No murmurs appreciated. Abdomen: Positive bowel sounds to auscultation. Benign to palpation. No masses felt. No hepatosplenomegaly.   Extremities: No clubbing, not present  Final     MRSA Culture Only   Date Value Ref Range Status   02/24/2017 Methicillin resistant Staph aureus not isolated  Final       ASSESSMENT:  · Scrotal edema and cellulitis -tinea- improved  · lymphedema    PLAN:  · Continue with Diflucan for now there is definitely improvement in her swelling and redness since admission but still significant scrotal swelling noted, mot much change today    Nikki Ervin  8:26 AM  8/11/2019

## 2019-08-12 ENCOUNTER — TELEPHONE (OUTPATIENT)
Dept: ADMINISTRATIVE | Age: 64
End: 2019-08-12

## 2019-08-12 NOTE — TELEPHONE ENCOUNTER
Patient stated that he was at [facility] about a week ago for an infection in the scrotum, where he said that they drained 34 lbs of fluid. He missed his appt in July because he wasn't able to walk at that time. He says now the [place] has him going many places so he is not sure when he might be able to get back in to see doc. Please call him back [phone].  TY

## 2019-08-13 LAB — BLOOD CULTURE, ROUTINE: NORMAL

## 2019-08-19 ENCOUNTER — APPOINTMENT (OUTPATIENT)
Dept: CT IMAGING | Age: 64
End: 2019-08-19

## 2019-08-19 ENCOUNTER — HOSPITAL ENCOUNTER (EMERGENCY)
Age: 64
Discharge: HOME | End: 2019-08-19
Attending: EMERGENCY MEDICINE
Payer: OTHER GOVERNMENT

## 2019-08-19 ENCOUNTER — TELEPHONE (OUTPATIENT)
Dept: OTHER | Facility: CLINIC | Age: 64
End: 2019-08-19

## 2019-08-19 ENCOUNTER — APPOINTMENT (OUTPATIENT)
Dept: GENERAL RADIOLOGY | Age: 64
End: 2019-08-19

## 2019-08-19 VITALS
DIASTOLIC BLOOD PRESSURE: 75 MMHG | HEART RATE: 83 BPM | BODY MASS INDEX: 46.65 KG/M2 | RESPIRATION RATE: 16 BRPM | HEIGHT: 69 IN | OXYGEN SATURATION: 99 % | WEIGHT: 315 LBS | TEMPERATURE: 97.6 F | SYSTOLIC BLOOD PRESSURE: 147 MMHG

## 2019-08-19 DIAGNOSIS — J90 PLEURAL EFFUSION: ICD-10-CM

## 2019-08-19 DIAGNOSIS — R10.9 ABDOMINAL PAIN, UNSPECIFIED ABDOMINAL LOCATION: Primary | ICD-10-CM

## 2019-08-19 LAB
ALBUMIN SERPL-MCNC: 3.8 G/DL (ref 3.5–5.2)
ALP BLD-CCNC: 94 U/L (ref 40–129)
ALT SERPL-CCNC: 37 U/L (ref 0–40)
ANION GAP SERPL CALCULATED.3IONS-SCNC: 10 MMOL/L (ref 7–16)
AST SERPL-CCNC: 79 U/L (ref 0–39)
BASOPHILS ABSOLUTE: 0.1 E9/L (ref 0–0.2)
BASOPHILS RELATIVE PERCENT: 2.3 % (ref 0–2)
BILIRUB SERPL-MCNC: 3.5 MG/DL (ref 0–1.2)
BILIRUBIN URINE: ABNORMAL
BLOOD, URINE: NEGATIVE
BUN BLDV-MCNC: 14 MG/DL (ref 8–23)
CALCIUM SERPL-MCNC: 9.1 MG/DL (ref 8.6–10.2)
CHLORIDE BLD-SCNC: 97 MMOL/L (ref 98–107)
CLARITY: CLEAR
CO2: 29 MMOL/L (ref 22–29)
COLOR: YELLOW
CREAT SERPL-MCNC: 0.5 MG/DL (ref 0.7–1.2)
EKG ATRIAL RATE: 83 BPM
EKG P AXIS: 40 DEGREES
EKG P-R INTERVAL: 220 MS
EKG Q-T INTERVAL: 422 MS
EKG QRS DURATION: 94 MS
EKG QTC CALCULATION (BAZETT): 495 MS
EKG R AXIS: 3 DEGREES
EKG T AXIS: 53 DEGREES
EKG VENTRICULAR RATE: 83 BPM
EOSINOPHILS ABSOLUTE: 0.09 E9/L (ref 0.05–0.5)
EOSINOPHILS RELATIVE PERCENT: 2.1 % (ref 0–6)
GFR AFRICAN AMERICAN: >60
GFR AFRICAN AMERICAN: >60
GFR NON-AFRICAN AMERICAN: >60 ML/MIN/1.73
GFR NON-AFRICAN AMERICAN: >60 ML/MIN/1.73
GLUCOSE BLD-MCNC: 164 MG/DL (ref 74–99)
GLUCOSE BLD-MCNC: 169 MG/DL (ref 74–99)
GLUCOSE URINE: NEGATIVE MG/DL
HCT VFR BLD CALC: 32.1 % (ref 37–54)
HEMOGLOBIN: 11 G/DL (ref 12.5–16.5)
IMMATURE GRANULOCYTES #: 0.01 E9/L
IMMATURE GRANULOCYTES %: 0.2 % (ref 0–5)
KETONES, URINE: NEGATIVE MG/DL
LACTIC ACID: 3.1 MMOL/L (ref 0.5–2.2)
LEUKOCYTE ESTERASE, URINE: NEGATIVE
LYMPHOCYTES ABSOLUTE: 0.81 E9/L (ref 1.5–4)
LYMPHOCYTES RELATIVE PERCENT: 19 % (ref 20–42)
MCH RBC QN AUTO: 37.4 PG (ref 26–35)
MCHC RBC AUTO-ENTMCNC: 34.3 % (ref 32–34.5)
MCV RBC AUTO: 109.2 FL (ref 80–99.9)
MONOCYTES ABSOLUTE: 0.47 E9/L (ref 0.1–0.95)
MONOCYTES RELATIVE PERCENT: 11 % (ref 2–12)
NEUTROPHILS ABSOLUTE: 2.78 E9/L (ref 1.8–7.3)
NEUTROPHILS RELATIVE PERCENT: 65.4 % (ref 43–80)
NITRITE, URINE: NEGATIVE
PDW BLD-RTO: 15.7 FL (ref 11.5–15)
PERFORMED ON: ABNORMAL
PH UA: 5 (ref 5–9)
PLATELET # BLD: 150 E9/L (ref 130–450)
PMV BLD AUTO: 10.9 FL (ref 7–12)
POC CHLORIDE: 104 MMOL/L (ref 100–108)
POC CREATININE: 0.4 MG/DL (ref 0.7–1.2)
POC POTASSIUM: 3.8 MMOL/L (ref 3.5–5)
POC SODIUM: 138 MMOL/L (ref 132–146)
POTASSIUM SERPL-SCNC: 3.9 MMOL/L (ref 3.5–5)
PRO-BNP: 283 PG/ML (ref 0–125)
PRO-BNP: 304 PG/ML (ref 0–125)
PROTEIN UA: NEGATIVE MG/DL
RBC # BLD: 2.94 E12/L (ref 3.8–5.8)
REASON FOR REJECTION: NORMAL
REASON FOR REJECTION: NORMAL
REJECTED TEST: NORMAL
REJECTED TEST: NORMAL
SODIUM BLD-SCNC: 136 MMOL/L (ref 132–146)
SPECIFIC GRAVITY UA: 1.02 (ref 1–1.03)
TOTAL PROTEIN: 8.5 G/DL (ref 6.4–8.3)
TROPONIN: <0.01 NG/ML (ref 0–0.03)
UROBILINOGEN, URINE: 1 E.U./DL
WBC # BLD: 4.3 E9/L (ref 4.5–11.5)

## 2019-08-19 PROCEDURE — 87088 URINE BACTERIA CULTURE: CPT

## 2019-08-19 PROCEDURE — 84295 ASSAY OF SERUM SODIUM: CPT

## 2019-08-19 PROCEDURE — 84132 ASSAY OF SERUM POTASSIUM: CPT

## 2019-08-19 PROCEDURE — 74177 CT ABD & PELVIS W/CONTRAST: CPT

## 2019-08-19 PROCEDURE — 81003 URINALYSIS AUTO W/O SCOPE: CPT

## 2019-08-19 PROCEDURE — 84484 ASSAY OF TROPONIN QUANT: CPT

## 2019-08-19 PROCEDURE — 87040 BLOOD CULTURE FOR BACTERIA: CPT

## 2019-08-19 PROCEDURE — C9113 INJ PANTOPRAZOLE SODIUM, VIA: HCPCS | Performed by: EMERGENCY MEDICINE

## 2019-08-19 PROCEDURE — 93010 ELECTROCARDIOGRAM REPORT: CPT | Performed by: INTERNAL MEDICINE

## 2019-08-19 PROCEDURE — 2580000003 HC RX 258: Performed by: RADIOLOGY

## 2019-08-19 PROCEDURE — 83605 ASSAY OF LACTIC ACID: CPT

## 2019-08-19 PROCEDURE — 2580000003 HC RX 258: Performed by: EMERGENCY MEDICINE

## 2019-08-19 PROCEDURE — 85025 COMPLETE CBC W/AUTO DIFF WBC: CPT

## 2019-08-19 PROCEDURE — 82947 ASSAY GLUCOSE BLOOD QUANT: CPT

## 2019-08-19 PROCEDURE — 36415 COLL VENOUS BLD VENIPUNCTURE: CPT

## 2019-08-19 PROCEDURE — 6370000000 HC RX 637 (ALT 250 FOR IP): Performed by: EMERGENCY MEDICINE

## 2019-08-19 PROCEDURE — 80053 COMPREHEN METABOLIC PANEL: CPT

## 2019-08-19 PROCEDURE — 93005 ELECTROCARDIOGRAM TRACING: CPT | Performed by: EMERGENCY MEDICINE

## 2019-08-19 PROCEDURE — 83880 ASSAY OF NATRIURETIC PEPTIDE: CPT

## 2019-08-19 PROCEDURE — 82565 ASSAY OF CREATININE: CPT

## 2019-08-19 PROCEDURE — 82435 ASSAY OF BLOOD CHLORIDE: CPT

## 2019-08-19 PROCEDURE — 96375 TX/PRO/DX INJ NEW DRUG ADDON: CPT

## 2019-08-19 PROCEDURE — 71045 X-RAY EXAM CHEST 1 VIEW: CPT

## 2019-08-19 PROCEDURE — 99285 EMERGENCY DEPT VISIT HI MDM: CPT

## 2019-08-19 PROCEDURE — 96374 THER/PROPH/DIAG INJ IV PUSH: CPT

## 2019-08-19 PROCEDURE — 6360000002 HC RX W HCPCS: Performed by: EMERGENCY MEDICINE

## 2019-08-19 PROCEDURE — 6360000004 HC RX CONTRAST MEDICATION: Performed by: RADIOLOGY

## 2019-08-19 RX ORDER — ONDANSETRON 4 MG/1
8 TABLET, ORALLY DISINTEGRATING ORAL EVERY 8 HOURS PRN
Qty: 10 TABLET | Refills: 0 | Status: ON HOLD | OUTPATIENT
Start: 2019-08-19 | End: 2020-03-16 | Stop reason: HOSPADM

## 2019-08-19 RX ORDER — PANTOPRAZOLE SODIUM 40 MG/10ML
40 INJECTION, POWDER, LYOPHILIZED, FOR SOLUTION INTRAVENOUS ONCE
Status: COMPLETED | OUTPATIENT
Start: 2019-08-19 | End: 2019-08-19

## 2019-08-19 RX ORDER — ONDANSETRON 2 MG/ML
4 INJECTION INTRAMUSCULAR; INTRAVENOUS ONCE
Status: COMPLETED | OUTPATIENT
Start: 2019-08-19 | End: 2019-08-19

## 2019-08-19 RX ORDER — OXYCODONE HYDROCHLORIDE AND ACETAMINOPHEN 5; 325 MG/1; MG/1
1 TABLET ORAL EVERY 6 HOURS PRN
Qty: 20 TABLET | Refills: 0 | Status: SHIPPED | OUTPATIENT
Start: 2019-08-19 | End: 2019-08-24

## 2019-08-19 RX ORDER — SODIUM CHLORIDE 0.9 % (FLUSH) 0.9 %
10 SYRINGE (ML) INJECTION
Status: COMPLETED | OUTPATIENT
Start: 2019-08-19 | End: 2019-08-19

## 2019-08-19 RX ORDER — MORPHINE SULFATE 4 MG/ML
8 INJECTION, SOLUTION INTRAMUSCULAR; INTRAVENOUS ONCE
Status: COMPLETED | OUTPATIENT
Start: 2019-08-19 | End: 2019-08-19

## 2019-08-19 RX ORDER — 0.9 % SODIUM CHLORIDE 0.9 %
500 INTRAVENOUS SOLUTION INTRAVENOUS ONCE
Status: COMPLETED | OUTPATIENT
Start: 2019-08-19 | End: 2019-08-19

## 2019-08-19 RX ORDER — OXYCODONE HYDROCHLORIDE AND ACETAMINOPHEN 5; 325 MG/1; MG/1
2 TABLET ORAL ONCE
Status: COMPLETED | OUTPATIENT
Start: 2019-08-19 | End: 2019-08-19

## 2019-08-19 RX ADMIN — SODIUM CHLORIDE 500 ML: 9 INJECTION, SOLUTION INTRAVENOUS at 10:35

## 2019-08-19 RX ADMIN — ONDANSETRON HYDROCHLORIDE 4 MG: 2 SOLUTION INTRAMUSCULAR; INTRAVENOUS at 08:50

## 2019-08-19 RX ADMIN — PANTOPRAZOLE SODIUM 40 MG: 40 INJECTION, POWDER, FOR SOLUTION INTRAVENOUS at 14:28

## 2019-08-19 RX ADMIN — LIDOCAINE HYDROCHLORIDE: 20 SOLUTION ORAL; TOPICAL at 14:28

## 2019-08-19 RX ADMIN — IOPAMIDOL 110 ML: 755 INJECTION, SOLUTION INTRAVENOUS at 12:27

## 2019-08-19 RX ADMIN — OXYCODONE HYDROCHLORIDE AND ACETAMINOPHEN 2 TABLET: 5; 325 TABLET ORAL at 15:23

## 2019-08-19 RX ADMIN — Medication 10 ML: at 12:27

## 2019-08-19 RX ADMIN — MORPHINE SULFATE 8 MG: 4 INJECTION, SOLUTION INTRAMUSCULAR; INTRAVENOUS at 08:50

## 2019-08-19 ASSESSMENT — ENCOUNTER SYMPTOMS
BACK PAIN: 0
ABDOMINAL DISTENTION: 1
DIARRHEA: 0
EYE REDNESS: 0
SHORTNESS OF BREATH: 0
SINUS PRESSURE: 0
COUGH: 0
VOMITING: 0
SORE THROAT: 0
NAUSEA: 1
ABDOMINAL PAIN: 1
EYE PAIN: 0
WHEEZING: 0
EYE DISCHARGE: 0

## 2019-08-19 ASSESSMENT — PAIN SCALES - GENERAL
PAINLEVEL_OUTOF10: 10

## 2019-08-19 ASSESSMENT — PAIN DESCRIPTION - LOCATION: LOCATION: ABDOMEN

## 2019-08-19 ASSESSMENT — PAIN DESCRIPTION - ORIENTATION: ORIENTATION: RIGHT

## 2019-08-19 ASSESSMENT — PAIN DESCRIPTION - DESCRIPTORS: DESCRIPTORS: CONSTANT

## 2019-08-19 ASSESSMENT — PAIN DESCRIPTION - PAIN TYPE: TYPE: ACUTE PAIN

## 2019-08-19 NOTE — ED PROVIDER NOTES
normal and breath sounds normal. No respiratory distress. He has no wheezes. He has no rales. Abdominal: Soft. Bowel sounds are normal. He exhibits distension. There is tenderness. There is no rebound and no guarding. Abdomen is moderately tympanic to percussion, hypoactive bowel sounds, tender to palpation diffusely   Musculoskeletal: He exhibits edema. Bilateral lower extremity pitting edema extending past the knee. Neurological: He is alert and oriented to person, place, and time. No cranial nerve deficit. Coordination normal.   Skin: Skin is warm and dry. Nursing note and vitals reviewed. Procedures    MDM  Number of Diagnoses or Management Options  Abdominal pain, unspecified abdominal location:   Pleural effusion:   Diagnosis management comments: Obese male with history of congestive heart failure alcoholic liver cirrhosis and multiple abdominal surgeries presenting to ED with abdominal distention and pain, greatest in the right upper quadrant. Physical exam notable for abdominal distention, patient appears dehydrated. Differential diagnosis at this time oriented towards possible perforation versus small bowel obstruction, also will consider pancreatitis, cholecystitis, incarcerated hernia, ureterolithiasis. Labs showed mild elevation in his lactic acid level, he was given IV fluid. CT imaging showed a small amount of pleural effusion noted in right lower lobe, and some lymphadenopathy, however there were no other acute abnormalities. White blood cell count within normal limits. Pain well controlled with IV pain medication. His case was discussed with his family physician, who felt the patient was okay for discharge to home and close follow-up in the office tomorrow for further evaluation. The patient was discharged with Zofran for nausea medication for pain control until then.          Amount and/or Complexity of Data Reviewed  Decide to obtain previous medical records or to obtain

## 2019-08-19 NOTE — ED NOTES
Bed: 09  Expected date:   Expected time:   Means of arrival:   Comments:   [facility]       [facility]  08/19/19 7521

## 2019-08-20 ENCOUNTER — TELEPHONE (OUTPATIENT)
Dept: FAMILY MEDICINE CLINIC | Age: 64
End: 2019-08-20

## 2019-08-20 ENCOUNTER — TELEPHONE (OUTPATIENT)
Dept: OTHER | Facility: CLINIC | Age: 64
End: 2019-08-20

## 2019-08-21 LAB — URINE CULTURE, ROUTINE: NORMAL

## 2019-08-24 LAB
BLOOD CULTURE, ROUTINE: NORMAL
CULTURE, BLOOD 2: NORMAL

## 2019-12-18 ENCOUNTER — APPOINTMENT (OUTPATIENT)
Dept: GENERAL RADIOLOGY | Age: 64
DRG: 280 | End: 2019-12-18
Payer: MEDICAID

## 2019-12-18 ENCOUNTER — HOSPITAL ENCOUNTER (INPATIENT)
Age: 64
LOS: 9 days | Discharge: NURSING, REHAB OR LONG-TERM CARE | DRG: 280 | End: 2019-12-27
Attending: EMERGENCY MEDICINE | Admitting: INTERNAL MEDICINE
Payer: MEDICAID

## 2019-12-18 DIAGNOSIS — R60.1 ANASARCA: Primary | ICD-10-CM

## 2019-12-18 PROBLEM — I50.33 ACUTE ON CHRONIC DIASTOLIC (CONGESTIVE) HEART FAILURE (HCC): Status: ACTIVE | Noted: 2019-12-18

## 2019-12-18 LAB
ALBUMIN SERPL-MCNC: 2.9 G/DL (ref 3.5–5.2)
ALP BLD-CCNC: 105 U/L (ref 40–129)
ALT SERPL-CCNC: 22 U/L (ref 0–40)
ANION GAP SERPL CALCULATED.3IONS-SCNC: 10 MMOL/L (ref 7–16)
ANISOCYTOSIS: ABNORMAL
AST SERPL-CCNC: 57 U/L (ref 0–39)
BASOPHILS ABSOLUTE: 0.13 E9/L (ref 0–0.2)
BASOPHILS RELATIVE PERCENT: 2.6 % (ref 0–2)
BILIRUB SERPL-MCNC: 3.9 MG/DL (ref 0–1.2)
BILIRUBIN DIRECT: 1.5 MG/DL (ref 0–0.3)
BILIRUBIN, INDIRECT: 2.4 MG/DL (ref 0–1)
BUN BLDV-MCNC: 15 MG/DL (ref 8–23)
CALCIUM SERPL-MCNC: 9 MG/DL (ref 8.6–10.2)
CHLORIDE BLD-SCNC: 96 MMOL/L (ref 98–107)
CO2: 29 MMOL/L (ref 22–29)
CREAT SERPL-MCNC: 0.7 MG/DL (ref 0.7–1.2)
EOSINOPHILS ABSOLUTE: 0.38 E9/L (ref 0.05–0.5)
EOSINOPHILS RELATIVE PERCENT: 7.8 % (ref 0–6)
GFR AFRICAN AMERICAN: >60
GFR NON-AFRICAN AMERICAN: >60 ML/MIN/1.73
GLUCOSE BLD-MCNC: 114 MG/DL (ref 74–99)
HCT VFR BLD CALC: 27.5 % (ref 37–54)
HEMOGLOBIN: 9.2 G/DL (ref 12.5–16.5)
INR BLD: 2.1
LYMPHOCYTES ABSOLUTE: 0.64 E9/L (ref 1.5–4)
LYMPHOCYTES RELATIVE PERCENT: 13 % (ref 20–42)
MCH RBC QN AUTO: 37.1 PG (ref 26–35)
MCHC RBC AUTO-ENTMCNC: 33.5 % (ref 32–34.5)
MCV RBC AUTO: 110.9 FL (ref 80–99.9)
MONOCYTES ABSOLUTE: 0.49 E9/L (ref 0.1–0.95)
MONOCYTES RELATIVE PERCENT: 10.4 % (ref 2–12)
NEUTROPHILS ABSOLUTE: 3.23 E9/L (ref 1.8–7.3)
NEUTROPHILS RELATIVE PERCENT: 66.1 % (ref 43–80)
OVALOCYTES: ABNORMAL
PDW BLD-RTO: 16.3 FL (ref 11.5–15)
PLATELET # BLD: 85 E9/L (ref 130–450)
PLATELET CONFIRMATION: NORMAL
PMV BLD AUTO: 8.2 FL (ref 7–12)
POIKILOCYTES: ABNORMAL
POTASSIUM SERPL-SCNC: 4.3 MMOL/L (ref 3.5–5)
PRO-BNP: 520 PG/ML (ref 0–125)
PROTHROMBIN TIME: 23.5 SEC (ref 9.3–12.4)
RBC # BLD: 2.48 E12/L (ref 3.8–5.8)
SODIUM BLD-SCNC: 135 MMOL/L (ref 132–146)
TARGET CELLS: ABNORMAL
TEAR DROP CELLS: ABNORMAL
TOTAL PROTEIN: 7.7 G/DL (ref 6.4–8.3)
TROPONIN: 0.02 NG/ML (ref 0–0.03)
TROPONIN: <0.01 NG/ML (ref 0–0.03)
WBC # BLD: 4.9 E9/L (ref 4.5–11.5)

## 2019-12-18 PROCEDURE — 96374 THER/PROPH/DIAG INJ IV PUSH: CPT

## 2019-12-18 PROCEDURE — 1200000000 HC SEMI PRIVATE

## 2019-12-18 PROCEDURE — 6370000000 HC RX 637 (ALT 250 FOR IP): Performed by: STUDENT IN AN ORGANIZED HEALTH CARE EDUCATION/TRAINING PROGRAM

## 2019-12-18 PROCEDURE — 85025 COMPLETE CBC W/AUTO DIFF WBC: CPT

## 2019-12-18 PROCEDURE — 84484 ASSAY OF TROPONIN QUANT: CPT

## 2019-12-18 PROCEDURE — 85610 PROTHROMBIN TIME: CPT

## 2019-12-18 PROCEDURE — 6360000002 HC RX W HCPCS: Performed by: STUDENT IN AN ORGANIZED HEALTH CARE EDUCATION/TRAINING PROGRAM

## 2019-12-18 PROCEDURE — 83880 ASSAY OF NATRIURETIC PEPTIDE: CPT

## 2019-12-18 PROCEDURE — 73620 X-RAY EXAM OF FOOT: CPT

## 2019-12-18 PROCEDURE — 6370000000 HC RX 637 (ALT 250 FOR IP): Performed by: INTERNAL MEDICINE

## 2019-12-18 PROCEDURE — 93005 ELECTROCARDIOGRAM TRACING: CPT | Performed by: STUDENT IN AN ORGANIZED HEALTH CARE EDUCATION/TRAINING PROGRAM

## 2019-12-18 PROCEDURE — 71045 X-RAY EXAM CHEST 1 VIEW: CPT

## 2019-12-18 PROCEDURE — 99285 EMERGENCY DEPT VISIT HI MDM: CPT

## 2019-12-18 PROCEDURE — 36415 COLL VENOUS BLD VENIPUNCTURE: CPT

## 2019-12-18 PROCEDURE — 80076 HEPATIC FUNCTION PANEL: CPT

## 2019-12-18 PROCEDURE — 2580000003 HC RX 258: Performed by: INTERNAL MEDICINE

## 2019-12-18 PROCEDURE — 80048 BASIC METABOLIC PNL TOTAL CA: CPT

## 2019-12-18 RX ORDER — LANOLIN ALCOHOL/MO/W.PET/CERES
3 CREAM (GRAM) TOPICAL NIGHTLY PRN
Status: DISCONTINUED | OUTPATIENT
Start: 2019-12-18 | End: 2019-12-27 | Stop reason: HOSPADM

## 2019-12-18 RX ORDER — SODIUM CHLORIDE 0.9 % (FLUSH) 0.9 %
10 SYRINGE (ML) INJECTION EVERY 12 HOURS SCHEDULED
Status: DISCONTINUED | OUTPATIENT
Start: 2019-12-18 | End: 2019-12-27 | Stop reason: HOSPADM

## 2019-12-18 RX ORDER — FUROSEMIDE 10 MG/ML
80 INJECTION INTRAMUSCULAR; INTRAVENOUS ONCE
Status: COMPLETED | OUTPATIENT
Start: 2019-12-18 | End: 2019-12-18

## 2019-12-18 RX ORDER — POTASSIUM CHLORIDE 20 MEQ/1
20 TABLET, EXTENDED RELEASE ORAL 2 TIMES DAILY WITH MEALS
Status: DISCONTINUED | OUTPATIENT
Start: 2019-12-19 | End: 2019-12-21

## 2019-12-18 RX ORDER — ONDANSETRON 2 MG/ML
4 INJECTION INTRAMUSCULAR; INTRAVENOUS EVERY 6 HOURS PRN
Status: DISCONTINUED | OUTPATIENT
Start: 2019-12-18 | End: 2019-12-27 | Stop reason: HOSPADM

## 2019-12-18 RX ORDER — CHOLECALCIFEROL (VITAMIN D3) 25 MCG
3000 TABLET,CHEWABLE ORAL DAILY
Status: ON HOLD | COMMUNITY
End: 2020-03-16 | Stop reason: HOSPADM

## 2019-12-18 RX ORDER — ACETAMINOPHEN 500 MG
1000 TABLET ORAL EVERY 6 HOURS PRN
Status: ON HOLD | COMMUNITY
End: 2019-12-24 | Stop reason: SDUPTHER

## 2019-12-18 RX ORDER — ACETAMINOPHEN 325 MG/1
650 TABLET ORAL EVERY 4 HOURS PRN
Status: DISCONTINUED | OUTPATIENT
Start: 2019-12-18 | End: 2019-12-27 | Stop reason: HOSPADM

## 2019-12-18 RX ORDER — SODIUM CHLORIDE 0.9 % (FLUSH) 0.9 %
10 SYRINGE (ML) INJECTION PRN
Status: DISCONTINUED | OUTPATIENT
Start: 2019-12-18 | End: 2019-12-27 | Stop reason: HOSPADM

## 2019-12-18 RX ADMIN — MELATONIN 3 MG: at 23:57

## 2019-12-18 RX ADMIN — ACETAMINOPHEN 650 MG: 325 TABLET, FILM COATED ORAL at 23:57

## 2019-12-18 RX ADMIN — NITROGLYCERIN 1 INCH: 20 OINTMENT TOPICAL at 17:55

## 2019-12-18 RX ADMIN — FUROSEMIDE 80 MG: 10 INJECTION, SOLUTION INTRAMUSCULAR; INTRAVENOUS at 17:55

## 2019-12-18 RX ADMIN — Medication 10 ML: at 22:01

## 2019-12-18 ASSESSMENT — ENCOUNTER SYMPTOMS
NAUSEA: 0
EYE REDNESS: 0
EYE PAIN: 0
WHEEZING: 0
ABDOMINAL PAIN: 0
SINUS PRESSURE: 0
SHORTNESS OF BREATH: 1
BACK PAIN: 0
SORE THROAT: 0
ABDOMINAL DISTENTION: 1
EYE DISCHARGE: 0
DIARRHEA: 0
VOMITING: 0
COUGH: 0

## 2019-12-18 ASSESSMENT — PAIN DESCRIPTION - PROGRESSION: CLINICAL_PROGRESSION: NOT CHANGED

## 2019-12-18 ASSESSMENT — PAIN DESCRIPTION - PAIN TYPE: TYPE: ACUTE PAIN

## 2019-12-18 ASSESSMENT — PAIN DESCRIPTION - ORIENTATION: ORIENTATION: RIGHT

## 2019-12-18 ASSESSMENT — PAIN SCALES - GENERAL
PAINLEVEL_OUTOF10: 9
PAINLEVEL_OUTOF10: 8

## 2019-12-18 ASSESSMENT — PAIN DESCRIPTION - FREQUENCY: FREQUENCY: CONTINUOUS

## 2019-12-18 ASSESSMENT — PAIN DESCRIPTION - DESCRIPTORS
DESCRIPTORS: CONSTANT
DESCRIPTORS: CONSTANT
DESCRIPTORS: ACHING;DISCOMFORT;CONSTANT

## 2019-12-18 ASSESSMENT — PAIN DESCRIPTION - LOCATION: LOCATION: RIB CAGE

## 2019-12-19 LAB
ALBUMIN SERPL-MCNC: 2.7 G/DL (ref 3.5–5.2)
ALP BLD-CCNC: 95 U/L (ref 40–129)
ALT SERPL-CCNC: 19 U/L (ref 0–40)
ANION GAP SERPL CALCULATED.3IONS-SCNC: 9 MMOL/L (ref 7–16)
AST SERPL-CCNC: 42 U/L (ref 0–39)
BILIRUB SERPL-MCNC: 4.3 MG/DL (ref 0–1.2)
BUN BLDV-MCNC: 15 MG/DL (ref 8–23)
CALCIUM SERPL-MCNC: 9.1 MG/DL (ref 8.6–10.2)
CHLORIDE BLD-SCNC: 99 MMOL/L (ref 98–107)
CHOLESTEROL, TOTAL: 95 MG/DL (ref 0–199)
CO2: 30 MMOL/L (ref 22–29)
CREAT SERPL-MCNC: 0.8 MG/DL (ref 0.7–1.2)
EKG ATRIAL RATE: 85 BPM
EKG P AXIS: 36 DEGREES
EKG P-R INTERVAL: 196 MS
EKG Q-T INTERVAL: 368 MS
EKG QRS DURATION: 88 MS
EKG QTC CALCULATION (BAZETT): 437 MS
EKG R AXIS: -3 DEGREES
EKG T AXIS: 81 DEGREES
EKG VENTRICULAR RATE: 85 BPM
FERRITIN: 354 NG/ML
FOLATE: 4.7 NG/ML (ref 4.8–24.2)
GFR AFRICAN AMERICAN: >60
GFR NON-AFRICAN AMERICAN: >60 ML/MIN/1.73
GLUCOSE BLD-MCNC: 100 MG/DL (ref 74–99)
HBA1C MFR BLD: 4.7 % (ref 4–5.6)
HCT VFR BLD CALC: 26 % (ref 37–54)
HDLC SERPL-MCNC: 39 MG/DL
HEMOGLOBIN: 8.4 G/DL (ref 12.5–16.5)
IMMATURE RETIC FRACT: 22 % (ref 2.3–13.4)
IRON SATURATION: 74 % (ref 20–55)
IRON: 168 MCG/DL (ref 59–158)
LDL CHOLESTEROL CALCULATED: 46 MG/DL (ref 0–99)
MAGNESIUM: 1.8 MG/DL (ref 1.6–2.6)
MCH RBC QN AUTO: 37.7 PG (ref 26–35)
MCHC RBC AUTO-ENTMCNC: 32.3 % (ref 32–34.5)
MCV RBC AUTO: 116.6 FL (ref 80–99.9)
PDW BLD-RTO: 16.7 FL (ref 11.5–15)
PHOSPHORUS: 4.4 MG/DL (ref 2.5–4.5)
PLATELET # BLD: 84 E9/L (ref 130–450)
PLATELET CONFIRMATION: NORMAL
PMV BLD AUTO: 8.4 FL (ref 7–12)
POTASSIUM SERPL-SCNC: 4 MMOL/L (ref 3.5–5)
RBC # BLD: 2.23 E12/L (ref 3.8–5.8)
RETIC HGB EQUIVALENT: 41.9 PG (ref 28.2–36.6)
RETICULOCYTE ABSOLUTE COUNT: 0.08 E12/L
RETICULOCYTE COUNT PCT: 3.6 % (ref 0.4–1.9)
SODIUM BLD-SCNC: 138 MMOL/L (ref 132–146)
TOTAL IRON BINDING CAPACITY: 228 MCG/DL (ref 250–450)
TOTAL PROTEIN: 7.2 G/DL (ref 6.4–8.3)
TRANSFERRIN: 175 MG/DL (ref 200–360)
TRIGL SERPL-MCNC: 49 MG/DL (ref 0–149)
TROPONIN: 0.02 NG/ML (ref 0–0.03)
TSH SERPL DL<=0.05 MIU/L-ACNC: 2.1 UIU/ML (ref 0.27–4.2)
VITAMIN B-12: >2000 PG/ML (ref 211–946)
VLDLC SERPL CALC-MCNC: 10 MG/DL
WBC # BLD: 4.8 E9/L (ref 4.5–11.5)

## 2019-12-19 PROCEDURE — 6370000000 HC RX 637 (ALT 250 FOR IP): Performed by: INTERNAL MEDICINE

## 2019-12-19 PROCEDURE — 83036 HEMOGLOBIN GLYCOSYLATED A1C: CPT

## 2019-12-19 PROCEDURE — 1200000000 HC SEMI PRIVATE

## 2019-12-19 PROCEDURE — 2580000003 HC RX 258: Performed by: INTERNAL MEDICINE

## 2019-12-19 PROCEDURE — 83550 IRON BINDING TEST: CPT

## 2019-12-19 PROCEDURE — 6360000002 HC RX W HCPCS: Performed by: INTERNAL MEDICINE

## 2019-12-19 PROCEDURE — 84466 ASSAY OF TRANSFERRIN: CPT

## 2019-12-19 PROCEDURE — 85027 COMPLETE CBC AUTOMATED: CPT

## 2019-12-19 PROCEDURE — 85045 AUTOMATED RETICULOCYTE COUNT: CPT

## 2019-12-19 PROCEDURE — 84443 ASSAY THYROID STIM HORMONE: CPT

## 2019-12-19 PROCEDURE — 83540 ASSAY OF IRON: CPT

## 2019-12-19 PROCEDURE — 82607 VITAMIN B-12: CPT

## 2019-12-19 PROCEDURE — 80061 LIPID PANEL: CPT

## 2019-12-19 PROCEDURE — 82746 ASSAY OF FOLIC ACID SERUM: CPT

## 2019-12-19 PROCEDURE — 82728 ASSAY OF FERRITIN: CPT

## 2019-12-19 PROCEDURE — 84100 ASSAY OF PHOSPHORUS: CPT

## 2019-12-19 PROCEDURE — 83735 ASSAY OF MAGNESIUM: CPT

## 2019-12-19 PROCEDURE — 84484 ASSAY OF TROPONIN QUANT: CPT

## 2019-12-19 PROCEDURE — 80053 COMPREHEN METABOLIC PANEL: CPT

## 2019-12-19 PROCEDURE — 36415 COLL VENOUS BLD VENIPUNCTURE: CPT

## 2019-12-19 PROCEDURE — 80074 ACUTE HEPATITIS PANEL: CPT

## 2019-12-19 RX ORDER — FOLIC ACID 1 MG/1
1 TABLET ORAL DAILY
Status: DISCONTINUED | OUTPATIENT
Start: 2019-12-19 | End: 2019-12-27 | Stop reason: HOSPADM

## 2019-12-19 RX ADMIN — FUROSEMIDE 40 MG: 10 INJECTION, SOLUTION INTRAMUSCULAR; INTRAVENOUS at 08:26

## 2019-12-19 RX ADMIN — FOLIC ACID 1 MG: 1 TABLET ORAL at 16:54

## 2019-12-19 RX ADMIN — POTASSIUM CHLORIDE 20 MEQ: 20 TABLET, EXTENDED RELEASE ORAL at 08:26

## 2019-12-19 RX ADMIN — FUROSEMIDE 60 MG: 10 INJECTION, SOLUTION INTRAMUSCULAR; INTRAVENOUS at 16:54

## 2019-12-19 RX ADMIN — POTASSIUM CHLORIDE 20 MEQ: 20 TABLET, EXTENDED RELEASE ORAL at 16:54

## 2019-12-19 RX ADMIN — Medication 10 ML: at 08:26

## 2019-12-19 RX ADMIN — Medication 10 ML: at 21:11

## 2019-12-19 ASSESSMENT — PAIN SCALES - GENERAL
PAINLEVEL_OUTOF10: 0

## 2019-12-20 LAB
ALBUMIN SERPL-MCNC: 2.9 G/DL (ref 3.5–5.2)
ALP BLD-CCNC: 90 U/L (ref 40–129)
ALT SERPL-CCNC: 19 U/L (ref 0–40)
AMMONIA: 93 UMOL/L (ref 16–60)
ANION GAP SERPL CALCULATED.3IONS-SCNC: 8 MMOL/L (ref 7–16)
ANISOCYTOSIS: ABNORMAL
AST SERPL-CCNC: 39 U/L (ref 0–39)
BASOPHILS ABSOLUTE: 0.04 E9/L (ref 0–0.2)
BASOPHILS RELATIVE PERCENT: 0.9 % (ref 0–2)
BILIRUB SERPL-MCNC: 3.3 MG/DL (ref 0–1.2)
BUN BLDV-MCNC: 17 MG/DL (ref 8–23)
CALCIUM SERPL-MCNC: 8.9 MG/DL (ref 8.6–10.2)
CHLORIDE BLD-SCNC: 98 MMOL/L (ref 98–107)
CO2: 30 MMOL/L (ref 22–29)
CREAT SERPL-MCNC: 0.9 MG/DL (ref 0.7–1.2)
EOSINOPHILS ABSOLUTE: 0.29 E9/L (ref 0.05–0.5)
EOSINOPHILS RELATIVE PERCENT: 6.2 % (ref 0–6)
GFR AFRICAN AMERICAN: >60
GFR NON-AFRICAN AMERICAN: >60 ML/MIN/1.73
GLUCOSE BLD-MCNC: 109 MG/DL (ref 74–99)
HAV IGM SER IA-ACNC: NORMAL
HCT VFR BLD CALC: 26.6 % (ref 37–54)
HEMOGLOBIN: 8.7 G/DL (ref 12.5–16.5)
HEPATITIS B CORE IGM ANTIBODY: NORMAL
HEPATITIS B SURFACE ANTIGEN INTERPRETATION: NORMAL
HEPATITIS C ANTIBODY INTERPRETATION: NORMAL
IRON SATURATION: 30 % (ref 20–55)
IRON: 64 MCG/DL (ref 59–158)
LYMPHOCYTES ABSOLUTE: 0.55 E9/L (ref 1.5–4)
LYMPHOCYTES RELATIVE PERCENT: 12.4 % (ref 20–42)
MCH RBC QN AUTO: 38.7 PG (ref 26–35)
MCHC RBC AUTO-ENTMCNC: 32.7 % (ref 32–34.5)
MCV RBC AUTO: 118.2 FL (ref 80–99.9)
MONOCYTES ABSOLUTE: 0.18 E9/L (ref 0.1–0.95)
MONOCYTES RELATIVE PERCENT: 4.4 % (ref 2–12)
NEUTROPHILS ABSOLUTE: 3.5 E9/L (ref 1.8–7.3)
NEUTROPHILS RELATIVE PERCENT: 76.1 % (ref 43–80)
OVALOCYTES: ABNORMAL
PDW BLD-RTO: 16.7 FL (ref 11.5–15)
PLATELET # BLD: 78 E9/L (ref 130–450)
PLATELET CONFIRMATION: NORMAL
PMV BLD AUTO: 7.9 FL (ref 7–12)
POIKILOCYTES: ABNORMAL
POTASSIUM SERPL-SCNC: 4.2 MMOL/L (ref 3.5–5)
RBC # BLD: 2.25 E12/L (ref 3.8–5.8)
SODIUM BLD-SCNC: 136 MMOL/L (ref 132–146)
TOTAL IRON BINDING CAPACITY: 216 MCG/DL (ref 250–450)
TOTAL PROTEIN: 7.4 G/DL (ref 6.4–8.3)
WBC # BLD: 4.6 E9/L (ref 4.5–11.5)

## 2019-12-20 PROCEDURE — 2700000000 HC OXYGEN THERAPY PER DAY

## 2019-12-20 PROCEDURE — 6360000002 HC RX W HCPCS: Performed by: INTERNAL MEDICINE

## 2019-12-20 PROCEDURE — 82140 ASSAY OF AMMONIA: CPT

## 2019-12-20 PROCEDURE — 6370000000 HC RX 637 (ALT 250 FOR IP): Performed by: INTERNAL MEDICINE

## 2019-12-20 PROCEDURE — 85025 COMPLETE CBC W/AUTO DIFF WBC: CPT

## 2019-12-20 PROCEDURE — 83540 ASSAY OF IRON: CPT

## 2019-12-20 PROCEDURE — 97535 SELF CARE MNGMENT TRAINING: CPT

## 2019-12-20 PROCEDURE — 97162 PT EVAL MOD COMPLEX 30 MIN: CPT | Performed by: PHYSICAL THERAPIST

## 2019-12-20 PROCEDURE — 83550 IRON BINDING TEST: CPT

## 2019-12-20 PROCEDURE — 80053 COMPREHEN METABOLIC PANEL: CPT

## 2019-12-20 PROCEDURE — 1200000000 HC SEMI PRIVATE

## 2019-12-20 PROCEDURE — 36415 COLL VENOUS BLD VENIPUNCTURE: CPT

## 2019-12-20 PROCEDURE — 97166 OT EVAL MOD COMPLEX 45 MIN: CPT

## 2019-12-20 PROCEDURE — 97530 THERAPEUTIC ACTIVITIES: CPT

## 2019-12-20 PROCEDURE — 97530 THERAPEUTIC ACTIVITIES: CPT | Performed by: PHYSICAL THERAPIST

## 2019-12-20 PROCEDURE — 2580000003 HC RX 258: Performed by: INTERNAL MEDICINE

## 2019-12-20 RX ORDER — METOLAZONE 2.5 MG/1
5 TABLET ORAL DAILY
Status: DISCONTINUED | OUTPATIENT
Start: 2019-12-20 | End: 2019-12-27 | Stop reason: HOSPADM

## 2019-12-20 RX ORDER — LACTULOSE 10 G/15ML
20 SOLUTION ORAL 3 TIMES DAILY
Status: DISCONTINUED | OUTPATIENT
Start: 2019-12-20 | End: 2019-12-21

## 2019-12-20 RX ADMIN — LACTULOSE 20 G: 20 SOLUTION ORAL at 13:13

## 2019-12-20 RX ADMIN — Medication 10 ML: at 09:04

## 2019-12-20 RX ADMIN — FUROSEMIDE 60 MG: 10 INJECTION, SOLUTION INTRAMUSCULAR; INTRAVENOUS at 16:47

## 2019-12-20 RX ADMIN — POTASSIUM CHLORIDE 20 MEQ: 20 TABLET, EXTENDED RELEASE ORAL at 16:47

## 2019-12-20 RX ADMIN — FOLIC ACID 1 MG: 1 TABLET ORAL at 09:04

## 2019-12-20 RX ADMIN — Medication 10 ML: at 20:48

## 2019-12-20 RX ADMIN — LACTULOSE 20 G: 20 SOLUTION ORAL at 20:48

## 2019-12-20 RX ADMIN — FUROSEMIDE 60 MG: 10 INJECTION, SOLUTION INTRAMUSCULAR; INTRAVENOUS at 00:37

## 2019-12-20 RX ADMIN — POTASSIUM CHLORIDE 20 MEQ: 20 TABLET, EXTENDED RELEASE ORAL at 09:04

## 2019-12-20 RX ADMIN — METOLAZONE 5 MG: 2.5 TABLET ORAL at 13:13

## 2019-12-20 RX ADMIN — FUROSEMIDE 60 MG: 10 INJECTION, SOLUTION INTRAMUSCULAR; INTRAVENOUS at 09:04

## 2019-12-20 RX ADMIN — SODIUM CHLORIDE, PRESERVATIVE FREE 10 ML: 5 INJECTION INTRAVENOUS at 00:38

## 2019-12-20 ASSESSMENT — PAIN SCALES - GENERAL: PAINLEVEL_OUTOF10: 0

## 2019-12-21 LAB
ALBUMIN SERPL-MCNC: 2.9 G/DL (ref 3.5–5.2)
ALP BLD-CCNC: 92 U/L (ref 40–129)
ALT SERPL-CCNC: 19 U/L (ref 0–40)
AMMONIA: 71 UMOL/L (ref 16–60)
ANION GAP SERPL CALCULATED.3IONS-SCNC: 10 MMOL/L (ref 7–16)
ANISOCYTOSIS: ABNORMAL
AST SERPL-CCNC: 39 U/L (ref 0–39)
BASOPHILS ABSOLUTE: 0.08 E9/L (ref 0–0.2)
BASOPHILS RELATIVE PERCENT: 1.6 % (ref 0–2)
BILIRUB SERPL-MCNC: 3.5 MG/DL (ref 0–1.2)
BUN BLDV-MCNC: 18 MG/DL (ref 8–23)
CALCIUM SERPL-MCNC: 8.5 MG/DL (ref 8.6–10.2)
CHLORIDE BLD-SCNC: 96 MMOL/L (ref 98–107)
CO2: 30 MMOL/L (ref 22–29)
CREAT SERPL-MCNC: 0.9 MG/DL (ref 0.7–1.2)
EOSINOPHILS ABSOLUTE: 0.68 E9/L (ref 0.05–0.5)
EOSINOPHILS RELATIVE PERCENT: 13.6 % (ref 0–6)
GFR AFRICAN AMERICAN: >60
GFR NON-AFRICAN AMERICAN: >60 ML/MIN/1.73
GLUCOSE BLD-MCNC: 108 MG/DL (ref 74–99)
HCT VFR BLD CALC: 28.2 % (ref 37–54)
HEMOGLOBIN: 9.1 G/DL (ref 12.5–16.5)
IMMATURE GRANULOCYTES #: 0.02 E9/L
IMMATURE GRANULOCYTES %: 0.4 % (ref 0–5)
LYMPHOCYTES ABSOLUTE: 0.96 E9/L (ref 1.5–4)
LYMPHOCYTES RELATIVE PERCENT: 19.2 % (ref 20–42)
MAGNESIUM: 1.8 MG/DL (ref 1.6–2.6)
MCH RBC QN AUTO: 38.1 PG (ref 26–35)
MCHC RBC AUTO-ENTMCNC: 32.3 % (ref 32–34.5)
MCV RBC AUTO: 118 FL (ref 80–99.9)
MONOCYTES ABSOLUTE: 0.7 E9/L (ref 0.1–0.95)
MONOCYTES RELATIVE PERCENT: 14 % (ref 2–12)
NEUTROPHILS ABSOLUTE: 2.56 E9/L (ref 1.8–7.3)
NEUTROPHILS RELATIVE PERCENT: 51.2 % (ref 43–80)
PDW BLD-RTO: 16.8 FL (ref 11.5–15)
PLATELET # BLD: 83 E9/L (ref 130–450)
PLATELET CONFIRMATION: NORMAL
PMV BLD AUTO: 8.4 FL (ref 7–12)
POTASSIUM SERPL-SCNC: 3.6 MMOL/L (ref 3.5–5)
RBC # BLD: 2.39 E12/L (ref 3.8–5.8)
REASON FOR REJECTION: NORMAL
REJECTED TEST: NORMAL
SODIUM BLD-SCNC: 136 MMOL/L (ref 132–146)
TOTAL PROTEIN: 7.6 G/DL (ref 6.4–8.3)
WBC # BLD: 5 E9/L (ref 4.5–11.5)

## 2019-12-21 PROCEDURE — 2580000003 HC RX 258: Performed by: INTERNAL MEDICINE

## 2019-12-21 PROCEDURE — 6370000000 HC RX 637 (ALT 250 FOR IP): Performed by: INTERNAL MEDICINE

## 2019-12-21 PROCEDURE — 36415 COLL VENOUS BLD VENIPUNCTURE: CPT

## 2019-12-21 PROCEDURE — 85025 COMPLETE CBC W/AUTO DIFF WBC: CPT

## 2019-12-21 PROCEDURE — 82140 ASSAY OF AMMONIA: CPT

## 2019-12-21 PROCEDURE — 83735 ASSAY OF MAGNESIUM: CPT

## 2019-12-21 PROCEDURE — 94640 AIRWAY INHALATION TREATMENT: CPT

## 2019-12-21 PROCEDURE — 1200000000 HC SEMI PRIVATE

## 2019-12-21 PROCEDURE — 2700000000 HC OXYGEN THERAPY PER DAY

## 2019-12-21 PROCEDURE — 80053 COMPREHEN METABOLIC PANEL: CPT

## 2019-12-21 PROCEDURE — 6360000002 HC RX W HCPCS: Performed by: INTERNAL MEDICINE

## 2019-12-21 RX ORDER — IPRATROPIUM BROMIDE AND ALBUTEROL SULFATE 2.5; .5 MG/3ML; MG/3ML
1 SOLUTION RESPIRATORY (INHALATION) 4 TIMES DAILY
Status: DISCONTINUED | OUTPATIENT
Start: 2019-12-21 | End: 2019-12-27 | Stop reason: HOSPADM

## 2019-12-21 RX ORDER — POTASSIUM CHLORIDE 20 MEQ/1
20 TABLET, EXTENDED RELEASE ORAL
Status: DISCONTINUED | OUTPATIENT
Start: 2019-12-21 | End: 2019-12-22

## 2019-12-21 RX ORDER — LACTULOSE 10 G/15ML
10 SOLUTION ORAL 2 TIMES DAILY
Status: DISCONTINUED | OUTPATIENT
Start: 2019-12-21 | End: 2019-12-26

## 2019-12-21 RX ORDER — BUMETANIDE 1 MG/1
1 TABLET ORAL 2 TIMES DAILY
Status: DISCONTINUED | OUTPATIENT
Start: 2019-12-21 | End: 2019-12-22

## 2019-12-21 RX ORDER — LACTULOSE 10 G/15ML
10 SOLUTION ORAL 3 TIMES DAILY
Status: DISCONTINUED | OUTPATIENT
Start: 2019-12-21 | End: 2019-12-21

## 2019-12-21 RX ADMIN — POTASSIUM CHLORIDE 20 MEQ: 20 TABLET, EXTENDED RELEASE ORAL at 13:03

## 2019-12-21 RX ADMIN — LACTULOSE 20 G: 20 SOLUTION ORAL at 08:47

## 2019-12-21 RX ADMIN — BUMETANIDE 1 MG: 1 TABLET ORAL at 21:50

## 2019-12-21 RX ADMIN — Medication 10 ML: at 08:47

## 2019-12-21 RX ADMIN — IPRATROPIUM BROMIDE AND ALBUTEROL SULFATE 1 AMPULE: .5; 3 SOLUTION RESPIRATORY (INHALATION) at 18:41

## 2019-12-21 RX ADMIN — FUROSEMIDE 60 MG: 10 INJECTION, SOLUTION INTRAMUSCULAR; INTRAVENOUS at 00:36

## 2019-12-21 RX ADMIN — METOLAZONE 5 MG: 2.5 TABLET ORAL at 08:47

## 2019-12-21 RX ADMIN — FUROSEMIDE 60 MG: 10 INJECTION, SOLUTION INTRAMUSCULAR; INTRAVENOUS at 08:47

## 2019-12-21 RX ADMIN — MOMETASONE FUROATE AND FORMOTEROL FUMARATE DIHYDRATE 2 PUFF: 200; 5 AEROSOL RESPIRATORY (INHALATION) at 18:42

## 2019-12-21 RX ADMIN — MELATONIN 3 MG: at 01:01

## 2019-12-21 RX ADMIN — POTASSIUM CHLORIDE 20 MEQ: 20 TABLET, EXTENDED RELEASE ORAL at 16:48

## 2019-12-21 RX ADMIN — ACETAMINOPHEN 650 MG: 325 TABLET, FILM COATED ORAL at 01:01

## 2019-12-21 RX ADMIN — POTASSIUM CHLORIDE 20 MEQ: 20 TABLET, EXTENDED RELEASE ORAL at 08:47

## 2019-12-21 RX ADMIN — IPRATROPIUM BROMIDE AND ALBUTEROL SULFATE 1 AMPULE: .5; 3 SOLUTION RESPIRATORY (INHALATION) at 14:44

## 2019-12-21 RX ADMIN — BUMETANIDE 1 MG: 1 TABLET ORAL at 13:03

## 2019-12-21 RX ADMIN — Medication 10 ML: at 21:00

## 2019-12-21 RX ADMIN — FOLIC ACID 1 MG: 1 TABLET ORAL at 08:47

## 2019-12-21 ASSESSMENT — PAIN SCALES - GENERAL
PAINLEVEL_OUTOF10: 0
PAINLEVEL_OUTOF10: 6

## 2019-12-22 LAB
ALBUMIN SERPL-MCNC: 2.9 G/DL (ref 3.5–5.2)
ALP BLD-CCNC: 93 U/L (ref 40–129)
ALT SERPL-CCNC: 20 U/L (ref 0–40)
AMMONIA: 74 UMOL/L (ref 16–60)
ANION GAP SERPL CALCULATED.3IONS-SCNC: 13 MMOL/L (ref 7–16)
AST SERPL-CCNC: 42 U/L (ref 0–39)
BILIRUB SERPL-MCNC: 3.4 MG/DL (ref 0–1.2)
BUN BLDV-MCNC: 19 MG/DL (ref 8–23)
CALCIUM SERPL-MCNC: 8.9 MG/DL (ref 8.6–10.2)
CHLORIDE BLD-SCNC: 94 MMOL/L (ref 98–107)
CO2: 30 MMOL/L (ref 22–29)
CREAT SERPL-MCNC: 1 MG/DL (ref 0.7–1.2)
GFR AFRICAN AMERICAN: >60
GFR NON-AFRICAN AMERICAN: >60 ML/MIN/1.73
GLUCOSE BLD-MCNC: 108 MG/DL (ref 74–99)
POTASSIUM SERPL-SCNC: 3.4 MMOL/L (ref 3.5–5)
SODIUM BLD-SCNC: 137 MMOL/L (ref 132–146)
TOTAL PROTEIN: 7.8 G/DL (ref 6.4–8.3)

## 2019-12-22 PROCEDURE — 80053 COMPREHEN METABOLIC PANEL: CPT

## 2019-12-22 PROCEDURE — 2580000003 HC RX 258: Performed by: INTERNAL MEDICINE

## 2019-12-22 PROCEDURE — 6370000000 HC RX 637 (ALT 250 FOR IP): Performed by: INTERNAL MEDICINE

## 2019-12-22 PROCEDURE — 1200000000 HC SEMI PRIVATE

## 2019-12-22 PROCEDURE — 36415 COLL VENOUS BLD VENIPUNCTURE: CPT

## 2019-12-22 PROCEDURE — 94640 AIRWAY INHALATION TREATMENT: CPT

## 2019-12-22 PROCEDURE — 2700000000 HC OXYGEN THERAPY PER DAY

## 2019-12-22 PROCEDURE — 82140 ASSAY OF AMMONIA: CPT

## 2019-12-22 RX ORDER — BUMETANIDE 1 MG/1
1.5 TABLET ORAL 2 TIMES DAILY
Status: DISCONTINUED | OUTPATIENT
Start: 2019-12-22 | End: 2019-12-26

## 2019-12-22 RX ORDER — POTASSIUM CHLORIDE 20 MEQ/1
20 TABLET, EXTENDED RELEASE ORAL
Status: DISCONTINUED | OUTPATIENT
Start: 2019-12-22 | End: 2019-12-27 | Stop reason: HOSPADM

## 2019-12-22 RX ADMIN — LACTULOSE 10 G: 20 SOLUTION ORAL at 20:31

## 2019-12-22 RX ADMIN — Medication 10 ML: at 08:50

## 2019-12-22 RX ADMIN — FOLIC ACID 1 MG: 1 TABLET ORAL at 08:51

## 2019-12-22 RX ADMIN — MOMETASONE FUROATE AND FORMOTEROL FUMARATE DIHYDRATE 2 PUFF: 200; 5 AEROSOL RESPIRATORY (INHALATION) at 18:16

## 2019-12-22 RX ADMIN — POTASSIUM CHLORIDE 20 MEQ: 20 TABLET, EXTENDED RELEASE ORAL at 20:31

## 2019-12-22 RX ADMIN — Medication 10 ML: at 20:32

## 2019-12-22 RX ADMIN — MOMETASONE FUROATE AND FORMOTEROL FUMARATE DIHYDRATE 2 PUFF: 200; 5 AEROSOL RESPIRATORY (INHALATION) at 06:31

## 2019-12-22 RX ADMIN — IPRATROPIUM BROMIDE AND ALBUTEROL SULFATE 1 AMPULE: .5; 3 SOLUTION RESPIRATORY (INHALATION) at 14:16

## 2019-12-22 RX ADMIN — BUMETANIDE 1.5 MG: 1 TABLET ORAL at 20:33

## 2019-12-22 RX ADMIN — POTASSIUM CHLORIDE 20 MEQ: 20 TABLET, EXTENDED RELEASE ORAL at 11:52

## 2019-12-22 RX ADMIN — IPRATROPIUM BROMIDE AND ALBUTEROL SULFATE 1 AMPULE: .5; 3 SOLUTION RESPIRATORY (INHALATION) at 09:33

## 2019-12-22 RX ADMIN — METOLAZONE 5 MG: 2.5 TABLET ORAL at 08:51

## 2019-12-22 RX ADMIN — BUMETANIDE 1 MG: 1 TABLET ORAL at 08:51

## 2019-12-22 RX ADMIN — IPRATROPIUM BROMIDE AND ALBUTEROL SULFATE 1 AMPULE: .5; 3 SOLUTION RESPIRATORY (INHALATION) at 18:16

## 2019-12-22 RX ADMIN — LACTULOSE 10 G: 20 SOLUTION ORAL at 08:51

## 2019-12-22 RX ADMIN — IPRATROPIUM BROMIDE AND ALBUTEROL SULFATE 1 AMPULE: .5; 3 SOLUTION RESPIRATORY (INHALATION) at 06:31

## 2019-12-22 RX ADMIN — POTASSIUM CHLORIDE 20 MEQ: 20 TABLET, EXTENDED RELEASE ORAL at 18:41

## 2019-12-22 RX ADMIN — POTASSIUM CHLORIDE 20 MEQ: 20 TABLET, EXTENDED RELEASE ORAL at 08:51

## 2019-12-22 ASSESSMENT — PAIN SCALES - GENERAL
PAINLEVEL_OUTOF10: 0
PAINLEVEL_OUTOF10: 0

## 2019-12-23 LAB
ANION GAP SERPL CALCULATED.3IONS-SCNC: 7 MMOL/L (ref 7–16)
BUN BLDV-MCNC: 21 MG/DL (ref 8–23)
CALCIUM SERPL-MCNC: 8.8 MG/DL (ref 8.6–10.2)
CHLORIDE BLD-SCNC: 93 MMOL/L (ref 98–107)
CO2: 36 MMOL/L (ref 22–29)
CREAT SERPL-MCNC: 1 MG/DL (ref 0.7–1.2)
GFR AFRICAN AMERICAN: >60
GFR NON-AFRICAN AMERICAN: >60 ML/MIN/1.73
GLUCOSE BLD-MCNC: 108 MG/DL (ref 74–99)
POTASSIUM SERPL-SCNC: 3.1 MMOL/L (ref 3.5–5)
SODIUM BLD-SCNC: 136 MMOL/L (ref 132–146)

## 2019-12-23 PROCEDURE — 6360000002 HC RX W HCPCS: Performed by: INTERNAL MEDICINE

## 2019-12-23 PROCEDURE — 2580000003 HC RX 258: Performed by: INTERNAL MEDICINE

## 2019-12-23 PROCEDURE — 36415 COLL VENOUS BLD VENIPUNCTURE: CPT

## 2019-12-23 PROCEDURE — 6370000000 HC RX 637 (ALT 250 FOR IP): Performed by: INTERNAL MEDICINE

## 2019-12-23 PROCEDURE — 1200000000 HC SEMI PRIVATE

## 2019-12-23 PROCEDURE — 2700000000 HC OXYGEN THERAPY PER DAY

## 2019-12-23 PROCEDURE — 94640 AIRWAY INHALATION TREATMENT: CPT

## 2019-12-23 PROCEDURE — 97530 THERAPEUTIC ACTIVITIES: CPT

## 2019-12-23 PROCEDURE — 97110 THERAPEUTIC EXERCISES: CPT

## 2019-12-23 PROCEDURE — 80048 BASIC METABOLIC PNL TOTAL CA: CPT

## 2019-12-23 RX ADMIN — Medication 10 ML: at 21:12

## 2019-12-23 RX ADMIN — POTASSIUM CHLORIDE 20 MEQ: 20 TABLET, EXTENDED RELEASE ORAL at 08:27

## 2019-12-23 RX ADMIN — POTASSIUM CHLORIDE 20 MEQ: 20 TABLET, EXTENDED RELEASE ORAL at 21:11

## 2019-12-23 RX ADMIN — ACETAMINOPHEN 650 MG: 325 TABLET, FILM COATED ORAL at 21:12

## 2019-12-23 RX ADMIN — LACTULOSE 10 G: 20 SOLUTION ORAL at 21:12

## 2019-12-23 RX ADMIN — IPRATROPIUM BROMIDE AND ALBUTEROL SULFATE 1 AMPULE: .5; 3 SOLUTION RESPIRATORY (INHALATION) at 09:11

## 2019-12-23 RX ADMIN — ONDANSETRON 4 MG: 2 INJECTION INTRAMUSCULAR; INTRAVENOUS at 23:45

## 2019-12-23 RX ADMIN — IPRATROPIUM BROMIDE AND ALBUTEROL SULFATE 1 AMPULE: .5; 3 SOLUTION RESPIRATORY (INHALATION) at 05:19

## 2019-12-23 RX ADMIN — POTASSIUM CHLORIDE 20 MEQ: 20 TABLET, EXTENDED RELEASE ORAL at 12:52

## 2019-12-23 RX ADMIN — BUMETANIDE 1.5 MG: 1 TABLET ORAL at 21:11

## 2019-12-23 RX ADMIN — IPRATROPIUM BROMIDE AND ALBUTEROL SULFATE 1 AMPULE: .5; 3 SOLUTION RESPIRATORY (INHALATION) at 18:06

## 2019-12-23 RX ADMIN — POTASSIUM CHLORIDE 20 MEQ: 20 TABLET, EXTENDED RELEASE ORAL at 21:12

## 2019-12-23 RX ADMIN — LACTULOSE 10 G: 20 SOLUTION ORAL at 08:28

## 2019-12-23 RX ADMIN — BUMETANIDE 1.5 MG: 1 TABLET ORAL at 08:27

## 2019-12-23 RX ADMIN — METOLAZONE 5 MG: 2.5 TABLET ORAL at 12:52

## 2019-12-23 RX ADMIN — IPRATROPIUM BROMIDE AND ALBUTEROL SULFATE 1 AMPULE: .5; 3 SOLUTION RESPIRATORY (INHALATION) at 13:35

## 2019-12-23 RX ADMIN — MOMETASONE FUROATE AND FORMOTEROL FUMARATE DIHYDRATE 2 PUFF: 200; 5 AEROSOL RESPIRATORY (INHALATION) at 05:19

## 2019-12-23 RX ADMIN — MELATONIN 3 MG: at 21:11

## 2019-12-23 RX ADMIN — ACETAMINOPHEN 650 MG: 325 TABLET, FILM COATED ORAL at 08:27

## 2019-12-23 RX ADMIN — FOLIC ACID 1 MG: 1 TABLET ORAL at 08:27

## 2019-12-23 RX ADMIN — MOMETASONE FUROATE AND FORMOTEROL FUMARATE DIHYDRATE 2 PUFF: 200; 5 AEROSOL RESPIRATORY (INHALATION) at 18:06

## 2019-12-23 ASSESSMENT — PAIN DESCRIPTION - PAIN TYPE: TYPE: ACUTE PAIN

## 2019-12-23 ASSESSMENT — PAIN DESCRIPTION - ORIENTATION: ORIENTATION: LEFT

## 2019-12-23 ASSESSMENT — PAIN SCALES - GENERAL
PAINLEVEL_OUTOF10: 4
PAINLEVEL_OUTOF10: 6
PAINLEVEL_OUTOF10: 0
PAINLEVEL_OUTOF10: 8
PAINLEVEL_OUTOF10: 0

## 2019-12-23 ASSESSMENT — PAIN DESCRIPTION - LOCATION: LOCATION: EAR;EYE

## 2019-12-23 ASSESSMENT — PAIN DESCRIPTION - FREQUENCY: FREQUENCY: CONTINUOUS

## 2019-12-23 ASSESSMENT — PAIN DESCRIPTION - PROGRESSION: CLINICAL_PROGRESSION: NOT CHANGED

## 2019-12-23 ASSESSMENT — PAIN DESCRIPTION - ONSET: ONSET: ON-GOING

## 2019-12-23 ASSESSMENT — PAIN DESCRIPTION - DESCRIPTORS: DESCRIPTORS: ACHING;DISCOMFORT;CONSTANT

## 2019-12-24 LAB
ACANTHOCYTES: ABNORMAL
ALBUMIN SERPL-MCNC: 2.9 G/DL (ref 3.5–5.2)
ALP BLD-CCNC: 82 U/L (ref 40–129)
ALT SERPL-CCNC: 17 U/L (ref 0–40)
ANION GAP SERPL CALCULATED.3IONS-SCNC: 10 MMOL/L (ref 7–16)
ANISOCYTOSIS: ABNORMAL
AST SERPL-CCNC: 36 U/L (ref 0–39)
BASOPHILS ABSOLUTE: 0.15 E9/L (ref 0–0.2)
BASOPHILS RELATIVE PERCENT: 3.5 % (ref 0–2)
BILIRUB SERPL-MCNC: 2.8 MG/DL (ref 0–1.2)
BUN BLDV-MCNC: 25 MG/DL (ref 8–23)
CALCIUM SERPL-MCNC: 8.8 MG/DL (ref 8.6–10.2)
CHLORIDE BLD-SCNC: 89 MMOL/L (ref 98–107)
CO2: 36 MMOL/L (ref 22–29)
CREAT SERPL-MCNC: 1.2 MG/DL (ref 0.7–1.2)
EOSINOPHILS ABSOLUTE: 0.49 E9/L (ref 0.05–0.5)
EOSINOPHILS RELATIVE PERCENT: 11.3 % (ref 0–6)
GFR AFRICAN AMERICAN: >60
GFR NON-AFRICAN AMERICAN: >60 ML/MIN/1.73
GLUCOSE BLD-MCNC: 119 MG/DL (ref 74–99)
HCT VFR BLD CALC: 27.2 % (ref 37–54)
HEMOGLOBIN: 8.7 G/DL (ref 12.5–16.5)
LYMPHOCYTES ABSOLUTE: 0.47 E9/L (ref 1.5–4)
LYMPHOCYTES RELATIVE PERCENT: 11.3 % (ref 20–42)
MACRO-OVALOCYTES: ABNORMAL
MCH RBC QN AUTO: 38.5 PG (ref 26–35)
MCHC RBC AUTO-ENTMCNC: 32 % (ref 32–34.5)
MCV RBC AUTO: 120.4 FL (ref 80–99.9)
MONOCYTES ABSOLUTE: 0.22 E9/L (ref 0.1–0.95)
MONOCYTES RELATIVE PERCENT: 5.2 % (ref 2–12)
NEUTROPHILS ABSOLUTE: 2.97 E9/L (ref 1.8–7.3)
NEUTROPHILS RELATIVE PERCENT: 68.7 % (ref 43–80)
OVALOCYTES: ABNORMAL
PDW BLD-RTO: 16.8 FL (ref 11.5–15)
PLATELET # BLD: 71 E9/L (ref 130–450)
PLATELET CONFIRMATION: NORMAL
PMV BLD AUTO: 8.5 FL (ref 7–12)
POIKILOCYTES: ABNORMAL
POLYCHROMASIA: ABNORMAL
POTASSIUM SERPL-SCNC: 3.6 MMOL/L (ref 3.5–5)
RBC # BLD: 2.26 E12/L (ref 3.8–5.8)
SODIUM BLD-SCNC: 135 MMOL/L (ref 132–146)
TARGET CELLS: ABNORMAL
TEAR DROP CELLS: ABNORMAL
TOTAL PROTEIN: 7.8 G/DL (ref 6.4–8.3)
WBC # BLD: 4.3 E9/L (ref 4.5–11.5)

## 2019-12-24 PROCEDURE — 6370000000 HC RX 637 (ALT 250 FOR IP): Performed by: INTERNAL MEDICINE

## 2019-12-24 PROCEDURE — 97530 THERAPEUTIC ACTIVITIES: CPT

## 2019-12-24 PROCEDURE — 85025 COMPLETE CBC W/AUTO DIFF WBC: CPT

## 2019-12-24 PROCEDURE — 36415 COLL VENOUS BLD VENIPUNCTURE: CPT

## 2019-12-24 PROCEDURE — 80053 COMPREHEN METABOLIC PANEL: CPT

## 2019-12-24 PROCEDURE — 2580000003 HC RX 258: Performed by: INTERNAL MEDICINE

## 2019-12-24 PROCEDURE — 2700000000 HC OXYGEN THERAPY PER DAY

## 2019-12-24 PROCEDURE — 1200000000 HC SEMI PRIVATE

## 2019-12-24 PROCEDURE — 94640 AIRWAY INHALATION TREATMENT: CPT

## 2019-12-24 RX ORDER — LANOLIN ALCOHOL/MO/W.PET/CERES
3 CREAM (GRAM) TOPICAL NIGHTLY PRN
Refills: 3 | Status: ON HOLD | COMMUNITY
Start: 2019-12-24 | End: 2020-03-16 | Stop reason: HOSPADM

## 2019-12-24 RX ORDER — ACETAMINOPHEN 500 MG
500 TABLET ORAL EVERY 6 HOURS PRN
Qty: 120 TABLET | Refills: 3 | Status: ON HOLD | COMMUNITY
Start: 2019-12-24 | End: 2020-03-16 | Stop reason: HOSPADM

## 2019-12-24 RX ORDER — BUMETANIDE 0.5 MG/1
1 TABLET ORAL 2 TIMES DAILY
Qty: 30 TABLET | Refills: 3 | DISCHARGE
Start: 2019-12-24 | End: 2019-12-27 | Stop reason: HOSPADM

## 2019-12-24 RX ORDER — IPRATROPIUM BROMIDE AND ALBUTEROL SULFATE 2.5; .5 MG/3ML; MG/3ML
3 SOLUTION RESPIRATORY (INHALATION) 4 TIMES DAILY
Qty: 360 ML | Status: ON HOLD | DISCHARGE
Start: 2019-12-24 | End: 2020-03-16 | Stop reason: HOSPADM

## 2019-12-24 RX ORDER — LACTULOSE 10 G/15ML
20 SOLUTION ORAL 2 TIMES DAILY
Refills: 1 | DISCHARGE
Start: 2019-12-24 | End: 2019-12-27 | Stop reason: HOSPADM

## 2019-12-24 RX ORDER — METOLAZONE 5 MG/1
5 TABLET ORAL
Qty: 30 TABLET | Refills: 3 | Status: ON HOLD | DISCHARGE
Start: 2019-12-25 | End: 2020-03-16 | Stop reason: HOSPADM

## 2019-12-24 RX ORDER — FOLIC ACID 1 MG/1
1 TABLET ORAL DAILY
Qty: 30 TABLET | Refills: 3 | Status: ON HOLD | COMMUNITY
Start: 2019-12-25 | End: 2020-03-16 | Stop reason: HOSPADM

## 2019-12-24 RX ADMIN — Medication 10 ML: at 08:38

## 2019-12-24 RX ADMIN — IPRATROPIUM BROMIDE AND ALBUTEROL SULFATE 1 AMPULE: .5; 3 SOLUTION RESPIRATORY (INHALATION) at 05:40

## 2019-12-24 RX ADMIN — MOMETASONE FUROATE AND FORMOTEROL FUMARATE DIHYDRATE 2 PUFF: 200; 5 AEROSOL RESPIRATORY (INHALATION) at 05:40

## 2019-12-24 RX ADMIN — FOLIC ACID 1 MG: 1 TABLET ORAL at 08:37

## 2019-12-24 RX ADMIN — BUMETANIDE 1.5 MG: 1 TABLET ORAL at 20:41

## 2019-12-24 RX ADMIN — IPRATROPIUM BROMIDE AND ALBUTEROL SULFATE 1 AMPULE: .5; 3 SOLUTION RESPIRATORY (INHALATION) at 08:58

## 2019-12-24 RX ADMIN — POTASSIUM CHLORIDE 20 MEQ: 20 TABLET, EXTENDED RELEASE ORAL at 08:37

## 2019-12-24 RX ADMIN — IPRATROPIUM BROMIDE AND ALBUTEROL SULFATE 1 AMPULE: .5; 3 SOLUTION RESPIRATORY (INHALATION) at 17:30

## 2019-12-24 RX ADMIN — METOLAZONE 5 MG: 2.5 TABLET ORAL at 08:37

## 2019-12-24 RX ADMIN — Medication 10 ML: at 20:41

## 2019-12-24 RX ADMIN — LACTULOSE 10 G: 20 SOLUTION ORAL at 08:37

## 2019-12-24 RX ADMIN — LACTULOSE 10 G: 20 SOLUTION ORAL at 20:43

## 2019-12-24 RX ADMIN — ACETAMINOPHEN 650 MG: 325 TABLET, FILM COATED ORAL at 03:27

## 2019-12-24 RX ADMIN — IPRATROPIUM BROMIDE AND ALBUTEROL SULFATE 1 AMPULE: .5; 3 SOLUTION RESPIRATORY (INHALATION) at 12:23

## 2019-12-24 RX ADMIN — POTASSIUM CHLORIDE 20 MEQ: 20 TABLET, EXTENDED RELEASE ORAL at 12:39

## 2019-12-24 RX ADMIN — POTASSIUM CHLORIDE 20 MEQ: 20 TABLET, EXTENDED RELEASE ORAL at 18:32

## 2019-12-24 RX ADMIN — POTASSIUM CHLORIDE 20 MEQ: 20 TABLET, EXTENDED RELEASE ORAL at 20:41

## 2019-12-24 RX ADMIN — BUMETANIDE 1.5 MG: 1 TABLET ORAL at 08:37

## 2019-12-24 RX ADMIN — MOMETASONE FUROATE AND FORMOTEROL FUMARATE DIHYDRATE 2 PUFF: 200; 5 AEROSOL RESPIRATORY (INHALATION) at 17:30

## 2019-12-24 ASSESSMENT — PAIN SCALES - GENERAL
PAINLEVEL_OUTOF10: 0
PAINLEVEL_OUTOF10: 0
PAINLEVEL_OUTOF10: 9

## 2019-12-25 LAB
ANION GAP SERPL CALCULATED.3IONS-SCNC: 8 MMOL/L (ref 7–16)
BUN BLDV-MCNC: 27 MG/DL (ref 8–23)
CALCIUM SERPL-MCNC: 8.5 MG/DL (ref 8.6–10.2)
CHLORIDE BLD-SCNC: 90 MMOL/L (ref 98–107)
CO2: 36 MMOL/L (ref 22–29)
CREAT SERPL-MCNC: 1.2 MG/DL (ref 0.7–1.2)
GFR AFRICAN AMERICAN: >60
GFR NON-AFRICAN AMERICAN: >60 ML/MIN/1.73
GLUCOSE BLD-MCNC: 143 MG/DL (ref 74–99)
POTASSIUM SERPL-SCNC: 4.3 MMOL/L (ref 3.5–5)
SODIUM BLD-SCNC: 134 MMOL/L (ref 132–146)

## 2019-12-25 PROCEDURE — 80048 BASIC METABOLIC PNL TOTAL CA: CPT

## 2019-12-25 PROCEDURE — 6370000000 HC RX 637 (ALT 250 FOR IP): Performed by: INTERNAL MEDICINE

## 2019-12-25 PROCEDURE — 36415 COLL VENOUS BLD VENIPUNCTURE: CPT

## 2019-12-25 PROCEDURE — 2700000000 HC OXYGEN THERAPY PER DAY

## 2019-12-25 PROCEDURE — 2580000003 HC RX 258: Performed by: INTERNAL MEDICINE

## 2019-12-25 PROCEDURE — 94640 AIRWAY INHALATION TREATMENT: CPT

## 2019-12-25 PROCEDURE — 1200000000 HC SEMI PRIVATE

## 2019-12-25 RX ADMIN — Medication 10 ML: at 08:32

## 2019-12-25 RX ADMIN — IPRATROPIUM BROMIDE AND ALBUTEROL SULFATE 1 AMPULE: .5; 3 SOLUTION RESPIRATORY (INHALATION) at 18:01

## 2019-12-25 RX ADMIN — POTASSIUM CHLORIDE 20 MEQ: 20 TABLET, EXTENDED RELEASE ORAL at 12:44

## 2019-12-25 RX ADMIN — LACTULOSE 10 G: 20 SOLUTION ORAL at 08:32

## 2019-12-25 RX ADMIN — LACTULOSE 10 G: 20 SOLUTION ORAL at 20:32

## 2019-12-25 RX ADMIN — IPRATROPIUM BROMIDE AND ALBUTEROL SULFATE 1 AMPULE: .5; 3 SOLUTION RESPIRATORY (INHALATION) at 09:15

## 2019-12-25 RX ADMIN — FOLIC ACID 1 MG: 1 TABLET ORAL at 08:33

## 2019-12-25 RX ADMIN — BUMETANIDE 1.5 MG: 1 TABLET ORAL at 08:32

## 2019-12-25 RX ADMIN — POTASSIUM CHLORIDE 20 MEQ: 20 TABLET, EXTENDED RELEASE ORAL at 08:33

## 2019-12-25 RX ADMIN — BUMETANIDE 1.5 MG: 1 TABLET ORAL at 20:31

## 2019-12-25 RX ADMIN — Medication 10 ML: at 20:31

## 2019-12-25 RX ADMIN — POTASSIUM CHLORIDE 20 MEQ: 20 TABLET, EXTENDED RELEASE ORAL at 20:31

## 2019-12-25 RX ADMIN — METOLAZONE 5 MG: 2.5 TABLET ORAL at 08:33

## 2019-12-25 RX ADMIN — IPRATROPIUM BROMIDE AND ALBUTEROL SULFATE 1 AMPULE: .5; 3 SOLUTION RESPIRATORY (INHALATION) at 05:58

## 2019-12-25 RX ADMIN — MOMETASONE FUROATE AND FORMOTEROL FUMARATE DIHYDRATE 2 PUFF: 200; 5 AEROSOL RESPIRATORY (INHALATION) at 18:01

## 2019-12-25 RX ADMIN — MOMETASONE FUROATE AND FORMOTEROL FUMARATE DIHYDRATE 2 PUFF: 200; 5 AEROSOL RESPIRATORY (INHALATION) at 05:58

## 2019-12-25 ASSESSMENT — PAIN SCALES - GENERAL
PAINLEVEL_OUTOF10: 0

## 2019-12-26 LAB
ALBUMIN SERPL-MCNC: 2.8 G/DL (ref 3.5–5.2)
ALP BLD-CCNC: 82 U/L (ref 40–129)
ALT SERPL-CCNC: 17 U/L (ref 0–40)
AMMONIA: 97 UMOL/L (ref 16–60)
ANION GAP SERPL CALCULATED.3IONS-SCNC: 7 MMOL/L (ref 7–16)
ANISOCYTOSIS: ABNORMAL
AST SERPL-CCNC: 35 U/L (ref 0–39)
BASOPHILS ABSOLUTE: 0.03 E9/L (ref 0–0.2)
BASOPHILS RELATIVE PERCENT: 0.9 % (ref 0–2)
BILIRUB SERPL-MCNC: 2.6 MG/DL (ref 0–1.2)
BUN BLDV-MCNC: 31 MG/DL (ref 8–23)
CALCIUM SERPL-MCNC: 8.8 MG/DL (ref 8.6–10.2)
CHLORIDE BLD-SCNC: 89 MMOL/L (ref 98–107)
CO2: 39 MMOL/L (ref 22–29)
CREAT SERPL-MCNC: 1.2 MG/DL (ref 0.7–1.2)
EOSINOPHILS ABSOLUTE: 0.33 E9/L (ref 0.05–0.5)
EOSINOPHILS RELATIVE PERCENT: 8.8 % (ref 0–6)
GFR AFRICAN AMERICAN: >60
GFR NON-AFRICAN AMERICAN: >60 ML/MIN/1.73
GLUCOSE BLD-MCNC: 113 MG/DL (ref 74–99)
HCT VFR BLD CALC: 28.1 % (ref 37–54)
HEMOGLOBIN: 8.9 G/DL (ref 12.5–16.5)
LYMPHOCYTES ABSOLUTE: 0.19 E9/L (ref 1.5–4)
LYMPHOCYTES RELATIVE PERCENT: 5.3 % (ref 20–42)
MCH RBC QN AUTO: 38.2 PG (ref 26–35)
MCHC RBC AUTO-ENTMCNC: 31.7 % (ref 32–34.5)
MCV RBC AUTO: 120.6 FL (ref 80–99.9)
MONOCYTES ABSOLUTE: 0.23 E9/L (ref 0.1–0.95)
MONOCYTES RELATIVE PERCENT: 6.2 % (ref 2–12)
NEUTROPHILS ABSOLUTE: 3 E9/L (ref 1.8–7.3)
NEUTROPHILS RELATIVE PERCENT: 78.8 % (ref 43–80)
PDW BLD-RTO: 16 FL (ref 11.5–15)
PLATELET # BLD: 58 E9/L (ref 130–450)
PLATELET CONFIRMATION: NORMAL
PMV BLD AUTO: 8.9 FL (ref 7–12)
POTASSIUM SERPL-SCNC: 3.7 MMOL/L (ref 3.5–5)
RBC # BLD: 2.33 E12/L (ref 3.8–5.8)
SODIUM BLD-SCNC: 135 MMOL/L (ref 132–146)
TOTAL PROTEIN: 7.9 G/DL (ref 6.4–8.3)
WBC # BLD: 3.8 E9/L (ref 4.5–11.5)

## 2019-12-26 PROCEDURE — 97530 THERAPEUTIC ACTIVITIES: CPT

## 2019-12-26 PROCEDURE — 6370000000 HC RX 637 (ALT 250 FOR IP): Performed by: INTERNAL MEDICINE

## 2019-12-26 PROCEDURE — 94640 AIRWAY INHALATION TREATMENT: CPT

## 2019-12-26 PROCEDURE — 85025 COMPLETE CBC W/AUTO DIFF WBC: CPT

## 2019-12-26 PROCEDURE — 97535 SELF CARE MNGMENT TRAINING: CPT

## 2019-12-26 PROCEDURE — 80053 COMPREHEN METABOLIC PANEL: CPT

## 2019-12-26 PROCEDURE — 2700000000 HC OXYGEN THERAPY PER DAY

## 2019-12-26 PROCEDURE — 82140 ASSAY OF AMMONIA: CPT

## 2019-12-26 PROCEDURE — 97116 GAIT TRAINING THERAPY: CPT

## 2019-12-26 PROCEDURE — 36415 COLL VENOUS BLD VENIPUNCTURE: CPT

## 2019-12-26 PROCEDURE — 1200000000 HC SEMI PRIVATE

## 2019-12-26 PROCEDURE — 2580000003 HC RX 258: Performed by: INTERNAL MEDICINE

## 2019-12-26 RX ORDER — BUMETANIDE 1 MG/1
1 TABLET ORAL 2 TIMES DAILY
Status: DISCONTINUED | OUTPATIENT
Start: 2019-12-26 | End: 2019-12-27 | Stop reason: HOSPADM

## 2019-12-26 RX ORDER — LACTULOSE 10 G/15ML
20 SOLUTION ORAL 2 TIMES DAILY
Status: DISCONTINUED | OUTPATIENT
Start: 2019-12-26 | End: 2019-12-27 | Stop reason: HOSPADM

## 2019-12-26 RX ADMIN — LACTULOSE 20 G: 20 SOLUTION ORAL at 20:07

## 2019-12-26 RX ADMIN — FOLIC ACID 1 MG: 1 TABLET ORAL at 08:25

## 2019-12-26 RX ADMIN — IPRATROPIUM BROMIDE AND ALBUTEROL SULFATE 1 AMPULE: .5; 3 SOLUTION RESPIRATORY (INHALATION) at 06:21

## 2019-12-26 RX ADMIN — Medication 10 ML: at 08:24

## 2019-12-26 RX ADMIN — IPRATROPIUM BROMIDE AND ALBUTEROL SULFATE 1 AMPULE: .5; 3 SOLUTION RESPIRATORY (INHALATION) at 13:50

## 2019-12-26 RX ADMIN — POTASSIUM CHLORIDE 20 MEQ: 20 TABLET, EXTENDED RELEASE ORAL at 13:41

## 2019-12-26 RX ADMIN — LACTULOSE 10 G: 20 SOLUTION ORAL at 08:25

## 2019-12-26 RX ADMIN — POTASSIUM CHLORIDE 20 MEQ: 20 TABLET, EXTENDED RELEASE ORAL at 19:00

## 2019-12-26 RX ADMIN — POTASSIUM CHLORIDE 20 MEQ: 20 TABLET, EXTENDED RELEASE ORAL at 08:25

## 2019-12-26 RX ADMIN — BUMETANIDE 1.5 MG: 1 TABLET ORAL at 08:24

## 2019-12-26 RX ADMIN — BUMETANIDE 1 MG: 1 TABLET ORAL at 16:51

## 2019-12-26 RX ADMIN — Medication 10 ML: at 20:07

## 2019-12-26 RX ADMIN — IPRATROPIUM BROMIDE AND ALBUTEROL SULFATE 1 AMPULE: .5; 3 SOLUTION RESPIRATORY (INHALATION) at 17:18

## 2019-12-26 RX ADMIN — POTASSIUM CHLORIDE 20 MEQ: 20 TABLET, EXTENDED RELEASE ORAL at 20:07

## 2019-12-26 RX ADMIN — IPRATROPIUM BROMIDE AND ALBUTEROL SULFATE 1 AMPULE: .5; 3 SOLUTION RESPIRATORY (INHALATION) at 09:28

## 2019-12-26 RX ADMIN — MOMETASONE FUROATE AND FORMOTEROL FUMARATE DIHYDRATE 2 PUFF: 200; 5 AEROSOL RESPIRATORY (INHALATION) at 06:22

## 2019-12-26 RX ADMIN — ACETAMINOPHEN 650 MG: 325 TABLET, FILM COATED ORAL at 08:24

## 2019-12-26 RX ADMIN — METOLAZONE 5 MG: 2.5 TABLET ORAL at 08:25

## 2019-12-26 RX ADMIN — MOMETASONE FUROATE AND FORMOTEROL FUMARATE DIHYDRATE 2 PUFF: 200; 5 AEROSOL RESPIRATORY (INHALATION) at 17:19

## 2019-12-26 ASSESSMENT — PAIN SCALES - GENERAL
PAINLEVEL_OUTOF10: 7
PAINLEVEL_OUTOF10: 0
PAINLEVEL_OUTOF10: 8
PAINLEVEL_OUTOF10: 9

## 2019-12-26 ASSESSMENT — PAIN DESCRIPTION - LOCATION: LOCATION: GENERALIZED

## 2019-12-26 ASSESSMENT — PAIN DESCRIPTION - PAIN TYPE: TYPE: ACUTE PAIN

## 2019-12-27 VITALS
WEIGHT: 315 LBS | BODY MASS INDEX: 46.65 KG/M2 | SYSTOLIC BLOOD PRESSURE: 120 MMHG | HEART RATE: 92 BPM | RESPIRATION RATE: 20 BRPM | TEMPERATURE: 97.9 F | HEIGHT: 69 IN | DIASTOLIC BLOOD PRESSURE: 62 MMHG | OXYGEN SATURATION: 96 %

## 2019-12-27 LAB
ALBUMIN SERPL-MCNC: 2.9 G/DL (ref 3.5–5.2)
ALP BLD-CCNC: 80 U/L (ref 40–129)
ALT SERPL-CCNC: 17 U/L (ref 0–40)
ANION GAP SERPL CALCULATED.3IONS-SCNC: 11 MMOL/L (ref 7–16)
ANISOCYTOSIS: ABNORMAL
AST SERPL-CCNC: 37 U/L (ref 0–39)
BASOPHILS ABSOLUTE: 0.04 E9/L (ref 0–0.2)
BASOPHILS RELATIVE PERCENT: 0.9 % (ref 0–2)
BILIRUB SERPL-MCNC: 3.6 MG/DL (ref 0–1.2)
BUN BLDV-MCNC: 35 MG/DL (ref 8–23)
CALCIUM SERPL-MCNC: 8.9 MG/DL (ref 8.6–10.2)
CHLORIDE BLD-SCNC: 89 MMOL/L (ref 98–107)
CO2: 38 MMOL/L (ref 22–29)
CREAT SERPL-MCNC: 1.2 MG/DL (ref 0.7–1.2)
EOSINOPHILS ABSOLUTE: 0.24 E9/L (ref 0.05–0.5)
EOSINOPHILS RELATIVE PERCENT: 5.2 % (ref 0–6)
GFR AFRICAN AMERICAN: >60
GFR NON-AFRICAN AMERICAN: >60 ML/MIN/1.73
GLUCOSE BLD-MCNC: 118 MG/DL (ref 74–99)
HCT VFR BLD CALC: 26.5 % (ref 37–54)
HEMOGLOBIN: 8.6 G/DL (ref 12.5–16.5)
LYMPHOCYTES ABSOLUTE: 0.69 E9/L (ref 1.5–4)
LYMPHOCYTES RELATIVE PERCENT: 14.7 % (ref 20–42)
MCH RBC QN AUTO: 38.2 PG (ref 26–35)
MCHC RBC AUTO-ENTMCNC: 32.5 % (ref 32–34.5)
MCV RBC AUTO: 117.8 FL (ref 80–99.9)
MONOCYTES ABSOLUTE: 0.83 E9/L (ref 0.1–0.95)
MONOCYTES RELATIVE PERCENT: 18.1 % (ref 2–12)
NEUTROPHILS ABSOLUTE: 2.81 E9/L (ref 1.8–7.3)
NEUTROPHILS RELATIVE PERCENT: 61.2 % (ref 43–80)
OVALOCYTES: ABNORMAL
PDW BLD-RTO: 16.4 FL (ref 11.5–15)
PLATELET # BLD: 65 E9/L (ref 130–450)
PLATELET CONFIRMATION: NORMAL
PMV BLD AUTO: 8.8 FL (ref 7–12)
POIKILOCYTES: ABNORMAL
POTASSIUM SERPL-SCNC: 3.7 MMOL/L (ref 3.5–5)
RBC # BLD: 2.25 E12/L (ref 3.8–5.8)
SCHISTOCYTES: ABNORMAL
SODIUM BLD-SCNC: 138 MMOL/L (ref 132–146)
TARGET CELLS: ABNORMAL
TOTAL PROTEIN: 7.8 G/DL (ref 6.4–8.3)
WBC # BLD: 4.6 E9/L (ref 4.5–11.5)

## 2019-12-27 PROCEDURE — 97116 GAIT TRAINING THERAPY: CPT

## 2019-12-27 PROCEDURE — 97530 THERAPEUTIC ACTIVITIES: CPT

## 2019-12-27 PROCEDURE — 2580000003 HC RX 258: Performed by: INTERNAL MEDICINE

## 2019-12-27 PROCEDURE — 94640 AIRWAY INHALATION TREATMENT: CPT

## 2019-12-27 PROCEDURE — 85025 COMPLETE CBC W/AUTO DIFF WBC: CPT

## 2019-12-27 PROCEDURE — 36415 COLL VENOUS BLD VENIPUNCTURE: CPT

## 2019-12-27 PROCEDURE — 97535 SELF CARE MNGMENT TRAINING: CPT

## 2019-12-27 PROCEDURE — 6370000000 HC RX 637 (ALT 250 FOR IP): Performed by: INTERNAL MEDICINE

## 2019-12-27 PROCEDURE — 2700000000 HC OXYGEN THERAPY PER DAY

## 2019-12-27 PROCEDURE — 97110 THERAPEUTIC EXERCISES: CPT

## 2019-12-27 PROCEDURE — 80053 COMPREHEN METABOLIC PANEL: CPT

## 2019-12-27 RX ORDER — LACTULOSE 10 G/15ML
20 SOLUTION ORAL 2 TIMES DAILY
Refills: 1 | Status: ON HOLD | DISCHARGE
Start: 2019-12-27 | End: 2020-03-16 | Stop reason: HOSPADM

## 2019-12-27 RX ORDER — BUMETANIDE 1 MG/1
1 TABLET ORAL 2 TIMES DAILY
Qty: 30 TABLET | Refills: 3 | Status: ON HOLD | DISCHARGE
Start: 2019-12-27 | End: 2020-03-16 | Stop reason: HOSPADM

## 2019-12-27 RX ADMIN — MOMETASONE FUROATE AND FORMOTEROL FUMARATE DIHYDRATE 2 PUFF: 200; 5 AEROSOL RESPIRATORY (INHALATION) at 05:27

## 2019-12-27 RX ADMIN — Medication 10 ML: at 10:17

## 2019-12-27 RX ADMIN — POTASSIUM CHLORIDE 20 MEQ: 20 TABLET, EXTENDED RELEASE ORAL at 13:00

## 2019-12-27 RX ADMIN — FOLIC ACID 1 MG: 1 TABLET ORAL at 10:16

## 2019-12-27 RX ADMIN — BUMETANIDE 1 MG: 1 TABLET ORAL at 15:32

## 2019-12-27 RX ADMIN — IPRATROPIUM BROMIDE AND ALBUTEROL SULFATE 1 AMPULE: .5; 3 SOLUTION RESPIRATORY (INHALATION) at 12:34

## 2019-12-27 RX ADMIN — IPRATROPIUM BROMIDE AND ALBUTEROL SULFATE 1 AMPULE: .5; 3 SOLUTION RESPIRATORY (INHALATION) at 16:15

## 2019-12-27 RX ADMIN — POTASSIUM CHLORIDE 20 MEQ: 20 TABLET, EXTENDED RELEASE ORAL at 10:16

## 2019-12-27 RX ADMIN — METOLAZONE 5 MG: 2.5 TABLET ORAL at 10:16

## 2019-12-27 RX ADMIN — MOMETASONE FUROATE AND FORMOTEROL FUMARATE DIHYDRATE 2 PUFF: 200; 5 AEROSOL RESPIRATORY (INHALATION) at 16:15

## 2019-12-27 RX ADMIN — IPRATROPIUM BROMIDE AND ALBUTEROL SULFATE 1 AMPULE: .5; 3 SOLUTION RESPIRATORY (INHALATION) at 09:06

## 2019-12-27 RX ADMIN — LACTULOSE 20 G: 20 SOLUTION ORAL at 10:15

## 2019-12-27 RX ADMIN — IPRATROPIUM BROMIDE AND ALBUTEROL SULFATE 1 AMPULE: .5; 3 SOLUTION RESPIRATORY (INHALATION) at 05:27

## 2019-12-27 RX ADMIN — BUMETANIDE 1 MG: 1 TABLET ORAL at 10:16

## 2019-12-27 ASSESSMENT — PAIN SCALES - GENERAL
PAINLEVEL_OUTOF10: 0

## 2019-12-29 ENCOUNTER — APPOINTMENT (OUTPATIENT)
Dept: GENERAL RADIOLOGY | Age: 64
End: 2019-12-29
Payer: OTHER GOVERNMENT

## 2019-12-29 ENCOUNTER — HOSPITAL ENCOUNTER (EMERGENCY)
Age: 64
Discharge: HOME | End: 2019-12-29
Attending: EMERGENCY MEDICINE
Payer: OTHER GOVERNMENT

## 2019-12-29 VITALS
HEART RATE: 78 BPM | OXYGEN SATURATION: 98 % | TEMPERATURE: 97.5 F | HEIGHT: 69 IN | RESPIRATION RATE: 18 BRPM | SYSTOLIC BLOOD PRESSURE: 129 MMHG | BODY MASS INDEX: 46.65 KG/M2 | WEIGHT: 315 LBS | DIASTOLIC BLOOD PRESSURE: 70 MMHG

## 2019-12-29 LAB
ALBUMIN SERPL-MCNC: 2.9 G/DL (ref 3.5–5.2)
ALP BLD-CCNC: 90 U/L (ref 40–129)
ALT SERPL-CCNC: 19 U/L (ref 0–40)
ANION GAP SERPL CALCULATED.3IONS-SCNC: 9 MMOL/L (ref 7–16)
AST SERPL-CCNC: 44 U/L (ref 0–39)
B.E.: 21.3 MMOL/L (ref -3–3)
BILIRUB SERPL-MCNC: 2.6 MG/DL (ref 0–1.2)
BILIRUBIN DIRECT: 1.3 MG/DL (ref 0–0.3)
BILIRUBIN, INDIRECT: 1.3 MG/DL (ref 0–1)
BUN BLDV-MCNC: 36 MG/DL (ref 8–23)
CALCIUM SERPL-MCNC: 9.1 MG/DL (ref 8.6–10.2)
CHLORIDE BLD-SCNC: 90 MMOL/L (ref 98–107)
CO2: 40 MMOL/L (ref 22–29)
COHB: 0.6 % (ref 0–1.5)
CREAT SERPL-MCNC: 1 MG/DL (ref 0.7–1.2)
CRITICAL: ABNORMAL
DATE ANALYZED: ABNORMAL
DATE OF COLLECTION: ABNORMAL
GFR AFRICAN AMERICAN: >60
GFR NON-AFRICAN AMERICAN: >60 ML/MIN/1.73
GLUCOSE BLD-MCNC: 139 MG/DL (ref 74–99)
HCO3: 48.1 MMOL/L (ref 22–26)
HCT VFR BLD CALC: 27.7 % (ref 37–54)
HEMOGLOBIN: 9.2 G/DL (ref 12.5–16.5)
HHB: 1.3 % (ref 0–5)
LAB: ABNORMAL
Lab: ABNORMAL
MAGNESIUM: 1.9 MG/DL (ref 1.6–2.6)
MCH RBC QN AUTO: 39.8 PG (ref 26–35)
MCHC RBC AUTO-ENTMCNC: 33.2 % (ref 32–34.5)
MCV RBC AUTO: 119.9 FL (ref 80–99.9)
METHB: 0.4 % (ref 0–1.5)
MODE: ABNORMAL
O2 CONTENT: 14.2 ML/DL
O2 SATURATION: 98.7 % (ref 92–98.5)
O2HB: 97.7 % (ref 94–97)
OPERATOR ID: 30
PATIENT TEMP: 37 C
PCO2: 67.6 MMHG (ref 35–45)
PDW BLD-RTO: 16.6 FL (ref 11.5–15)
PH BLOOD GAS: 7.47 (ref 7.35–7.45)
PLATELET # BLD: 102 E9/L (ref 130–450)
PMV BLD AUTO: 11 FL (ref 7–12)
PO2: 159.1 MMHG (ref 60–100)
POTASSIUM SERPL-SCNC: 3.4 MMOL/L (ref 3.5–5)
PRO-BNP: 1134 PG/ML (ref 0–125)
RBC # BLD: 2.31 E12/L (ref 3.8–5.8)
REASON FOR REJECTION: NORMAL
REJECTED TEST: NORMAL
SODIUM BLD-SCNC: 139 MMOL/L (ref 132–146)
SOURCE, BLOOD GAS: ABNORMAL
THB: 10.1 G/DL (ref 11.5–16.5)
TIME ANALYZED: 1629
TOTAL PROTEIN: 7.9 G/DL (ref 6.4–8.3)
TROPONIN: 0.03 NG/ML (ref 0–0.03)
WBC # BLD: 4.9 E9/L (ref 4.5–11.5)

## 2019-12-29 PROCEDURE — 80076 HEPATIC FUNCTION PANEL: CPT

## 2019-12-29 PROCEDURE — 71045 X-RAY EXAM CHEST 1 VIEW: CPT

## 2019-12-29 PROCEDURE — 85027 COMPLETE CBC AUTOMATED: CPT

## 2019-12-29 PROCEDURE — 6360000002 HC RX W HCPCS: Performed by: EMERGENCY MEDICINE

## 2019-12-29 PROCEDURE — 84484 ASSAY OF TROPONIN QUANT: CPT

## 2019-12-29 PROCEDURE — 82805 BLOOD GASES W/O2 SATURATION: CPT

## 2019-12-29 PROCEDURE — 96374 THER/PROPH/DIAG INJ IV PUSH: CPT

## 2019-12-29 PROCEDURE — 83880 ASSAY OF NATRIURETIC PEPTIDE: CPT

## 2019-12-29 PROCEDURE — 6370000000 HC RX 637 (ALT 250 FOR IP): Performed by: EMERGENCY MEDICINE

## 2019-12-29 PROCEDURE — 80048 BASIC METABOLIC PNL TOTAL CA: CPT

## 2019-12-29 PROCEDURE — 93005 ELECTROCARDIOGRAM TRACING: CPT | Performed by: EMERGENCY MEDICINE

## 2019-12-29 PROCEDURE — 36415 COLL VENOUS BLD VENIPUNCTURE: CPT

## 2019-12-29 PROCEDURE — 83735 ASSAY OF MAGNESIUM: CPT

## 2019-12-29 PROCEDURE — 99285 EMERGENCY DEPT VISIT HI MDM: CPT

## 2019-12-29 PROCEDURE — 94644 CONT INHLJ TX 1ST HOUR: CPT

## 2019-12-29 RX ORDER — FUROSEMIDE 10 MG/ML
40 INJECTION INTRAMUSCULAR; INTRAVENOUS ONCE
Status: COMPLETED | OUTPATIENT
Start: 2019-12-29 | End: 2019-12-29

## 2019-12-29 RX ORDER — IPRATROPIUM BROMIDE AND ALBUTEROL SULFATE 2.5; .5 MG/3ML; MG/3ML
3 SOLUTION RESPIRATORY (INHALATION) ONCE
Status: COMPLETED | OUTPATIENT
Start: 2019-12-29 | End: 2019-12-29

## 2019-12-29 RX ADMIN — FUROSEMIDE 40 MG: 10 INJECTION, SOLUTION INTRAMUSCULAR; INTRAVENOUS at 18:57

## 2019-12-29 RX ADMIN — IPRATROPIUM BROMIDE AND ALBUTEROL SULFATE 3 AMPULE: .5; 3 SOLUTION RESPIRATORY (INHALATION) at 16:06

## 2019-12-29 ASSESSMENT — PAIN DESCRIPTION - FREQUENCY: FREQUENCY: CONTINUOUS

## 2019-12-29 ASSESSMENT — ENCOUNTER SYMPTOMS
EYE PAIN: 0
SHORTNESS OF BREATH: 1
HEMOPTYSIS: 0
COUGH: 0
DIARRHEA: 0
SINUS PRESSURE: 0
NAUSEA: 0
EYE DISCHARGE: 0
EYE REDNESS: 0
BACK PAIN: 0
WHEEZING: 0
ABDOMINAL PAIN: 0
SPUTUM PRODUCTION: 0
SORE THROAT: 0
VOMITING: 0

## 2019-12-29 ASSESSMENT — PAIN DESCRIPTION - ONSET: ONSET: ON-GOING

## 2019-12-29 ASSESSMENT — PAIN DESCRIPTION - PAIN TYPE: TYPE: CHRONIC PAIN

## 2019-12-29 ASSESSMENT — PAIN SCALES - GENERAL: PAINLEVEL_OUTOF10: 8

## 2019-12-29 ASSESSMENT — PAIN DESCRIPTION - DESCRIPTORS: DESCRIPTORS: ACHING

## 2019-12-29 NOTE — ED PROVIDER NOTES
Patient with history of congestive heart failure and anasarca. Recently discharged from this hospital and sent to acute care rehab. He is been there about 5 days. He states that the bed is uncomfortable the way it sits him up causes him to lose his breath. He does use supplemental oxygen. He states that our bed at the hospital here is fine and that he is able to breathe easily in our ED bed. When questioned about neck pain, he denies neck pain as listed in his chief complaint. However, he does say that if he takes a deep breath, he does get pain at the top of both his lungs. The history is provided by the patient. Shortness of Breath   Severity:  Moderate  Onset quality:  Gradual  Duration:  5 days  Timing:  Constant  Progression:  Worsening  Chronicity:  Recurrent  Context: activity    Relieved by:  None tried  Worsened by:  Nothing  Ineffective treatments:  None tried  Associated symptoms: no abdominal pain, no chest pain, no claudication, no cough, no ear pain, no fever, no headaches, no hemoptysis, no rash, no sore throat, no sputum production, no vomiting and no wheezing         Review of Systems   Constitutional: Negative for chills and fever. HENT: Negative for ear pain, sinus pressure and sore throat. Eyes: Negative for pain, discharge and redness. Respiratory: Positive for shortness of breath. Negative for cough, hemoptysis, sputum production and wheezing. Cardiovascular: Negative for chest pain and claudication. Gastrointestinal: Negative for abdominal pain, diarrhea, nausea and vomiting. Genitourinary: Negative for dysuria and frequency. Musculoskeletal: Negative for arthralgias and back pain. Skin: Negative for rash and wound. Neurological: Negative for weakness and headaches. Hematological: Negative for adenopathy. All other systems reviewed and are negative. Physical Exam  Vitals signs and nursing note reviewed.    Constitutional:       Appearance: He is well-developed. He is obese. HENT:      Head: Normocephalic and atraumatic. Eyes:      Pupils: Pupils are equal, round, and reactive to light. Neck:      Musculoskeletal: Normal range of motion and neck supple. Cardiovascular:      Rate and Rhythm: Normal rate and regular rhythm. Heart sounds: Normal heart sounds. No murmur. Pulmonary:      Effort: Pulmonary effort is normal. No respiratory distress. Breath sounds: Decreased breath sounds present. No wheezing or rales. Abdominal:      General: Bowel sounds are normal.      Palpations: Abdomen is soft. Tenderness: There is no tenderness. There is no guarding or rebound. Musculoskeletal:         General: Swelling present. Comments: Lower extremities with significant edema consistent with lymphedema. Skin:     General: Skin is warm and dry. Neurological:      Mental Status: He is alert and oriented to person, place, and time. Cranial Nerves: No cranial nerve deficit. Coordination: Coordination normal.          Procedures     MDM     EKG: This EKG is signed and interpreted by me. Rate: 82  Rhythm: Sinus  Interpretation: no acute changes, non-specific EKG and 1st degree AV block  Comparison: stable as compared to patient's most recent EKG           --------------------------------------------- PAST HISTORY ---------------------------------------------  Past Medical History:  has a past medical history of Acute diastolic CHF (congestive heart failure) ([facility] [address] Dermatitis, Heart valve problem, Hx of cardiovascular stress test, Hypertension, and Obesity. Past Surgical History:  has a past surgical history that includes hernia repair; Neck surgery; Carpal tunnel release; and Upper gastrointestinal endoscopy (N/A, 4/30/2019). Social History:  reports that he has never smoked. He has never used smokeless tobacco. He reports current alcohol use. He reports that he does not use drugs.     Family History: family history includes COPD in his father; Parkinsonism in his mother. The patients home medications have been reviewed. Allergies: Latex;  Aldactone [spironolactone]; and Thimerosal    -------------------------------------------------- RESULTS -------------------------------------------------  Labs:  Results for orders placed or performed during the hospital encounter of 12/29/19   CBC   Result Value Ref Range    WBC 4.9 4.5 - 11.5 E9/L    RBC 2.31 (L) 3.80 - 5.80 E12/L    Hemoglobin 9.2 (L) 12.5 - 16.5 g/dL    Hematocrit 27.7 (L) 37.0 - 54.0 %    .9 (H) 80.0 - 99.9 fL    MCH 39.8 (H) 26.0 - 35.0 pg    MCHC 33.2 32.0 - 34.5 %    RDW 16.6 (H) 11.5 - 15.0 fL    Platelets 418 (L) 763 - 450 E9/L    MPV 11.0 7.0 - 12.0 fL   Brain Natriuretic Peptide   Result Value Ref Range    Pro-BNP 1,134 (H) 0 - 125 pg/mL   Magnesium   Result Value Ref Range    Magnesium 1.9 1.6 - 2.6 mg/dL   Blood Gas, Arterial   Result Value Ref Range    Date Analyzed 20191229     Time Analyzed 1629     Source: Blood Arterial     pH, Blood Gas 7.470 (H) 7.350 - 7.450    PCO2 67.6 (HH) 35.0 - 45.0 mmHg    PO2 159.1 (H) 60.0 - 100.0 mmHg    HCO3 48.1 (H) 22.0 - 26.0 mmol/L    B.E. 21.3 (H) -3.0 - 3.0 mmol/L    O2 Sat 98.7 (H) 92.0 - 98.5 %    O2Hb 97.7 (H) 94.0 - 97.0 %    COHb 0.6 0.0 - 1.5 %    MetHb 0.4 0.0 - 1.5 %    O2 Content 14.2 mL/dL    HHb 1.3 0.0 - 5.0 %    tHb (est) 10.1 (L) 11.5 - 16.5 g/dL    Mode NC- 3 L     Date Of Collection      Time Collected      Pt Temp 37.0 C     ID 30     Lab [id number]     Critical(s) Notified Handed report to Dr/RN    SPECIMEN REJECTION   Result Value Ref Range    Rejected Test trop liver bmp     Reason for Rejection see below    Hepatic function panel   Result Value Ref Range    Total Protein 7.9 6.4 - 8.3 g/dL    Alb 2.9 (L) 3.5 - 5.2 g/dL    Alkaline Phosphatase 90 40 - 129 U/L    ALT 19 0 - 40 U/L    AST 44 (H) 0 - 39 U/L    Total Bilirubin 2.6 (H) 0.0 - 1.2 mg/dL    Bilirubin, Direct 1.3 (H) 0.0 - 0.3 so at this point. I have spoken with the patient and discussed todays results, in addition to providing specific details for the plan of care and counseling regarding the diagnosis and prognosis. Their questions are answered at this time and they are agreeable with the plan. I discussed at length with them reasons for immediate return here for re evaluation. They will followup with their primary care physician by calling their office tomorrow. --------------------------------- ADDITIONAL PROVIDER NOTES ---------------------------------  At this time the patient is without objective evidence of an acute process requiring hospitalization or inpatient management. They have remained hemodynamically stable throughout their entire ED visit and are stable for discharge with outpatient follow-up. The plan has been discussed in detail and they are aware of the specific conditions for emergent return, as well as the importance of follow-up. New Prescriptions    No medications on file       Diagnosis:  1. Acute on chronic congestive heart failure, unspecified heart failure type ([facility] [address]    2. Metabolic alkalosis with respiratory acidosis    3. Hypokalemia        Disposition:  Patient's disposition: Discharge to home  Patient's condition is stable.            Brandon Leone [place]  12/29/19 1941

## 2019-12-30 LAB
EKG ATRIAL RATE: 82 BPM
EKG P AXIS: 31 DEGREES
EKG P-R INTERVAL: 260 MS
EKG Q-T INTERVAL: 398 MS
EKG QRS DURATION: 100 MS
EKG QTC CALCULATION (BAZETT): 464 MS
EKG R AXIS: 37 DEGREES
EKG T AXIS: 49 DEGREES
EKG VENTRICULAR RATE: 82 BPM

## 2019-12-30 PROCEDURE — 93010 ELECTROCARDIOGRAM REPORT: CPT | Performed by: INTERNAL MEDICINE

## 2020-01-29 ENCOUNTER — APPOINTMENT (OUTPATIENT)
Dept: GENERAL RADIOLOGY | Age: 65
DRG: 720 | End: 2020-01-29
Payer: COMMERCIAL

## 2020-01-29 ENCOUNTER — HOSPITAL ENCOUNTER (INPATIENT)
Age: 65
LOS: 14 days | Discharge: NURSING, REHAB OR LONG-TERM CARE | DRG: 720 | End: 2020-02-12
Attending: EMERGENCY MEDICINE | Admitting: FAMILY MEDICINE
Payer: COMMERCIAL

## 2020-01-29 PROBLEM — J18.9 SEPSIS DUE TO PNEUMONIA (HCC): Status: ACTIVE | Noted: 2020-01-29

## 2020-01-29 PROBLEM — A41.9 SEPSIS DUE TO PNEUMONIA (HCC): Status: ACTIVE | Noted: 2020-01-29

## 2020-01-29 LAB
ABO/RH: NORMAL
ALBUMIN SERPL-MCNC: 2.4 G/DL (ref 3.5–5.2)
ALP BLD-CCNC: 101 U/L (ref 40–129)
ALT SERPL-CCNC: 23 U/L (ref 0–40)
AMMONIA: 101 UMOL/L (ref 16–60)
ANION GAP SERPL CALCULATED.3IONS-SCNC: 14 MMOL/L (ref 7–16)
ANISOCYTOSIS: ABNORMAL
ANTIBODY SCREEN: NORMAL
AST SERPL-CCNC: 57 U/L (ref 0–39)
B.E.: 11.6 MMOL/L (ref -3–3)
BASOPHILIC STIPPLING: ABNORMAL
BASOPHILS ABSOLUTE: 0 E9/L (ref 0–0.2)
BASOPHILS RELATIVE PERCENT: 0.2 % (ref 0–2)
BILIRUB SERPL-MCNC: 4.5 MG/DL (ref 0–1.2)
BILIRUBIN DIRECT: 2.2 MG/DL (ref 0–0.3)
BILIRUBIN, INDIRECT: 2.3 MG/DL (ref 0–1)
BUN BLDV-MCNC: 77 MG/DL (ref 8–23)
CALCIUM SERPL-MCNC: 9.1 MG/DL (ref 8.6–10.2)
CHLORIDE BLD-SCNC: 88 MMOL/L (ref 98–107)
CO2: 34 MMOL/L (ref 22–29)
CREAT SERPL-MCNC: 1.9 MG/DL (ref 0.7–1.2)
DELIVERY SYSTEMS: ABNORMAL
DEVICE: ABNORMAL
EOSINOPHILS ABSOLUTE: 0.14 E9/L (ref 0.05–0.5)
EOSINOPHILS RELATIVE PERCENT: 0.9 % (ref 0–6)
FIO2 ARTERIAL: 100
GFR AFRICAN AMERICAN: 43
GFR NON-AFRICAN AMERICAN: 36 ML/MIN/1.73
GLUCOSE BLD-MCNC: 79 MG/DL (ref 74–99)
HCO3 ARTERIAL: 37.5 MMOL/L (ref 22–26)
HCT VFR BLD CALC: 20.7 % (ref 37–54)
HEMOGLOBIN: 6.9 G/DL (ref 12.5–16.5)
HYPOCHROMIA: ABNORMAL
INFLUENZA A BY PCR: NOT DETECTED
INFLUENZA B BY PCR: NOT DETECTED
INR BLD: 1.9
LACTIC ACID: 4.6 MMOL/L (ref 0.5–2.2)
LYMPHOCYTES ABSOLUTE: 0.48 E9/L (ref 1.5–4)
LYMPHOCYTES RELATIVE PERCENT: 2.6 % (ref 20–42)
MAGNESIUM: 2 MG/DL (ref 1.6–2.6)
MCH RBC QN AUTO: 39 PG (ref 26–35)
MCHC RBC AUTO-ENTMCNC: 33.3 % (ref 32–34.5)
MCV RBC AUTO: 116.9 FL (ref 80–99.9)
MONOCYTES ABSOLUTE: 0 E9/L (ref 0.1–0.95)
MONOCYTES RELATIVE PERCENT: 4 % (ref 2–12)
MYELOCYTE PERCENT: 0.9 % (ref 0–0)
NEUTROPHILS ABSOLUTE: 15.52 E9/L (ref 1.8–7.3)
NEUTROPHILS RELATIVE PERCENT: 95.7 % (ref 43–80)
O2 SATURATION: 98.8 % (ref 92–98.5)
OPERATOR ID: 4809
PATIENT TEMP: 37
PCO2 (TEMP CORRECTED): 58.8 MMHG (ref 35–45)
PDW BLD-RTO: 15.2 FL (ref 11.5–15)
PH (TEMPERATURE CORRECTED): 7.41 (ref 7.35–7.45)
PLATELET # BLD: 63 E9/L (ref 130–450)
PLATELET CONFIRMATION: NORMAL
PMV BLD AUTO: 8.9 FL (ref 7–12)
PO2 (TEMP CORRECTED): 126.2 MMHG (ref 60–80)
POTASSIUM SERPL-SCNC: 4.1 MMOL/L (ref 3.5–5)
PRO-BNP: 2098 PG/ML (ref 0–125)
PROTHROMBIN TIME: 22 SEC (ref 9.3–12.4)
RBC # BLD: 1.77 E12/L (ref 3.8–5.8)
SODIUM BLD-SCNC: 136 MMOL/L (ref 132–146)
SOURCE, BLOOD GAS: ABNORMAL
TOTAL PROTEIN: 7.3 G/DL (ref 6.4–8.3)
TROPONIN: 0.14 NG/ML (ref 0–0.03)
WBC # BLD: 16 E9/L (ref 4.5–11.5)

## 2020-01-29 PROCEDURE — 99285 EMERGENCY DEPT VISIT HI MDM: CPT

## 2020-01-29 PROCEDURE — 82140 ASSAY OF AMMONIA: CPT

## 2020-01-29 PROCEDURE — 93005 ELECTROCARDIOGRAM TRACING: CPT | Performed by: STUDENT IN AN ORGANIZED HEALTH CARE EDUCATION/TRAINING PROGRAM

## 2020-01-29 PROCEDURE — 83605 ASSAY OF LACTIC ACID: CPT

## 2020-01-29 PROCEDURE — 94644 CONT INHLJ TX 1ST HOUR: CPT

## 2020-01-29 PROCEDURE — 87150 DNA/RNA AMPLIFIED PROBE: CPT

## 2020-01-29 PROCEDURE — P9059 PLASMA, FRZ BETWEEN 8-24HOUR: HCPCS

## 2020-01-29 PROCEDURE — 82803 BLOOD GASES ANY COMBINATION: CPT

## 2020-01-29 PROCEDURE — 0100U HC RESPIRPTHGN MULT REV TRANS & AMP PRB TECH 21 TRGT: CPT

## 2020-01-29 PROCEDURE — 71045 X-RAY EXAM CHEST 1 VIEW: CPT

## 2020-01-29 PROCEDURE — 2500000003 HC RX 250 WO HCPCS: Performed by: STUDENT IN AN ORGANIZED HEALTH CARE EDUCATION/TRAINING PROGRAM

## 2020-01-29 PROCEDURE — 83735 ASSAY OF MAGNESIUM: CPT

## 2020-01-29 PROCEDURE — 84484 ASSAY OF TROPONIN QUANT: CPT

## 2020-01-29 PROCEDURE — 86900 BLOOD TYPING SEROLOGIC ABO: CPT

## 2020-01-29 PROCEDURE — 87502 INFLUENZA DNA AMP PROBE: CPT

## 2020-01-29 PROCEDURE — 96375 TX/PRO/DX INJ NEW DRUG ADDON: CPT

## 2020-01-29 PROCEDURE — 85610 PROTHROMBIN TIME: CPT

## 2020-01-29 PROCEDURE — 36600 WITHDRAWAL OF ARTERIAL BLOOD: CPT

## 2020-01-29 PROCEDURE — 87186 SC STD MICRODIL/AGAR DIL: CPT

## 2020-01-29 PROCEDURE — 87077 CULTURE AEROBIC IDENTIFY: CPT

## 2020-01-29 PROCEDURE — 2580000003 HC RX 258

## 2020-01-29 PROCEDURE — 6370000000 HC RX 637 (ALT 250 FOR IP): Performed by: STUDENT IN AN ORGANIZED HEALTH CARE EDUCATION/TRAINING PROGRAM

## 2020-01-29 PROCEDURE — 96365 THER/PROPH/DIAG IV INF INIT: CPT

## 2020-01-29 PROCEDURE — 36415 COLL VENOUS BLD VENIPUNCTURE: CPT

## 2020-01-29 PROCEDURE — 87040 BLOOD CULTURE FOR BACTERIA: CPT

## 2020-01-29 PROCEDURE — 85025 COMPLETE CBC W/AUTO DIFF WBC: CPT

## 2020-01-29 PROCEDURE — 80048 BASIC METABOLIC PNL TOTAL CA: CPT

## 2020-01-29 PROCEDURE — 2580000003 HC RX 258: Performed by: STUDENT IN AN ORGANIZED HEALTH CARE EDUCATION/TRAINING PROGRAM

## 2020-01-29 PROCEDURE — 86901 BLOOD TYPING SEROLOGIC RH(D): CPT

## 2020-01-29 PROCEDURE — 94660 CPAP INITIATION&MGMT: CPT

## 2020-01-29 PROCEDURE — 86923 COMPATIBILITY TEST ELECTRIC: CPT

## 2020-01-29 PROCEDURE — 86850 RBC ANTIBODY SCREEN: CPT

## 2020-01-29 PROCEDURE — 6360000002 HC RX W HCPCS: Performed by: STUDENT IN AN ORGANIZED HEALTH CARE EDUCATION/TRAINING PROGRAM

## 2020-01-29 PROCEDURE — 80076 HEPATIC FUNCTION PANEL: CPT

## 2020-01-29 PROCEDURE — 83880 ASSAY OF NATRIURETIC PEPTIDE: CPT

## 2020-01-29 PROCEDURE — P9016 RBC LEUKOCYTES REDUCED: HCPCS

## 2020-01-29 PROCEDURE — 2000000000 HC ICU R&B

## 2020-01-29 RX ORDER — 0.9 % SODIUM CHLORIDE 0.9 %
500 INTRAVENOUS SOLUTION INTRAVENOUS ONCE
Status: COMPLETED | OUTPATIENT
Start: 2020-01-29 | End: 2020-01-29

## 2020-01-29 RX ORDER — 0.9 % SODIUM CHLORIDE 0.9 %
500 INTRAVENOUS SOLUTION INTRAVENOUS ONCE
Status: DISCONTINUED | OUTPATIENT
Start: 2020-01-29 | End: 2020-01-29

## 2020-01-29 RX ORDER — IPRATROPIUM BROMIDE AND ALBUTEROL SULFATE 2.5; .5 MG/3ML; MG/3ML
1 SOLUTION RESPIRATORY (INHALATION)
Status: COMPLETED | OUTPATIENT
Start: 2020-01-29 | End: 2020-01-29

## 2020-01-29 RX ORDER — 0.9 % SODIUM CHLORIDE 0.9 %
250 INTRAVENOUS SOLUTION INTRAVENOUS ONCE
Status: DISCONTINUED | OUTPATIENT
Start: 2020-01-29 | End: 2020-02-08

## 2020-01-29 RX ORDER — SODIUM CHLORIDE 9 MG/ML
INJECTION, SOLUTION INTRAVENOUS
Status: COMPLETED
Start: 2020-01-29 | End: 2020-01-29

## 2020-01-29 RX ORDER — BUMETANIDE 0.25 MG/ML
1 INJECTION, SOLUTION INTRAMUSCULAR; INTRAVENOUS ONCE
Status: COMPLETED | OUTPATIENT
Start: 2020-01-29 | End: 2020-01-29

## 2020-01-29 RX ADMIN — NOREPINEPHRINE BITARTRATE 2 MCG/MIN: 1 INJECTION INTRAVENOUS at 23:14

## 2020-01-29 RX ADMIN — CEFEPIME HYDROCHLORIDE 2 G: 2 INJECTION, POWDER, FOR SOLUTION INTRAVENOUS at 21:24

## 2020-01-29 RX ADMIN — BUMETANIDE 1 MG: 0.25 INJECTION INTRAMUSCULAR; INTRAVENOUS at 21:24

## 2020-01-29 RX ADMIN — Medication 500 ML: at 22:11

## 2020-01-29 RX ADMIN — IPRATROPIUM BROMIDE AND ALBUTEROL SULFATE 1 AMPULE: .5; 3 SOLUTION RESPIRATORY (INHALATION) at 20:06

## 2020-01-29 RX ADMIN — SODIUM CHLORIDE 500 ML: 900 INJECTION, SOLUTION INTRAVENOUS at 22:11

## 2020-01-29 RX ADMIN — IPRATROPIUM BROMIDE AND ALBUTEROL SULFATE 1 AMPULE: .5; 3 SOLUTION RESPIRATORY (INHALATION) at 20:08

## 2020-01-29 RX ADMIN — IPRATROPIUM BROMIDE AND ALBUTEROL SULFATE 1 AMPULE: .5; 3 SOLUTION RESPIRATORY (INHALATION) at 20:07

## 2020-01-29 RX ADMIN — VANCOMYCIN HYDROCHLORIDE 3000 MG: 1 INJECTION, POWDER, LYOPHILIZED, FOR SOLUTION INTRAVENOUS at 22:34

## 2020-01-29 ASSESSMENT — ENCOUNTER SYMPTOMS
VOMITING: 0
NAUSEA: 0
SHORTNESS OF BREATH: 1
ABDOMINAL PAIN: 0
WHEEZING: 0
COUGH: 1
DIARRHEA: 0
COLOR CHANGE: 0
CONSTIPATION: 0

## 2020-01-30 LAB
ADENOVIRUS BY PCR: NOT DETECTED
ALBUMIN SERPL-MCNC: 2.4 G/DL (ref 3.5–5.2)
ALP BLD-CCNC: 103 U/L (ref 40–129)
ALT SERPL-CCNC: 25 U/L (ref 0–40)
ANION GAP SERPL CALCULATED.3IONS-SCNC: 13 MMOL/L (ref 7–16)
ANISOCYTOSIS: ABNORMAL
APTT: 104.3 SEC (ref 24.5–35.1)
APTT: 39.6 SEC (ref 24.5–35.1)
AST SERPL-CCNC: 67 U/L (ref 0–39)
BACTERIA: ABNORMAL /HPF
BASOPHILS ABSOLUTE: 0 E9/L (ref 0–0.2)
BASOPHILS RELATIVE PERCENT: 0.2 % (ref 0–2)
BILIRUB SERPL-MCNC: 5.3 MG/DL (ref 0–1.2)
BILIRUBIN URINE: NEGATIVE
BLOOD, URINE: ABNORMAL
BORDETELLA PARAPERTUSSIS BY PCR: NOT DETECTED
BORDETELLA PERTUSSIS BY PCR: NOT DETECTED
BUN BLDV-MCNC: 82 MG/DL (ref 8–23)
CALCIUM SERPL-MCNC: 8.8 MG/DL (ref 8.6–10.2)
CHLAMYDOPHILIA PNEUMONIAE BY PCR: NOT DETECTED
CHLORIDE BLD-SCNC: 86 MMOL/L (ref 98–107)
CHLORIDE URINE RANDOM: 62 MMOL/L
CLARITY: ABNORMAL
CO2: 33 MMOL/L (ref 22–29)
COLOR: YELLOW
CORONAVIRUS 229E BY PCR: NOT DETECTED
CORONAVIRUS HKU1 BY PCR: NOT DETECTED
CORONAVIRUS NL63 BY PCR: NOT DETECTED
CORONAVIRUS OC43 BY PCR: NOT DETECTED
CREAT SERPL-MCNC: 2.1 MG/DL (ref 0.7–1.2)
EKG ATRIAL RATE: 109 BPM
EKG P AXIS: 37 DEGREES
EKG P-R INTERVAL: 192 MS
EKG Q-T INTERVAL: 330 MS
EKG QRS DURATION: 88 MS
EKG QTC CALCULATION (BAZETT): 444 MS
EKG R AXIS: 10 DEGREES
EKG T AXIS: 87 DEGREES
EKG VENTRICULAR RATE: 109 BPM
EOSINOPHILS ABSOLUTE: 0 E9/L (ref 0.05–0.5)
EOSINOPHILS RELATIVE PERCENT: 0.1 % (ref 0–6)
GFR AFRICAN AMERICAN: 39
GFR NON-AFRICAN AMERICAN: 32 ML/MIN/1.73
GLUCOSE BLD-MCNC: 134 MG/DL (ref 74–99)
GLUCOSE URINE: NEGATIVE MG/DL
HCT VFR BLD CALC: 21.2 % (ref 37–54)
HCT VFR BLD CALC: 21.2 % (ref 37–54)
HEMOGLOBIN: 7 G/DL (ref 12.5–16.5)
HEMOGLOBIN: 7.1 G/DL (ref 12.5–16.5)
HUMAN METAPNEUMOVIRUS BY PCR: NOT DETECTED
HUMAN RHINOVIRUS/ENTEROVIRUS BY PCR: NOT DETECTED
HYALINE CASTS: ABNORMAL /LPF (ref 0–2)
HYPOCHROMIA: ABNORMAL
INFLUENZA A BY PCR: NOT DETECTED
INFLUENZA B BY PCR: NOT DETECTED
KETONES, URINE: NEGATIVE MG/DL
LACTIC ACID: 2.4 MMOL/L (ref 0.5–2.2)
LACTIC ACID: 3.2 MMOL/L (ref 0.5–2.2)
LACTIC ACID: 4.5 MMOL/L (ref 0.5–2.2)
LACTIC ACID: 5.3 MMOL/L (ref 0.5–2.2)
LEUKOCYTE ESTERASE, URINE: ABNORMAL
LV EF: 55 %
LVEF MODALITY: NORMAL
LYMPHOCYTES ABSOLUTE: 0.92 E9/L (ref 1.5–4)
LYMPHOCYTES RELATIVE PERCENT: 3.5 % (ref 20–42)
MAGNESIUM: 1.9 MG/DL (ref 1.6–2.6)
MCH RBC QN AUTO: 38.3 PG (ref 26–35)
MCH RBC QN AUTO: 39.4 PG (ref 26–35)
MCHC RBC AUTO-ENTMCNC: 33 % (ref 32–34.5)
MCHC RBC AUTO-ENTMCNC: 33.5 % (ref 32–34.5)
MCV RBC AUTO: 115.8 FL (ref 80–99.9)
MCV RBC AUTO: 117.8 FL (ref 80–99.9)
MONOCYTES ABSOLUTE: 0.92 E9/L (ref 0.1–0.95)
MONOCYTES RELATIVE PERCENT: 3.5 % (ref 2–12)
MYCOPLASMA PNEUMONIAE BY PCR: NOT DETECTED
NEUTROPHILS ABSOLUTE: 21.48 E9/L (ref 1.8–7.3)
NEUTROPHILS RELATIVE PERCENT: 93 % (ref 43–80)
NITRITE, URINE: NEGATIVE
OVALOCYTES: ABNORMAL
PARAINFLUENZA VIRUS 1 BY PCR: NOT DETECTED
PARAINFLUENZA VIRUS 2 BY PCR: NOT DETECTED
PARAINFLUENZA VIRUS 3 BY PCR: NOT DETECTED
PARAINFLUENZA VIRUS 4 BY PCR: NOT DETECTED
PDW BLD-RTO: 15.3 FL (ref 11.5–15)
PDW BLD-RTO: 15.6 FL (ref 11.5–15)
PH UA: 6 (ref 5–9)
PHOSPHORUS: 4.4 MG/DL (ref 2.5–4.5)
PLATELET # BLD: 74 E9/L (ref 130–450)
PLATELET # BLD: 91 E9/L (ref 130–450)
PLATELET CONFIRMATION: NORMAL
PLATELET CONFIRMATION: NORMAL
PMV BLD AUTO: 8.9 FL (ref 7–12)
PMV BLD AUTO: 9.3 FL (ref 7–12)
POIKILOCYTES: ABNORMAL
POTASSIUM SERPL-SCNC: 4.4 MMOL/L (ref 3.5–5)
POTASSIUM, UR: 66.4 MMOL/L
PROTEIN UA: 30 MG/DL
RBC # BLD: 1.8 E12/L (ref 3.8–5.8)
RBC # BLD: 1.83 E12/L (ref 3.8–5.8)
RBC UA: >20 /HPF (ref 0–2)
RESPIRATORY SYNCYTIAL VIRUS BY PCR: NOT DETECTED
SODIUM BLD-SCNC: 132 MMOL/L (ref 132–146)
SODIUM URINE: 26 MMOL/L
SPECIFIC GRAVITY UA: 1.01 (ref 1–1.03)
TARGET CELLS: ABNORMAL
TEAR DROP CELLS: ABNORMAL
TOTAL PROTEIN: 7.7 G/DL (ref 6.4–8.3)
TROPONIN: 0.29 NG/ML (ref 0–0.03)
TROPONIN: 0.36 NG/ML (ref 0–0.03)
TROPONIN: 0.37 NG/ML (ref 0–0.03)
TROPONIN: 0.38 NG/ML (ref 0–0.03)
UROBILINOGEN, URINE: 0.2 E.U./DL
WBC # BLD: 23.1 E9/L (ref 4.5–11.5)
WBC # BLD: 25 E9/L (ref 4.5–11.5)
WBC UA: ABNORMAL /HPF (ref 0–5)

## 2020-01-30 PROCEDURE — 36592 COLLECT BLOOD FROM PICC: CPT

## 2020-01-30 PROCEDURE — 84484 ASSAY OF TROPONIN QUANT: CPT

## 2020-01-30 PROCEDURE — 93306 TTE W/DOPPLER COMPLETE: CPT

## 2020-01-30 PROCEDURE — 2500000003 HC RX 250 WO HCPCS: Performed by: INTERNAL MEDICINE

## 2020-01-30 PROCEDURE — 82436 ASSAY OF URINE CHLORIDE: CPT

## 2020-01-30 PROCEDURE — 94660 CPAP INITIATION&MGMT: CPT

## 2020-01-30 PROCEDURE — 85025 COMPLETE CBC W/AUTO DIFF WBC: CPT

## 2020-01-30 PROCEDURE — 87081 CULTURE SCREEN ONLY: CPT

## 2020-01-30 PROCEDURE — 99222 1ST HOSP IP/OBS MODERATE 55: CPT | Performed by: INTERNAL MEDICINE

## 2020-01-30 PROCEDURE — 84100 ASSAY OF PHOSPHORUS: CPT

## 2020-01-30 PROCEDURE — 85027 COMPLETE CBC AUTOMATED: CPT

## 2020-01-30 PROCEDURE — 36556 INSERT NON-TUNNEL CV CATH: CPT

## 2020-01-30 PROCEDURE — 85730 THROMBOPLASTIN TIME PARTIAL: CPT

## 2020-01-30 PROCEDURE — 6370000000 HC RX 637 (ALT 250 FOR IP): Performed by: INTERNAL MEDICINE

## 2020-01-30 PROCEDURE — 87088 URINE BACTERIA CULTURE: CPT

## 2020-01-30 PROCEDURE — 83735 ASSAY OF MAGNESIUM: CPT

## 2020-01-30 PROCEDURE — 84300 ASSAY OF URINE SODIUM: CPT

## 2020-01-30 PROCEDURE — 99223 1ST HOSP IP/OBS HIGH 75: CPT | Performed by: FAMILY MEDICINE

## 2020-01-30 PROCEDURE — 87186 SC STD MICRODIL/AGAR DIL: CPT

## 2020-01-30 PROCEDURE — 2580000003 HC RX 258: Performed by: INTERNAL MEDICINE

## 2020-01-30 PROCEDURE — 81001 URINALYSIS AUTO W/SCOPE: CPT

## 2020-01-30 PROCEDURE — 6360000002 HC RX W HCPCS: Performed by: INTERNAL MEDICINE

## 2020-01-30 PROCEDURE — 84133 ASSAY OF URINE POTASSIUM: CPT

## 2020-01-30 PROCEDURE — 83605 ASSAY OF LACTIC ACID: CPT

## 2020-01-30 PROCEDURE — 2000000000 HC ICU R&B

## 2020-01-30 PROCEDURE — 36415 COLL VENOUS BLD VENIPUNCTURE: CPT

## 2020-01-30 PROCEDURE — 80053 COMPREHEN METABOLIC PANEL: CPT

## 2020-01-30 PROCEDURE — 87077 CULTURE AEROBIC IDENTIFY: CPT

## 2020-01-30 PROCEDURE — 51702 INSERT TEMP BLADDER CATH: CPT

## 2020-01-30 RX ORDER — SODIUM CHLORIDE 9 MG/ML
INJECTION, SOLUTION INTRAVENOUS EVERY 8 HOURS
Status: DISCONTINUED | OUTPATIENT
Start: 2020-01-30 | End: 2020-01-31

## 2020-01-30 RX ORDER — SODIUM CHLORIDE 9 MG/ML
INJECTION, SOLUTION INTRAVENOUS CONTINUOUS
Status: DISCONTINUED | OUTPATIENT
Start: 2020-01-30 | End: 2020-01-30 | Stop reason: SDUPTHER

## 2020-01-30 RX ORDER — ASPIRIN 81 MG/1
81 TABLET, CHEWABLE ORAL DAILY
Status: DISCONTINUED | OUTPATIENT
Start: 2020-01-31 | End: 2020-02-12 | Stop reason: HOSPADM

## 2020-01-30 RX ORDER — SODIUM CHLORIDE 0.9 % (FLUSH) 0.9 %
SYRINGE (ML) INJECTION
Status: DISPENSED
Start: 2020-01-30 | End: 2020-01-31

## 2020-01-30 RX ORDER — HEPARIN SODIUM 1000 [USP'U]/ML
2000 INJECTION, SOLUTION INTRAVENOUS; SUBCUTANEOUS PRN
Status: DISCONTINUED | OUTPATIENT
Start: 2020-01-30 | End: 2020-01-31

## 2020-01-30 RX ORDER — SODIUM CHLORIDE 9 MG/ML
INJECTION, SOLUTION INTRAVENOUS CONTINUOUS
Status: DISCONTINUED | OUTPATIENT
Start: 2020-01-30 | End: 2020-02-01

## 2020-01-30 RX ORDER — HEPARIN SODIUM 1000 [USP'U]/ML
4000 INJECTION, SOLUTION INTRAVENOUS; SUBCUTANEOUS ONCE
Status: COMPLETED | OUTPATIENT
Start: 2020-01-30 | End: 2020-01-30

## 2020-01-30 RX ORDER — HEPARIN SODIUM 10000 [USP'U]/100ML
1000 INJECTION, SOLUTION INTRAVENOUS CONTINUOUS
Status: DISCONTINUED | OUTPATIENT
Start: 2020-01-30 | End: 2020-01-31

## 2020-01-30 RX ORDER — SODIUM CHLORIDE 9 MG/ML
INJECTION, SOLUTION INTRAVENOUS EVERY 12 HOURS
Status: DISCONTINUED | OUTPATIENT
Start: 2020-01-30 | End: 2020-01-30

## 2020-01-30 RX ORDER — HEPARIN SODIUM 1000 [USP'U]/ML
4000 INJECTION, SOLUTION INTRAVENOUS; SUBCUTANEOUS PRN
Status: DISCONTINUED | OUTPATIENT
Start: 2020-01-30 | End: 2020-01-31

## 2020-01-30 RX ADMIN — HEPARIN SODIUM 4000 UNITS: 1000 INJECTION INTRAVENOUS; SUBCUTANEOUS at 11:54

## 2020-01-30 RX ADMIN — SODIUM CHLORIDE: 9 INJECTION, SOLUTION INTRAVENOUS at 15:02

## 2020-01-30 RX ADMIN — NOREPINEPHRINE BITARTRATE 20 MCG/MIN: 1 INJECTION INTRAVENOUS at 15:55

## 2020-01-30 RX ADMIN — ASPIRIN 325 MG: 325 TABLET, DELAYED RELEASE ORAL at 04:35

## 2020-01-30 RX ADMIN — CEFEPIME HYDROCHLORIDE 2 G: 2 INJECTION, POWDER, FOR SOLUTION INTRAVENOUS at 11:09

## 2020-01-30 RX ADMIN — CEFEPIME HYDROCHLORIDE 2 G: 2 INJECTION, POWDER, FOR SOLUTION INTRAVENOUS at 20:53

## 2020-01-30 RX ADMIN — SODIUM CHLORIDE: 9 INJECTION, SOLUTION INTRAVENOUS at 14:07

## 2020-01-30 RX ADMIN — HEPARIN SODIUM 1000 UNITS/HR: 10000 INJECTION, SOLUTION INTRAVENOUS at 11:52

## 2020-01-30 ASSESSMENT — PAIN SCALES - GENERAL
PAINLEVEL_OUTOF10: 0

## 2020-01-30 NOTE — H&P
lungs 4 times daily  mometasone-formoterol (DULERA) 200-5 MCG/ACT inhaler, Inhale 2 puffs into the lungs 2 times daily  metOLazone (ZAROXOLYN) 5 MG tablet, Take 1 tablet by mouth three times a week  folic acid (FOLVITE) 1 MG tablet, Take 1 tablet by mouth daily  melatonin 3 MG TABS tablet, Take 1 tablet by mouth nightly as needed (sleep)  Respiratory Therapy Supplies (FULL KIT NEBULIZER SET) MISC, Use as directed with nebulized medication. Cyanocobalamin (B-12) 3000 MCG CAPS, Take 3,000 mg by mouth daily  ondansetron (ZOFRAN ODT) 4 MG disintegrating tablet, Place 2 tablets under the tongue every 8 hours as needed for Nausea or Vomiting  potassium chloride (KLOR-CON M) 20 MEQ extended release tablet, Take 1 tablet by mouth 2 times daily (with meals)  magnesium oxide (MAG-OX) 400 (241.3 Mg) MG TABS tablet, Take 1 tablet by mouth daily    Allergies:    Latex; Aldactone [spironolactone]; and Thimerosal    Social History:    reports that he has never smoked. He has never used smokeless tobacco. He reports current alcohol use. He reports that he does not use drugs. Family History:   family history includes COPD in his father; Parkinsonism in his mother. REVIEW OF SYSTEMS:  EENT: NO COMPLAIN. CVS: NO CHEST PAIN. SOB PRESENT. RS: SOB  PRESENT. COUGHING PRESENT. MUSCULOSKELETAL: AMBULATORY. NO JOINT PAINS. CNS: NO SYMPTOMS EXPRESSED. PHYSICAL EXAM:    Vitals:  BP (!) 107/50   Pulse 99   Temp 97.7 °F (36.5 °C) (Core)   Resp 17   Ht 5' 9\" (1.753 m)   Wt (!) 341 lb 1.6 oz (154.7 kg)   SpO2 96%   BMI 50.37 kg/m²     General:  Awake, alert, oriented X 3. Well developed, well nourished, well groomed. No apparent distress. HEENT:  Normocephalic, atraumatic. Pupils equal, round, reactive to light. No scleral icterus. No conjunctival injection. Normal lips, teeth, and gums. No nasal discharge.   Neck:  Supple  Heart:  RRR, no murmurs, gallops, rubs  Lungs:  CTA bilaterally, bilat symmetrical expansion, no

## 2020-01-30 NOTE — CONSULTS
(L/min): 15 L/min  O2 Device: PAP (positive airway pressure)        LABS:  WBC   Date Value Ref Range Status   01/30/2020 25.0 (H) 4.5 - 11.5 E9/L Final   01/30/2020 23.1 (H) 4.5 - 11.5 E9/L Final   01/29/2020 16.0 (H) 4.5 - 11.5 E9/L Final     Hemoglobin   Date Value Ref Range Status   01/30/2020 7.0 (L) 12.5 - 16.5 g/dL Final   01/30/2020 7.1 (L) 12.5 - 16.5 g/dL Final   01/29/2020 6.9 (LL) 12.5 - 16.5 g/dL Final     Hematocrit   Date Value Ref Range Status   01/30/2020 21.2 (L) 37.0 - 54.0 % Final   01/30/2020 21.2 (L) 37.0 - 54.0 % Final   01/29/2020 20.7 (L) 37.0 - 54.0 % Final     MCV   Date Value Ref Range Status   01/30/2020 115.8 (H) 80.0 - 99.9 fL Final   01/30/2020 117.8 (H) 80.0 - 99.9 fL Final   01/29/2020 116.9 (H) 80.0 - 99.9 fL Final     Platelets   Date Value Ref Range Status   01/30/2020 91 (L) 130 - 450 E9/L Final   01/30/2020 74 (L) 130 - 450 E9/L Final   01/29/2020 63 (L) 130 - 450 E9/L Final     Sodium   Date Value Ref Range Status   01/30/2020 132 132 - 146 mmol/L Final   01/29/2020 136 132 - 146 mmol/L Final   12/29/2019 139 132 - 146 mmol/L Final     Potassium   Date Value Ref Range Status   01/30/2020 4.4 3.5 - 5.0 mmol/L Final   01/29/2020 4.1 3.5 - 5.0 mmol/L Final   12/29/2019 3.4 (L) 3.5 - 5.0 mmol/L Final     Potassium reflex Magnesium   Date Value Ref Range Status   08/08/2019 3.3 (L) 3.5 - 5.0 mmol/L Final   08/06/2019 3.9 3.5 - 5.0 mmol/L Final     Chloride   Date Value Ref Range Status   01/30/2020 86 (L) 98 - 107 mmol/L Final   01/29/2020 88 (L) 98 - 107 mmol/L Final   12/29/2019 90 (L) 98 - 107 mmol/L Final     CO2   Date Value Ref Range Status   01/30/2020 33 (H) 22 - 29 mmol/L Final   01/29/2020 34 (H) 22 - 29 mmol/L Final   12/29/2019 40 (H) 22 - 29 mmol/L Final     BUN   Date Value Ref Range Status   01/30/2020 82 (H) 8 - 23 mg/dL Final   01/29/2020 77 (H) 8 - 23 mg/dL Final   12/29/2019 36 (H) 8 - 23 mg/dL Final     CREATININE   Date Value Ref Range Status   01/30/2020 2.1 (H) 0.7 - 1.2 mg/dL Final   01/29/2020 1.9 (H) 0.7 - 1.2 mg/dL Final   12/29/2019 1.0 0.7 - 1.2 mg/dL Final     Glucose   Date Value Ref Range Status   01/30/2020 134 (H) 74 - 99 mg/dL Final   01/29/2020 79 74 - 99 mg/dL Final   12/29/2019 139 (H) 74 - 99 mg/dL Final     Calcium   Date Value Ref Range Status   01/30/2020 8.8 8.6 - 10.2 mg/dL Final   01/29/2020 9.1 8.6 - 10.2 mg/dL Final   12/29/2019 9.1 8.6 - 10.2 mg/dL Final     Total Protein   Date Value Ref Range Status   01/30/2020 7.7 6.4 - 8.3 g/dL Final   01/29/2020 7.3 6.4 - 8.3 g/dL Final   12/29/2019 7.9 6.4 - 8.3 g/dL Final     Alb   Date Value Ref Range Status   01/30/2020 2.4 (L) 3.5 - 5.2 g/dL Final   01/29/2020 2.4 (L) 3.5 - 5.2 g/dL Final   12/29/2019 2.9 (L) 3.5 - 5.2 g/dL Final     Total Bilirubin   Date Value Ref Range Status   01/30/2020 5.3 (H) 0.0 - 1.2 mg/dL Final   01/29/2020 4.5 (H) 0.0 - 1.2 mg/dL Final   12/29/2019 2.6 (H) 0.0 - 1.2 mg/dL Final     Alkaline Phosphatase   Date Value Ref Range Status   01/30/2020 103 40 - 129 U/L Final   01/29/2020 101 40 - 129 U/L Final   12/29/2019 90 40 - 129 U/L Final     AST   Date Value Ref Range Status   01/30/2020 67 (H) 0 - 39 U/L Final   01/29/2020 57 (H) 0 - 39 U/L Final   12/29/2019 44 (H) 0 - 39 U/L Final     ALT   Date Value Ref Range Status   01/30/2020 25 0 - 40 U/L Final   01/29/2020 23 0 - 40 U/L Final   12/29/2019 19 0 - 40 U/L Final     GFR Non-   Date Value Ref Range Status   01/30/2020 32 >=60 mL/min/1.73 Final     Comment:     Chronic Kidney Disease: less than 60 ml/min/1.73 sq.m. Kidney Failure: less than 15 ml/min/1.73 sq.m. Results valid for patients 18 years and older. 01/29/2020 36 >=60 mL/min/1.73 Final     Comment:     Chronic Kidney Disease: less than 60 ml/min/1.73 sq.m. Kidney Failure: less than 15 ml/min/1.73 sq.m. Results valid for patients 18 years and older.      12/29/2019 >60 >=60 mL/min/1.73 Final     Comment:     Chronic Kidney

## 2020-01-30 NOTE — DISCHARGE INSTR - COC
Continuity of Care Form    Patient Name: Zack Vargas   :  1955  MRN:  [id number]    Admit date:  2020  Discharge date:  2020    Code Status Order: Full Code   Advance Directives:     Admitting Physician:  Callie Morel MD  PCP: Callie Morel MD    Discharging Nurse: [address] Unit/Room#: 0621/0621-02  Discharging Unit Phone Number: [phone]    Emergency Contact:   Extended Emergency Contact Information  Primary Emergency Contact: Renetta Hester,  [facility] Phone: [phone]  Relation: Other  Secondary Emergency Contact: Konstantin Galvan  Home Phone: [phone]  Mobile Phone: [phone]  Relation: Other    Past Surgical History:  Past Surgical History:   Procedure Laterality Date    CARPAL TUNNEL RELEASE      HERNIA REPAIR      mesh in abd    NECK SURGERY      UPPER GASTROINTESTINAL ENDOSCOPY N/A 2019    EGD ESOPHAGOGASTRODUODENOSCOPY performed by Edyta Rocha MD at [facility] ENDOSCOPY       Immunization History: There is no immunization history on file for this patient.     Active Problems:  Patient Active Problem List   Diagnosis Code    HTN (hypertension) I10    Morbid obesity with BMI of 45.0-49.9, adult ([facility] E66.01, Z68.42    Bilateral leg edema R60.0    Gout M10.9    Acute diastolic CHF (congestive heart failure) (HCC) I50.31    Bilateral low back pain without sciatica M54.5    Rhabdomyolysis M62.82    Mental confusion J24.0    Alcoholic cirrhosis of liver without ascites (HCC) K70.30    Pneumonia of right upper lobe due to infectious organism (HCC) J18.1    Hepatic encephalopathy (HCC) K72.90    Cellulitis of scrotum N49.2    Acute on chronic diastolic (congestive) heart failure (HCC) I50.33    Sepsis due to pneumonia (HCC) J18.9, A41.9    Iron deficiency anemia due to chronic blood loss D50.0    Septic shock due to Staphylococcus aureus (HCC) A41.01, R65.21    Sepsis due to Acinetobacter ([facility] A41.59 Isolation/Infection:   Isolation          No Isolation        Patient Infection Status     None to display          Nurse Assessment:  Last Vital Signs: BP (!) 147/58   Pulse 90   Temp 98.2 °F (36.8 °C) (Axillary)   Resp 21   Ht 5' 9\" (1.753 m)   Wt (!) 358 lb 14.4 oz (162.8 kg)   SpO2 95%   BMI 53.00 kg/m²     Last documented pain score (0-10 scale): Pain Level: 0  Last Weight:   Wt Readings from Last 1 Encounters:   02/04/20 (!) 358 lb 14.4 oz (162.8 kg)     Mental Status:  oriented and alert    IV Access:  - PICC - site  left brachial, insertion date: 2/7/2020    Nursing Mobility/ADLs:  Walking   Assisted  Transfer  Assisted  Bathing  Assisted  Dressing  Assisted  Toileting  Assisted  Feeding  Independent  Med Admin  Assisted  Med Delivery   whole    Wound Care Documentation and Therapy:        Elimination:  Continence:   · Bowel: No  · Bladder: Burgess  Urinary Catheter: Insertion Date: 1/29/2020   Colostomy/Ileostomy/Ileal Conduit: No       Date of Last BM: 2/12/2020    Intake/Output Summary (Last 24 hours) at 1/30/2020 0836  Last data filed at 1/30/2020 0636  Gross per 24 hour   Intake 747 ml   Output 310 ml   Net 437 ml     I/O last 3 completed shifts: In: 747 [P.O.:100; I.V.:147; IV Piggyback:500]  Out: 310 [Urine:310]    Safety Concerns: At Risk for Falls    Impairments/Disabilities:      None    Nutrition Therapy:  Current Nutrition Therapy:   - Oral Diet:  Renal    Routes of Feeding: Oral  Liquids: Thin Liquids  Daily Fluid Restriction: no  Last Modified Barium Swallow with Video (Video Swallowing Test): not done    Treatments at the Time of Hospital Discharge:   Respiratory Treatments: see med rec. Oxygen Therapy:  is on oxygen at 4 L/min per nasal cannula.   Ventilator:    - BiPAP   IPAP: 16 cmH20, CPAP/EPAP: 5 cmH2O At HS and prn     Rehab Therapies: Physical Therapy and Occupational Therapy  Weight Bearing Status/Restrictions: No weight bearing restirctions  Other Medical Equipment (for information only, NOT a DME order):  walker and hospital bed  Other Treatments:     Patient's personal belongings (please select all that are sent with patient):  None    RN SIGNATURE:  Electronically signed by Ghazala Scales RN on 2/12/20 at 1:01 PM    CASE MANAGEMENT/SOCIAL WORK SECTION    Inpatient Status Date: 1/29/2020    Readmission Risk Assessment Score:  Readmission Risk              Risk of Unplanned Readmission:        23           Discharging to Facility/ [address]   [phone]  · Name:   · Address:   · Phone:  · Fax       / signature: Electronically signed by King Donnelly RN-BC on 1/30/2020 at 8:37 AM      PHYSICIAN SECTION    Prognosis: {Prognosis:6054118142}    Condition at Discharge: [facility] Patient Condition:[id number]}    Rehab Potential (if transferring to Rehab): {Prognosis:5027818872}    Recommended Labs or Other Treatments After Discharge: ***    Physician Certification: I certify the above information and transfer of Markos Okeefe  is necessary for the continuing treatment of the diagnosis listed and that he requires LTAC for less 30 days.      Update Admission H&P: {CHP DME Changes in PUMES:[id number]}    PHYSICIAN SIGNATURE:  Dr Swetha Stevens MD

## 2020-01-30 NOTE — CARE COORDINATION
1/30/2020  Clinicals faxed to the [facility] as requested per Valentina.  Electronically signed by Dot Narayan RN-BC on 1/30/2020 at 1:56 PM Patient: Yuan Musa  [id number]  Date: 4/3/2019      Chief Complaint: pain    History of Present Illness/Hospital Course:  77-year-old female to history of CAD, diabetes, hyperlipidemia, and recent left humerus fracture who was admitted on 4/1 with repeat falls and back pain. Imaging revealed a healing left humerus fracture and T12 acute compression fracture. She underwent a kyphoplasty on 4/2. Her medical course has been significant for pain in the back and left upper extremity. Patient states that she was living independently in a condo and was in the process of moving to an assisted living facility when she fell while standing on a ladder and reaching for an object which initially set her into this down slide. He was this event that resulted in her left humerus fracture initially. She has had increased pain and right lower extremity occasional weakness that has led to her repeat falls per her report. Therapy evaluations are currently pending. Prior Level of Function:  Independent and living alone    Current Level of Function:  CGA     has a past medical history of Arthritis, CAD (coronary artery disease), Cancer ([address] Cystocele, Diabetes mellitus ([facility] [address] Hepatitis A, History of blood transfusion, Hyperlipidemia, PVC (premature ventricular contraction), Rectocele, Reflux, and Scoliosis. has a past surgical history that includes Appendectomy; joint replacement (Left); hernia repair (6 YRS AGO); Cholecystectomy; shoulder surgery (Right, 7/26/12); Bunionectomy (7/26/12); Breast surgery; Upper gastrointestinal endoscopy (11/2/12); Hysterectomy; bladder suspension; Dilation and curettage of uterus; other surgical history (Left, 09/22/14); Foot surgery; Cardiac catheterization; Colonoscopy (2008); Cystocopy (03/29/2017); other surgical history (Right, 06/28/2018); Cataract removal with implant (Left, 09/13/2018); and pr remv cataract extracap,insert lens (Left, 9/13/2018).      reports that she quit smoking about 24 years ago. She has a 7.50 pack-year smoking history. She has quit using smokeless tobacco. She reports that she drinks alcohol. She reports that she does not use drugs. family history includes Cancer in her maternal grandfather; Diabetes in her maternal grandmother. REVIEW OF SYSTEMS:   CONSTITUTIONAL: negative for fevers, chills, diaphoresis, appetite change, fatigue, night sweats and unexpected weight change. HEENT: negative for hearing loss, tinnitus, ear drainage, sinus pressure, nasal congestion, epistaxis and snoring. RESPIRATORY: Negative for hemoptysis, cough, sputum production. CARDIOVASCULAR: negative for chest pain, palpitations, exertional chest pressure/discomfort, edema, syncope. GASTROINTESTINAL: negative for nausea, vomiting, diarrhea, constipation, blood in stool and abdominal pain. GENITOURINARY: negative for frequency, dysuria, urinary incontinence, decreased urine volume, and hematuria. HEMATOLOGIC/LYMPHATIC: negative for easy bruising, bleeding and lymphadenopathy. ALLERGIC/IMMUNOLOGIC: negative for recurrent infections, angioedema, anaphylaxis and drug reactions. ENDOCRINE: negative for weight changes and diabetic symptoms including polyuria, polydipsia and polyphagia. MUSCULOSKELETAL: positive for pain, joint swelling, decreased range of motion and muscle weakness. NEUROLOGICAL: negative for headaches, slurred speech, unilateral weakness. PSYCHIATRIC/BEHAVIORAL: negative for hallucinations, behavioral problems, confusion and agitation. All pertinent positives are noted in the HPI.     Physical Examination:  Vitals:   Patient Vitals for the past 24 hrs:   BP Temp Temp src Pulse Resp SpO2   04/03/19 0803 132/79 97.5 °F (36.4 °C) Axillary 79 16 96 %   04/03/19 0524 120/71 98.7 °F (37.1 °C) Oral 69 14 98 %   04/03/19 0126 127/73 97.6 °F (36.4 °C) Oral 75 16 96 %   04/02/19 2056 115/71 97.6 °F (36.4 °C) Oral 82 14 96 %   04/02/19 1723 127/67 98.7 °F (37.1 °C) Oral 99 -- 95 %   04/02/19 1445 129/74 97.8 °F (36.6 °C) Oral 84 18 100 %   04/02/19 1300 131/71 97.3 °F (36.3 °C) Oral 80 18 100 %   04/02/19 1230 134/72 97.6 °F (36.4 °C) Oral 77 18 100 %   04/02/19 1200 (!) 148/78 97.5 °F (36.4 °C) Oral 79 18 98 %   04/02/19 1151 (!) 150/76 -- -- 98 (!) 34 100 %     Psych: Stable mood, normal judgement, normal affect. Const: No distress  Eyes: Conjunctiva noninjected, no icterus noted; pupils equal, round. HENT: Atraumatic, normocephalic; Oral mucosa moist  Neck: Trachea midline, neck supple. No thyromegaly noted. CV: No audible murmurs  Resp: No increased WOB, no audible wheezing   GI: Nondistended   Neuro: Alert, oriented, appropriate. Skin: No visible abnormalities  MSK: No joint abnormalities noted. LUE in sling   Ext: No significant edema appreciated. No varicosities.     Lab Results   Component Value Date    WBC 3.8 (L) 04/01/2019    HGB 11.6 (L) 04/01/2019    HCT 34.9 (L) 04/01/2019    MCV 98.2 04/01/2019     04/01/2019     Lab Results   Component Value Date    INR 0.98 04/01/2019    INR 1.01 12/06/2018    INR 1.03 08/05/2010    PROTIME 11.2 04/01/2019    PROTIME 11.5 12/06/2018    PROTIME 11.5 08/05/2010     Lab Results   Component Value Date    CREATININE 1.0 04/01/2019    BUN 13 04/01/2019     04/01/2019    K 4.2 04/01/2019     04/01/2019    CO2 25 04/01/2019     Lab Results   Component Value Date    ALT 13 04/01/2019    AST 22 04/01/2019    ALKPHOS 87 04/01/2019    BILITOT 0.3 04/01/2019     Most recent imaging studies revealed   EXAMINATION:   MRI OF THE LUMBAR SPINE WITHOUT CONTRAST, 4/1/2019 5:06 pm       TECHNIQUE:   Multiplanar multisequence MRI of the lumbar spine was performed without the   administration of intravenous contrast.       COMPARISON:   CT chest 04/01/2019.  MRI lumbar spine 12/16/2017.       HISTORY:   ORDERING SYSTEM PROVIDED HISTORY: fall - abnrml ct   TECHNOLOGIST PROVIDED HISTORY:   Ordering Physician Provided Reason for Exam: Pt states has back problems but   recently had a fall. Severe back pain, had dilaudid before coming to MRI,   still in a lot of pain, best images possible   Acuity: Acute   Type of Exam: Initial       FINDINGS:   BONES/ALIGNMENT: Approximately 25% height loss of T12 vertebral body.  Fluid   and/or hemorrhage present within a large fracture plane, involving the   anterior and posterior vertebral cortex in addition to superior endplate. There is osseous retropulsion at the inferior endplate level measuring   approximately 3 mm.  Extensive vertebral body marrow edema.  No significant   left pedicular edema.  There is right pedicular edema with possible mild   fracture involvement.  Focal linear T2 hyperintensity within the anterior   T11-T12 disc space, likely traumatic.       Chronic height loss of L4 and L5 vertebral bodies.  Lumbar scoliosis with   minimal retrolisthesis present at the L2-L3 and L3-L4 levels.  There is   minimal anterolisthesis at T11-T12 and retrolisthesis at T12-L1.       Disc space and endplate change at N3-F8 favored represent degenerative   process, similar to previous exam.  No evidence of additional acute fracture   in the visualized thoracolumbar spine.       SPINAL CORD: The conus terminates normally.       SOFT TISSUES: Interspinous edema at T12-L1 and L1-L2. No definite ligamentous   discontinuity.       T12-L1: Bilateral facet hypertrophy.  Retrolisthesis.  Retropulsion and disc   bulging/endplate spurring.  Moderate neural foraminal narrowing.  Anterior   and posterior subarachnoid effacement.  Conus/cord flattening.  AP dimension   of the thecal sac measures 7-8 mm at midline.       L1-L2: Mild right neural foraminal narrowing due to facet hypertrophy and   endplate spurring.  No significant spinal canal stenosis.       L2-L3: Mild-moderate right neural foraminal narrowing due to facet   hypertrophy and disc spur complex.  No significant spinal canal stenosis.      L3-L4: Bilateral facet hypertrophy.  Disc bulging and endplate spurring. Moderate-severe left and mild right neural foraminal narrowing.  No   significant spinal canal stenosis.       L4-L5: Bilateral facet hypertrophy.  Mild disc bulging and endplate spurring. Mild bilateral neural foraminal narrowing.  No significant spinal canal   stenosis.       L5-S1: Bilateral facet hypertrophy.  Moderate neural foraminal narrowing,   greater on the left.  No significant spinal canal stenosis.  No evidence of   descending S1 nerve root compression.           Impression   Acute T12 fracture.  There is prominent diastasis of the vertebral fracture   plane, which may involve the right pedicle.       Borderline compression of the distal cord at the T12 inferior endplate.       Interspinous edema, likely traumatic.  In addition, there is suspected   traumatic disruption of the anterior annulus at T11-T12. EXAMINATION:   CT OF THE CERVICAL SPINE WITHOUT CONTRAST 4/1/2019 2:57 pm       TECHNIQUE:   CT of the cervical spine was performed without the administration of   intravenous contrast. Multiplanar reformatted images are provided for review. Dose modulation, iterative reconstruction, and/or weight based adjustment of   the mA/kV was utilized to reduce the radiation dose to as low as reasonably   achievable.       COMPARISON:   12/16/2017 CT       HISTORY:   ORDERING SYSTEM PROVIDED HISTORY: inj   TECHNOLOGIST PROVIDED HISTORY:   Ordering Physician Provided Reason for Exam: Fall (Pt with 4 falls in last 6   weeks, known humerus fx from 6 weeks ago. Pt states right leg/hip get weak   and cause to fall. Pt states fell in rehab few weeks ago and arm has been   hurting worse since lower than origional fracture, no new xrays done.  Pt with   pain in both hips, right knee back. )   Acuity: Acute   Type of Exam: Initial       FINDINGS:   BONES/ALIGNMENT: Severe kyphosis in the thoracic spine contributes to   straightening of lordotic curvature in the cervical spine. Matilda Laura is an age   indeterminate compression fracture of T5 which is new in comparison to a   prior CT 12/16/2017.  Probable mild compression fracture also seen to the   superior endplate of T6.  No acute fracture or subluxation in the cervical   spine.       DEGENERATIVE CHANGES: Degenerative spondylosis and facet DJD observed   throughout.  No significant spinal canal stenosis.       SOFT TISSUES: Paraspinal soft tissues are normal.  Lung apices are clear. Incidental 6 mm right thyroid nodule.  This appears stable.           Impression   1. No acute abnormality of the cervical spine. 2. Age indeterminate fractures of T5 and likely T6 only partially evaluated   on this cervical spine CT.  These are new since 12/16/2017, and contribute to   kyphosis in the thoracic spine.  Recommend correlation with physical exam for   point tenderness.  A follow-up MRI of the thoracic spine may be considered   for further evaluation if necessary. 3. Incidental right thyroid nodule, stable.  No ultrasound follow-up is   necessary. EXAMINATION:   CT OF THE HEAD WITHOUT CONTRAST  4/1/2019 2:56 pm       TECHNIQUE:   CT of the head was performed without the administration of intravenous   contrast. Dose modulation, iterative reconstruction, and/or weight based   adjustment of the mA/kV was utilized to reduce the radiation dose to as low   as reasonably achievable.       COMPARISON:   August 21, 2018       HISTORY:   ORDERING SYSTEM PROVIDED HISTORY: inj   TECHNOLOGIST PROVIDED HISTORY:   Has a \"code stroke\" or \"stroke alert\" been called? ->No   Ordering Physician Provided Reason for Exam: Fall (Pt with 4 falls in last 6   weeks, known humerus fx from 6 weeks ago. Pt states right leg/hip get weak   and cause to fall. Pt states fell in rehab few weeks ago and arm has been   hurting worse since lower than origional fracture, no new xrays done. Pt with   pain in both hips, right knee back. )   Acuity: Acute   Type of Exam: Initial       FINDINGS:   BRAIN/VENTRICLES: There is no acute intracranial hemorrhage, mass effect or   midline shift.  No abnormal extra-axial fluid collection.  The gray-white   differentiation is maintained without evidence of an acute infarct.  There is   no evidence of hydrocephalus. Mild generalized volume loss.  Mild   periventricular low-attenuation likely related chronic small vessel disease.       ORBITS: The visualized portion of the orbits demonstrate no acute abnormality.       SINUSES: The visualized paranasal sinuses and mastoid air cells demonstrate   no acute abnormality.       SOFT TISSUES/SKULL:  No acute abnormality of the visualized skull or soft   tissues.           Impression   No acute intracranial abnormality. EXAMINATION:   TWO XRAY VIEWS OF THE LEFT HUMERUS       4/1/2019 2:17 pm       COMPARISON:   02/20/2019       HISTORY:   ORDERING SYSTEM PROVIDED HISTORY: inj   TECHNOLOGIST PROVIDED HISTORY:   Reason for exam:->inj   Ordering Physician Provided Reason for Exam: Pt with 4 falls in last 6 weeks,   known humerus fx from 6 weeks ago. Pt states right leg/hip get weak and cause   to fall. Acuity: Acute   Type of Exam: Initial       FINDINGS:   There is a sclerotic, impacted fracture line across the humeral neck.  There   is fragmentation of the greater tuberosity.  There is no dislocation.  Old   left posterolateral rib fractures are noted.           Impression   Healing, impacted fracture of the humeral neck.  No superimposed, acute   fracture is identified.             The above laboratory data have been reviewed. The above imaging data have been reviewed. The above medical testing have been reviewed. Body mass index is 29.59 kg/m². Assessment and Plan:  Acute T12 compression fracture: S/P kyphoplasty 4/2. Pain control. PT/OT. TLSO when OOB  Subacute left humerus fracture: Sling immobilization  CAD  HLD    Dispo:  Therapy evaluations remain pending. Likely patient would be an appropriate ARU candidate. If appropriate during her therapy sessions today, we would be able to accept today. We will continue to follow. Thank you for the consultation. Rufina Velazco MD 4/3/2019, 11:49 AM     * This document was created using dictation software. While all precautions were taken to ensure accuracy, errors may have occurred. Please disregard any typographical errors.

## 2020-01-30 NOTE — PROGRESS NOTES
Pharmacy Consultation Note  (Antibiotic Dosing and Monitoring)    Initial consult date: 1/30/20  Consulting physician: Dr. Claudio Olszewski  Drug(s): Vancomycin  Indication: Septic shock    Ht Readings from Last 1 Encounters:   01/30/20 5' 9\" (1.753 m)     Wt Readings from Last 1 Encounters:   01/30/20 (!) 341 lb 1.6 oz (154.7 kg)     Age/  Gender IBW DW  Allergy Information   59 y.o.   male 70.7 kg 104.3 kg  Latex; Aldactone [spironolactone]; and Thimerosal          Date  Tmax WBC BUN/CR UOP  (mL/kg/hr) Drug/Dose Time   Given Level(s)   (Time) Comments   1/29  (#1) afebrile 16 77/1.9 -- Vancomycin 3000 mg IV x 1 2234 1/30  (#2) afebrile 23.1 82/2.1 310 mL -- --     1/31  (#3)       Random AM level =       (#4)             (#5)             (#6)             (#7)             Estimated Creatinine Clearance: 52 mL/min (A) (based on SCr of 2.1 mg/dL (H)). UOP over the past 24 hours:     Intake/Output Summary (Last 24 hours) at 1/30/2020 1053  Last data filed at 1/30/2020 0636  Gross per 24 hour   Intake 747 ml   Output 310 ml   Net 437 ml     Other anti-infectives: Anti-infective Dose Date Initiated Date Stopped   Cefepime 2g IV q8hr 1/29      Cultures:  available culture and sensitivity results were reviewed in EPIC  Cultures sent and are pending. Culture Date Result    Rapid flu 1/29 Not detected   Blood cx 1 1/29    Blood cx 2 1/29    Respiratory film array 1/29    Urine cx 1/30    MRSA nares 1/30      Assessment:  · Consulted by Dr. Claudio Olszewski to dose/monitor vancomycin  · Goal trough level:  15-20 mcg/mL  · Pt is a 59 yoM who [facility] with septic shock. Hx of cirrhosis, recently admitted with alcohol withdrawal in 12/2019.    · Serum creatinine today: 2.1 mg/dL; CrCl ~ 52 mL/min; baseline Scr ~ 0.7-0.8 mg/dL    Plan:  · Vancomycin 3000 mg IV x 1 given yesterday @ 2234  · Pt is in renal failure, hold off giving any additional vancomycin today  · Random AM level tomorrow  · Follow renal function  · Pharmacist will

## 2020-01-30 NOTE — ED NOTES
Bed: 01  Expected date:   Expected time:   Means of arrival:   Comments:     Vargas Moura RN  01/29/20 1948

## 2020-01-30 NOTE — ED PROVIDER NOTES
No oropharyngeal exudate. Eyes:      Extraocular Movements: Extraocular movements intact. Pupils: Pupils are equal, round, and reactive to light. Neck:      Musculoskeletal: Normal range of motion. Cardiovascular:      Rate and Rhythm: Normal rate and regular rhythm. Heart sounds: Murmur (Plus 3/6 systolic ejection murmur.) present. No friction rub. No gallop. Pulmonary:      Effort: Pulmonary effort is normal. No respiratory distress. Breath sounds: Wheezing (Diffuse expiratory wheezes bilaterally.) present. No rales. Chest:      Chest wall: No tenderness. Abdominal:      General: Bowel sounds are normal. There is no distension. Palpations: Abdomen is soft. There is no mass. Tenderness: There is no abdominal tenderness. There is no guarding or rebound. Hernia: No hernia is present. Musculoskeletal: Normal range of motion. General: No tenderness or deformity. Right lower leg: Edema present. Left lower leg: Edema (2+ bilateral lower extremity pitting edema.) present. Lymphadenopathy:      Cervical: No cervical adenopathy. Skin:     General: Skin is warm and dry. Capillary Refill: Capillary refill takes less than 2 seconds. Coloration: Skin is not pale. Findings: No erythema or rash. Neurological:      General: No focal deficit present. Mental Status: He is alert and oriented to person, place, and time. Cranial Nerves: No cranial nerve deficit. Sensory: No sensory deficit. Comments: Responding to questions appropriately. Psychiatric:         Behavior: Behavior normal.         Thought Content:  Thought content normal.         Judgment: Judgment normal.          Procedures    Central Line Placement Procedure Note    Indication: vascular access and centrally administered medications    Consent: The patient was counseled regarding the procedure, its indications, risks, potential complications and alternatives, and any patients home medications have been reviewed. Allergies: Latex;  Aldactone [spironolactone]; and Thimerosal    -------------------------------------------------- RESULTS -------------------------------------------------    LABS:  Results for orders placed or performed during the hospital encounter of 01/29/20   Rapid influenza A/B antigens   Result Value Ref Range    Influenza A by PCR Not Detected Not Detected    Influenza B by PCR Not Detected Not Detected   CBC auto differential   Result Value Ref Range    WBC 16.0 (H) 4.5 - 11.5 E9/L    RBC 1.77 (L) 3.80 - 5.80 E12/L    Hemoglobin 6.9 (LL) 12.5 - 16.5 g/dL    Hematocrit 20.7 (L) 37.0 - 54.0 %    .9 (H) 80.0 - 99.9 fL    MCH 39.0 (H) 26.0 - 35.0 pg    MCHC 33.3 32.0 - 34.5 %    RDW 15.2 (H) 11.5 - 15.0 fL    Platelets 63 (L) 521 - 450 E9/L    MPV 8.9 7.0 - 12.0 fL    Neutrophils % 95.7 (H) 43.0 - 80.0 %    Lymphocytes % 2.6 (L) 20.0 - 42.0 %    Monocytes % 4.0 2.0 - 12.0 %    Eosinophils % 0.9 0.0 - 6.0 %    Basophils % 0.2 0.0 - 2.0 %    Neutrophils Absolute 15.52 (H) 1.80 - 7.30 E9/L    Lymphocytes Absolute 0.48 (L) 1.50 - 4.00 E9/L    Monocytes Absolute 0.00 (L) 0.10 - 0.95 E9/L    Eosinophils Absolute 0.14 0.05 - 0.50 E9/L    Basophils Absolute 0.00 0.00 - 0.20 E9/L    Myelocyte Percent 0.9 0 - 0 %    Anisocytosis 1+     Hypochromia 1+     Basophilic Stippling 2+    Basic Metabolic Panel   Result Value Ref Range    Sodium 136 132 - 146 mmol/L    Potassium 4.1 3.5 - 5.0 mmol/L    Chloride 88 (L) 98 - 107 mmol/L    CO2 34 (H) 22 - 29 mmol/L    Anion Gap 14 7 - 16 mmol/L    Glucose 79 74 - 99 mg/dL    BUN 77 (H) 8 - 23 mg/dL    CREATININE 1.9 (H) 0.7 - 1.2 mg/dL    GFR Non-African American 36 >=60 mL/min/1.73    GFR African American 43     Calcium 9.1 8.6 - 10.2 mg/dL   Hepatic function panel   Result Value Ref Range    Total Protein 7.3 6.4 - 8.3 g/dL    Alb 2.4 (L) 3.5 - 5.2 g/dL    Alkaline Phosphatase 101 40 - 129 U/L    ALT 23 0 - 40 U/L    AST 57 (H) 0 - 39 U/L    Total Bilirubin 4.5 (H) 0.0 - 1.2 mg/dL    Bilirubin, Direct 2.2 (H) 0.0 - 0.3 mg/dL    Bilirubin, Indirect 2.3 (H) 0.0 - 1.0 mg/dL   Protime-INR   Result Value Ref Range    Protime 22.0 (H) 9.3 - 12.4 sec    INR 1.9    Ammonia   Result Value Ref Range    Ammonia 101.0 (H) 16.0 - 60.0 umol/L   Troponin   Result Value Ref Range    Troponin 0.14 (H) 0.00 - 0.03 ng/mL   Brain Natriuretic Peptide   Result Value Ref Range    Pro-BNP 2,098 (H) 0 - 125 pg/mL   Magnesium   Result Value Ref Range    Magnesium 2.0 1.6 - 2.6 mg/dL   Lactic Acid, Plasma   Result Value Ref Range    Lactic Acid 4.6 (HH) 0.5 - 2.2 mmol/L   Arterial Blood Gas, Respiratory Only   Result Value Ref Range    Source: Arterial     FIO2 Arterial 100.0     Pt Temp 37.0     PH (TEMPERATURE CORRECTED) 7.413 7.350 - 7.450    PCO2 (TEMP CORRECTED) 58.8 (H) 35.0 - 45.0 mmHg    PO2 (TEMP CORRECTED) 126.2 (H) 60.0 - 80.0 mmHg    HCO3, Arterial 37.5 (H) 22.0 - 26.0 mmol/L    B.E. 11.6 (H) -3.0 - 3.0 mmol/L    O2 Sat 98.8 (H) 92.0 - 98.5 %     ID 4,809     DEVICE 15,065,[id number],933     Delivery Systems [id number]\6    Platelet Confirmation   Result Value Ref Range    Platelet Confirmation CONFIRMED    TYPE AND SCREEN   Result Value Ref Range    ABO/Rh A POS     Antibody Screen NEG    PREPARE RBC (CROSSMATCH), 1 Units   Result Value Ref Range    Product Code Blood Bank [id number]     Description Blood Bank Red Blood Cells, Leuko-reduced     Unit Number [id number]     Dispense Status Blood Bank selected        RADIOLOGY:  Xr Chest Portable    Result Date: 1/29/2020  Reading location: [facility] INDICATION: Fever FINDINGS: Portable AP semiupright view the chest compared 12/29/2019. Cardiac enlargement. Suboptimal inspiration. Distended pulmonary vessels. Extensive bilateral pleural-parenchymal opacities, right greater than left appears similar to prior exam.     Extensive bilateral airspace disease and pleural effusions.  Possible congestive heart failure. EKG: This EKG is signed and interpreted by me. Rate: 109  Rhythm: Sinus  Interpretation: sinus tachycardia  Comparison: changes compared to previous EKG      ------------------------- NURSING NOTES AND VITALS REVIEWED ---------------------------  Date / Time Roomed:  1/29/2020  7:47 PM  ED Bed Assignment:  01/01    The nursing notes within the ED encounter and vital signs as below have been reviewed. Patient Vitals for the past 24 hrs:   BP Temp Temp src Pulse Resp SpO2 Height Weight   01/29/20 2101 (!) 78/35 -- -- 105 22 100 % -- --   01/29/20 2056 (!) 79/34 -- -- 104 18 100 % -- --   01/29/20 2033 (!) 81/32 -- -- 105 30 92 % -- --   01/29/20 1950 (!) 79/48 98.8 °F (37.1 °C) Oral 109 24 90 % 5' 10\" (1.778 m) (!) 400 lb (181.4 kg)       Oxygen Saturation Interpretation: Normal    ------------------------------------------ PROGRESS NOTES ------------------------------------------  ED Course as of Jan 29 2255 Wed Jan 29, 2020 2123 Patient remains on BiPAP. Blood pressure while in the room was 92 systolic. His blood pressures have been ranging in the upper 70s to low 80s normally. He is speaking a little bit better feels that his breathing is slightly improved. [SO]   2123 We will plan on admitting to the hospital, we did get verbal consent for a central line, he will receive a small amount of fluids and diuretics as well.    [SO]   2202 Discussed with Dr. Ej Serrano who agreed with admission.    [KS]   2216 Discussed with Dr. Simran Hazel, agrees with admission.    [KS]      ED Course User Index  [KS] Julianne Mir, DO  [SO] Indra Santos, DO       Counseling:  I have spoken with the patient and discussed todays results, in addition to providing specific details for the plan of care and counseling regarding the diagnosis and prognosis.   Their questions are answered at this time and they are agreeable with the plan of admission.    --------------------------------- ADDITIONAL PROVIDER NOTES ---------------------------------  Consultations:  Time: 2202. Spoke with Dr. John George. Discussed case. They will admit the patient. This patient's ED course included: a personal history and physicial examination, re-evaluation prior to disposition, multiple bedside re-evaluations, IV medications, cardiac monitoring, continuous pulse oximetry and complex medical decision making and emergency management    This patient has remained hemodynamically stable during their ED course. Diagnosis:  1. Septic shock ([address]    2. Acute respiratory failure with hypoxia (HCC)    3. Hyperammonemia ([facility]    4. Anasarca    5. Acute on chronic congestive heart failure, unspecified heart failure type ([facility] [address]    6. RONNY (acute kidney injury) ([facility] [address] [facility]        Disposition:  Patient's disposition: Admit to CCU/ICU  Patient's condition is serious. Critical care time: 35 minutes      MDM  Number of Diagnoses or Management Options  Acute on chronic congestive heart failure, unspecified heart failure type [facility]):   Acute respiratory failure with hypoxia ([facility] [address]):   RONNY (acute kidney injury) ([facility] [address]): Anasarca:   Hyperammonemia ([facility] [address]):   Septic shock [facility]):   Diagnosis management comments: Patient is a 55-year-old male who presents the ED for shortness of breath, and cough. Patient evaluated, was hypotensive, tachycardic, and septic in appearance. X-rays did show that he had evidence of acute on chronic congestive heart failure, was placed on BiPAP. Patient does have evidence of RONNY, as well as hyperammonemia. Patient was started on a small dose of IV fluids as the patient does have a history of CHF, and appeared fluid overloaded. Patient was also started on Bumex, and IV antibiotics. Central line was placed in the right IJ, in good position. Discussed with hospitalist, as well as intensivist, and was in agreement with admission. Patient's symptoms most likely related to pneumonia and septic shock.   Patient otherwise was responding to questions

## 2020-01-31 ENCOUNTER — APPOINTMENT (OUTPATIENT)
Dept: CT IMAGING | Age: 65
DRG: 720 | End: 2020-01-31
Payer: COMMERCIAL

## 2020-01-31 ENCOUNTER — APPOINTMENT (OUTPATIENT)
Dept: ULTRASOUND IMAGING | Age: 65
DRG: 720 | End: 2020-01-31
Payer: COMMERCIAL

## 2020-01-31 PROBLEM — D50.0 IRON DEFICIENCY ANEMIA DUE TO CHRONIC BLOOD LOSS: Status: ACTIVE | Noted: 2020-01-31

## 2020-01-31 LAB
ACINETOBACTER BAUMANNII BY PCR: DETECTED
ACINETOBACTER BAUMANNII BY PCR: NOT DETECTED
ALBUMIN SERPL-MCNC: 2.1 G/DL (ref 3.5–5.2)
ALP BLD-CCNC: 84 U/L (ref 40–129)
ALT SERPL-CCNC: 22 U/L (ref 0–40)
ANION GAP SERPL CALCULATED.3IONS-SCNC: 9 MMOL/L (ref 7–16)
ANISOCYTOSIS: ABNORMAL
APTT: 91 SEC (ref 24.5–35.1)
AST SERPL-CCNC: 55 U/L (ref 0–39)
BASOPHILIC STIPPLING: ABNORMAL
BASOPHILS ABSOLUTE: 0.03 E9/L (ref 0–0.2)
BASOPHILS RELATIVE PERCENT: 0.2 % (ref 0–2)
BILIRUB SERPL-MCNC: 3.9 MG/DL (ref 0–1.2)
BLOOD BANK DISPENSE STATUS: NORMAL
BLOOD BANK PRODUCT CODE: NORMAL
BOTTLE TYPE: ABNORMAL
BOTTLE TYPE: ABNORMAL
BPU ID: NORMAL
BUN BLDV-MCNC: 84 MG/DL (ref 8–23)
CALCIUM SERPL-MCNC: 8.7 MG/DL (ref 8.6–10.2)
CANDIDA ALBICANS BY PCR: NOT DETECTED
CANDIDA ALBICANS BY PCR: NOT DETECTED
CANDIDA GLABRATA BY PCR: NOT DETECTED
CANDIDA GLABRATA BY PCR: NOT DETECTED
CANDIDA KRUSEI BY PCR: NOT DETECTED
CANDIDA KRUSEI BY PCR: NOT DETECTED
CANDIDA PARAPSILOSIS BY PCR: NOT DETECTED
CANDIDA PARAPSILOSIS BY PCR: NOT DETECTED
CANDIDA TROPICALIS BY PCR: NOT DETECTED
CANDIDA TROPICALIS BY PCR: NOT DETECTED
CARBAPENEM RESISTANCE KPC BY PCR: NOT DETECTED
CHLORIDE BLD-SCNC: 88 MMOL/L (ref 98–107)
CO2: 36 MMOL/L (ref 22–29)
CREAT SERPL-MCNC: 2.2 MG/DL (ref 0.7–1.2)
DESCRIPTION BLOOD BANK: NORMAL
ENTEROBACTER CLOACAE COMPLEX BY PCR: NOT DETECTED
ENTEROBACTER CLOACAE COMPLEX BY PCR: NOT DETECTED
ENTEROBACTERALES BY PCR: NOT DETECTED
ENTEROBACTERALES BY PCR: NOT DETECTED
ENTEROCOCCUS BY PCR: NOT DETECTED
ENTEROCOCCUS BY PCR: NOT DETECTED
EOSINOPHILS ABSOLUTE: 0.29 E9/L (ref 0.05–0.5)
EOSINOPHILS RELATIVE PERCENT: 1.8 % (ref 0–6)
ESCHERICHIA COLI BY PCR: NOT DETECTED
ESCHERICHIA COLI BY PCR: NOT DETECTED
GFR AFRICAN AMERICAN: 37
GFR NON-AFRICAN AMERICAN: 30 ML/MIN/1.73
GLUCOSE BLD-MCNC: 114 MG/DL (ref 74–99)
HAEMOPHILUS INFLUENZAE BY PCR: NOT DETECTED
HAEMOPHILUS INFLUENZAE BY PCR: NOT DETECTED
HCT VFR BLD CALC: 18.2 % (ref 37–54)
HCT VFR BLD CALC: 23.6 % (ref 37–54)
HEMOGLOBIN: 6.2 G/DL (ref 12.5–16.5)
HEMOGLOBIN: 7.8 G/DL (ref 12.5–16.5)
IMMATURE GRANULOCYTES #: 0.12 E9/L
IMMATURE GRANULOCYTES %: 0.7 % (ref 0–5)
KLEBSIELLA OXYTOCA BY PCR: NOT DETECTED
KLEBSIELLA OXYTOCA BY PCR: NOT DETECTED
KLEBSIELLA PNEUMONIAE GROUP BY PCR: NOT DETECTED
KLEBSIELLA PNEUMONIAE GROUP BY PCR: NOT DETECTED
LISTERIA MONOCYTOGENES BY PCR: NOT DETECTED
LISTERIA MONOCYTOGENES BY PCR: NOT DETECTED
LYMPHOCYTES ABSOLUTE: 1.21 E9/L (ref 1.5–4)
LYMPHOCYTES RELATIVE PERCENT: 7.4 % (ref 20–42)
MACRO-OVALOCYTES: ABNORMAL
MAGNESIUM: 2 MG/DL (ref 1.6–2.6)
MCH RBC QN AUTO: 39.7 PG (ref 26–35)
MCHC RBC AUTO-ENTMCNC: 34.1 % (ref 32–34.5)
MCV RBC AUTO: 116.7 FL (ref 80–99.9)
METHICILLIN RESISTANCE MECA/C  BY PCR: DETECTED
MONOCYTES ABSOLUTE: 1.13 E9/L (ref 0.1–0.95)
MONOCYTES RELATIVE PERCENT: 6.9 % (ref 2–12)
NEISSERIA MENINGITIDIS BY PCR: NOT DETECTED
NEISSERIA MENINGITIDIS BY PCR: NOT DETECTED
NEUTROPHILS ABSOLUTE: 13.5 E9/L (ref 1.8–7.3)
NEUTROPHILS RELATIVE PERCENT: 83 % (ref 43–80)
ORDER NUMBER: ABNORMAL
ORDER NUMBER: ABNORMAL
PDW BLD-RTO: 15.9 FL (ref 11.5–15)
PHOSPHORUS: 4.7 MG/DL (ref 2.5–4.5)
PLATELET # BLD: 71 E9/L (ref 130–450)
PLATELET CONFIRMATION: NORMAL
PMV BLD AUTO: 9.1 FL (ref 7–12)
POIKILOCYTES: ABNORMAL
POTASSIUM SERPL-SCNC: 4 MMOL/L (ref 3.5–5)
PROTEUS BY PCR: NOT DETECTED
PROTEUS BY PCR: NOT DETECTED
PSEUDOMONAS AERUGINOSA BY PCR: NOT DETECTED
PSEUDOMONAS AERUGINOSA BY PCR: NOT DETECTED
RBC # BLD: 1.56 E12/L (ref 3.8–5.8)
SERRATIA MARCESCENS BY PCR: NOT DETECTED
SERRATIA MARCESCENS BY PCR: NOT DETECTED
SODIUM BLD-SCNC: 133 MMOL/L (ref 132–146)
SOURCE OF BLOOD CULTURE: ABNORMAL
SOURCE OF BLOOD CULTURE: ABNORMAL
STAPHYLOCOCCUS AUREUS BY PCR: NOT DETECTED
STAPHYLOCOCCUS AUREUS BY PCR: NOT DETECTED
STAPHYLOCOCCUS SPECIES BY PCR: DETECTED
STAPHYLOCOCCUS SPECIES BY PCR: NOT DETECTED
STREPTOCOCCUS AGALACTIAE BY PCR: NOT DETECTED
STREPTOCOCCUS AGALACTIAE BY PCR: NOT DETECTED
STREPTOCOCCUS PNEUMONIAE BY PCR: NOT DETECTED
STREPTOCOCCUS PNEUMONIAE BY PCR: NOT DETECTED
STREPTOCOCCUS PYOGENES  BY PCR: NOT DETECTED
STREPTOCOCCUS PYOGENES  BY PCR: NOT DETECTED
STREPTOCOCCUS SPECIES BY PCR: NOT DETECTED
STREPTOCOCCUS SPECIES BY PCR: NOT DETECTED
TOTAL PROTEIN: 6.7 G/DL (ref 6.4–8.3)
TROPONIN: 0.37 NG/ML (ref 0–0.03)
VANCOMYCIN RANDOM: 17.3 MCG/ML (ref 5–40)
WBC # BLD: 16.3 E9/L (ref 4.5–11.5)

## 2020-01-31 PROCEDURE — 70486 CT MAXILLOFACIAL W/O DYE: CPT

## 2020-01-31 PROCEDURE — 80053 COMPREHEN METABOLIC PANEL: CPT

## 2020-01-31 PROCEDURE — 85014 HEMATOCRIT: CPT

## 2020-01-31 PROCEDURE — 93005 ELECTROCARDIOGRAM TRACING: CPT | Performed by: INTERNAL MEDICINE

## 2020-01-31 PROCEDURE — 85018 HEMOGLOBIN: CPT

## 2020-01-31 PROCEDURE — 83735 ASSAY OF MAGNESIUM: CPT

## 2020-01-31 PROCEDURE — 6360000002 HC RX W HCPCS: Performed by: SPECIALIST

## 2020-01-31 PROCEDURE — 85730 THROMBOPLASTIN TIME PARTIAL: CPT

## 2020-01-31 PROCEDURE — 6360000002 HC RX W HCPCS: Performed by: INTERNAL MEDICINE

## 2020-01-31 PROCEDURE — 85025 COMPLETE CBC W/AUTO DIFF WBC: CPT

## 2020-01-31 PROCEDURE — 93971 EXTREMITY STUDY: CPT

## 2020-01-31 PROCEDURE — 84100 ASSAY OF PHOSPHORUS: CPT

## 2020-01-31 PROCEDURE — 80202 ASSAY OF VANCOMYCIN: CPT

## 2020-01-31 PROCEDURE — 36415 COLL VENOUS BLD VENIPUNCTURE: CPT

## 2020-01-31 PROCEDURE — 6370000000 HC RX 637 (ALT 250 FOR IP): Performed by: SPECIALIST

## 2020-01-31 PROCEDURE — 2000000000 HC ICU R&B

## 2020-01-31 PROCEDURE — 2580000003 HC RX 258: Performed by: INTERNAL MEDICINE

## 2020-01-31 PROCEDURE — 99232 SBSQ HOSP IP/OBS MODERATE 35: CPT | Performed by: INTERNAL MEDICINE

## 2020-01-31 PROCEDURE — 85027 COMPLETE CBC AUTOMATED: CPT

## 2020-01-31 PROCEDURE — 6370000000 HC RX 637 (ALT 250 FOR IP): Performed by: INTERNAL MEDICINE

## 2020-01-31 PROCEDURE — 2580000003 HC RX 258: Performed by: SPECIALIST

## 2020-01-31 PROCEDURE — 99233 SBSQ HOSP IP/OBS HIGH 50: CPT | Performed by: FAMILY MEDICINE

## 2020-01-31 PROCEDURE — 2500000003 HC RX 250 WO HCPCS: Performed by: INTERNAL MEDICINE

## 2020-01-31 PROCEDURE — 94660 CPAP INITIATION&MGMT: CPT

## 2020-01-31 PROCEDURE — 87040 BLOOD CULTURE FOR BACTERIA: CPT

## 2020-01-31 PROCEDURE — 84484 ASSAY OF TROPONIN QUANT: CPT

## 2020-01-31 RX ORDER — LACTULOSE 10 G/15ML
30 SOLUTION ORAL 2 TIMES DAILY
Status: DISCONTINUED | OUTPATIENT
Start: 2020-01-31 | End: 2020-02-12 | Stop reason: HOSPADM

## 2020-01-31 RX ORDER — HYDROMORPHONE HCL 110MG/55ML
0.3 PATIENT CONTROLLED ANALGESIA SYRINGE INTRAVENOUS ONCE
Status: COMPLETED | OUTPATIENT
Start: 2020-01-31 | End: 2020-01-31

## 2020-01-31 RX ORDER — LEVOFLOXACIN 750 MG/1
750 TABLET ORAL EVERY OTHER DAY
Status: DISCONTINUED | OUTPATIENT
Start: 2020-01-31 | End: 2020-02-02

## 2020-01-31 RX ORDER — SODIUM CHLORIDE 9 MG/ML
25 INJECTION, SOLUTION INTRAVENOUS EVERY 12 HOURS
Status: DISCONTINUED | OUTPATIENT
Start: 2020-02-01 | End: 2020-02-01

## 2020-01-31 RX ORDER — SODIUM CHLORIDE 0.9 % (FLUSH) 0.9 %
SYRINGE (ML) INJECTION
Status: DISPENSED
Start: 2020-01-31 | End: 2020-01-31

## 2020-01-31 RX ORDER — 0.9 % SODIUM CHLORIDE 0.9 %
250 INTRAVENOUS SOLUTION INTRAVENOUS ONCE
Status: DISCONTINUED | OUTPATIENT
Start: 2020-01-31 | End: 2020-01-31

## 2020-01-31 RX ADMIN — SODIUM CHLORIDE: 9 INJECTION, SOLUTION INTRAVENOUS at 00:42

## 2020-01-31 RX ADMIN — HEPARIN SODIUM 1000 UNITS/HR: 10000 INJECTION, SOLUTION INTRAVENOUS at 12:53

## 2020-01-31 RX ADMIN — ASPIRIN 81 MG 81 MG: 81 TABLET ORAL at 08:16

## 2020-01-31 RX ADMIN — LEVOFLOXACIN 750 MG: 750 TABLET, FILM COATED ORAL at 15:45

## 2020-01-31 RX ADMIN — NOREPINEPHRINE BITARTRATE 7 MCG/MIN: 1 INJECTION INTRAVENOUS at 18:15

## 2020-01-31 RX ADMIN — SODIUM CHLORIDE: 9 INJECTION, SOLUTION INTRAVENOUS at 08:12

## 2020-01-31 RX ADMIN — RIFAXIMIN 550 MG: 550 TABLET ORAL at 22:00

## 2020-01-31 RX ADMIN — VANCOMYCIN HYDROCHLORIDE 1250 MG: 10 INJECTION, POWDER, LYOPHILIZED, FOR SOLUTION INTRAVENOUS at 09:50

## 2020-01-31 RX ADMIN — HYDROCORTISONE SODIUM SUCCINATE 50 MG: 100 INJECTION, POWDER, FOR SOLUTION INTRAMUSCULAR; INTRAVENOUS at 15:45

## 2020-01-31 RX ADMIN — CEFEPIME HYDROCHLORIDE 2 G: 2 INJECTION, POWDER, FOR SOLUTION INTRAVENOUS at 11:19

## 2020-01-31 RX ADMIN — HYDROCORTISONE SODIUM SUCCINATE 50 MG: 100 INJECTION, POWDER, FOR SOLUTION INTRAMUSCULAR; INTRAVENOUS at 10:29

## 2020-01-31 RX ADMIN — CEFEPIME HYDROCHLORIDE 2 G: 2 INJECTION, POWDER, FOR SOLUTION INTRAVENOUS at 03:31

## 2020-01-31 RX ADMIN — HYDROMORPHONE HYDROCHLORIDE 0.03 MG: 2 INJECTION, SOLUTION INTRAMUSCULAR; INTRAVENOUS; SUBCUTANEOUS at 10:26

## 2020-01-31 RX ADMIN — HYDROCORTISONE SODIUM SUCCINATE 50 MG: 100 INJECTION, POWDER, FOR SOLUTION INTRAMUSCULAR; INTRAVENOUS at 21:58

## 2020-01-31 RX ADMIN — RIFAXIMIN 550 MG: 550 TABLET ORAL at 15:45

## 2020-01-31 RX ADMIN — AMPICILLIN SODIUM AND SULBACTAM SODIUM 3 G: 1; .5 INJECTION, POWDER, FOR SOLUTION INTRAMUSCULAR; INTRAVENOUS at 21:58

## 2020-01-31 RX ADMIN — LACTULOSE 30 G: 20 SOLUTION ORAL at 21:58

## 2020-01-31 RX ADMIN — SODIUM CHLORIDE: 9 INJECTION, SOLUTION INTRAVENOUS at 10:42

## 2020-01-31 RX ADMIN — AMPICILLIN SODIUM AND SULBACTAM SODIUM 3 G: 1; .5 INJECTION, POWDER, FOR SOLUTION INTRAMUSCULAR; INTRAVENOUS at 15:38

## 2020-01-31 ASSESSMENT — PAIN SCALES - GENERAL
PAINLEVEL_OUTOF10: 0
PAINLEVEL_OUTOF10: 4
PAINLEVEL_OUTOF10: 0

## 2020-01-31 ASSESSMENT — PAIN DESCRIPTION - ONSET: ONSET: SUDDEN

## 2020-01-31 ASSESSMENT — PAIN DESCRIPTION - LOCATION: LOCATION: CHEST

## 2020-01-31 ASSESSMENT — PAIN DESCRIPTION - DESCRIPTORS: DESCRIPTORS: DISCOMFORT;CONSTANT

## 2020-01-31 ASSESSMENT — PAIN DESCRIPTION - FREQUENCY: FREQUENCY: CONTINUOUS

## 2020-01-31 ASSESSMENT — PAIN DESCRIPTION - ORIENTATION: ORIENTATION: MID

## 2020-01-31 ASSESSMENT — PAIN DESCRIPTION - PAIN TYPE: TYPE: ACUTE PAIN

## 2020-01-31 NOTE — CONSULTS
Inpatient Cardiology Consultation      Reason for Consult: chf    Consulting [de-identified]    Requesting Physician: Dr Clare Chew    Date of Consultation: 1/30/2020    HISTORY OF PRESENT ILLNESS:   This is a 77-year-old male appearing older than his stated age who was admitted to the ICU with hypotension multiple comorbidities including hepatic encephalopathy alcoholic cirrhosis hypertension acute respiratory failure secondary to pneumonia and heart failure as well as elevated troponin is likely secondary to demand ischemia. Patient resides at a nursing home. He is currently being treated with fluids antibiotics and is on levophed for hypotension. Please note: past medical records were reviewed per electronic medical record (EMR) - see detailed reports under Past Medical/ Surgical History. Past Medical History:    Past Medical History:   Diagnosis Date    Acute diastolic CHF (congestive heart failure) ([facility] [address] 11/17/15    Echo 11/18/15 EF 73%    Dermatitis     due to thimersol    Heart valve problem     leaky    Hx of cardiovascular stress test 12/9/2015    Lexiscan    Hypertension     Obesity        Past Surgical History:    Past Surgical History:   Procedure Laterality Date    CARPAL TUNNEL RELEASE      HERNIA REPAIR      mesh in abd    NECK SURGERY      UPPER GASTROINTESTINAL ENDOSCOPY N/A 4/30/2019    EGD ESOPHAGOGASTRODUODENOSCOPY performed by Wen Matthews MD at [facility]       Medications Prior to admit:  Prior to Admission medications    Medication Sig Start Date End Date Taking?  Authorizing Provider   bumetanide (BUMEX) 1 MG tablet Take 1 tablet by mouth 2 times daily 12/27/19   Jenny Mcneil, DO   lactulose [facility]) 10 GM/15ML solution Take 30 mLs by mouth 2 times daily 12/27/19   Jenny Mcneil,    acetaminophen (TYLENOL) 500 MG tablet Take 1 tablet by mouth every 6 hours as needed for Pain 12/24/19   Jenny Mcneil, DO   ipratropium-albuterol (DUONEB) 0.5-2.5 (3) intermittent dosing (placeholder), , Other, RX Placeholder  0.9 % sodium chloride infusion, , Intravenous, Continuous  sodium chloride flush 0.9 % injection, , ,   0.9 % sodium chloride bolus, 250 mL, Intravenous, Once    Allergies:  Latex; Aldactone [spironolactone]; and Thimerosal    Social History:    Social History     Socioeconomic History    Marital status: Single     Spouse name: Not on file    Number of children: Not on file    Years of education: Not on file    Highest education level: Not on file   Occupational History    Not on file   Social Needs    Financial resource strain: Not on file    Food insecurity:     Worry: Not on file     Inability: Not on file    Transportation needs:     Medical: Not on file     Non-medical: Not on file   Tobacco Use    Smoking status: Never Smoker    Smokeless tobacco: Never Used   Substance and Sexual Activity    Alcohol use:  Yes    Drug use: No    Sexual activity: Not on file   Lifestyle    Physical activity:     Days per week: Not on file     Minutes per session: Not on file    Stress: Not on file   Relationships    Social connections:     Talks on phone: Not on file     Gets together: Not on file     Attends Zoroastrianism service: Not on file     Active member of club or organization: Not on file     Attends meetings of clubs or organizations: Not on file     Relationship status: Not on file    Intimate partner violence:     Fear of current or ex partner: Not on file     Emotionally abused: Not on file     Physically abused: Not on file     Forced sexual activity: Not on file   Other Topics Concern    Not on file   Social History Narrative    Not on file       Family History:   Family History   Problem Relation Age of Onset    Parkinsonism Mother     COPD Father          REVIEW OF SYSTEMS:     · Constitutional: Denies fevers, chills, night sweats, and fatigue  · HEENT: Denies headaches, nose bleeds, and blurred vision,oral pain, abscess or lesion. · Musculoskeletal: Denies falls, pain to BLE with ambulation and edema to BLE. · Neurological: Denies dizziness and lightheadedness, numbness and tingling  · Cardiovascular: Denies chest pain, palpitations, and feelings of heart racing. · Respiratory: Denies orthopnea and PND  · Gastrointestinal: Denies heartburn, nausea/vomiting, diarrhea and constipation, black/bloody, and tarry stools. · Genitourinary: Denies dysuria and hematuria  · Hematologic: Denies excessive bruising or bleeding      PHYSICAL EXAM:   BP (!) 109/41   Pulse 89   Temp 97 °F (36.1 °C) (Core)   Resp 16   Ht 5' 9\" (1.753 m)   Wt (!) 341 lb 1.6 oz (154.7 kg)   SpO2 98%   BMI 50.37 kg/m²   CONST:  Well developed, obese who appears  Older than stated age. On bipap and difficult to arouse  apparent distress  HEENT:   Head- Normocephalic, atraumatic   Eyes- Conjunctivae pink, icteric  Throat- Oral mucosa pink and moist  Neck-  No stridor, trachea midline, no jugular venous distention. No adenopathy   CHEST: Chest symmetrical and non-tender to palpation. No accessory muscle use or intercostal retractions  RESPIRATORY: Lung sounds   Decrease breath sounds   CARDIOVASCULAR:     No carotid bruit  Heart Inspection- shows no noted pulsations  Heart Palpation- no heaves or thrills; PMI is non-displaced   Heart Ausculation- Regular rate and rhythm, no murmur. No s3, s4 or rub   PV: extremity edema.  4 +No varicosities. Pedal pulses palpable, no clubbing or cyanosis   ABDOMEN: Soft, non-tender to light palpation. Bowel sounds present.  No palpable masses no organomegaly; no abdominal bruit  MS: not tested  : Deferred  SKIN: Warm and dry , statis dermatitis 4+ edema    NEURO / PSYCH: lethargic .difficult to arouse  DATA:    ECG:Sinus tachycardia  Otherwise normal ECG  When compared with ECG of 29-DEC-2019 15:50,  WY interval has decreased  Nonspecific T wave abnormality no longer evident in Inferior leads  Confirmed by Landon Thornton ([id number]) on 1/30/2020 4:53:12 PM  25mm/s 10mm/mV 40Hz 9.0.5 12SL 241 HD KASEY: 169  Referred by: [facility] Confirmed By:  Tele strips:   Diagnostic:      Intake/Output Summary (Last 24 hours) at 1/30/2020 2056  Last data filed at 1/30/2020 1800  Gross per 24 hour   Intake 973 ml   Output 1110 ml   Net -137 ml       Labs:   CBC:   Recent Labs     01/30/20  0610 01/30/20  1100   WBC 23.1* 25.0*   HGB 7.1* 7.0*   HCT 21.2* 21.2*   PLT 74* 91*     BMP:   Recent Labs     01/29/20 2030 01/30/20  0610    132   K 4.1 4.4   CO2 34* 33*   BUN 77* 82*   CREATININE 1.9* 2.1*   LABGLOM 36 32   CALCIUM 9.1 8.8     Mag:   Recent Labs     01/29/20 2030 01/30/20  0610   MG 2.0 1.9     Phos:   Recent Labs     01/30/20  0610   PHOS 4.4     TSH: No results for input(s): TSH in the last 72 hours. HgA1c:   Lab Results   Component Value Date    LABA1C 4.7 12/19/2019     No results found for: EAG  BNP: No results for input(s): BNP in the last 72 hours. PT/INR:   Recent Labs     01/29/20  2030   PROTIME 22.0*   INR 1.9     APTT:  Recent Labs     01/30/20  1100 01/30/20  1750   APTT 39.6* 104.3*     CARDIAC ENZYMES:  Recent Labs     01/30/20  0830 01/30/20  1450 01/30/20  1750   TROPONINI 0.37* 0.38* 0.36*     FASTING LIPID PANEL:  Lab Results   Component Value Date    CHOL 95 12/19/2019    HDL 39 12/19/2019    LDLCALC 46 12/19/2019    TRIG 49 12/19/2019     LIVER PROFILE:  Recent Labs     01/29/20 2030 01/30/20  0610   AST 57* 67*   ALT 23 25   LABALBU 2.4* 2.4*         ASSESSMENT:  Hypotension/septic shock likely secondary to multiple etiologic factors tach acidosis currently  on vasopressors    Acute on chronic systolic diastolic heart failure. Chest x-ray showing pleural effusion and pulmonary congestion. 2D echocardiogram pending   type II MI secondary to demand ischemia.   Note acute on chronic anemia    Morbid obesity    Alcoholic cirrhosis with hepatic encephalopathy and anasarca    Acute on chronic anemia    Acute kidney injury    PLAN:  Continue supportive care ,antibiotics IV fluids and vasopressors. Transfuse as needed particularly in light of abnormal cardiac enzymes  To review 2D echocardiogram however patient's comorbidities  No further cardiac work-up is planned at this time.   Will follow as clinically warranted      Electronically signed by Arie Yusuf DO on 1/30/2020 at 8:56 PM

## 2020-01-31 NOTE — PROGRESS NOTES
1475 ml   Net 1270.77 ml       Labs:   CBC:   Recent Labs     01/30/20  1100 01/31/20  0535   WBC 25.0* 16.3*   HGB 7.0* 6.2*   HCT 21.2* 18.2*   PLT 91* 71*     BMP:   Recent Labs     01/30/20  0610 01/31/20  0535    133   K 4.4 4.0   CO2 33* 36*   BUN 82* 84*   CREATININE 2.1* 2.2*   LABGLOM 32 30   CALCIUM 8.8 8.7     Mag:   Recent Labs     01/30/20  0610 01/31/20  0535   MG 1.9 2.0     Phos:   Recent Labs     01/30/20  0610 01/31/20  0535   PHOS 4.4 4.7*     HgA1c:   Lab Results   Component Value Date    LABA1C 4.7 12/19/2019     PT/INR:   Recent Labs     01/29/20  2030   PROTIME 22.0*   INR 1.9     APTT:  Recent Labs     01/30/20  1750 01/31/20  0535   APTT 104.3* 91.0*     CARDIAC ENZYMES:  Recent Labs     01/30/20  1450 01/30/20  1750 01/31/20  0535   TROPONINI 0.38* 0.36* 0.37*     FASTING LIPID PANEL:  Lab Results   Component Value Date    CHOL 95 12/19/2019    HDL 39 12/19/2019    LDLCALC 46 12/19/2019    TRIG 49 12/19/2019     LIVER PROFILE:  Recent Labs     01/30/20  0610 01/31/20  0535   AST 67* 55*   ALT 25 22   LABALBU 2.4* 2.1*         ASSESSMENT:  1. Septic shock due to bacteremia, HCAP and UTI requiring pressor support  2. Acute on chronic diastolic heart failure  3. Acute hypoxic respiratory failure  4. Elevated troponin, likely type II NSTEMI due to demand ischemia given acute anemia, septic shock with bacteremia, RONNY and acute on chronic diastolic heart failure. 5. Morbid obesity. 6. Elevated LFTs. 7. Hepatic encephalopathy. Alcoholic liver cirrhosis with anasarca. 8. Acute on chronic anemia, now requiring blood transfusions. 9. Acute kidney injury      PLAN:  1. Continue IV diuresis. 2D echocardiogram results pending. 2. Continue supportive care, antibiotic therapy, critical care management per critical care team. ID following. 3. No aggressive cardiac work-up planned at this time. 4. Will continue to follow.         Electronically signed by Myra Jesus DO on 1/31/2020 at 2:22

## 2020-01-31 NOTE — PROGRESS NOTES
dose)    Plan:  · Re-dose vancomycin 1250 mg IV x 1  · Pt is in renal failure  · Random AM level tomorrow  · Follow renal function  · Pharmacist will follow and monitor/adjust dosing as necessary      Thank you for the consult,    Teofilo Mcelroy PharmD, BCPS 1/31/2020 8:43 AM   Pager: [phone]  Ext: [phone]

## 2020-01-31 NOTE — CONSULTS
++ CHF  ENDOCRINE:    No increase thirst, urination   HEME-LYMPH:   No easy bruising or bleeding  GI:     No nausea, vomiting or diarrhea  :     No urinary complaints  NEURO:    No seizures, stroke, HA  MUSCULOSKELETAL:  No muscle aches or pain, no jointpain  SKIN:     No rash or itch  PSYCH:    No depression or anxiety    CURRENT MEDICATIONS     Current Facility-Administered Medications:     sodium chloride flush 0.9 % injection, , , ,     hydrocortisone sodium succinate PF (SOLU-CORTEF) injection 50 mg, 50 mg, Intravenous, Q6H, Dipika Finch MD, 50 mg at 01/31/20 1029    norepinephrine (LEVOPHED) 16 mg in dextrose 5 % 250 mL infusion, 2 mcg/min, Intravenous, Continuous, Dipika Finch MD, Last Rate: 10.3 mL/hr at 01/31/20 1048, 11 mcg/min at 01/31/20 1048    aspirin chewable tablet 81 mg, 81 mg, Oral, Daily, Dipika Finch MD, 81 mg at 01/31/20 0816    perflutren lipid microspheres (DEFINITY) injection 1.65 mg, 1.5 mL, Intravenous, ONCE PRN, Dipika Finch MD    heparin (porcine) injection 4,000 Units, 4,000 Units, Intravenous, PRN, Dipika Finch MD    heparin (porcine) injection 2,000 Units, 2,000 Units, Intravenous, PRN, Dipika Finch MD    heparin 25,000 units in dextrose 5% 250 mL infusion, 1,000 Units/hr, Intravenous, Continuous, Dipika Finch MD, Last Rate: 10 mL/hr at 01/31/20 1253, 1,000 Units/hr at 01/31/20 1253    cefepime (MAXIPIME) 2 g IVPB extended (mini-bag), 2 g, Intravenous, Q8H, Last Rate: 12.5 mL/hr at 01/31/20 1119, 2 g at 01/31/20 1119 **AND** 0.9 % sodium chloride infusion, , Intravenous, Q8H, Dipika Finch MD, Stopped at 01/31/20 1025    vancomycin (VANCOCIN) intermittent dosing (placeholder), , Other, RX Placeholder, Dipika Finch MD    0.9 % sodium chloride infusion, , Intravenous, Continuous, Dipika Finch MD, Last Rate: 100 mL/hr at 01/31/20 1042    0.9 % sodium chloride bolus, 250 mL, Intravenous, Once, Sabrina Speaks, DO  ALLERGIES     Latex; Aldactone [spironolactone]; and Thimerosal    There is no immunization history on file for this patient. PAST MEDICAL HISTORY     Past Medical History:   Diagnosis Date    Acute diastolic CHF (congestive heart failure) ([facility] 11/17/15    Echo 11/18/15 EF 73%    Dermatitis     due to thimersol    Heart valve problem     leaky    Hx of cardiovascular stress test 12/9/2015    Lexiscan    Hypertension     Obesity      SURGICAL HISTORY       Past Surgical History:   Procedure Laterality Date    CARPAL TUNNEL RELEASE      HERNIA REPAIR      mesh in abd    NECK SURGERY      UPPER GASTROINTESTINAL ENDOSCOPY N/A 4/30/2019    EGD ESOPHAGOGASTRODUODENOSCOPY performed by Luis Stone MD at [address] HISTORY       Family History   Problem Relation Age of Onset    Parkinsonism Mother     COPD Father      SOCIAL HISTORY       Social History     Socioeconomic History    Marital status: Single     Spouse name: Not on file    Number of children: Not on file    Years of education: Not on file    Highest education level: Not on file   Occupational History    Not on file   Social Needs    Financial resource strain: Not on file    Food insecurity:     Worry: Not on file     Inability: Not on file    Transportation needs:     Medical: Not on file     Non-medical: Not on file   Tobacco Use    Smoking status: Never Smoker    Smokeless tobacco: Never Used   Substance and Sexual Activity    Alcohol use:  Yes    Drug use: No    Sexual activity: Not on file   Lifestyle    Physical activity:     Days per week: Not on file     Minutes per session: Not on file    Stress: Not on file   Relationships    Social connections:     Talks on phone: Not on file     Gets together: Not on file     Attends [employer] service: Not on file     Active member of club or organization: Not on file     Attends meetings of clubs or There is no evidence of pneumothorax. There is lower cervical fusion hardware. 1. Right internal jugular catheter tip in appropriate position. No pneumothorax. 2. Stable moderate right pleural effusion with bilateral perihilar airspace disease. Xr Chest Portable    Result Date: 1/29/2020  Reading location: [facility] INDICATION: Fever FINDINGS: Portable AP semiupright view the chest compared 12/29/2019. Cardiac enlargement. Suboptimal inspiration. Distended pulmonary vessels. Extensive bilateral pleural-parenchymal opacities, right greater than left appears similar to prior exam.     Extensive bilateral airspace disease and pleural effusions. Possible congestive heart failure.     LABS  Recent Labs     01/30/20  0610 01/30/20  1100 01/31/20  0535   WBC 23.1* 25.0* 16.3*   HGB 7.1* 7.0* 6.2*   HCT 21.2* 21.2* 18.2*   .8* 115.8* 116.7*   PLT 74* 91* 71*     Recent Labs     01/29/20 2030 01/30/20  0610 01/31/20  0535    132 133   K 4.1 4.4 4.0   CL 88* 86* 88*   CO2 34* 33* 36*   BUN 77* 82* 84*   CREATININE 1.9* 2.1* 2.2*   GFRAA 43 39 37   LABGLOM 36 32 30   GLUCOSE 79 134* 114*   PROT 7.3 7.7 6.7   LABALBU 2.4* 2.4* 2.1*   CALCIUM 9.1 8.8 8.7   BILITOT 4.5* 5.3* 3.9*   ALKPHOS 101 103 84   AST 57* 67* 55*   ALT 23 25 22     Lab Results   Component Value Date    HEPAIGM Non-Reactive 12/19/2019    HEPBIGM Non-Reactive 12/19/2019    HCVABI Non-Reactive 12/19/2019     Hep C Ab Interp   Date Value Ref Range Status   12/19/2019 Non-Reactive NON REACT Final       MICROBIOLOGY:  Cultures :   Lab Results   Component Value Date    [facility]  01/29/2020     Gram stain performed from blood culture bottle media  Gram positive cocci in clusters  Gram negative rods      BC 5 Days- no growth 08/19/2019    BC  08/06/2019     Susceptibility testing for this isolate is restricted to  normally sterile sources and is a reference laboratory  referral.  Please contact the laboratory to discuss further testing  options if clinically indicated. [facility]  08/06/2019     No antibiotic susceptibility studies performed. Plates will be held 10  days. Contact the laboratory, [phone], if further workup is  desired. [facility]  08/06/2019     No antibiotic susceptibility studies performed. Plates will be held 10  days. Contact the laboratory, [phone], if further workup is  desired. Second morphology      ORG Corynebacterium species 08/06/2019    ORG Coagulase Negative Staphylococci 08/06/2019    ORG Coagulase Negative Staphylococci 08/06/2019     Lab Results   Component Value Date    BLOODCULT2  01/29/2020     Gram stain performed from blood culture bottle media  Gram positive cocci in clusters  Gram negative rods      BLOODCULT2 5 Days- no growth 08/19/2019    ORG Corynebacterium species 08/06/2019    ORG Coagulase Negative Staphylococci 08/06/2019    ORG Coagulase Negative Staphylococci 08/06/2019     URINE CULTURE  Urine Culture, Routine   Date Value Ref Range Status   01/30/2020 <10,000 CFU/mL  Mixed gram negative rods    Preliminary   08/19/2019   Final    ,000 CFU/mL  Mixed fauzia isolated. Further workup and sensitivity testing  is not routinely indicated and will not be performed. Mixed fauzia isolated includes:  Mixed gram positive organisms     03/02/2017 Growth not present  Final     MRSA Culture Only   Date Value Ref Range Status   01/30/2020 Methicillin resistant Staph aureus not isolated  Preliminary   02/24/2017 Methicillin resistant Staph aureus not isolated  Final     Patient is a 59 y.o. male who presented with   Chief Complaint   Patient presents with    Shortness of Breath      FINAL IMPRESSION      1. Septic shock ([address]    2. Acute respiratory failure with hypoxia (HCC)    3. Hyperammonemia ([address]    4. Anasarca    5. Acute on chronic congestive heart failure, unspecified heart failure type ([address]    6. RONNY (acute kidney injury) ([facility] [address]    · Bacteremia   - GNR and GPC  · ?  Possible dental infection   · R/o pneumonia

## 2020-01-31 NOTE — PROGRESS NOTES
Transported patient from ICU to CT scan and back again using Twila portable ventilator. Unit was set-up in PCV-AC mode. No problems encountered. Total time 45 minutes.

## 2020-01-31 NOTE — CONSULTS
CRITICAL CARE PROGRESS NOTE    The patient's case was discussed in multidisciplinary rounds including critical care specialist, nursing, RT and pharmacy. His evaluation is as follows:     59year old man with PMH of HTN, CHF, cirrhosis, hepatic encephalopathy, nursing home resident, admitted to ICU for management of acute hypoxemic respiratory failure due to pneumonia and pulmonary edema, as well as type 2 AMI vs NSTEMI.        --Mr Bernie Cade is bacteremic with CNS and Acinetobacter baumanii  --This morning had chest pain, his hemoglobin is down to 6, no EKG changes --> Receiving transfusion    Last 3 CMP:    Recent Labs     01/29/20 2030 01/30/20  0610 01/31/20  0535    132 133   K 4.1 4.4 4.0   CL 88* 86* 88*   CO2 34* 33* 36*   BUN 77* 82* 84*   CREATININE 1.9* 2.1* 2.2*   GLUCOSE 79 134* 114*   CALCIUM 9.1 8.8 8.7   PROT 7.3 7.7 6.7   LABALBU 2.4* 2.4* 2.1*   BILITOT 4.5* 5.3* 3.9*   ALKPHOS 101 103 84   AST 57* 67* 55*   ALT 23 25 22     Recent Labs     01/31/20  0535   WBC 16.3*   RBC 1.56*   HGB 6.2*   HCT 18.2*   .7*   MCH 39.7*   MCHC 34.1   RDW 15.9*   PLT 71*   MPV 9.1     Recent Labs     01/29/20 2015   BC Gram stain performed from blood culture bottle media  Gram positive cocci in clusters  Gram negative rods  *       Recent Labs     01/29/20 2030   BLOODCULT2 Gram stain performed from blood culture bottle media  Gram positive cocci in clusters  Gram negative rods  *       24 HR INTAKE/OUTPUT:      Intake/Output Summary (Last 24 hours) at 1/31/2020 1339  Last data filed at 1/31/2020 1300  Gross per 24 hour   Intake 2884.77 ml   Output 1375 ml   Net 1509.77 ml     MEDICATIONS:   sodium chloride flush        hydrocortisone sodium succinate PF  50 mg Intravenous Q6H    cefepime  2 g Intravenous Q12H    aspirin  81 mg Oral Daily    vancomycin (VANCOCIN) intermittent dosing (placeholder)   Other RX Placeholder    sodium chloride  250 mL Intravenous Once      sodium chloride      norepinephrine 11 mcg/min (01/31/20 1048)    heparin (porcine) 1,000 Units/hr (01/31/20 1253)    sodium chloride 100 mL/hr at 01/31/20 1042     perflutren lipid microspheres, heparin (porcine), heparin (porcine)    OBJECTIVE:  Vitals:    01/31/20 1330   BP: (!) 121/52   Pulse: 82   Resp: 10   Temp:    SpO2: 97%     FiO2 : 4 %  O2 Flow Rate (L/min): 50 L/min  O2 Device: PAP (positive airway pressure)    LABS:  WBC   Date Value Ref Range Status   01/31/2020 16.3 (H) 4.5 - 11.5 E9/L Final   01/30/2020 25.0 (H) 4.5 - 11.5 E9/L Final   01/30/2020 23.1 (H) 4.5 - 11.5 E9/L Final     Hemoglobin   Date Value Ref Range Status   01/31/2020 6.2 (L) 12.5 - 16.5 g/dL Final   01/30/2020 7.0 (L) 12.5 - 16.5 g/dL Final   01/30/2020 7.1 (L) 12.5 - 16.5 g/dL Final     Hematocrit   Date Value Ref Range Status   01/31/2020 18.2 (L) 37.0 - 54.0 % Final   01/30/2020 21.2 (L) 37.0 - 54.0 % Final   01/30/2020 21.2 (L) 37.0 - 54.0 % Final     MCV   Date Value Ref Range Status   01/31/2020 116.7 (H) 80.0 - 99.9 fL Final   01/30/2020 115.8 (H) 80.0 - 99.9 fL Final   01/30/2020 117.8 (H) 80.0 - 99.9 fL Final     Platelets   Date Value Ref Range Status   01/31/2020 71 (L) 130 - 450 E9/L Final   01/30/2020 91 (L) 130 - 450 E9/L Final   01/30/2020 74 (L) 130 - 450 E9/L Final     Sodium   Date Value Ref Range Status   01/31/2020 133 132 - 146 mmol/L Final   01/30/2020 132 132 - 146 mmol/L Final   01/29/2020 136 132 - 146 mmol/L Final     Potassium   Date Value Ref Range Status   01/31/2020 4.0 3.5 - 5.0 mmol/L Final   01/30/2020 4.4 3.5 - 5.0 mmol/L Final   01/29/2020 4.1 3.5 - 5.0 mmol/L Final     Potassium reflex Magnesium   Date Value Ref Range Status   08/08/2019 3.3 (L) 3.5 - 5.0 mmol/L Final   08/06/2019 3.9 3.5 - 5.0 mmol/L Final     Chloride   Date Value Ref Range Status   01/31/2020 88 (L) 98 - 107 mmol/L Final   01/30/2020 86 (L) 98 - 107 mmol/L Final   01/29/2020 88 (L) 98 - 107 mmol/L Final     CO2   Date Value Ref Range Status confirm the physical findings described above,  And that I reviewed the relevant imaging studies and available reports. I also discussed the differential diagnosis and all of the proposed management plans with the patient and individuals accompanying the patient to this visit. They had the opportunity to ask questions about the proposed management plans and to have those questions answered. This patient has a high probability of sudden, clinically significant deterioration, which requires the highest level of physician preparedness to intervene urgently. I managed/supervised life or organ supporting interventions that required frequent physician assessment. I devoted my full attention to the direct care of this patient for the amount of time indicated below. Time I spent with the family or surrogate(s) is included only if the patient was incapable of providing the necessary information or participating in medical decisions - Time devoted to teaching and to any procedures I billed separately is not included.      CRITICAL CARE TIME:  30 minutes    Scott Wood MD  Pulmonary and Critical Care Medicine

## 2020-02-01 ENCOUNTER — APPOINTMENT (OUTPATIENT)
Dept: GENERAL RADIOLOGY | Age: 65
DRG: 720 | End: 2020-02-01
Payer: COMMERCIAL

## 2020-02-01 PROBLEM — R65.21 SEPTIC SHOCK DUE TO UNDETERMINED ORGANISM (HCC): Status: ACTIVE | Noted: 2020-02-01

## 2020-02-01 PROBLEM — A41.9 SEPTIC SHOCK DUE TO UNDETERMINED ORGANISM (HCC): Status: ACTIVE | Noted: 2020-02-01

## 2020-02-01 LAB
ALBUMIN SERPL-MCNC: 2.3 G/DL (ref 3.5–5.2)
ALP BLD-CCNC: 92 U/L (ref 40–129)
ALT SERPL-CCNC: 23 U/L (ref 0–40)
ANION GAP SERPL CALCULATED.3IONS-SCNC: 11 MMOL/L (ref 7–16)
ANISOCYTOSIS: ABNORMAL
APTT: 38 SEC (ref 24.5–35.1)
AST SERPL-CCNC: 56 U/L (ref 0–39)
BASOPHILIC STIPPLING: ABNORMAL
BASOPHILS ABSOLUTE: 0 E9/L (ref 0–0.2)
BASOPHILS RELATIVE PERCENT: 0.2 % (ref 0–2)
BILIRUB SERPL-MCNC: 5.6 MG/DL (ref 0–1.2)
BUN BLDV-MCNC: 85 MG/DL (ref 8–23)
CALCIUM SERPL-MCNC: 8.7 MG/DL (ref 8.6–10.2)
CHLORIDE BLD-SCNC: 88 MMOL/L (ref 98–107)
CO2: 33 MMOL/L (ref 22–29)
CREAT SERPL-MCNC: 1.9 MG/DL (ref 0.7–1.2)
EOSINOPHILS ABSOLUTE: 0 E9/L (ref 0.05–0.5)
EOSINOPHILS RELATIVE PERCENT: 0 % (ref 0–6)
GFR AFRICAN AMERICAN: 43
GFR NON-AFRICAN AMERICAN: 36 ML/MIN/1.73
GLUCOSE BLD-MCNC: 144 MG/DL (ref 74–99)
HCT VFR BLD CALC: 21.5 % (ref 37–54)
HCT VFR BLD CALC: 22 % (ref 37–54)
HCT VFR BLD CALC: 23.7 % (ref 37–54)
HEMOGLOBIN: 7.3 G/DL (ref 12.5–16.5)
HEMOGLOBIN: 7.6 G/DL (ref 12.5–16.5)
HEMOGLOBIN: 8.1 G/DL (ref 12.5–16.5)
LACTIC ACID: 2.3 MMOL/L (ref 0.5–2.2)
LYMPHOCYTES ABSOLUTE: 0 E9/L (ref 1.5–4)
LYMPHOCYTES RELATIVE PERCENT: 2.8 % (ref 20–42)
MAGNESIUM: 2 MG/DL (ref 1.6–2.6)
MCH RBC QN AUTO: 37.2 PG (ref 26–35)
MCH RBC QN AUTO: 37.3 PG (ref 26–35)
MCH RBC QN AUTO: 37.5 PG (ref 26–35)
MCHC RBC AUTO-ENTMCNC: 34 % (ref 32–34.5)
MCHC RBC AUTO-ENTMCNC: 34.2 % (ref 32–34.5)
MCHC RBC AUTO-ENTMCNC: 34.5 % (ref 32–34.5)
MCV RBC AUTO: 107.8 FL (ref 80–99.9)
MCV RBC AUTO: 109.7 FL (ref 80–99.9)
MCV RBC AUTO: 109.7 FL (ref 80–99.9)
MONOCYTES ABSOLUTE: 0.13 E9/L (ref 0.1–0.95)
MONOCYTES RELATIVE PERCENT: 0.9 % (ref 2–12)
MRSA CULTURE ONLY: NORMAL
NEUTROPHILS ABSOLUTE: 13.17 E9/L (ref 1.8–7.3)
NEUTROPHILS RELATIVE PERCENT: 99.1 % (ref 43–80)
OVALOCYTES: ABNORMAL
PDW BLD-RTO: 19.9 FL (ref 11.5–15)
PDW BLD-RTO: 20 FL (ref 11.5–15)
PDW BLD-RTO: 20.6 FL (ref 11.5–15)
PHOSPHORUS: 5 MG/DL (ref 2.5–4.5)
PLATELET # BLD: 63 E9/L (ref 130–450)
PLATELET # BLD: 73 E9/L (ref 130–450)
PLATELET # BLD: 76 E9/L (ref 130–450)
PLATELET CONFIRMATION: NORMAL
PMV BLD AUTO: 8.8 FL (ref 7–12)
PMV BLD AUTO: 8.8 FL (ref 7–12)
PMV BLD AUTO: 9.2 FL (ref 7–12)
POIKILOCYTES: ABNORMAL
POTASSIUM SERPL-SCNC: 3.8 MMOL/L (ref 3.5–5)
RBC # BLD: 1.96 E12/L (ref 3.8–5.8)
RBC # BLD: 2.04 E12/L (ref 3.8–5.8)
RBC # BLD: 2.16 E12/L (ref 3.8–5.8)
SODIUM BLD-SCNC: 132 MMOL/L (ref 132–146)
TOTAL PROTEIN: 7.2 G/DL (ref 6.4–8.3)
VANCOMYCIN RANDOM: 20.9 MCG/ML (ref 5–40)
WBC # BLD: 10.9 E9/L (ref 4.5–11.5)
WBC # BLD: 13.3 E9/L (ref 4.5–11.5)
WBC # BLD: 14.4 E9/L (ref 4.5–11.5)

## 2020-02-01 PROCEDURE — 83605 ASSAY OF LACTIC ACID: CPT

## 2020-02-01 PROCEDURE — 6360000002 HC RX W HCPCS: Performed by: INTERNAL MEDICINE

## 2020-02-01 PROCEDURE — 99232 SBSQ HOSP IP/OBS MODERATE 35: CPT | Performed by: FAMILY MEDICINE

## 2020-02-01 PROCEDURE — 89051 BODY FLUID CELL COUNT: CPT

## 2020-02-01 PROCEDURE — 2580000003 HC RX 258: Performed by: SPECIALIST

## 2020-02-01 PROCEDURE — 83735 ASSAY OF MAGNESIUM: CPT

## 2020-02-01 PROCEDURE — 71045 X-RAY EXAM CHEST 1 VIEW: CPT

## 2020-02-01 PROCEDURE — 2000000000 HC ICU R&B

## 2020-02-01 PROCEDURE — 6370000000 HC RX 637 (ALT 250 FOR IP): Performed by: INTERNAL MEDICINE

## 2020-02-01 PROCEDURE — 94660 CPAP INITIATION&MGMT: CPT

## 2020-02-01 PROCEDURE — 6360000002 HC RX W HCPCS: Performed by: SPECIALIST

## 2020-02-01 PROCEDURE — 85027 COMPLETE CBC AUTOMATED: CPT

## 2020-02-01 PROCEDURE — 85730 THROMBOPLASTIN TIME PARTIAL: CPT

## 2020-02-01 PROCEDURE — 2580000003 HC RX 258: Performed by: INTERNAL MEDICINE

## 2020-02-01 PROCEDURE — 80053 COMPREHEN METABOLIC PANEL: CPT

## 2020-02-01 PROCEDURE — 36415 COLL VENOUS BLD VENIPUNCTURE: CPT

## 2020-02-01 PROCEDURE — 99291 CRITICAL CARE FIRST HOUR: CPT | Performed by: INTERNAL MEDICINE

## 2020-02-01 PROCEDURE — 85025 COMPLETE CBC W/AUTO DIFF WBC: CPT

## 2020-02-01 PROCEDURE — 80202 ASSAY OF VANCOMYCIN: CPT

## 2020-02-01 PROCEDURE — 84100 ASSAY OF PHOSPHORUS: CPT

## 2020-02-01 RX ORDER — FUROSEMIDE 10 MG/ML
20 INJECTION INTRAMUSCULAR; INTRAVENOUS ONCE
Status: COMPLETED | OUTPATIENT
Start: 2020-02-01 | End: 2020-02-01

## 2020-02-01 RX ORDER — MIDODRINE HYDROCHLORIDE 5 MG/1
10 TABLET ORAL
Status: DISCONTINUED | OUTPATIENT
Start: 2020-02-01 | End: 2020-02-02

## 2020-02-01 RX ADMIN — AMPICILLIN SODIUM AND SULBACTAM SODIUM 3 G: 1; .5 INJECTION, POWDER, FOR SOLUTION INTRAMUSCULAR; INTRAVENOUS at 15:12

## 2020-02-01 RX ADMIN — VANCOMYCIN HYDROCHLORIDE 1250 MG: 10 INJECTION, POWDER, LYOPHILIZED, FOR SOLUTION INTRAVENOUS at 21:26

## 2020-02-01 RX ADMIN — SODIUM CHLORIDE: 9 INJECTION, SOLUTION INTRAVENOUS at 08:39

## 2020-02-01 RX ADMIN — LACTULOSE 20 G: 20 SOLUTION ORAL at 21:12

## 2020-02-01 RX ADMIN — AMPICILLIN SODIUM AND SULBACTAM SODIUM 3 G: 1; .5 INJECTION, POWDER, FOR SOLUTION INTRAMUSCULAR; INTRAVENOUS at 10:09

## 2020-02-01 RX ADMIN — RIFAXIMIN 550 MG: 550 TABLET ORAL at 15:12

## 2020-02-01 RX ADMIN — SODIUM CHLORIDE: 9 INJECTION, SOLUTION INTRAVENOUS at 00:19

## 2020-02-01 RX ADMIN — AMPICILLIN SODIUM AND SULBACTAM SODIUM 3 G: 1; .5 INJECTION, POWDER, FOR SOLUTION INTRAMUSCULAR; INTRAVENOUS at 03:36

## 2020-02-01 RX ADMIN — ASPIRIN 81 MG 81 MG: 81 TABLET ORAL at 08:32

## 2020-02-01 RX ADMIN — SALINE NASAL SPRAY 2 SPRAY: 1.5 SOLUTION NASAL at 12:26

## 2020-02-01 RX ADMIN — MIDODRINE HYDROCHLORIDE 10 MG: 5 TABLET ORAL at 12:35

## 2020-02-01 RX ADMIN — RIFAXIMIN 550 MG: 550 TABLET ORAL at 08:32

## 2020-02-01 RX ADMIN — RIFAXIMIN 550 MG: 550 TABLET ORAL at 21:23

## 2020-02-01 RX ADMIN — LACTULOSE 30 G: 20 SOLUTION ORAL at 08:31

## 2020-02-01 RX ADMIN — HYDROCORTISONE SODIUM SUCCINATE 50 MG: 100 INJECTION, POWDER, FOR SOLUTION INTRAMUSCULAR; INTRAVENOUS at 03:37

## 2020-02-01 RX ADMIN — MIDODRINE HYDROCHLORIDE 10 MG: 5 TABLET ORAL at 16:22

## 2020-02-01 RX ADMIN — FUROSEMIDE 20 MG: 10 INJECTION, SOLUTION INTRAMUSCULAR; INTRAVENOUS at 16:09

## 2020-02-01 RX ADMIN — AMPICILLIN SODIUM AND SULBACTAM SODIUM 3 G: 1; .5 INJECTION, POWDER, FOR SOLUTION INTRAMUSCULAR; INTRAVENOUS at 21:15

## 2020-02-01 ASSESSMENT — PAIN SCALES - GENERAL
PAINLEVEL_OUTOF10: 0

## 2020-02-01 NOTE — PROGRESS NOTES
Subjective:     Patient CO SOB for the last few days, stable, worse with exertion, better with rest, associated occasional cough, sputum, gen weakness, edema, no (fever,chills,cp)      PMH,Social Hx and Family Hx : Reviewed; no changes from initial note      Review of Systems    All other systems reviewed and are otherwise negative. Objective:   Data Review:  Vital Signs: BP (!) 104/50   Pulse 93   Temp 98.1 °F (36.7 °C) (Core)   Resp 17   Ht 5' 9\" (1.753 m)   Wt (!) 341 lb 1.6 oz (154.7 kg)   SpO2 94%   BMI 50.37 kg/m²     24 Hour I&O Review:     Intake/Output Summary (Last 24 hours) at 2/1/2020 1147  Last data filed at 2/1/2020 1009  Gross per 24 hour   Intake 3781.63 ml   Output 2200 ml   Net 1581.63 ml       Physical Exam   VS: reviewed, trend noted  Constitutional: Alert and oriented x 2, confused, + distress. HENT: No oropharyngeal exudate. No ulceration . NC AT   Eyes: Pupils are equal, round, and reactive to light. Neck: Supple, No LAP. No tracheal deviation present. Cardiovascular: Normal rate, regular rhythm, S1&S2    Pulmonary/Chest: + respiratory distress. No wheezes, + bibasilar crackles   Abdominal: Soft. Bowel sounds are normal, obese, no tenderness to palpation. Musculoskeletal: Normal range of motion. no tenderness.    Neurological: Non focal. normal speech  Extremities:2+ edema BL LE no clubbing or cyanosis  Skin: warm and dry  PSY: no depression, normal affect      Continuous Infusions:   norepinephrine Stopped (02/01/20 1045)    sodium chloride 50 mL/hr at 02/01/20 1010         Current Medications: Current Facility-Administered Medications: vancomycin (VANCOCIN) 1,250 mg in dextrose 5 % 250 mL IVPB, 1,250 mg, Intravenous, Once  midodrine (PROAMATINE) tablet 10 mg, 10 mg, Oral, TID WC  sodium chloride (OCEAN, BABY AYR) 0.65 % nasal spray 2 spray, 2 spray, Each Nostril, Q2H PRN  ampicillin-sulbactam (UNASYN) 3 g ivpb minibag, 3 g, Intravenous, Q6H  levofloxacin (LEVAQUIN) tablet 750 mg, 750 mg, Oral, Every Other Day  lactulose (CHRONULAC) 10 GM/15ML solution 30 g, 30 g, Oral, BID  rifaximin (XIFAXAN) tablet 550 mg, 550 mg, Oral, TID  norepinephrine (LEVOPHED) 16 mg in dextrose 5 % 250 mL infusion, 2 mcg/min, Intravenous, Continuous  aspirin chewable tablet 81 mg, 81 mg, Oral, Daily  perflutren lipid microspheres (DEFINITY) injection 1.65 mg, 1.5 mL, Intravenous, ONCE PRN  vancomycin (VANCOCIN) intermittent dosing (placeholder), , Other, RX Placeholder  0.9 % sodium chloride infusion, , Intravenous, Continuous  0.9 % sodium chloride bolus, 250 mL, Intravenous, Once    Ventilator Settings:Vent Information  Skin Assessment: Clean, dry, & intact  Equipment ID: [id number]  FiO2 : 50 %   CBC:  Recent Labs     01/31/20  0535 01/31/20  1730 01/31/20  2348 02/01/20  0640   WBC 16.3*  --  14.4* 13.3*   RBC 1.56*  --  2.16* 2.04*   HGB 6.2* 7.8* 8.1* 7.6*   HCT 18.2* 23.6* 23.7* 22.0*   PLT 71*  --  76* 73*   .7*  --  109.7* 107.8*   MCH 39.7*  --  37.5* 37.3*   MCHC 34.1  --  34.2 34.5   RDW 15.9*  --  20.6* 20.0*      BMP:  Recent Labs     01/30/20  0610 01/31/20  0535 02/01/20  0640    133 132   K 4.4 4.0 3.8   CL 86* 88* 88*   CO2 33* 36* 33*   BUN 82* 84* 85*   CREATININE 2.1* 2.2* 1.9*   CALCIUM 8.8 8.7 8.7   GLUCOSE 134* 114* 144*      ABG:  Lab Results   Component Value Date    PH 7.470 12/29/2019    PCO2 67.6 12/29/2019    PO2 159.1 12/29/2019    HCO3 48.1 12/29/2019    O2SAT 98.8 01/29/2020       Cultures:  Recent Labs     01/29/20 2015   BC Gram stain performed from blood culture bottle media  Gram positive cocci in clusters  Gram negative rods  *  Identification and sensitivity to follow     Recent Labs     01/29/20 2030   BLOODCULT2 Gram stain performed from blood culture bottle media  Gram positive cocci in clusters  Gram negative rods  *     No results for input(s): CULTRESP in the last 72 hours.     Radiology Review:  Pertinent images / reports were reviewed as a part of this

## 2020-02-01 NOTE — PROGRESS NOTES
HOSPITAL   PROGRESS NOTE. SUBJECTIVE:  Jeremiah Jones, 59 y.o., male C/O  SLEEPING AT THIS TIME. NO NEW COMPLAIN. RECEIVED TRANSFUSIONS FOR LOW BLOOD COUNT YESTERDAY. CONDITION DISCUSSED WITH  PATIENT  AND NURSING   STAFF. CONSULT NOTES REVIEWED. ROS:GENERAL- APPETITE- POOR. BOWEL MOVEMENTS-NONE NO URINE    PROBLEM. EENT: NORMAL            CVS: NORMAL. NO CHEST PAIN OR PALPITATION. RESPIRATORY:NO SOB. GI/: NO ABDOMINAL PAINS            MUSCULOSKELETAL: NO COMPLAIN            CNS: NO  COMPLAIN            OBJECTIVE:    GENERAL:Temperature:  Current - Temp: 98.1 °F (36.7 °C); Max - Temp  Av.9 °F (36.6 °C)  Min: 97.5 °F (36.4 °C)  Max: 98.4 °F (36.9 °C)  Respiratory Rate : Resp  Avg: 15.8  Min: 10  Max: 24  Pulse Range: Pulse  Av.9  Min: 80  Max: 93  Blood Presuure Range:  Systolic (67VHY), PDH:866 , Min:104 , OYQ:657   ; Diastolic (15GBB), XRJ:61, Min:47, Max:72    Pulse ox Range: SpO2  Av.1 %  Min: 93 %  Max: 98 %  24hr I & O:      Intake/Output Summary (Last 24 hours) at 2020 1139  Last data filed at 2020 1009  Gross per 24 hour   Intake 3781.63 ml   Output 2200 ml   Net 1581.63 ml       CONSTITUTIONAL: MORBIDLY OBESE,ORIENTED,CO-OPERATIVE  HEENT:NORMAL. NECK:  supple, symmetrical, trachea midline,Carotids- normal,JVP- flat  HEMATOLOGIC/LYMPHATICS:  no cervical lymphadenopathy  LUNGS:clear  CARDIOVASCULAR:  Normal apical impulse, regular rate and rhythm, normal S1 and S2, no S3 or S4, and no murmur noted  ABDOMEN:  normal bowel sounds, non-distended, non-tender, no masses palpated  EXTREMITIES: ARTHRITIC CHANGES. MOVEMENTS-LIMITED. PEDAL PULSES-POOR. LEG EDEMA.   SKIN:  normal skin color, texture, turgor    Data    CBC:   Lab Results   Component Value Date    WBC 13.3 2020    RBC 2.04 2020    HGB 7.6 2020    HCT 22.0 2020    .8 2020    MCH 37.3 02/01/2020    MCHC 34.5 02/01/2020    RDW 20.0 02/01/2020    PLT 73 02/01/2020    MPV 9.2 02/01/2020     CMP:    Lab Results   Component Value Date     02/01/2020    K 3.8 02/01/2020    K 3.3 08/08/2019    CL 88 02/01/2020    CO2 33 02/01/2020    BUN 85 02/01/2020    CREATININE 1.9 02/01/2020    GFRAA 43 02/01/2020    LABGLOM 36 02/01/2020    GLUCOSE 144 02/01/2020    PROT 7.2 02/01/2020    LABALBU 2.3 02/01/2020    CALCIUM 8.7 02/01/2020    BILITOT 5.6 02/01/2020    ALKPHOS 92 02/01/2020    AST 56 02/01/2020    ALT 23 02/01/2020         ASSESSMENT:    Active Hospital Problems    Diagnosis Date Noted    Septic shock due to undetermined organism ([facility] [A41.9, R65.21] 02/01/2020     Priority: High     Class: Acute    Iron deficiency anemia due to chronic blood loss [D50.0] 01/31/2020     Priority: High     Class: Chronic    Mental confusion [R41.0] 04/28/2019     Priority: High     Class: Acute    Acute diastolic CHF (congestive heart failure) (HCC) [I50.31] 11/23/2015     Priority: High     Class: Acute    Bilateral leg edema [R60.0] 01/12/2015     Priority: High    Morbid obesity with BMI of 45.0-49.9, adult ([facility] [E66.01, Z68.42] 01/12/2015     Priority: High     Class: Chronic    HTN (hypertension) [I10] 01/12/2015     Priority: High     Class: Chronic    Sepsis due to pneumonia ([facility] [J18.9, A41.9] 01/29/2020    Acute on chronic diastolic (congestive) heart failure (HCC) [I50.33] 12/18/2019       PLAN: CONTINUE CURRENT MEDICATIONS AND Rx.       Electronically signed by Gloria Funk MD on 2/1/20 at 11:39 AM

## 2020-02-01 NOTE — PROGRESS NOTES
Pharmacy Consultation Note  (Antibiotic Dosing and Monitoring)    Initial consult date: 1/30/20  Consulting physician: Dr. Bijal Cardona  Drug(s): Vancomycin  Indication: Septic shock    Ht Readings from Last 1 Encounters:   01/30/20 5' 9\" (1.753 m)     Wt Readings from Last 1 Encounters:   01/30/20 (!) 341 lb 1.6 oz (154.7 kg)     Age/  Gender IBW DW  Allergy Information   59 y.o.   male 70.7 kg 104.3 kg  Latex; Aldactone [spironolactone]; and Thimerosal          Date  Tmax WBC BUN/CR UOP  (mL/kg/hr) Drug/Dose Time   Given Level(s)   (Time) Comments   1/29  (#1) afebrile 16 77/1.9 -- Vancomycin 3000 mg IV x 1 2234     1/30  (#2) afebrile 23.1 82/2.1 310 mL -- --     1/31  (#3) afebrile 16.3 84/2.2 0.4 Vancomycin 1250 mg IV x 1 0950 Random AM level @ 0535 = 17.3 mcg/mL    2/1  (#4) afebrile 13.3 85/1.9 0.6 Vancomycin 1250 mg IV x 1 <2100> Random AM level @ 0640 = 20.9 mcg/mL      (#5)             (#6)             (#7)             Estimated Creatinine Clearance: 58 mL/min (A) (based on SCr of 1.9 mg/dL (H)). UOP over the past 24 hours:     Intake/Output Summary (Last 24 hours) at 2/1/2020 0747  Last data filed at 2/1/2020 0600  Gross per 24 hour   Intake 4031.63 ml   Output 2300 ml   Net 1731.63 ml     Other anti-infectives: Anti-infective Dose Date Initiated Date Stopped   Cefepime 2g IV q8hr 1/29      Cultures:  available culture and sensitivity results were reviewed in EPIC  Cultures sent and are pending. Culture Date Result    Rapid flu 1/29 Not detected   Blood cx 1 1/29 GNR   Blood cx 2 1/29 GNR   Respiratory film array 1/29 Not detected   Urine cx 1/30 <10,000 CFU/mL Proteus mirabilis  <10,000 CFU/mL GNR   MRSA nares 1/30 Not isolated   Blood cx 1 1/31    Blood cx 2 1/31                Assessment:  · Consulted by Dr. Bijal Cardona to dose/monitor vancomycin  · Goal trough level:  15-20 mcg/mL  · Pt is a 59 yoM who presented from SNF with septic shock. Hx of cirrhosis, recently admitted with alcohol withdrawal in 12/2019.
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
## 2020-02-02 ENCOUNTER — APPOINTMENT (OUTPATIENT)
Dept: GENERAL RADIOLOGY | Age: 65
DRG: 720 | End: 2020-02-02
Payer: COMMERCIAL

## 2020-02-02 PROBLEM — A41.01 SEPTIC SHOCK DUE TO STAPHYLOCOCCUS AUREUS (HCC): Status: ACTIVE | Noted: 2020-02-01

## 2020-02-02 LAB
ALBUMIN SERPL-MCNC: 2 G/DL (ref 3.5–5.2)
ALP BLD-CCNC: 100 U/L (ref 40–129)
ALT SERPL-CCNC: 28 U/L (ref 0–40)
ANION GAP SERPL CALCULATED.3IONS-SCNC: 10 MMOL/L (ref 7–16)
ANION GAP SERPL CALCULATED.3IONS-SCNC: 8 MMOL/L (ref 7–16)
ANISOCYTOSIS: ABNORMAL
AST SERPL-CCNC: 59 U/L (ref 0–39)
B.E.: 9.3 MMOL/L (ref -3–3)
BASOPHILIC STIPPLING: ABNORMAL
BASOPHILS ABSOLUTE: 0 E9/L (ref 0–0.2)
BASOPHILS RELATIVE PERCENT: 0 % (ref 0–2)
BILIRUB SERPL-MCNC: 4 MG/DL (ref 0–1.2)
BLOOD BANK DISPENSE STATUS: NORMAL
BLOOD BANK PRODUCT CODE: NORMAL
BPU ID: NORMAL
BUN BLDV-MCNC: 87 MG/DL (ref 8–23)
BUN BLDV-MCNC: 88 MG/DL (ref 8–23)
CALCIUM SERPL-MCNC: 8.8 MG/DL (ref 8.6–10.2)
CALCIUM SERPL-MCNC: 8.9 MG/DL (ref 8.6–10.2)
CHLORIDE BLD-SCNC: 91 MMOL/L (ref 98–107)
CHLORIDE BLD-SCNC: 91 MMOL/L (ref 98–107)
CO2: 34 MMOL/L (ref 22–29)
CO2: 37 MMOL/L (ref 22–29)
CREAT SERPL-MCNC: 1.8 MG/DL (ref 0.7–1.2)
CREAT SERPL-MCNC: 1.8 MG/DL (ref 0.7–1.2)
CULTURE, BLOOD 2: ABNORMAL
DELIVERY SYSTEMS: ABNORMAL
DESCRIPTION BLOOD BANK: NORMAL
DEVICE: ABNORMAL
EKG ATRIAL RATE: 92 BPM
EKG P AXIS: 6 DEGREES
EKG P-R INTERVAL: 202 MS
EKG Q-T INTERVAL: 364 MS
EKG QRS DURATION: 90 MS
EKG QTC CALCULATION (BAZETT): 450 MS
EKG R AXIS: 6 DEGREES
EKG T AXIS: 63 DEGREES
EKG VENTRICULAR RATE: 92 BPM
EOSINOPHILS ABSOLUTE: 0 E9/L (ref 0.05–0.5)
EOSINOPHILS RELATIVE PERCENT: 0 % (ref 0–6)
FIO2 ARTERIAL: 40
FOLATE: 17.7 NG/ML (ref 4.8–24.2)
GFR AFRICAN AMERICAN: 46
GFR AFRICAN AMERICAN: 46
GFR NON-AFRICAN AMERICAN: 38 ML/MIN/1.73
GFR NON-AFRICAN AMERICAN: 38 ML/MIN/1.73
GLUCOSE BLD-MCNC: 113 MG/DL (ref 74–99)
GLUCOSE BLD-MCNC: 125 MG/DL (ref 74–99)
HCO3 ARTERIAL: 36.1 MMOL/L (ref 22–26)
HCT VFR BLD CALC: 21.3 % (ref 37–54)
HEMOGLOBIN: 7.1 G/DL (ref 12.5–16.5)
HYPOCHROMIA: ABNORMAL
INR BLD: 2.2
LACTATE DEHYDROGENASE: 222 U/L (ref 135–225)
LACTIC ACID: 1.5 MMOL/L (ref 0.5–2.2)
LYMPHOCYTES ABSOLUTE: 0.4 E9/L (ref 1.5–4)
LYMPHOCYTES RELATIVE PERCENT: 4 % (ref 20–42)
MAGNESIUM: 2 MG/DL (ref 1.6–2.6)
MCH RBC QN AUTO: 37 PG (ref 26–35)
MCHC RBC AUTO-ENTMCNC: 33.3 % (ref 32–34.5)
MCV RBC AUTO: 110.9 FL (ref 80–99.9)
MODE: ABNORMAL
MONOCYTES ABSOLUTE: 0.4 E9/L (ref 0.1–0.95)
MONOCYTES RELATIVE PERCENT: 4 % (ref 2–12)
NEUTROPHILS ABSOLUTE: 9.11 E9/L (ref 1.8–7.3)
NEUTROPHILS RELATIVE PERCENT: 92 % (ref 43–80)
O2 SATURATION: 98.6 % (ref 92–98.5)
OPERATOR ID: 3
ORGANISM: ABNORMAL
ORGANISM: ABNORMAL
OVALOCYTES: ABNORMAL
PCO2 ARTERIAL: 64.4 MMHG (ref 35–45)
PDW BLD-RTO: 19.2 FL (ref 11.5–15)
PH BLOOD GAS: 7.36 (ref 7.35–7.45)
PHOSPHORUS: 4.8 MG/DL (ref 2.5–4.5)
PLATELET # BLD: 62 E9/L (ref 130–450)
PLATELET CONFIRMATION: NORMAL
PMV BLD AUTO: 9.1 FL (ref 7–12)
PO2 ARTERIAL: 129.1 MMHG (ref 60–80)
POIKILOCYTES: ABNORMAL
POSITIVE END EXP PRESS: 6 CMH2O
POTASSIUM SERPL-SCNC: 2.6 MMOL/L (ref 3.5–5)
POTASSIUM SERPL-SCNC: 2.9 MMOL/L (ref 3.5–5)
PRESSURE SUPPORT: 15 CMH2O
PROTHROMBIN TIME: 25.4 SEC (ref 9.3–12.4)
RBC # BLD: 1.92 E12/L (ref 3.8–5.8)
RESPIRATORY RATE: 12 B/MIN
SODIUM BLD-SCNC: 135 MMOL/L (ref 132–146)
SODIUM BLD-SCNC: 136 MMOL/L (ref 132–146)
SOURCE, BLOOD GAS: ABNORMAL
TOTAL PROTEIN: 7 G/DL (ref 6.4–8.3)
VITAMIN B-12: >2000 PG/ML (ref 211–946)
WBC # BLD: 9.9 E9/L (ref 4.5–11.5)

## 2020-02-02 PROCEDURE — 82607 VITAMIN B-12: CPT

## 2020-02-02 PROCEDURE — 82746 ASSAY OF FOLIC ACID SERUM: CPT

## 2020-02-02 PROCEDURE — 6360000002 HC RX W HCPCS: Performed by: INTERNAL MEDICINE

## 2020-02-02 PROCEDURE — 85610 PROTHROMBIN TIME: CPT

## 2020-02-02 PROCEDURE — 6370000000 HC RX 637 (ALT 250 FOR IP): Performed by: INTERNAL MEDICINE

## 2020-02-02 PROCEDURE — 71045 X-RAY EXAM CHEST 1 VIEW: CPT

## 2020-02-02 PROCEDURE — 80053 COMPREHEN METABOLIC PANEL: CPT

## 2020-02-02 PROCEDURE — 6360000002 HC RX W HCPCS: Performed by: SPECIALIST

## 2020-02-02 PROCEDURE — 94660 CPAP INITIATION&MGMT: CPT

## 2020-02-02 PROCEDURE — 99233 SBSQ HOSP IP/OBS HIGH 50: CPT | Performed by: INTERNAL MEDICINE

## 2020-02-02 PROCEDURE — 82803 BLOOD GASES ANY COMBINATION: CPT

## 2020-02-02 PROCEDURE — 36415 COLL VENOUS BLD VENIPUNCTURE: CPT

## 2020-02-02 PROCEDURE — 84100 ASSAY OF PHOSPHORUS: CPT

## 2020-02-02 PROCEDURE — 2580000003 HC RX 258: Performed by: INTERNAL MEDICINE

## 2020-02-02 PROCEDURE — 94640 AIRWAY INHALATION TREATMENT: CPT

## 2020-02-02 PROCEDURE — 6360000002 HC RX W HCPCS

## 2020-02-02 PROCEDURE — 2000000000 HC ICU R&B

## 2020-02-02 PROCEDURE — 83605 ASSAY OF LACTIC ACID: CPT

## 2020-02-02 PROCEDURE — 85025 COMPLETE CBC W/AUTO DIFF WBC: CPT

## 2020-02-02 PROCEDURE — 83735 ASSAY OF MAGNESIUM: CPT

## 2020-02-02 PROCEDURE — 99233 SBSQ HOSP IP/OBS HIGH 50: CPT | Performed by: FAMILY MEDICINE

## 2020-02-02 PROCEDURE — 83615 LACTATE (LD) (LDH) ENZYME: CPT

## 2020-02-02 PROCEDURE — 80048 BASIC METABOLIC PNL TOTAL CA: CPT

## 2020-02-02 PROCEDURE — 2580000003 HC RX 258: Performed by: SPECIALIST

## 2020-02-02 PROCEDURE — 2500000003 HC RX 250 WO HCPCS: Performed by: INTERNAL MEDICINE

## 2020-02-02 RX ORDER — POTASSIUM CHLORIDE 29.8 MG/ML
20 INJECTION INTRAVENOUS
Status: COMPLETED | OUTPATIENT
Start: 2020-02-02 | End: 2020-02-02

## 2020-02-02 RX ORDER — LANOLIN ALCOHOL/MO/W.PET/CERES
3000 CREAM (GRAM) TOPICAL DAILY
Status: DISCONTINUED | OUTPATIENT
Start: 2020-02-02 | End: 2020-02-12 | Stop reason: HOSPADM

## 2020-02-02 RX ORDER — MIDODRINE HYDROCHLORIDE 5 MG/1
5 TABLET ORAL
Status: DISCONTINUED | OUTPATIENT
Start: 2020-02-02 | End: 2020-02-08

## 2020-02-02 RX ORDER — SODIUM CHLORIDE 0.9 % (FLUSH) 0.9 %
SYRINGE (ML) INJECTION
Status: DISPENSED
Start: 2020-02-02 | End: 2020-02-03

## 2020-02-02 RX ORDER — POTASSIUM CHLORIDE 20 MEQ/1
40 TABLET, EXTENDED RELEASE ORAL 2 TIMES DAILY WITH MEALS
Status: DISCONTINUED | OUTPATIENT
Start: 2020-02-02 | End: 2020-02-03

## 2020-02-02 RX ORDER — CEFOXITIN SODIUM 1 G/50ML
1 INJECTION, SOLUTION INTRAVENOUS EVERY 6 HOURS SCHEDULED
Status: DISCONTINUED | OUTPATIENT
Start: 2020-02-02 | End: 2020-02-02 | Stop reason: CLARIF

## 2020-02-02 RX ORDER — POTASSIUM CHLORIDE 29.8 MG/ML
20 INJECTION INTRAVENOUS ONCE
Status: COMPLETED | OUTPATIENT
Start: 2020-02-02 | End: 2020-02-02

## 2020-02-02 RX ORDER — POTASSIUM CHLORIDE 20 MEQ/1
40 TABLET, EXTENDED RELEASE ORAL ONCE
Status: COMPLETED | OUTPATIENT
Start: 2020-02-02 | End: 2020-02-02

## 2020-02-02 RX ORDER — IPRATROPIUM BROMIDE AND ALBUTEROL SULFATE 2.5; .5 MG/3ML; MG/3ML
1 SOLUTION RESPIRATORY (INHALATION)
Status: DISCONTINUED | OUTPATIENT
Start: 2020-02-02 | End: 2020-02-03

## 2020-02-02 RX ORDER — FUROSEMIDE 10 MG/ML
40 INJECTION INTRAMUSCULAR; INTRAVENOUS DAILY
Status: DISCONTINUED | OUTPATIENT
Start: 2020-02-02 | End: 2020-02-02

## 2020-02-02 RX ORDER — ONDANSETRON 2 MG/ML
4 INJECTION INTRAMUSCULAR; INTRAVENOUS EVERY 6 HOURS PRN
Status: DISCONTINUED | OUTPATIENT
Start: 2020-02-02 | End: 2020-02-12 | Stop reason: HOSPADM

## 2020-02-02 RX ORDER — FOLIC ACID 1 MG/1
1 TABLET ORAL DAILY
Status: DISCONTINUED | OUTPATIENT
Start: 2020-02-02 | End: 2020-02-12 | Stop reason: HOSPADM

## 2020-02-02 RX ORDER — ONDANSETRON 2 MG/ML
INJECTION INTRAMUSCULAR; INTRAVENOUS
Status: COMPLETED
Start: 2020-02-02 | End: 2020-02-02

## 2020-02-02 RX ORDER — EPLERENONE 25 MG/1
50 TABLET, FILM COATED ORAL DAILY
Status: DISCONTINUED | OUTPATIENT
Start: 2020-02-02 | End: 2020-02-02

## 2020-02-02 RX ORDER — CEFOXITIN SODIUM 1 G/50ML
1 INJECTION, SOLUTION INTRAVENOUS EVERY 6 HOURS SCHEDULED
Status: DISCONTINUED | OUTPATIENT
Start: 2020-02-02 | End: 2020-02-02

## 2020-02-02 RX ADMIN — LACTULOSE 30 G: 20 SOLUTION ORAL at 10:12

## 2020-02-02 RX ADMIN — RIFAXIMIN 550 MG: 550 TABLET ORAL at 10:13

## 2020-02-02 RX ADMIN — POTASSIUM CHLORIDE 20 MEQ: 29.8 INJECTION, SOLUTION INTRAVENOUS at 15:26

## 2020-02-02 RX ADMIN — AMPICILLIN SODIUM AND SULBACTAM SODIUM 3 G: 1; .5 INJECTION, POWDER, FOR SOLUTION INTRAMUSCULAR; INTRAVENOUS at 17:32

## 2020-02-02 RX ADMIN — AMPICILLIN SODIUM AND SULBACTAM SODIUM 3 G: 1; .5 INJECTION, POWDER, FOR SOLUTION INTRAMUSCULAR; INTRAVENOUS at 03:02

## 2020-02-02 RX ADMIN — IPRATROPIUM BROMIDE AND ALBUTEROL SULFATE 1 AMPULE: .5; 3 SOLUTION RESPIRATORY (INHALATION) at 12:04

## 2020-02-02 RX ADMIN — FOLIC ACID 1 MG: 1 TABLET ORAL at 10:12

## 2020-02-02 RX ADMIN — POTASSIUM CHLORIDE 40 MEQ: 1500 TABLET, EXTENDED RELEASE ORAL at 21:25

## 2020-02-02 RX ADMIN — IPRATROPIUM BROMIDE AND ALBUTEROL SULFATE 1 AMPULE: .5; 3 SOLUTION RESPIRATORY (INHALATION) at 18:12

## 2020-02-02 RX ADMIN — CYANOCOBALAMIN TAB 1000 MCG 3000 MCG: 1000 TAB at 11:25

## 2020-02-02 RX ADMIN — POTASSIUM CHLORIDE 20 MEQ: 29.8 INJECTION, SOLUTION INTRAVENOUS at 16:28

## 2020-02-02 RX ADMIN — AMPICILLIN SODIUM AND SULBACTAM SODIUM 3 G: 1; .5 INJECTION, POWDER, FOR SOLUTION INTRAMUSCULAR; INTRAVENOUS at 21:25

## 2020-02-02 RX ADMIN — AMPICILLIN SODIUM AND SULBACTAM SODIUM 3 G: 1; .5 INJECTION, POWDER, FOR SOLUTION INTRAMUSCULAR; INTRAVENOUS at 08:55

## 2020-02-02 RX ADMIN — RIFAXIMIN 550 MG: 550 TABLET ORAL at 14:24

## 2020-02-02 RX ADMIN — POTASSIUM CHLORIDE 20 MEQ: 29.8 INJECTION, SOLUTION INTRAVENOUS at 09:28

## 2020-02-02 RX ADMIN — ENOXAPARIN SODIUM 40 MG: 40 INJECTION SUBCUTANEOUS at 10:12

## 2020-02-02 RX ADMIN — POTASSIUM CHLORIDE 20 MEQ: 29.8 INJECTION, SOLUTION INTRAVENOUS at 08:48

## 2020-02-02 RX ADMIN — POTASSIUM CHLORIDE 40 MEQ: 1500 TABLET, EXTENDED RELEASE ORAL at 18:54

## 2020-02-02 RX ADMIN — ONDANSETRON 4 MG: 2 INJECTION INTRAMUSCULAR; INTRAVENOUS at 14:24

## 2020-02-02 RX ADMIN — FUROSEMIDE 40 MG: 10 INJECTION, SOLUTION INTRAMUSCULAR; INTRAVENOUS at 10:12

## 2020-02-02 RX ADMIN — MIDODRINE HYDROCHLORIDE 10 MG: 5 TABLET ORAL at 10:12

## 2020-02-02 RX ADMIN — LACTULOSE 30 G: 20 SOLUTION ORAL at 21:25

## 2020-02-02 RX ADMIN — CEFOXITIN SODIUM 1 G: 1 INJECTION, SOLUTION INTRAVENOUS at 11:38

## 2020-02-02 RX ADMIN — BUMETANIDE 0.5 MG/HR: 0.25 INJECTION INTRAMUSCULAR; INTRAVENOUS at 15:32

## 2020-02-02 RX ADMIN — MIDODRINE HYDROCHLORIDE 10 MG: 5 TABLET ORAL at 11:25

## 2020-02-02 RX ADMIN — MIDODRINE HYDROCHLORIDE 5 MG: 5 TABLET ORAL at 17:32

## 2020-02-02 RX ADMIN — ASPIRIN 81 MG 81 MG: 81 TABLET ORAL at 10:12

## 2020-02-02 RX ADMIN — RIFAXIMIN 550 MG: 550 TABLET ORAL at 21:25

## 2020-02-02 ASSESSMENT — PAIN SCALES - GENERAL
PAINLEVEL_OUTOF10: 0

## 2020-02-02 NOTE — PROGRESS NOTES
Patient is seen in follow-up for CHF    Subjective:     Mr. Rosalea Scheuermann feels a lot better today, shortness of breath is slightly better  Laying in bed with BiPAP mask on    ROS:  CONSTITUTIONAL:  negative for  fevers, chills  HEENT:  negative for earaches, nasal congestion and epistaxis  RESPIRATORY: +  dry cough, cough with sputum,Negative for wheezing and hemoptysis  GASTROINTESTINAL:  negative for nausea, vomiting  MUSCULOSKELETAL:  negative for  myalgias, arthralgias  NEUROLOGICAL:  negative for visual disturbance, dysphagia    Medication side effects: none    Scheduled Meds:   vancomycin  1,250 mg Intravenous Once    midodrine  10 mg Oral TID WC    ampicillin-sulbactam  3 g Intravenous Q6H    levofloxacin  750 mg Oral Every Other Day    lactulose  30 g Oral BID    rifaximin  550 mg Oral TID    aspirin  81 mg Oral Daily    vancomycin (VANCOCIN) intermittent dosing (placeholder)   Other RX Placeholder    sodium chloride  250 mL Intravenous Once     Continuous Infusions:   norepinephrine Stopped (02/01/20 1045)     PRN Meds:sodium chloride, perflutren lipid microspheres      Objective:      Physical Exam:   /60   Pulse 83   Temp 97.7 °F (36.5 °C) (Core)   Resp 15   Ht 5' 9\" (1.753 m)   Wt (!) 341 lb 1.6 oz (154.7 kg)   SpO2 98%   BMI 50.37 kg/m²   CONSTITUTIONAL:  awake, alert, cooperative, mild respiratory distress, and appears stated age  HEAD:  normocepalic, without obvious abnormality, atraumatic  NECK:  Supple, symmetrical, trachea midline, no adenopathy, thyroid symmetric, not enlarged and no tenderness, skin normal  LUNGS:  No increased work of breathing, good air exchange, bilateral rales cARDIOVASCULAR:  Normal apical impulse, regular rate and rhythm, normal S1 and S2, no S3 or S4, and no murmur noted, + +, edema, no JVD, no carotid bruit. ABDOMEN:  Soft, nontender, no masses, no hepatomegaly, no splenomegaly, BS+  MUSCULOSKELETAL:  No clubbing no cyanosis. there is no redness, warmth, or swelling of the joints,full range of motion noted, + + upper extremities edema, stasis dermatitis of the lower extremities  NEUROLOGIC:  Alert, awake,oriented x3  SKIN:  no bruising or bleeding, normal skin color, texture, turgor and no redness, warmth, or swelling      Cardiographics  I personally reviewed the telemetry monitor strips with the following interpretation: Sinus rhythm    Echocardiogram: 8/8/2019, normal echocardiogram    Imaging  XR CHEST PORTABLE   Final Result   Relative severe congestive heart failure with right   pleural effusion and bilateral airspace disease. CT FACIAL BONES WO CONTRAST   Final Result   1. There is no gross periodontal abscess. 2. Minimal mucosal thickening seen within the sphenoid sinuses. US DUP UPPER EXTREMITY RIGHT VENOUS   Final Result   1. There is no right upper extremity deep venous thrombosis. RADIOLOGY REPORT   Final Result      XR CHEST PORTABLE   Final Result   1. Right internal jugular catheter tip in appropriate position. No   pneumothorax. 2. Stable moderate right pleural effusion with bilateral perihilar   airspace disease. XR CHEST PORTABLE   Final Result   Extensive bilateral airspace disease and pleural   effusions. Possible congestive heart failure. IR GUIDED THORACENTESIS PLEURAL    (Results Pending)       Lab Review   Lab Results   Component Value Date     02/01/2020    K 3.8 02/01/2020    K 3.3 08/08/2019    CL 88 02/01/2020    CO2 33 02/01/2020    BUN 85 02/01/2020    CREATININE 1.9 02/01/2020    GLUCOSE 144 02/01/2020    CALCIUM 8.7 02/01/2020     Lab Results   Component Value Date    WBC 10.9 02/01/2020    HGB 7.3 02/01/2020    HCT 21.5 02/01/2020    .7 02/01/2020    PLT 63 02/01/2020     I have personally reviewed the laboratory, cardiac diagnostic and radiographic testing as outlined above:    Assessment:     1.   Anasarca: Suspect multifactorial secondary to liver disease and diastolic congestive heart failure, recommend IV diuresis  2. Acute hypoxic respiratory failure: Suspect secondary to volume overloaded as above. 3.  Hypotension: Secondary to sepsis, improving, Levophed is being weaned off   4. Acute exacerbation of congestive heart failure: Acute on chronic, decompensated, will continue diuresis  5. Elevated troponin: ACS type I versus ACS type II secondary to renal insufficiency, CHF and hypertension, will continue current treatment, will need ischemic evaluation prior to discharge  6. Acute kidney injury: Stable  7. Hx of alcoholic liver cirrhosis  8. Obesity:    Recommendations:     1. Recommend IV diuresis, patient significantly volume overloaded, recommend starting IV Bumex drip, and maintain him on low-dose Levophed to keep mean systolic blood pressure more than 65 mmHg   2. Continue ventilatory support as needed  3. Basic metabolic panel in a.m.  4.  Chest x-ray in a.m.  5.  Further cardiac recommendations will be forthcoming pending his clinical course and diagnostic test findings    Discussed with patient  Discussed with   Critical care time spent reviewing labs/films, examining patient, collaborating with other physicians, disussion with nursing staff, is 32 minutes. Electronically signed by Shakira oTmas MD on 2/1/2020 at 10:46 PM  NOTE: This report was transcribed using voice recognition software.  Every effort was made to ensure accuracy; however, inadvertent computerized transcription errors may be present

## 2020-02-02 NOTE — PROGRESS NOTES
Pulmonary/Critical Care Progress Note    We are following patient for septic shock, probable cellulitis, Staphylococcus aureus bacteremia, CHF, cannot entirely rule out pneumonia    SUBJECTIVE:  Patient is off pressors now but is probably in CHF. He will need a new echo because the last study in August suggested a normal ejection fraction and normal pulmonary artery pressures, and neither of which is terribly likely at this time based on his clinical condition and x-ray appearance. Patient is growing Proteus in his urine which is not sensitive to any of the antibiotics he is on. Therefore, we will switch to cefoxitin which will also cover for anaerobes into which the Proteus is sensitive. Unasyn will be discontinued since the Proteus is resistant to ampicillin. Daily Lasix will be started since his blood pressure is holding well now. Potassium will need to be replaced. He is currently tolerating BiPAP without difficulty. We await blood gases. It is notable that patient has very high mean corpuscular volumes at approximately 110. He has been on folic acid and vitamin B12 at home. These will be resumed. We will also recheck folic acid and G71 levels.     MEDICATIONS:   potassium chloride  20 mEq Intravenous Q1H    furosemide  40 mg Intravenous Daily    enoxaparin  40 mg Subcutaneous Daily    midodrine  10 mg Oral TID WC    ampicillin-sulbactam  3 g Intravenous Q6H    lactulose  30 g Oral BID    rifaximin  550 mg Oral TID    aspirin  81 mg Oral Daily    vancomycin (VANCOCIN) intermittent dosing (placeholder)   Other RX Placeholder    sodium chloride  250 mL Intravenous Once      norepinephrine Stopped (02/01/20 1045)     [START ON 2/3/2020] perflutren lipid microspheres, sodium chloride, perflutren lipid microspheres      REVIEW OF SYSTEMS:  Constitutional: Denies fever, weight loss, night sweats, and fatigue  Skin: Denies pigmentation, dark lesions, and rashes   HEENT: Denies hearing

## 2020-02-02 NOTE — PROGRESS NOTES
CFU/mL Proteus mirabilis  <10,000 CFU/mL GNR   MRSA nares 1/30 Not isolated   Blood cx 1 1/31 NGTD   Blood cx 2 1/31 NGTD               Assessment:  · Consulted by Dr. Brittany Fuentes to dose/monitor vancomycin  · Goal trough level:  15-20 mcg/mL  · Pt is a 59 yoM who presented from [facility] with septic shock. Hx of cirrhosis, recently admitted with alcohol withdrawal in 12/2019.    · Serum creatinine today: 1.8 mg/dL; CrCl ~ 60 mL/min; baseline Scr ~ 0.7-0.8 mg/dL  · 1/31: Rm AM level = 17.3 mcg/mL (~31 hour level from initial 3000 mg dose)  · 2/1: Rm AM level = 20.9 mcg/mL, re-dosed last night    Plan:  · Random AM level tomorrow, re-dose if level is less than 15-20 mcg/mL  · Follow renal function  · Pharmacist will follow and monitor/adjust dosing as necessary      Thank you for the consult,    Annabel Benjamin, PharmD, BCPS 2/2/2020 10:32 AM

## 2020-02-02 NOTE — PROGRESS NOTES
Patient is seen in follow-up for CHF    Subjective:     Mr. Tiffany Ricardo feels a lot better today, shortness of breath is slightly better  Laying in bed with BiPAP mask on    ROS:  CONSTITUTIONAL:  negative for  fevers, chills  HEENT:  negative for earaches, nasal congestion and epistaxis  RESPIRATORY: +  dry cough, cough with sputum,Negative for wheezing and hemoptysis  GASTROINTESTINAL:  negative for nausea, vomiting  MUSCULOSKELETAL:  negative for  myalgias, arthralgias  NEUROLOGICAL:  negative for visual disturbance, dysphagia    Medication side effects: none    Scheduled Meds:   potassium chloride  20 mEq Intravenous Q1H    furosemide  40 mg Intravenous Daily    enoxaparin  40 mg Subcutaneous Daily    cefOXitin  1 g Intravenous 4 times per day    ipratropium-albuterol  1 ampule Inhalation Q4H WA    vitamin B-12  3,000 mcg Oral Daily    folic acid  1 mg Oral Daily    midodrine  10 mg Oral TID WC    lactulose  30 g Oral BID    rifaximin  550 mg Oral TID    aspirin  81 mg Oral Daily    vancomycin (VANCOCIN) intermittent dosing (placeholder)   Other RX Placeholder    sodium chloride  250 mL Intravenous Once     Continuous Infusions:   norepinephrine Stopped (02/01/20 1045)     PRN Meds:[START ON 2/3/2020] perflutren lipid microspheres, ondansetron, sodium chloride, perflutren lipid microspheres      Objective:      Physical Exam:   BP (!) 146/62   Pulse 85   Temp 97.3 °F (36.3 °C) (Core)   Resp 16   Ht 5' 9\" (1.753 m)   Wt (!) 341 lb 1.6 oz (154.7 kg)   SpO2 92%   BMI 50.37 kg/m²   CONSTITUTIONAL:  awake, alert, cooperative, mild respiratory distress, and appears stated age  HEAD:  normocepalic, without obvious abnormality, atraumatic  NECK:  Supple, symmetrical, trachea midline, no adenopathy, thyroid symmetric, not enlarged and no tenderness, skin normal  LUNGS:  No increased work of breathing, good air exchange, bilateral rales cARDIOVASCULAR:  Normal apical impulse, regular rate and rhythm, normal S1 and S2, no S3 or S4, and no murmur noted, + +, edema, no JVD, no carotid bruit. ABDOMEN:  Soft, nontender, no masses, no hepatomegaly, no splenomegaly, BS+  MUSCULOSKELETAL:  No clubbing no cyanosis. there is no redness, warmth, or swelling of the joints,full range of motion noted, + + upper extremities edema, stasis dermatitis of the lower extremities  NEUROLOGIC:  Alert, awake,oriented x3  SKIN:  no bruising or bleeding, normal skin color, texture, turgor and no redness, warmth, or swelling      Cardiographics  I personally reviewed the telemetry monitor strips with the following interpretation: Sinus rhythm    Echocardiogram: 8/8/2019, normal echocardiogram    Imaging  XR CHEST PORTABLE   Final Result      XR CHEST PORTABLE   Final Result   Relative severe congestive heart failure with right   pleural effusion and bilateral airspace disease. CT FACIAL BONES WO CONTRAST   Final Result   1. There is no gross periodontal abscess. 2. Minimal mucosal thickening seen within the sphenoid sinuses. US DUP UPPER EXTREMITY RIGHT VENOUS   Final Result   1. There is no right upper extremity deep venous thrombosis. RADIOLOGY REPORT   Final Result      XR CHEST PORTABLE   Final Result   1. Right internal jugular catheter tip in appropriate position. No   pneumothorax. 2. Stable moderate right pleural effusion with bilateral perihilar   airspace disease. XR CHEST PORTABLE   Final Result   Extensive bilateral airspace disease and pleural   effusions. Possible congestive heart failure.       IR GUIDED THORACENTESIS PLEURAL    (Results Pending)       Lab Review   Lab Results   Component Value Date     02/02/2020    K 2.6 02/02/2020    K 3.3 08/08/2019    CL 91 02/02/2020    CO2 34 02/02/2020    BUN 87 02/02/2020    CREATININE 1.8 02/02/2020    GLUCOSE 125 02/02/2020    CALCIUM 8.8 02/02/2020     Lab Results   Component Value Date    WBC 9.9 02/02/2020    HGB 7.1 02/02/2020    HCT 21.3 02/02/2020    .9 02/02/2020    PLT 62 02/02/2020     I have personally reviewed the laboratory, cardiac diagnostic and radiographic testing as outlined above:    Assessment:     1. Anasarca: Suspect multifactorial secondary to liver disease and diastolic congestive heart failure, recommend IV diuresis  2. Acute hypoxic respiratory failure: Suspect secondary to volume overloaded as above. 3.  Hypotension: Improved  4. Acute exacerbation of congestive heart failure: Acute on chronic, decompensated, will continue diuresis  5. Elevated troponin: ACS type I versus ACS type II secondary to renal insufficiency, CHF and hypertension, will continue current treatment, will need ischemic evaluation prior to discharge  6. Acute kidney injury: Stable  7. Hypokalemia: We will start letter known since patient is intolerant to Aldactone  8. Hx of alcoholic liver cirrhosis  9. Obesity:    Recommendations:     1. We will start her on Bumex drip  2. Eplerenone 50 mg daily  3.  will decrease midodrine to oral 5 mg 3 times daily continue ventilatory support as needed  4. Basic metabolic panel in a.m.  5.  Level in a.m. 6.  Chest x-ray in a.m.  7.  Further cardiac recommendations will be forthcoming pending his clinical course and diagnostic test findings    Discussed with patient  Discussed with nursing staff    Electronically signed by Lucille Ivy MD on 2/2/2020 at 2:53 PM  NOTE: This report was transcribed using voice recognition software.  Every effort was made to ensure accuracy; however, inadvertent computerized transcription errors may be present

## 2020-02-02 NOTE — PROGRESS NOTES
PLT 62 02/02/2020    MPV 9.1 02/02/2020     CMP:    Lab Results   Component Value Date     02/02/2020    K 2.6 02/02/2020    K 3.3 08/08/2019    CL 91 02/02/2020    CO2 34 02/02/2020    BUN 87 02/02/2020    CREATININE 1.8 02/02/2020    GFRAA 46 02/02/2020    LABGLOM 38 02/02/2020    GLUCOSE 125 02/02/2020    PROT 7.0 02/02/2020    LABALBU 2.0 02/02/2020    CALCIUM 8.8 02/02/2020    BILITOT 4.0 02/02/2020    ALKPHOS 100 02/02/2020    AST 59 02/02/2020    ALT 28 02/02/2020         ASSESSMENT:    Active Hospital Problems    Diagnosis Date Noted    Septic shock due to Staphylococcus aureus ([facility] [A41.01, R65.21] 02/01/2020     Priority: High     Class: Acute    Iron deficiency anemia due to chronic blood loss [D50.0] 01/31/2020     Priority: High     Class: Chronic    Mental confusion [R41.0] 04/28/2019     Priority: High     Class: Acute    Acute diastolic CHF (congestive heart failure) (HCC) [I50.31] 11/23/2015     Priority: High     Class: Acute    Bilateral leg edema [R60.0] 01/12/2015     Priority: High    Morbid obesity with BMI of 45.0-49.9, adult ([facility] [E66.01, Z68.42] 01/12/2015     Priority: High     Class: Chronic    HTN (hypertension) [I10] 01/12/2015     Priority: High     Class: Chronic    Sepsis due to pneumonia ([facility] [J18.9, A41.9] 01/29/2020    Acute on chronic diastolic (congestive) heart failure (HCC) [I50.33] 12/18/2019       PLAN: CONTINUE CURRENT MEDICATIONS AND Rx. TAPERING OFF LEVOPHED DRIP, ANTIBIOTIC CHANGE AS PER DR. Francine Archer.       Electronically signed by Florencio Hernandez MD on 2/2/20 at 12:09 PM

## 2020-02-02 NOTE — PROGRESS NOTES
Hepatic Function Panel:    Lab Results   Component Value Date    ALKPHOS 100 02/02/2020    ALT 28 02/02/2020    AST 59 02/02/2020    PROT 7.0 02/02/2020    BILITOT 4.0 02/02/2020    BILIDIR 2.2 01/29/2020    IBILI 2.3 01/29/2020    LABALBU 2.0 02/02/2020       No results found for: Farzana Olivas    . No results found for: CRP      Microbiology :  Recent Labs     01/31/20  1130   BC 24 Hours- no growth     Recent Labs     01/31/20  1200   BLOODCULT2 24 Hours- no growth     No results for input(s): Viola Smith in the last 72 hours. No results for input(s): CULTRESP in the last 72 hours. No results for input(s): WNDABS in the last 72 hours. Radiology :  XR CHEST PORTABLE   Final Result      XR CHEST PORTABLE   Final Result   Relative severe congestive heart failure with right   pleural effusion and bilateral airspace disease. CT FACIAL BONES WO CONTRAST   Final Result   1. There is no gross periodontal abscess. 2. Minimal mucosal thickening seen within the sphenoid sinuses. US DUP UPPER EXTREMITY RIGHT VENOUS   Final Result   1. There is no right upper extremity deep venous thrombosis. RADIOLOGY REPORT   Final Result      XR CHEST PORTABLE   Final Result   1. Right internal jugular catheter tip in appropriate position. No   pneumothorax. 2. Stable moderate right pleural effusion with bilateral perihilar   airspace disease. XR CHEST PORTABLE   Final Result   Extensive bilateral airspace disease and pleural   effusions. Possible congestive heart failure.       IR GUIDED THORACENTESIS PLEURAL    (Results Pending)         ASSESSMENT:   Septic shock  Acute respiratory failure  Hyperammonemia  Anasarca  CHF  RONNY  Acinetobacter/ Staph epi Bacteremia   Proteus UTI  AMS      PLAN:  DC Vancomycin and Cefoxitin  Start on Unasyn for Carbapenemase resistant Acintebacter bacteremia   Suspect Proteus is colonization - if patient spikes fever, will add MERREM (UC shows ESBL)  Follow cultures   Follow labs    Leonidas Cornea, APRN - NP  2/2/2020  1:06 PM     Patient examined along with the nurse practitioner patient with a culture positive for Acinetobacter bow many which is multidrug resistant including resistant to carboplatinum we will treat the patient with Unasyn  Discontinue vancomycin discontinue cefoxitin  We will start the patient on ampicillin sulbactam    I have discussed the case, including pertinent history and physical  exam findings . I have seen and examined the patient and the key elements of the encounter have been performed by me. I agree with the assessment, plan and orders as documented.       Treatment plan as per my recommendation     Merline Albee, MD, FACP  2/2/2020  9:25 PM

## 2020-02-03 ENCOUNTER — APPOINTMENT (OUTPATIENT)
Dept: GENERAL RADIOLOGY | Age: 65
DRG: 720 | End: 2020-02-03
Payer: COMMERCIAL

## 2020-02-03 LAB
ALBUMIN SERPL-MCNC: 2.4 G/DL (ref 3.5–5.2)
ALP BLD-CCNC: 96 U/L (ref 40–129)
ALT SERPL-CCNC: 39 U/L (ref 0–40)
ANION GAP SERPL CALCULATED.3IONS-SCNC: 10 MMOL/L (ref 7–16)
ANISOCYTOSIS: ABNORMAL
AST SERPL-CCNC: 80 U/L (ref 0–39)
B.E.: 11.5 MMOL/L (ref -3–3)
BASOPHILIC STIPPLING: ABNORMAL
BASOPHILS ABSOLUTE: 0.12 E9/L (ref 0–0.2)
BASOPHILS RELATIVE PERCENT: 1.7 % (ref 0–2)
BILIRUB SERPL-MCNC: 3.7 MG/DL (ref 0–1.2)
BLOOD BANK DISPENSE STATUS: NORMAL
BLOOD BANK PRODUCT CODE: NORMAL
BPU ID: NORMAL
BUN BLDV-MCNC: 89 MG/DL (ref 8–23)
CALCIUM SERPL-MCNC: 8.7 MG/DL (ref 8.6–10.2)
CHLORIDE BLD-SCNC: 92 MMOL/L (ref 98–107)
CO2: 35 MMOL/L (ref 22–29)
COHB: 1.3 % (ref 0–1.5)
COMMENT: ABNORMAL
CREAT SERPL-MCNC: 1.8 MG/DL (ref 0.7–1.2)
CRITICAL: ABNORMAL
DATE ANALYZED: ABNORMAL
DATE OF COLLECTION: ABNORMAL
DESCRIPTION BLOOD BANK: NORMAL
EOSINOPHILS ABSOLUTE: 0.25 E9/L (ref 0.05–0.5)
EOSINOPHILS RELATIVE PERCENT: 3.5 % (ref 0–6)
FIO2: 40 %
GFR AFRICAN AMERICAN: 46
GFR NON-AFRICAN AMERICAN: 38 ML/MIN/1.73
GLUCOSE BLD-MCNC: 116 MG/DL (ref 74–99)
HCO3: 38.6 MMOL/L (ref 22–26)
HCT VFR BLD CALC: 21.7 % (ref 37–54)
HEMOGLOBIN: 7 G/DL (ref 12.5–16.5)
HHB: 4.1 % (ref 0–5)
HYPOCHROMIA: ABNORMAL
INR BLD: 2.3
LAB: ABNORMAL
LYMPHOCYTES ABSOLUTE: 0.28 E9/L (ref 1.5–4)
LYMPHOCYTES RELATIVE PERCENT: 4.3 % (ref 20–42)
Lab: ABNORMAL
MAGNESIUM: 1.9 MG/DL (ref 1.6–2.6)
MCH RBC QN AUTO: 36.6 PG (ref 26–35)
MCHC RBC AUTO-ENTMCNC: 32.3 % (ref 32–34.5)
MCV RBC AUTO: 113.6 FL (ref 80–99.9)
METAMYELOCYTES RELATIVE PERCENT: 0.9 % (ref 0–1)
METHB: 0.5 % (ref 0–1.5)
MODE: ABNORMAL
MONOCYTES ABSOLUTE: 0.57 E9/L (ref 0.1–0.95)
MONOCYTES RELATIVE PERCENT: 7.8 % (ref 2–12)
MYELOCYTE PERCENT: 3.5 % (ref 0–0)
NEUTROPHILS ABSOLUTE: 5.89 E9/L (ref 1.8–7.3)
NEUTROPHILS RELATIVE PERCENT: 78.3 % (ref 43–80)
O2 CONTENT: 11.4 ML/DL
O2 SATURATION: 95.8 % (ref 92–98.5)
O2HB: 94.1 % (ref 94–97)
OPERATOR ID: 901
OVALOCYTES: ABNORMAL
PATIENT TEMP: 37
PCO2: 69.9 MMHG (ref 35–45)
PDW BLD-RTO: 18.9 FL (ref 11.5–15)
PEEP/CPAP: 6 CMH2O
PFO2: 2.2 MMHG/%
PH BLOOD GAS: 7.36 (ref 7.35–7.45)
PHOSPHORUS: 4.6 MG/DL (ref 2.5–4.5)
PIP: 15 CMH2O
PLATELET # BLD: 57 E9/L (ref 130–450)
PLATELET CONFIRMATION: NORMAL
PMV BLD AUTO: 8.8 FL (ref 7–12)
PO2: 87.8 MMHG (ref 75–100)
POIKILOCYTES: ABNORMAL
POLYCHROMASIA: ABNORMAL
POTASSIUM SERPL-SCNC: 3 MMOL/L (ref 3.5–5)
PROCALCITONIN: 3.54 NG/ML (ref 0–0.08)
PROTHROMBIN TIME: 26.3 SEC (ref 9.3–12.4)
PS: 9 CMH20
RBC # BLD: 1.91 E12/L (ref 3.8–5.8)
RR MECHANICAL: 10 B/MIN
SODIUM BLD-SCNC: 137 MMOL/L (ref 132–146)
SOURCE, BLOOD GAS: ABNORMAL
TARGET CELLS: ABNORMAL
THB: 8.5 G/DL (ref 11.5–16.5)
TIME ANALYZED: 535
TOTAL PROTEIN: 7.1 G/DL (ref 6.4–8.3)
VANCOMYCIN RANDOM: 20.5 MCG/ML (ref 5–40)
WBC # BLD: 7.1 E9/L (ref 4.5–11.5)

## 2020-02-03 PROCEDURE — 94640 AIRWAY INHALATION TREATMENT: CPT

## 2020-02-03 PROCEDURE — 99233 SBSQ HOSP IP/OBS HIGH 50: CPT | Performed by: FAMILY MEDICINE

## 2020-02-03 PROCEDURE — 6370000000 HC RX 637 (ALT 250 FOR IP): Performed by: INTERNAL MEDICINE

## 2020-02-03 PROCEDURE — 83735 ASSAY OF MAGNESIUM: CPT

## 2020-02-03 PROCEDURE — 99231 SBSQ HOSP IP/OBS SF/LOW 25: CPT | Performed by: INTERNAL MEDICINE

## 2020-02-03 PROCEDURE — 97110 THERAPEUTIC EXERCISES: CPT | Performed by: PHYSICAL THERAPIST

## 2020-02-03 PROCEDURE — 84145 PROCALCITONIN (PCT): CPT

## 2020-02-03 PROCEDURE — 87088 URINE BACTERIA CULTURE: CPT

## 2020-02-03 PROCEDURE — 2580000003 HC RX 258: Performed by: INTERNAL MEDICINE

## 2020-02-03 PROCEDURE — 80053 COMPREHEN METABOLIC PANEL: CPT

## 2020-02-03 PROCEDURE — 88305 TISSUE EXAM BY PATHOLOGIST: CPT

## 2020-02-03 PROCEDURE — 36415 COLL VENOUS BLD VENIPUNCTURE: CPT

## 2020-02-03 PROCEDURE — 85610 PROTHROMBIN TIME: CPT

## 2020-02-03 PROCEDURE — 6360000002 HC RX W HCPCS: Performed by: INTERNAL MEDICINE

## 2020-02-03 PROCEDURE — 94660 CPAP INITIATION&MGMT: CPT

## 2020-02-03 PROCEDURE — 80202 ASSAY OF VANCOMYCIN: CPT

## 2020-02-03 PROCEDURE — 84100 ASSAY OF PHOSPHORUS: CPT

## 2020-02-03 PROCEDURE — 71045 X-RAY EXAM CHEST 1 VIEW: CPT

## 2020-02-03 PROCEDURE — 82805 BLOOD GASES W/O2 SATURATION: CPT

## 2020-02-03 PROCEDURE — 85025 COMPLETE CBC W/AUTO DIFF WBC: CPT

## 2020-02-03 PROCEDURE — 88112 CYTOPATH CELL ENHANCE TECH: CPT

## 2020-02-03 PROCEDURE — 2000000000 HC ICU R&B

## 2020-02-03 PROCEDURE — 97162 PT EVAL MOD COMPLEX 30 MIN: CPT | Performed by: PHYSICAL THERAPIST

## 2020-02-03 RX ORDER — IPRATROPIUM BROMIDE AND ALBUTEROL SULFATE 2.5; .5 MG/3ML; MG/3ML
1 SOLUTION RESPIRATORY (INHALATION) EVERY 4 HOURS
Status: DISCONTINUED | OUTPATIENT
Start: 2020-02-03 | End: 2020-02-12 | Stop reason: HOSPADM

## 2020-02-03 RX ORDER — POTASSIUM CHLORIDE 29.8 MG/ML
40 INJECTION INTRAVENOUS 2 TIMES DAILY
Status: DISCONTINUED | OUTPATIENT
Start: 2020-02-03 | End: 2020-02-03 | Stop reason: CLARIF

## 2020-02-03 RX ORDER — 0.9 % SODIUM CHLORIDE 0.9 %
20 INTRAVENOUS SOLUTION INTRAVENOUS ONCE
Status: DISCONTINUED | OUTPATIENT
Start: 2020-02-03 | End: 2020-02-08

## 2020-02-03 RX ORDER — POTASSIUM CHLORIDE 29.8 MG/ML
20 INJECTION INTRAVENOUS
Status: COMPLETED | OUTPATIENT
Start: 2020-02-03 | End: 2020-02-03

## 2020-02-03 RX ORDER — POTASSIUM BICARBONATE 25 MEQ/1
25 TABLET, EFFERVESCENT ORAL 2 TIMES DAILY
Status: DISCONTINUED | OUTPATIENT
Start: 2020-02-03 | End: 2020-02-03

## 2020-02-03 RX ORDER — POTASSIUM CHLORIDE 400 MEQ/1000ML
20 INJECTION, SOLUTION INTRAVENOUS
Status: COMPLETED | OUTPATIENT
Start: 2020-02-03 | End: 2020-02-03

## 2020-02-03 RX ORDER — BUMETANIDE 0.25 MG/ML
1 INJECTION, SOLUTION INTRAMUSCULAR; INTRAVENOUS DAILY
Status: DISCONTINUED | OUTPATIENT
Start: 2020-02-03 | End: 2020-02-05

## 2020-02-03 RX ORDER — POTASSIUM BICARBONATE 25 MEQ/1
25 TABLET, EFFERVESCENT ORAL ONCE
Status: DISCONTINUED | OUTPATIENT
Start: 2020-02-03 | End: 2020-02-03 | Stop reason: CLARIF

## 2020-02-03 RX ADMIN — RIFAXIMIN 550 MG: 550 TABLET ORAL at 15:03

## 2020-02-03 RX ADMIN — MIDODRINE HYDROCHLORIDE 5 MG: 5 TABLET ORAL at 14:52

## 2020-02-03 RX ADMIN — IPRATROPIUM BROMIDE AND ALBUTEROL SULFATE 1 AMPULE: 2.5; .5 SOLUTION RESPIRATORY (INHALATION) at 09:31

## 2020-02-03 RX ADMIN — POTASSIUM CHLORIDE 20 MEQ: 29.8 INJECTION, SOLUTION INTRAVENOUS at 20:45

## 2020-02-03 RX ADMIN — LACTULOSE 30 G: 20 SOLUTION ORAL at 20:45

## 2020-02-03 RX ADMIN — MIDODRINE HYDROCHLORIDE 5 MG: 5 TABLET ORAL at 17:08

## 2020-02-03 RX ADMIN — ONDANSETRON 4 MG: 2 INJECTION INTRAMUSCULAR; INTRAVENOUS at 19:56

## 2020-02-03 RX ADMIN — POTASSIUM CHLORIDE 20 MEQ: 29.8 INJECTION, SOLUTION INTRAVENOUS at 10:07

## 2020-02-03 RX ADMIN — POTASSIUM CHLORIDE 20 MEQ: 29.8 INJECTION, SOLUTION INTRAVENOUS at 21:40

## 2020-02-03 RX ADMIN — SALINE NASAL SPRAY 2 SPRAY: 1.5 SOLUTION NASAL at 20:48

## 2020-02-03 RX ADMIN — PHYTONADIONE 5 MG: 10 INJECTION, EMULSION INTRAMUSCULAR; INTRAVENOUS; SUBCUTANEOUS at 10:24

## 2020-02-03 RX ADMIN — IPRATROPIUM BROMIDE AND ALBUTEROL SULFATE 1 AMPULE: 2.5; .5 SOLUTION RESPIRATORY (INHALATION) at 22:29

## 2020-02-03 RX ADMIN — AMPICILLIN SODIUM AND SULBACTAM SODIUM 3 G: 2; 1 INJECTION, POWDER, FOR SOLUTION INTRAMUSCULAR; INTRAVENOUS at 17:08

## 2020-02-03 RX ADMIN — MEROPENEM 500 MG: 500 INJECTION, POWDER, FOR SOLUTION INTRAVENOUS at 17:08

## 2020-02-03 RX ADMIN — IPRATROPIUM BROMIDE AND ALBUTEROL SULFATE 1 AMPULE: .5; 3 SOLUTION RESPIRATORY (INHALATION) at 06:41

## 2020-02-03 RX ADMIN — AMPICILLIN SODIUM AND SULBACTAM SODIUM 3 G: 2; 1 INJECTION, POWDER, FOR SOLUTION INTRAMUSCULAR; INTRAVENOUS at 10:25

## 2020-02-03 RX ADMIN — MIDODRINE HYDROCHLORIDE 5 MG: 5 TABLET ORAL at 10:06

## 2020-02-03 RX ADMIN — FOLIC ACID 1 MG: 1 TABLET ORAL at 10:05

## 2020-02-03 RX ADMIN — LACTULOSE 30 G: 20 SOLUTION ORAL at 10:05

## 2020-02-03 RX ADMIN — POTASSIUM BICARBONATE 20 MEQ: 782 TABLET, EFFERVESCENT ORAL at 10:05

## 2020-02-03 RX ADMIN — IPRATROPIUM BROMIDE AND ALBUTEROL SULFATE 1 AMPULE: 2.5; .5 SOLUTION RESPIRATORY (INHALATION) at 18:59

## 2020-02-03 RX ADMIN — IPRATROPIUM BROMIDE AND ALBUTEROL SULFATE 1 AMPULE: 2.5; .5 SOLUTION RESPIRATORY (INHALATION) at 15:05

## 2020-02-03 RX ADMIN — ASPIRIN 81 MG 81 MG: 81 TABLET ORAL at 10:05

## 2020-02-03 RX ADMIN — RIFAXIMIN 550 MG: 550 TABLET ORAL at 20:45

## 2020-02-03 RX ADMIN — POTASSIUM CHLORIDE 20 MEQ: 29.8 INJECTION, SOLUTION INTRAVENOUS at 10:58

## 2020-02-03 RX ADMIN — MEROPENEM 500 MG: 500 INJECTION, POWDER, FOR SOLUTION INTRAVENOUS at 10:58

## 2020-02-03 ASSESSMENT — PAIN SCALES - GENERAL
PAINLEVEL_OUTOF10: 0

## 2020-02-03 NOTE — PROGRESS NOTES
Pulmonary/Critical Care Progress Note    We are following patient for septic shock, pneumonia, CHF, continued bilateral lung infiltrates despite aggressive diuresis    SUBJECTIVE:  Patient is about the same as he was yesterday except that he is less peripheral edema. Dr. Luis Antonio Lynn started a Bumex drip. Patient's renal function is about the same as yesterday. I am wondering if the Bumex drip can be discontinued now that he has had a good diuresis and his peripheral edema is considerably reduced. The patient is scheduled to have a right-sided thoracentesis today. An echocardiogram has also been ordered since his last study was in August and I believe there is a deterioration clinically in cardiac function. We will also check a pro calcitonin level. With the lack of progress in his infiltrates, I am in agreement with the infectious disease service that meropenem should be added and I have done that with an adjustment in dose for his renal insufficiency. MEDICATIONS:   potassium bicarbonate  25 mEq Oral Once    ipratropium-albuterol  1 ampule Inhalation Q4H    meropenem  500 mg Intravenous Q8H    enoxaparin  40 mg Subcutaneous Daily    vitamin B-12  3,000 mcg Oral Daily    folic acid  1 mg Oral Daily    midodrine  5 mg Oral TID WC    lactulose  30 g Oral BID    rifaximin  550 mg Oral TID    aspirin  81 mg Oral Daily    sodium chloride  250 mL Intravenous Once      bumetanide 0.1 mg/mL infusion 0.5 mg/hr (02/02/20 1532)     perflutren lipid microspheres, ondansetron, sodium chloride, perflutren lipid microspheres      REVIEW OF SYSTEMS:  Constitutional: Denies fever, weight loss, night sweats, and fatigue  Skin: Denies pigmentation, dark lesions, and rashes   HEENT: Denies hearing loss, tinnitus, ear drainage, epistaxis, sore throat, and hoarseness. Cardiovascular: Denies palpitations, chest pain, and chest pressure.   Respiratory: Denies cough, dyspnea at rest, hemoptysis, apnea, and Final   02/01/2020 109.7 (H) 80.0 - 99.9 fL Final     Platelets   Date Value Ref Range Status   02/03/2020 57 (L) 130 - 450 E9/L Final   02/02/2020 62 (L) 130 - 450 E9/L Final   02/01/2020 63 (L) 130 - 450 E9/L Final     Sodium   Date Value Ref Range Status   02/03/2020 137 132 - 146 mmol/L Final   02/02/2020 136 132 - 146 mmol/L Final   02/02/2020 135 132 - 146 mmol/L Final     Potassium   Date Value Ref Range Status   02/03/2020 3.0 (L) 3.5 - 5.0 mmol/L Final   02/02/2020 2.9 (L) 3.5 - 5.0 mmol/L Final   02/02/2020 2.6 (LL) 3.5 - 5.0 mmol/L Final     Potassium reflex Magnesium   Date Value Ref Range Status   08/08/2019 3.3 (L) 3.5 - 5.0 mmol/L Final   08/06/2019 3.9 3.5 - 5.0 mmol/L Final     Chloride   Date Value Ref Range Status   02/03/2020 92 (L) 98 - 107 mmol/L Final   02/02/2020 91 (L) 98 - 107 mmol/L Final   02/02/2020 91 (L) 98 - 107 mmol/L Final     CO2   Date Value Ref Range Status   02/03/2020 35 (H) 22 - 29 mmol/L Final   02/02/2020 37 (H) 22 - 29 mmol/L Final   02/02/2020 34 (H) 22 - 29 mmol/L Final     BUN   Date Value Ref Range Status   02/03/2020 89 (H) 8 - 23 mg/dL Final   02/02/2020 88 (H) 8 - 23 mg/dL Final   02/02/2020 87 (H) 8 - 23 mg/dL Final     CREATININE   Date Value Ref Range Status   02/03/2020 1.8 (H) 0.7 - 1.2 mg/dL Final   02/02/2020 1.8 (H) 0.7 - 1.2 mg/dL Final   02/02/2020 1.8 (H) 0.7 - 1.2 mg/dL Final     Glucose   Date Value Ref Range Status   02/03/2020 116 (H) 74 - 99 mg/dL Final   02/02/2020 113 (H) 74 - 99 mg/dL Final   02/02/2020 125 (H) 74 - 99 mg/dL Final     Calcium   Date Value Ref Range Status   02/03/2020 8.7 8.6 - 10.2 mg/dL Final   02/02/2020 8.9 8.6 - 10.2 mg/dL Final   02/02/2020 8.8 8.6 - 10.2 mg/dL Final     Total Protein   Date Value Ref Range Status   02/03/2020 7.1 6.4 - 8.3 g/dL Final   02/02/2020 7.0 6.4 - 8.3 g/dL Final   02/01/2020 7.2 6.4 - 8.3 g/dL Final     Alb   Date Value Ref Range Status   02/03/2020 2.4 (L) 3.5 - 5.2 g/dL Final   02/02/2020 2.0 (L) 11.5*   O2SAT 95.8       RADIOLOGY:  XR CHEST PORTABLE   Final Result   1. No change from XR CHEST PORTABLE with report dated 2/2/2020 11:39   AM.          XR CHEST PORTABLE   Final Result      XR CHEST PORTABLE   Final Result   Relative severe congestive heart failure with right   pleural effusion and bilateral airspace disease. CT FACIAL BONES WO CONTRAST   Final Result   1. There is no gross periodontal abscess. 2. Minimal mucosal thickening seen within the sphenoid sinuses. US DUP UPPER EXTREMITY RIGHT VENOUS   Final Result   1. There is no right upper extremity deep venous thrombosis. RADIOLOGY REPORT   Final Result      XR CHEST PORTABLE   Final Result   1. Right internal jugular catheter tip in appropriate position. No   pneumothorax. 2. Stable moderate right pleural effusion with bilateral perihilar   airspace disease. XR CHEST PORTABLE   Final Result   Extensive bilateral airspace disease and pleural   effusions. Possible congestive heart failure. IR GUIDED THORACENTESIS PLEURAL    (Results Pending)           PROBLEM LIST:  Principal Problem:    Septic shock due to Staphylococcus aureus ([facility] [address]  Active Problems:    HTN (hypertension)    Morbid obesity with BMI of 45.0-49.9, adult (HCC)    Bilateral leg edema    Acute diastolic CHF (congestive heart failure) ([facility])    Mental confusion    Iron deficiency anemia due to chronic blood loss    Acute on chronic diastolic (congestive) heart failure ([facility])    Sepsis due to pneumonia [facility])  Resolved Problems:    * No resolved hospital problems. *      IMPRESSION:  1. Bilateral pneumonia  2. CHF-unknown whether this is HF PEF or low ejection fraction CHF  3. Right pleural effusion  4. Acute respiratory failure  5. Acute kidney injury  6. Worsening metabolic alkalosis secondary to volume depletion from diuresis and hypokalemia, leading to CO2 retention  7. Morbid obesity  8. Obstructive sleep apnea  9.  Suspect cor pulmonale  10. Acinetobacter bacteremia previously noted  11. Cirrhosis with thrombocytopenia    PLAN:  1. Unasyn and meropenem  2. Consider changing Bumex drip to intermittent doses  3. Potassium supplementation  4. Try to alternate Vapotherm with BiPAP considering breakdown of facial skin with BiPAP  5. Echo already ordered  6. Thoracentesis may need to be delayed till tomorrow because of elevated pro time  7. IV vitamin K 5 mg today  8. Track daily chest x-ray, ABGs, CBC including platelet count and labs    ATTESTATION:  ICU Staff Physician note of personal involvement in Care  As the attending physician, I certify that I personally reviewed the patients history and personally examined the patient to confirm the physical findings described above,  And that I reviewed the relevant imaging studies and available reports. I also discussed the differential diagnosis and all of the proposed management plans with the patient and individuals accompanying the patient to this visit. They had the opportunity to ask questions about the proposed management plans and to have those questions answered. This patient has a high probability of sudden, clinically significant deterioration, which requires the highest level of physician preparedness to intervene urgently. I managed/supervised life or organ supporting interventions that required frequent physician assessment. I devoted my full attention to the direct care of this patient for the amount of time indicated below. Time I spent with the family or surrogate(s) is included only if the patient was incapable of providing the necessary information or participating in medical decisions - Time devoted to teaching and to any procedures I billed separately is not included.     CRITICAL CARE TIME:  38 minutes    Electronically signed by Toro Tovar MD on 2/3/2020 at 9:06 AM

## 2020-02-03 NOTE — PROGRESS NOTES
Pharmacy Consultation Note  (Antibiotic Dosing and Monitoring)    Initial consult date: 1/30/20 Oswaldo Paige), re-consulted 2/3 Audrey Kennedy)  Drug(s): Vancomycin  Indication: Septic shock    Ht Readings from Last 1 Encounters:   01/30/20 5' 9\" (1.753 m)     Wt Readings from Last 1 Encounters:   01/30/20 (!) 341 lb 1.6 oz (154.7 kg)     Age/  Gender IBW DW  Allergy Information   59 y.o.   male 70.7 kg 104.3 kg  Latex; Aldactone [spironolactone]; and Thimerosal          Date  Tmax WBC BUN/CR UOP  (mL/kg/hr) Drug/Dose Time   Given Level(s)   (Time) Comments   1/29  (#1) afebrile 16 77/1.9 -- Vancomycin 3000 mg IV x 1 2234     1/30  (#2) afebrile 23.1 82/2.1 310 mL -- --     1/31  (#3) afebrile 16.3 84/2.2 0.4 Vancomycin 1250 mg IV x 1 0950 Random AM level @ 0535 = 17.3 mcg/mL    2/1  (#4) afebrile 13.3 85/1.9 0.6 Vancomycin 1250 mg IV x 1 2126 Random AM level @ 0640 = 20.9 mcg/mL    2/2  (#5) afebrile 9.9 87/1.8 0.6 -- --     2/3  (#6) afebrile 7.1 89/1.8 0.7 Hold vancomycin -- Random level @ 1130 = 20.5 mcg/mL    2/4  (#7)     Vancomycin 1250 mg IV x 1 <0600>       Estimated Creatinine Clearance: 61 mL/min (A) (based on SCr of 1.8 mg/dL (H)). UOP over the past 24 hours:     Intake/Output Summary (Last 24 hours) at 2/3/2020 1320  Last data filed at 2/3/2020 1305  Gross per 24 hour   Intake 1380 ml   Output 2730 ml   Net -1350 ml     Other anti-infectives: Anti-infective Dose Date Initiated Date Stopped   Cefepime 2g IV q8hr 1/29 1/31   Levofloxacin 750 mg every other day 1/31 2/2   Unasyn 3 g Q6H  3g Q8H 1/31  2/3 2/2   Cefoxitin 1 g Q6H 2/2 2/2   Meropenem 500 mg q8hr 2/3            Cultures:  available culture and sensitivity results were reviewed in EPIC  Cultures sent and are pending.   Culture Date Result    Rapid flu 1/29 Not detected   Blood cx 1 1/29 Acinetobacter baumannii  MRSE   Blood cx 2 1/29 Acinetobacter baumannii  MRSE   Respiratory film array 1/29 Not detected   Urine cx 1/30 <10,000 CFU/mL Proteus mirabilis  <10,000 CFU/mL GNR   MRSA nares 1/30 Not isolated   Blood cx 1 1/31 NGTD   Blood cx 2 1/31 NGTD               Assessment:  · Consulted by Dr. Herminio Swain to dose/monitor vancomycin  · Goal trough level:  15-20 mcg/mL  · Pt is a 59 yoM who presented from [facility] with septic shock. Hx of cirrhosis, recently admitted with alcohol withdrawal in 12/2019.    · Serum creatinine today: 1.8 mg/dL; CrCl ~ 60 mL/min; baseline Scr ~ 0.7-0.8 mg/dL  · Vancomycin was discontinued on 2/2, re-started 2/3 as blood cultures are positive for MRSE in addition to Acinetobacter baumannii    Plan:  · Random level today @ 1130 = 20.5 mcg/mL (~38 hours from previous dose)  · Re-dose vancomycin 1250 mg IV x 1 TOMORROW AM @ 0600  · Follow renal function, continue to dose based on levels  · Pharmacist will follow and monitor/adjust dosing as necessary      Thank you for the consult,    Cher Ram, PharmD, BCPS 2/3/2020 1:31 PM   Pager: [phone]  Ext: [phone]

## 2020-02-03 NOTE — PROGRESS NOTES
HOSPITAL   PROGRESS NOTE. SUBJECTIVE:  Real President, 59 y.o., male C/O  FEELS VERY WEAK. CONDITION DISCUSSED WITH  PATIENT, DR Mitzi Newman   AND NURSING   STAFF. CONSULT NOTES REVIEWED. ROS:GENERAL- APPETITE- POOR. BOWEL MOVEMENTS- NORMAL. NO URINE    PROBLEM. EENT: NORMAL            CVS: NORMAL. NO CHEST PAIN OR PALPITATION. RESPIRATORY:NO SOB. GI/: NO ABDOMINAL PAINS            MUSCULOSKELETAL: NO COMPLAIN            CNS: NO  COMPLAIN            OBJECTIVE:    GENERAL:Temperature:  Current - Temp: 97.7 °F (36.5 °C); Max - Temp  Av.3 °F (36.3 °C)  Min: 96.4 °F (35.8 °C)  Max: 97.7 °F (36.5 °C)  Respiratory Rate : Resp  Avg: 15.9  Min: 7  Max: 23  Pulse Range: Pulse  Av.5  Min: 74  Max: 90  Blood Presuure Range:  Systolic (95NJK), VEC:833 , Min:103 , UZA:018   ; Diastolic (36YTR), FMI:82, Min:42, Max:62    Pulse ox Range: SpO2  Av.4 %  Min: 88 %  Max: 100 %  24hr I & O:      Intake/Output Summary (Last 24 hours) at 2/3/2020 1126  Last data filed at 2/3/2020 1100  Gross per 24 hour   Intake 1110 ml   Output 3005 ml   Net -1895 ml       CONSTITUTIONALl:MORBIDLY OBESE,ORIENTED,CO-OPERATIVE  HEENT:NORMAL. NECK:  supple, symmetrical, trachea midline,Carotids- normal,JVP- flat  HEMATOLOGIC/LYMPHATICS:  no cervical lymphadenopathy  LUNGS:clear  CARDIOVASCULAR:  Normal apical impulse, regular rate and rhythm, normal S1 and S2, no S3 or S4, and no murmur noted  ABDOMEN:  normal bowel sounds, non-distended, non-tender, no masses palpated  EXTREMITIES: ARTHRITIC CHANGES. MOVEMENTS-LIMITED. PEDAL PULSES-FAIR.   SKIN:  normal skin color, texture, turgor    Data    CBC:   Lab Results   Component Value Date    WBC 7.1 2020    RBC 1.91 2020    HGB 7.0 2020    HCT 21.7 2020    .6 2020    MCH 36.6 2020    MCHC 32.3 2020    RDW 18.9 2020    PLT 57 2020    MPV (congestive) heart failure (HCC) [I50.33] 12/18/2019       PLAN: CONTINUE CURRENT MEDICATIONS AND Rx. AGREE WITH  TO CUT BACK ON DIURESIS. PT/OT FOR BED EXERCISES.       Electronically signed by Dann Heller MD on 2/3/20 at 11:26 AM

## 2020-02-03 NOTE — PROGRESS NOTES
[facility]  [facility]  Progress Note    F/U bacteremia  Chief complain:  Bacteremia     SUBJECTIVE:    Patient is awake, alert  On  Bipap  Seen at bedside  NO FAMILY PRESENT  C/o ear ache   Tolerating antibiotics     c/o FMS burning   No vomiting, fever, chills, or rash     ROS:  Otherwise negative except as above     OBJECTIVE:    Vitals:    02/03/20 0400 02/03/20 0500 02/03/20 0600 02/03/20 0645   BP: (!) 103/47 (!) 108/47 (!) 108/46    Pulse: 82 84 83    Resp: (!) 7 15 19 17   Temp: 97.7 °F (36.5 °C)      TempSrc: Core      SpO2: 94% 96%     Weight:       Height:           HEENT :         unremarkable A&O x 3, NAD   Heart:             RRR,   murmurs  Lungs:            DEC to auscultation, no wheezing , no rales bipap  Abdomen:       soft, non tender, bowel sounds present + murmur   Extremites:      edema, no ulcers + lymphedema  Skin dry   Skin:                No rash   Right IJ /29  Scrotal edema   [place]   FMS  MEDS:      enoxaparin  40 mg Subcutaneous Daily    ipratropium-albuterol  1 ampule Inhalation Q4H WA    vitamin B-12  3,000 mcg Oral Daily    folic acid  1 mg Oral Daily    potassium chloride  40 mEq Oral BID WC    midodrine  5 mg Oral TID WC    ampicillin-sulbactam  3 g Intravenous 4x Daily    lactulose  30 g Oral BID    rifaximin  550 mg Oral TID    aspirin  81 mg Oral Daily    sodium chloride  250 mL Intravenous Once       LABS    CBC:   Recent Labs     02/01/20  1215 02/02/20  0550 02/03/20  0510   WBC 10.9 9.9 7.1   HGB 7.3* 7.1* 7.0*   HCT 21.5* 21.3* 21.7*   PLT 63* 62* 57*       BMP:    Recent Labs     02/02/20  0550 02/02/20  1810 02/03/20  0510    136 137   K 2.6* 2.9* 3.0*   CL 91* 91* 92*   CO2 34* 37* 35*   BUN 87* 88* 89*   CREATININE 1.8* 1.8* 1.8*   GLUCOSE 125* 113* 116*         Hepatic Function Panel:    Lab Results   Component Value Date    ALKPHOS 96 02/03/2020    ALT 39 02/03/2020    AST 80 02/03/2020    PROT 7.1 02/03/2020 questions. All questions were answered.       Electronically signed by Ayesha Harris MD on 2/3/2020 at 9:17 AM    Phone [phone]  Fax [phone]

## 2020-02-03 NOTE — PROGRESS NOTES
Physical Therapy                      PHYSICAL THERAPY INITIAL EVALUATION  Room #: IC03/IC03-01  Patient Name: Jessica Hardy  Referring Doctor: Thor Primrose, MD    Diagnosis/Problem list:  Sepsis due to pneumonia [facility]) [J18.9, A41.9]        Patient Active Problem List   Diagnosis    HTN (hypertension)    Morbid obesity with BMI of 45.0-49.9, adult ([facility] [place] [address]    Bilateral leg edema    Gout    Acute diastolic CHF (congestive heart failure) ([facility] [place] [address]    Bilateral low back pain without sciatica    Rhabdomyolysis    Mental confusion    Alcoholic cirrhosis of liver without ascites ([facility] [place] [address]    Pneumonia of right upper lobe due to infectious organism ([facility] [place] [address]    Hepatic encephalopathy ([facility] [address]    Cellulitis of scrotum    Acute on chronic diastolic (congestive) heart failure ([facility] [place] [address]    [facility] to [facility]    Iron deficiency anemia due to chronic blood loss    Septic shock due to Staphylococcus aureus [facility])       Tentative placement recommendation: [facility] with Physical Therapy   Pt states he wants to work with therapy to return home  Equipment recommendation: To be determined      Prior Level of Function: Patient ambulated with cane > 10 months   Rehab Potential: fair  for baseline; patient has been in and out of hospital over past 10 months    Plan of care: Patient will be seen    daily  for therapeutic exercise, functional retraining, endurance activities, balance exercises, family and patient education.        AM-PAC Basic Mobility        AM-PAC Mobility Inpatient   How much difficulty turning over in bed?: Unable  How much difficulty sitting down on / standing up from a chair with arms?: Unable  How much difficulty moving from lying on back to sitting on side of bed?: Unable  How much help from another person moving to and from a bed to a chair?: Total  How much help from another person needed to walk in hospital room?: Total  How much help from another person for climbing 3-5 steps with a railing?: Total  AM-PAC Inpatient Mobility Raw Score : 6  AM-PAC Inpatient T-Scale Score : 23.55  Mobility Inpatient CMS 0-100% Score: 100  Mobility Inpatient CMS G-Code Modifier : CN    Order:  EVALUATE AND TREAT      Past medical history:       Diagnosis Date    Acute diastolic CHF (congestive heart failure) ([facility] [address] 11/17/15    Echo 11/18/15 EF 73%    Dermatitis     due to thimersol    Heart valve problem     leaky    Hx of cardiovascular stress test 12/9/2015    Lexiscan    Hypertension     Obesity    ;      Procedure Laterality Date    CARPAL TUNNEL RELEASE      HERNIA REPAIR      mesh in abd    NECK SURGERY      UPPER GASTROINTESTINAL ENDOSCOPY N/A 4/30/2019    EGD ESOPHAGOGASTRODUODENOSCOPY performed by Katie Salguero MD at [address] cleared patient for PT evaluation and patient agreeable. The admitting diagnosis and active problem list as listed above have been reviewed prior to the initiation of this evaluation. Last time out of bed: unknown    Precautions: falls, alarm and O2 , vasotherm, fecal management system, painful Left lower extremity   Social history: Patient lives in a skilled nursing facility        Equipment owned: [employer] and  [employer],      Mentation: alert, cooperative, oriented x 3  and follows directions,      ROM: wfl with exception of bilateral lower extremities d/t pain L>R  STRENGTH: Right upper extremitiy 2-/5; Left upper extremitiy 2/5; Right lower extremity 1+/5; Left lower extremity 1/5  PAIN: (measured on a visual analog scale with 0=no pain and 10=excruciating pain) 8/10.  Left lower extremity however with encouragement participated with exercises for rom and strengthening    FUNCTIONAL ASSESSMENT   Bed Mobility- roll Dependent of  2             Edema: yes -    Endurance: poor fatigues quickly  Vitals: po2 during exercises 93%    Treatment: performed shoulder flexion, tricep curls, heel slides , ankle pumps and straight leg-raise all ex a/a

## 2020-02-03 NOTE — PROGRESS NOTES
Inpatient Cardiology Progress Note     Date of Service: 2/3/2020    Reason for Follow-up: Elevated troponin, acute on chronic diastolic heart failure, acute hypoxic respiratory failure       HISTORY OF PRESENT ILLNESS:     Patient seen and examined. Generalized edema slowly improving. Shortness of breath slightly improved. For right sided thoracentesis today per nursing staff. No new complaints at this time        REVIEW OF SYSTEMS:     · Constitutional: Denies fevers, chills, night sweats, and fatigue  · HEENT: Denies headaches, nose bleeds, and blurred vision  · Musculoskeletal: ++ generalized edema. Denies falls, pain to BLE with ambulation  · Neurological: Denies dizziness and lightheadedness, numbness and tingling  · Cardiovascular: Denies chest pain, palpitations, and feelings of heart racing. · Respiratory: +shortness of breath. Denies orthopnea and PND  · Gastrointestinal: Denies nausea/vomiting, diarrhea and constipation, black/bloody, and tarry stools. · Genitourinary: Denies dysuria and hematuria  · Hematologic: Denies excessive bruising or bleeding      PHYSICAL EXAM:   BP (!) 98/53   Pulse 92   Temp 98.2 °F (36.8 °C) (Core)   Resp 18   Ht 5' 9\" (1.753 m)   Wt (!) 341 lb 1.6 oz (154.7 kg)   SpO2 94%   BMI 50.37 kg/m²   CONST:  Well developed, morbidly obese white male who appears stated age. Awake, alert, cooperative, no apparent distress  HEENT:   Head- Normocephalic, atraumatic   Eyes- Conjunctivae pink, anicteric  Throat- Oral mucosa pink and moist  Neck-  No stridor, trachea midline, no jugular venous distention. CHEST: Chest symmetrical and non-tender to palpation. No accessory muscle use or intercostal retractions  RESPIRATORY: Lung sounds - rales bilaterally  CARDIOVASCULAR:     No carotid bruit  Heart Inspection- shows no noted pulsations  Heart Palpation- no heaves or thrills; PMI is non-displaced   Heart Ausculation- Regular rate and rhythm, +systolic murmur.  No s3, s4 or rub Component Value Date    CHOL 95 12/19/2019    HDL 39 12/19/2019    LDLCALC 46 12/19/2019    TRIG 49 12/19/2019     LIVER PROFILE:  Recent Labs     02/02/20  0550 02/03/20  0510   AST 59* 80*   ALT 28 39   LABALBU 2.0* 2.4*         ASSESSMENT:  1. Septic shock due to bacteremia, HCAP and UTI requiring pressor support  2. Acute on chronic diastolic heart failure  3. Acute hypoxic respiratory failure  4. Elevated troponin, likely type II NSTEMI due to demand ischemia given acute anemia, septic shock with bacteremia, RONNY and acute on chronic diastolic heart failure. 5. Morbid obesity. 6. Elevated LFTs. 7. Hepatic encephalopathy. Alcoholic liver cirrhosis with anasarca. 8. Acute on chronic anemia, now requiring blood transfusions, hemoglobin still low likely with occult bleeding ? 9. Acute kidney injury         PLAN:  1. Continue with IV diuresis. Monitor creatinine, electrolytes. Daily weights, intake and output. 2. 2D echocardiogram reviewed. Normal EF. 3. Not a candidate at this time for any aggressive cardiac work up or intervention. Will continue with medical management at this time. 4. Will continue to follow.          Electronically signed by Miguelito Alvarado DO on 2/3/2020 at 4:08 PM

## 2020-02-04 ENCOUNTER — APPOINTMENT (OUTPATIENT)
Dept: INTERVENTIONAL RADIOLOGY/VASCULAR | Age: 65
DRG: 720 | End: 2020-02-04
Payer: COMMERCIAL

## 2020-02-04 ENCOUNTER — APPOINTMENT (OUTPATIENT)
Dept: GENERAL RADIOLOGY | Age: 65
DRG: 720 | End: 2020-02-04
Payer: COMMERCIAL

## 2020-02-04 PROBLEM — A41.59 SEPSIS DUE TO ACINETOBACTER (HCC): Status: ACTIVE | Noted: 2020-02-04

## 2020-02-04 LAB
ABO/RH: NORMAL
ALBUMIN SERPL-MCNC: 2.2 G/DL (ref 3.5–5.2)
ALP BLD-CCNC: 112 U/L (ref 40–129)
ALT SERPL-CCNC: 38 U/L (ref 0–40)
ANION GAP SERPL CALCULATED.3IONS-SCNC: 11 MMOL/L (ref 7–16)
ANISOCYTOSIS: ABNORMAL
ANTIBODY SCREEN: NORMAL
AST SERPL-CCNC: 74 U/L (ref 0–39)
BASOPHILIC STIPPLING: ABNORMAL
BASOPHILS ABSOLUTE: 0 E9/L (ref 0–0.2)
BASOPHILS RELATIVE PERCENT: 0 % (ref 0–2)
BILIRUB SERPL-MCNC: 3.4 MG/DL (ref 0–1.2)
BLOOD BANK DISPENSE STATUS: NORMAL
BLOOD BANK PRODUCT CODE: NORMAL
BPU ID: NORMAL
BUN BLDV-MCNC: 88 MG/DL (ref 8–23)
CALCIUM SERPL-MCNC: 8.6 MG/DL (ref 8.6–10.2)
CHLORIDE BLD-SCNC: 89 MMOL/L (ref 98–107)
CO2: 34 MMOL/L (ref 22–29)
CREAT SERPL-MCNC: 1.9 MG/DL (ref 0.7–1.2)
CRITICAL: NORMAL
DATE ANALYZED: NORMAL
DATE OF COLLECTION: NORMAL
DESCRIPTION BLOOD BANK: NORMAL
EOSINOPHILS ABSOLUTE: 0.25 E9/L (ref 0.05–0.5)
EOSINOPHILS RELATIVE PERCENT: 4 % (ref 0–6)
FLUID TYPE: NORMAL
GFR AFRICAN AMERICAN: 43
GFR NON-AFRICAN AMERICAN: 36 ML/MIN/1.73
GLUCOSE BLD-MCNC: 120 MG/DL (ref 74–99)
GLUCOSE, FLUID: 140 MG/DL
HCT VFR BLD CALC: 21.5 % (ref 37–54)
HCT VFR BLD CALC: 22.3 % (ref 37–54)
HEMOGLOBIN: 6.9 G/DL (ref 12.5–16.5)
HEMOGLOBIN: 7.3 G/DL (ref 12.5–16.5)
HYPOCHROMIA: ABNORMAL
INR BLD: 2.1
LAB: NORMAL
LD, FLUID: 97 U/L
LYMPHOCYTES ABSOLUTE: 0.12 E9/L (ref 1.5–4)
LYMPHOCYTES RELATIVE PERCENT: 2 % (ref 20–42)
Lab: NORMAL
Lab: NORMAL
MACRO-OVALOCYTES: ABNORMAL
MAGNESIUM: 1.8 MG/DL (ref 1.6–2.6)
MCH RBC QN AUTO: 36.3 PG (ref 26–35)
MCHC RBC AUTO-ENTMCNC: 32.1 % (ref 32–34.5)
MCV RBC AUTO: 113.2 FL (ref 80–99.9)
METAMYELOCYTES RELATIVE PERCENT: 1 % (ref 0–1)
MONOCYTE, FLUID: 51 %
MONOCYTES ABSOLUTE: 0.5 E9/L (ref 0.1–0.95)
MONOCYTES RELATIVE PERCENT: 8 % (ref 2–12)
MYELOCYTE PERCENT: 1 % (ref 0–0)
NEUTROPHIL, FLUID: 49 %
NEUTROPHILS ABSOLUTE: 5.33 E9/L (ref 1.8–7.3)
NEUTROPHILS RELATIVE PERCENT: 84 % (ref 43–80)
NUCLEATED CELLS FLUID: 297 /UL
OPERATOR ID: 30
OVALOCYTES: ABNORMAL
PDW BLD-RTO: 18.3 FL (ref 11.5–15)
PH FLUID: 7.39
PH, BODY FLUID: 8
PHOSPHORUS: 4.2 MG/DL (ref 2.5–4.5)
PLATELET # BLD: 51 E9/L (ref 130–450)
PLATELET CONFIRMATION: NORMAL
PMV BLD AUTO: 9.1 FL (ref 7–12)
POIKILOCYTES: ABNORMAL
POLYCHROMASIA: ABNORMAL
POTASSIUM SERPL-SCNC: 3 MMOL/L (ref 3.5–5)
PROCALCITONIN: 2.22 NG/ML (ref 0–0.08)
PROTEIN FLUID: 2 G/DL
PROTHROMBIN TIME: 24.2 SEC (ref 9.3–12.4)
RBC # BLD: 1.9 E12/L (ref 3.8–5.8)
RBC FLUID: NORMAL /UL
SODIUM BLD-SCNC: 134 MMOL/L (ref 132–146)
SOURCE, BLOOD GAS: NORMAL
TIME ANALYZED: 1525
TOTAL PROTEIN: 7.1 G/DL (ref 6.4–8.3)
WBC # BLD: 6.2 E9/L (ref 4.5–11.5)

## 2020-02-04 PROCEDURE — 87015 SPECIMEN INFECT AGNT CONCNTJ: CPT

## 2020-02-04 PROCEDURE — 86923 COMPATIBILITY TEST ELECTRIC: CPT

## 2020-02-04 PROCEDURE — 6370000000 HC RX 637 (ALT 250 FOR IP): Performed by: INTERNAL MEDICINE

## 2020-02-04 PROCEDURE — 36415 COLL VENOUS BLD VENIPUNCTURE: CPT

## 2020-02-04 PROCEDURE — 87116 MYCOBACTERIA CULTURE: CPT

## 2020-02-04 PROCEDURE — 0W993ZZ DRAINAGE OF RIGHT PLEURAL CAVITY, PERCUTANEOUS APPROACH: ICD-10-PCS | Performed by: RADIOLOGY

## 2020-02-04 PROCEDURE — 85025 COMPLETE CBC W/AUTO DIFF WBC: CPT

## 2020-02-04 PROCEDURE — 87070 CULTURE OTHR SPECIMN AEROBIC: CPT

## 2020-02-04 PROCEDURE — 86900 BLOOD TYPING SEROLOGIC ABO: CPT

## 2020-02-04 PROCEDURE — 2000000000 HC ICU R&B

## 2020-02-04 PROCEDURE — 87206 SMEAR FLUORESCENT/ACID STAI: CPT

## 2020-02-04 PROCEDURE — 71045 X-RAY EXAM CHEST 1 VIEW: CPT

## 2020-02-04 PROCEDURE — 84145 PROCALCITONIN (PCT): CPT

## 2020-02-04 PROCEDURE — 6360000002 HC RX W HCPCS: Performed by: SPECIALIST

## 2020-02-04 PROCEDURE — 80053 COMPREHEN METABOLIC PANEL: CPT

## 2020-02-04 PROCEDURE — 82784 ASSAY IGA/IGD/IGG/IGM EACH: CPT

## 2020-02-04 PROCEDURE — 2580000003 HC RX 258: Performed by: INTERNAL MEDICINE

## 2020-02-04 PROCEDURE — 94640 AIRWAY INHALATION TREATMENT: CPT

## 2020-02-04 PROCEDURE — 86901 BLOOD TYPING SEROLOGIC RH(D): CPT

## 2020-02-04 PROCEDURE — 32555 ASPIRATE PLEURA W/ IMAGING: CPT | Performed by: RADIOLOGY

## 2020-02-04 PROCEDURE — 85610 PROTHROMBIN TIME: CPT

## 2020-02-04 PROCEDURE — 2500000003 HC RX 250 WO HCPCS: Performed by: INTERNAL MEDICINE

## 2020-02-04 PROCEDURE — 36592 COLLECT BLOOD FROM PICC: CPT

## 2020-02-04 PROCEDURE — 82947 ASSAY GLUCOSE BLOOD QUANT: CPT

## 2020-02-04 PROCEDURE — 86850 RBC ANTIBODY SCREEN: CPT

## 2020-02-04 PROCEDURE — 83615 LACTATE (LD) (LDH) ENZYME: CPT

## 2020-02-04 PROCEDURE — 87205 SMEAR GRAM STAIN: CPT

## 2020-02-04 PROCEDURE — 85014 HEMATOCRIT: CPT

## 2020-02-04 PROCEDURE — 84100 ASSAY OF PHOSPHORUS: CPT

## 2020-02-04 PROCEDURE — 83986 ASSAY PH BODY FLUID NOS: CPT

## 2020-02-04 PROCEDURE — P9016 RBC LEUKOCYTES REDUCED: HCPCS

## 2020-02-04 PROCEDURE — 87102 FUNGUS ISOLATION CULTURE: CPT

## 2020-02-04 PROCEDURE — 6360000002 HC RX W HCPCS: Performed by: INTERNAL MEDICINE

## 2020-02-04 PROCEDURE — 94660 CPAP INITIATION&MGMT: CPT

## 2020-02-04 PROCEDURE — 85018 HEMOGLOBIN: CPT

## 2020-02-04 PROCEDURE — 84157 ASSAY OF PROTEIN OTHER: CPT

## 2020-02-04 PROCEDURE — C1729 CATH, DRAINAGE: HCPCS

## 2020-02-04 PROCEDURE — 2580000003 HC RX 258: Performed by: SPECIALIST

## 2020-02-04 PROCEDURE — 83735 ASSAY OF MAGNESIUM: CPT

## 2020-02-04 PROCEDURE — 99233 SBSQ HOSP IP/OBS HIGH 50: CPT | Performed by: FAMILY MEDICINE

## 2020-02-04 PROCEDURE — 36430 TRANSFUSION BLD/BLD COMPNT: CPT

## 2020-02-04 RX ORDER — FENTANYL CITRATE 50 UG/ML
50 INJECTION, SOLUTION INTRAMUSCULAR; INTRAVENOUS ONCE
Status: COMPLETED | OUTPATIENT
Start: 2020-02-04 | End: 2020-02-04

## 2020-02-04 RX ORDER — 0.9 % SODIUM CHLORIDE 0.9 %
20 INTRAVENOUS SOLUTION INTRAVENOUS ONCE
Status: DISCONTINUED | OUTPATIENT
Start: 2020-02-04 | End: 2020-02-08

## 2020-02-04 RX ORDER — POTASSIUM CHLORIDE 29.8 MG/ML
20 INJECTION INTRAVENOUS
Status: COMPLETED | OUTPATIENT
Start: 2020-02-04 | End: 2020-02-04

## 2020-02-04 RX ADMIN — IPRATROPIUM BROMIDE AND ALBUTEROL SULFATE 1 AMPULE: 2.5; .5 SOLUTION RESPIRATORY (INHALATION) at 02:03

## 2020-02-04 RX ADMIN — LACTULOSE 30 G: 20 SOLUTION ORAL at 09:05

## 2020-02-04 RX ADMIN — RIFAXIMIN 550 MG: 550 TABLET ORAL at 20:37

## 2020-02-04 RX ADMIN — POTASSIUM CHLORIDE 20 MEQ: 29.8 INJECTION, SOLUTION INTRAVENOUS at 13:36

## 2020-02-04 RX ADMIN — AMPICILLIN SODIUM AND SULBACTAM SODIUM 3 G: 2; 1 INJECTION, POWDER, FOR SOLUTION INTRAMUSCULAR; INTRAVENOUS at 17:35

## 2020-02-04 RX ADMIN — IPRATROPIUM BROMIDE AND ALBUTEROL SULFATE 1 AMPULE: 2.5; .5 SOLUTION RESPIRATORY (INHALATION) at 22:59

## 2020-02-04 RX ADMIN — AMPICILLIN SODIUM AND SULBACTAM SODIUM 3 G: 2; 1 INJECTION, POWDER, FOR SOLUTION INTRAMUSCULAR; INTRAVENOUS at 00:53

## 2020-02-04 RX ADMIN — AMPICILLIN SODIUM AND SULBACTAM SODIUM 3 G: 2; 1 INJECTION, POWDER, FOR SOLUTION INTRAMUSCULAR; INTRAVENOUS at 09:13

## 2020-02-04 RX ADMIN — FOLIC ACID 1 MG: 1 TABLET ORAL at 09:04

## 2020-02-04 RX ADMIN — POTASSIUM CHLORIDE 20 MEQ: 29.8 INJECTION, SOLUTION INTRAVENOUS at 09:28

## 2020-02-04 RX ADMIN — BUMETANIDE 1 MG: 0.25 INJECTION INTRAMUSCULAR; INTRAVENOUS at 09:04

## 2020-02-04 RX ADMIN — CYANOCOBALAMIN TAB 1000 MCG 3000 MCG: 1000 TAB at 09:06

## 2020-02-04 RX ADMIN — IPRATROPIUM BROMIDE AND ALBUTEROL SULFATE 1 AMPULE: 2.5; .5 SOLUTION RESPIRATORY (INHALATION) at 14:01

## 2020-02-04 RX ADMIN — IPRATROPIUM BROMIDE AND ALBUTEROL SULFATE 1 AMPULE: 2.5; .5 SOLUTION RESPIRATORY (INHALATION) at 09:30

## 2020-02-04 RX ADMIN — VANCOMYCIN HYDROCHLORIDE 1250 MG: 10 INJECTION, POWDER, LYOPHILIZED, FOR SOLUTION INTRAVENOUS at 05:55

## 2020-02-04 RX ADMIN — FENTANYL CITRATE 50 MCG: 50 INJECTION, SOLUTION INTRAMUSCULAR; INTRAVENOUS at 21:36

## 2020-02-04 RX ADMIN — MEROPENEM 500 MG: 500 INJECTION, POWDER, FOR SOLUTION INTRAVENOUS at 17:35

## 2020-02-04 RX ADMIN — MEROPENEM 500 MG: 500 INJECTION, POWDER, FOR SOLUTION INTRAVENOUS at 09:13

## 2020-02-04 RX ADMIN — RIFAXIMIN 550 MG: 550 TABLET ORAL at 09:06

## 2020-02-04 RX ADMIN — ONDANSETRON 4 MG: 2 INJECTION INTRAMUSCULAR; INTRAVENOUS at 13:30

## 2020-02-04 RX ADMIN — ASPIRIN 81 MG 81 MG: 81 TABLET ORAL at 09:05

## 2020-02-04 RX ADMIN — RIFAXIMIN 550 MG: 550 TABLET ORAL at 15:27

## 2020-02-04 RX ADMIN — MIDODRINE HYDROCHLORIDE 5 MG: 5 TABLET ORAL at 17:35

## 2020-02-04 RX ADMIN — IPRATROPIUM BROMIDE AND ALBUTEROL SULFATE 1 AMPULE: 2.5; .5 SOLUTION RESPIRATORY (INHALATION) at 17:19

## 2020-02-04 RX ADMIN — POTASSIUM CHLORIDE 20 MEQ: 29.8 INJECTION, SOLUTION INTRAVENOUS at 10:13

## 2020-02-04 RX ADMIN — IPRATROPIUM BROMIDE AND ALBUTEROL SULFATE 1 AMPULE: 2.5; .5 SOLUTION RESPIRATORY (INHALATION) at 05:36

## 2020-02-04 RX ADMIN — POTASSIUM CHLORIDE 20 MEQ: 29.8 INJECTION, SOLUTION INTRAVENOUS at 12:35

## 2020-02-04 RX ADMIN — POTASSIUM CHLORIDE 20 MEQ: 29.8 INJECTION, SOLUTION INTRAVENOUS at 15:24

## 2020-02-04 RX ADMIN — MIDODRINE HYDROCHLORIDE 5 MG: 5 TABLET ORAL at 09:04

## 2020-02-04 RX ADMIN — SALINE NASAL SPRAY 2 SPRAY: 1.5 SOLUTION NASAL at 09:05

## 2020-02-04 RX ADMIN — POTASSIUM CHLORIDE 20 MEQ: 29.8 INJECTION, SOLUTION INTRAVENOUS at 11:35

## 2020-02-04 RX ADMIN — MEROPENEM 500 MG: 500 INJECTION, POWDER, FOR SOLUTION INTRAVENOUS at 00:53

## 2020-02-04 ASSESSMENT — PAIN DESCRIPTION - PROGRESSION: CLINICAL_PROGRESSION: NOT CHANGED

## 2020-02-04 ASSESSMENT — PAIN DESCRIPTION - FREQUENCY: FREQUENCY: CONTINUOUS

## 2020-02-04 ASSESSMENT — PAIN SCALES - GENERAL
PAINLEVEL_OUTOF10: 0
PAINLEVEL_OUTOF10: 7
PAINLEVEL_OUTOF10: 0

## 2020-02-04 ASSESSMENT — PAIN DESCRIPTION - ONSET: ONSET: ON-GOING

## 2020-02-04 ASSESSMENT — PAIN DESCRIPTION - ORIENTATION: ORIENTATION: RIGHT;LEFT

## 2020-02-04 ASSESSMENT — PAIN DESCRIPTION - LOCATION: LOCATION: LEG

## 2020-02-04 ASSESSMENT — PAIN - FUNCTIONAL ASSESSMENT: PAIN_FUNCTIONAL_ASSESSMENT: PREVENTS OR INTERFERES SOME ACTIVE ACTIVITIES AND ADLS

## 2020-02-04 ASSESSMENT — PAIN DESCRIPTION - DESCRIPTORS: DESCRIPTORS: ACHING;DISCOMFORT;SORE

## 2020-02-04 ASSESSMENT — PAIN DESCRIPTION - PAIN TYPE: TYPE: CHRONIC PAIN

## 2020-02-04 NOTE — PROGRESS NOTES
Patient returned from [facility] where he underwent a thoracentesis performed by dr. Kristine Mcelroy.  Patient tolerated procedure well

## 2020-02-04 NOTE — PROGRESS NOTES
[id number]  Patient Name: Gonsalo Ge  MRN: [id number]  : 1955  ROOM #: IC03/IC03-01    Occupational Therapy order received, chart reviewed and evaluation attempted this date. Patient is unavailable for OT evaluation due to being off the unit for a thoracentesis. Will attempt OT evaluation at a later time. Thank you.    Mesha Siegel OTR/L #[id number]

## 2020-02-04 NOTE — PLAN OF CARE
Problem: Falls - Risk of:  Goal: Will remain free from falls  Description  Will remain free from falls  Outcome: Met This Shift  Goal: Absence of physical injury  Description  Absence of physical injury  Outcome: Met This Shift     Problem: Pain:  Goal: Pain level will decrease  Description  Pain level will decrease  Outcome: Met This Shift     Problem: Gas Exchange - Impaired:  Goal: Levels of oxygenation will improve  Description  Levels of oxygenation will improve  Outcome: Met This Shift     Problem: Breathing Pattern - Ineffective:  Goal: Ability to achieve and maintain a regular respiratory rate will improve  Description  Ability to achieve and maintain a regular respiratory rate will improve  Outcome: Met This Shift     Problem: Mobility - Impaired:  Goal: Mobility will improve  Description  Mobility will improve  Outcome: Not Met This Shift

## 2020-02-04 NOTE — PROGRESS NOTES
Physical Therapy      IC03/IC03-01    Patient unavailable for physical therapy treatment due to recent thoracentesis and marv gallardo did not feel appropriate at this time.

## 2020-02-04 NOTE — PROGRESS NOTES
opportunity to ask questions. All questions were answered.       Electronically signed by Janetta Blizzard, MD on 2/4/2020 at 9:45 AM    Phone [phone]  Fax [phone]

## 2020-02-04 NOTE — PROGRESS NOTES
hoarseness. Cardiovascular: Denies palpitations, chest pain, and chest pressure. Respiratory: Denies cough, dyspnea at rest, hemoptysis, apnea, and choking. Gastrointestinal: Denies nausea, vomiting, poor appetite, diarrhea, heartburn or reflux  Genitourinary: Denies dysuria, frequency, urgency or hematuria  Musculoskeletal: Denies myalgias, muscle weakness, and bone pain  Neurological: Denies dizziness, vertigo, headache, and focal weakness  Psychological: Denies anxiety and depression  Endocrine: Denies heat intolerance and cold intolerance  Hematopoietic/Lymphatic: Denies bleeding problems and blood transfusions    OBJECTIVE:  Vitals:    02/04/20 0800   BP:    Pulse:    Resp:    Temp: 97.5 °F (36.4 °C)   SpO2:      FiO2 : 40 %  O2 Flow Rate (L/min): 40 L/min  O2 Device: High flow nasal cannula    PHYSICAL EXAM:  Constitutional: No fever, chills, diaphoresis  Skin: Skin rash, no skin breakdown. Lower extremities show extensive venous stasis changes  HEENT: Unremarkable except for small oropharyngeal opening  Neck: JVD, lymphadenopathy, thyromegaly. Large neck circumference  Cardiovascular: S1, S2 normal.  No S3-S4 or rubs are present  Respiratory: Decreased breath sounds over right lower hemithorax consistent with pleural effusion/infiltrates  Gastrointestinal: Left, markedly obese, nontender  Genitourinary: No grossly bloody urine  Extremities: Less edema in ankles; no clubbing or cyanosis is present  Neurological: Awake, alert, oriented x3.   No evidence of focal motor or sensory deficits  Psychological: Seems to have a brighter affect over the last day or 2 as he is beginning to feel better    LABS:  WBC   Date Value Ref Range Status   02/04/2020 6.2 4.5 - 11.5 E9/L Final   02/03/2020 7.1 4.5 - 11.5 E9/L Final   02/02/2020 9.9 4.5 - 11.5 E9/L Final     Hemoglobin   Date Value Ref Range Status   02/04/2020 6.9 (L) 12.5 - 16.5 g/dL Final   02/03/2020 7.0 (L) 12.5 - 16.5 g/dL Final   02/02/2020 7.1 (L) 12.5 - 16.5 g/dL Final     Hematocrit   Date Value Ref Range Status   02/04/2020 21.5 (L) 37.0 - 54.0 % Final   02/03/2020 21.7 (L) 37.0 - 54.0 % Final   02/02/2020 21.3 (L) 37.0 - 54.0 % Final     MCV   Date Value Ref Range Status   02/04/2020 113.2 (H) 80.0 - 99.9 fL Final   02/03/2020 113.6 (H) 80.0 - 99.9 fL Final   02/02/2020 110.9 (H) 80.0 - 99.9 fL Final     Platelets   Date Value Ref Range Status   02/04/2020 51 (L) 130 - 450 E9/L Final   02/03/2020 57 (L) 130 - 450 E9/L Final   02/02/2020 62 (L) 130 - 450 E9/L Final     Sodium   Date Value Ref Range Status   02/04/2020 134 132 - 146 mmol/L Final   02/03/2020 137 132 - 146 mmol/L Final   02/02/2020 136 132 - 146 mmol/L Final     Potassium   Date Value Ref Range Status   02/04/2020 3.0 (L) 3.5 - 5.0 mmol/L Final   02/03/2020 3.0 (L) 3.5 - 5.0 mmol/L Final   02/02/2020 2.9 (L) 3.5 - 5.0 mmol/L Final     Potassium reflex Magnesium   Date Value Ref Range Status   08/08/2019 3.3 (L) 3.5 - 5.0 mmol/L Final   08/06/2019 3.9 3.5 - 5.0 mmol/L Final     Chloride   Date Value Ref Range Status   02/04/2020 89 (L) 98 - 107 mmol/L Final   02/03/2020 92 (L) 98 - 107 mmol/L Final   02/02/2020 91 (L) 98 - 107 mmol/L Final     CO2   Date Value Ref Range Status   02/04/2020 34 (H) 22 - 29 mmol/L Final   02/03/2020 35 (H) 22 - 29 mmol/L Final   02/02/2020 37 (H) 22 - 29 mmol/L Final     BUN   Date Value Ref Range Status   02/04/2020 88 (H) 8 - 23 mg/dL Final   02/03/2020 89 (H) 8 - 23 mg/dL Final   02/02/2020 88 (H) 8 - 23 mg/dL Final     CREATININE   Date Value Ref Range Status   02/04/2020 1.9 (H) 0.7 - 1.2 mg/dL Final   02/03/2020 1.8 (H) 0.7 - 1.2 mg/dL Final   02/02/2020 1.8 (H) 0.7 - 1.2 mg/dL Final     Glucose   Date Value Ref Range Status   02/04/2020 120 (H) 74 - 99 mg/dL Final   02/03/2020 116 (H) 74 - 99 mg/dL Final   02/02/2020 113 (H) 74 - 99 mg/dL Final     Calcium   Date Value Ref Range Status   02/04/2020 8.6 8.6 - 10.2 mg/dL Final   02/03/2020 8.7 8.6 - 10.2 mg/dL 2/4/2020 at 10:10 AM

## 2020-02-04 NOTE — PROGRESS NOTES
HOSPITAL   PROGRESS NOTE. SUBJECTIVE:  June Harvey, 59 y.o., male C/O   FEELS LITTLE BETTER TODAY. PROBLEM SEEING AS HE DOES NOT HAVE HIS GLASSES. CONDITION DISCUSSED WITH  PATIENT  AND NURSING   STAFF. CONSULT NOTES REVIEWED. ROS:GENERAL- APPETITE- GOOD. BOWEL MOVEMENTS- NORMAL. NO URINE    PROBLEM. EENT: NORMAL            CVS: NORMAL. NO CHEST PAIN OR PALPITATION. RESPIRATORY:NO SOB. GI/: NO ABDOMINAL PAINS            MUSCULOSKELETAL: NO COMPLAIN            CNS: NO  COMPLAIN            OBJECTIVE:    GENERAL:Temperature:  Current - Temp: 97.5 °F (36.4 °C); Max - Temp  Av °F (36.7 °C)  Min: 97.5 °F (36.4 °C)  Max: 98.4 °F (36.9 °C)  Respiratory Rate : Resp  Av.5  Min: 14  Max: 26  Pulse Range: Pulse  Av.3  Min: 81  Max: 93  Blood Presuure Range:  Systolic (54RMB), OGS:865 , Min:83 , JBX:266   ; Diastolic (49NFC), KJJ:89, Min:39, Max:66    Pulse ox Range: SpO2  Av.2 %  Min: 73 %  Max: 100 %  24hr I & O:      Intake/Output Summary (Last 24 hours) at 2020 1058  Last data filed at 2020 0532  Gross per 24 hour   Intake 1760 ml   Output 2425 ml   Net -665 ml       CONSTITUTIONALl:MORBIDLY OBESE,ORIENTED,CO-OPERATIVE  HEENT:NORMAL. NECK:  supple, symmetrical, trachea midline,Carotids- normal,JVP- flat  HEMATOLOGIC/LYMPHATICS:  no cervical lymphadenopathy  LUNGS:clear  CARDIOVASCULAR:  Normal apical impulse, regular rate and rhythm, normal S1 and S2, no S3 or S4, and no murmur noted  ABDOMEN:  normal bowel sounds, non-distended, non-tender, no masses palpated  EXTREMITIES: ARTHRITIC CHANGES. MOVEMENTS-LIMITED. PEDAL PULSES-POOR. MARKED EDEMA.   SKIN:  normal skin color, texture, turgor    Data    CBC:   Lab Results   Component Value Date    WBC 6.2 2020    RBC 1.90 2020    HGB 6.9 2020    HCT 21.5 2020    .2 2020    MCH 36.3 2020    MCHC 32.1 02/04/2020    RDW 18.3 02/04/2020    PLT 51 02/04/2020    MPV 9.1 02/04/2020     CMP:    Lab Results   Component Value Date     02/04/2020    K 3.0 02/04/2020    K 3.3 08/08/2019    CL 89 02/04/2020    CO2 34 02/04/2020    BUN 88 02/04/2020    CREATININE 1.9 02/04/2020    GFRAA 43 02/04/2020    LABGLOM 36 02/04/2020    GLUCOSE 120 02/04/2020    PROT 7.1 02/04/2020    LABALBU 2.2 02/04/2020    CALCIUM 8.6 02/04/2020    BILITOT 3.4 02/04/2020    ALKPHOS 112 02/04/2020    AST 74 02/04/2020    ALT 38 02/04/2020     PT/INR:    Lab Results   Component Value Date    PROTIME 24.2 02/04/2020    INR 2.1 02/04/2020         ASSESSMENT:    Active Hospital Problems    Diagnosis Date Noted    Sepsis due to Acinetobacter [facility]) [A41.59] 02/04/2020     Priority: High     Class: Acute    Septic shock due to Staphylococcus aureus ([facility] [A41.01, R65.21] 02/01/2020     Priority: High     Class: Acute    Iron deficiency anemia due to chronic blood loss [D50.0] 01/31/2020     Priority: High     Class: Chronic    Mental confusion [R41.0] 04/28/2019     Priority: High     Class: Acute    Acute diastolic CHF (congestive heart failure) ([facility] [I50.31] 11/23/2015     Priority: High     Class: Acute    Bilateral leg edema [R60.0] 01/12/2015     Priority: High    Morbid obesity with BMI of 45.0-49.9, adult ([facility] [E66.01, Z68.42] 01/12/2015     Priority: High     Class: Chronic    HTN (hypertension) [I10] 01/12/2015     Priority: High     Class: Chronic    Sepsis due to pneumonia ([facility] [J18.9, A41.9] 01/29/2020    Acute on chronic diastolic (congestive) heart failure (HCC) [I50.33] 12/18/2019       PLAN: CONTINUE CURRENT MEDICATIONS AND Rx. DISCONTINUE LOVENOX AS INR IS HIGH ON ITS OWN.        Electronically signed by Doris Kenyon MD on 2/4/20 at 10:58 AM

## 2020-02-04 NOTE — CARE COORDINATION
2/4/2020:  transition of care: Informed by [facility]- potential ltach- evaluation requested by intensivist.  Will need orders and pt/family can be choiced. Will follow closer to discharge. Will require PRECERT/SUKUMAR as well. Electronically signed by SENTHIL Orozco on 2/4/2020 at 12:24 PM      Interviewed patient about ltach and discharge needs. Mr. Taryn Feliciano would like to go to [facility] here at [facility]- if approved and pcp agrees. Alternate discharge plans to [address] is following. CM/SS to follow.   Electronically signed by SENTHIL Orozco on 2/4/2020 at 2:18 PM

## 2020-02-04 NOTE — PLAN OF CARE
Problem: Falls - Risk of:  Goal: Will remain free from falls  Description  Will remain free from falls  2/4/2020 0421 by Annette Ogden RN  Outcome: Met This Shift     Problem: Falls - Risk of:  Goal: Absence of physical injury  Description  Absence of physical injury  2/4/2020 0421 by Annette Ogden RN  Outcome: Met This Shift     Problem: Pain:  Goal: Pain level will decrease  Description  Pain level will decrease  2/4/2020 0421 by Annette Ogden RN  Outcome: Met This Shift     Problem: Gas Exchange - Impaired:  Goal: Levels of oxygenation will improve  Description  Levels of oxygenation will improve  2/4/2020 0421 by Annette Ogden RN  Outcome: Met This Shift     Problem: Pain:  Goal: Control of acute pain  Description  Control of acute pain  Outcome: Met This Shift     Problem: Breathing Pattern - Ineffective:  Goal: Ability to achieve and maintain a regular respiratory rate will improve  Description  Ability to achieve and maintain a regular respiratory rate will improve  Outcome: Met This Shift     Problem: Mobility - Impaired:  Goal: Mobility will improve  Description  Mobility will improve  2/4/2020 0421 by Annette Ogden RN  Outcome: Not Met This Shift

## 2020-02-04 NOTE — PROGRESS NOTES
Pharmacy Consultation Note  (Antibiotic Dosing and Monitoring)    Initial consult date: 1/30/20 Charan Falcon), re-consulted 2/3 Fara Santamaria)  Drug(s): Vancomycin  Indication: Septic shock    Ht Readings from Last 1 Encounters:   01/30/20 5' 9\" (1.753 m)     Wt Readings from Last 1 Encounters:   02/04/20 (!) 358 lb 14.4 oz (162.8 kg)     Age/  Gender IBW DW  Allergy Information   59 y.o.   male 70.7 kg 104.3 kg  Latex; Aldactone [spironolactone]; and Thimerosal          Date  Tmax WBC BUN/CR UOP  (mL/kg/hr) Drug/Dose Time   Given Level(s)   (Time) Comments   1/29  (#1) afebrile 16 77/1.9 -- Vancomycin 3000 mg IV x 1 2234     1/30  (#2) afebrile 23.1 82/2.1 310 mL -- --     1/31  (#3) afebrile 16.3 84/2.2 0.4 Vancomycin 1250 mg IV x 1 0950 Random AM level @ 0535 = 17.3 mcg/mL    2/1  (#4) afebrile 13.3 85/1.9 0.6 Vancomycin 1250 mg IV x 1 2126 Random AM level @ 0640 = 20.9 mcg/mL    2/2  (#5) afebrile 9.9 87/1.8 0.6 -- --     2/3  (#6) afebrile 7.1 89/1.8 0.7 Hold vancomycin -- Random level @ 1130 = 20.5 mcg/mL    2/4  (#7) afebrile 6.2 88/1.9 0.6 Vancomycin 1250 mg IV x 1 0555       Estimated Creatinine Clearance: 60 mL/min (A) (based on SCr of 1.9 mg/dL (H)). UOP over the past 24 hours:     Intake/Output Summary (Last 24 hours) at 2/4/2020 1036  Last data filed at 2/4/2020 0532  Gross per 24 hour   Intake 1860 ml   Output 2425 ml   Net -565 ml     Other anti-infectives: Anti-infective Dose Date Initiated Date Stopped   Cefepime 2g IV q8hr 1/29 1/31   Levofloxacin 750 mg every other day 1/31 2/2   Unasyn 3 g Q6H  3g Q8H 1/31  2/3 2/2   Cefoxitin 1 g Q6H 2/2 2/2   Meropenem 500 mg q8hr 2/3            Cultures:  available culture and sensitivity results were reviewed in EPIC  Cultures sent and are pending.   Culture Date Result    Rapid flu 1/29 Not detected   Blood cx 1 1/29 Acinetobacter baumannii  MRSE   Blood cx 2 1/29 Acinetobacter baumannii  MRSE   Respiratory film array 1/29 Not detected   Urine cx 1/30 <10,000 CFU/mL

## 2020-02-05 ENCOUNTER — APPOINTMENT (OUTPATIENT)
Dept: GENERAL RADIOLOGY | Age: 65
DRG: 720 | End: 2020-02-05
Payer: COMMERCIAL

## 2020-02-05 LAB
ACANTHOCYTES: ABNORMAL
ALBUMIN SERPL-MCNC: 2.1 G/DL (ref 3.5–5.2)
ALP BLD-CCNC: 94 U/L (ref 40–129)
ALT SERPL-CCNC: 33 U/L (ref 0–40)
ANION GAP SERPL CALCULATED.3IONS-SCNC: 7 MMOL/L (ref 7–16)
ANISOCYTOSIS: ABNORMAL
AST SERPL-CCNC: 60 U/L (ref 0–39)
B.E.: 13.6 MMOL/L (ref -3–3)
BASOPHILIC STIPPLING: ABNORMAL
BASOPHILS ABSOLUTE: 0 E9/L (ref 0–0.2)
BASOPHILS RELATIVE PERCENT: 0.6 % (ref 0–2)
BILIRUB SERPL-MCNC: 4.1 MG/DL (ref 0–1.2)
BLOOD CULTURE, ROUTINE: NORMAL
BUN BLDV-MCNC: 85 MG/DL (ref 8–23)
CALCIUM SERPL-MCNC: 8.1 MG/DL (ref 8.6–10.2)
CHLORIDE BLD-SCNC: 90 MMOL/L (ref 98–107)
CO2: 37 MMOL/L (ref 22–29)
COHB: 2.1 % (ref 0–1.5)
CREAT SERPL-MCNC: 1.7 MG/DL (ref 0.7–1.2)
CRITICAL: ABNORMAL
CULTURE, BLOOD 2: NORMAL
DATE ANALYZED: ABNORMAL
DATE OF COLLECTION: ABNORMAL
EOSINOPHILS ABSOLUTE: 0.6 E9/L (ref 0.05–0.5)
EOSINOPHILS RELATIVE PERCENT: 8.8 % (ref 0–6)
GFR AFRICAN AMERICAN: 49
GFR NON-AFRICAN AMERICAN: 41 ML/MIN/1.73
GLUCOSE BLD-MCNC: 138 MG/DL (ref 74–99)
GRAM STAIN ORDERABLE: NORMAL
HAV IGM SER IA-ACNC: NORMAL
HCO3: 41.1 MMOL/L (ref 22–26)
HCT VFR BLD CALC: 22.8 % (ref 37–54)
HEMOGLOBIN: 7.6 G/DL (ref 12.5–16.5)
HEPATITIS B CORE IGM ANTIBODY: NORMAL
HEPATITIS B SURFACE ANTIGEN INTERPRETATION: NORMAL
HEPATITIS C ANTIBODY INTERPRETATION: NORMAL
HHB: 3.6 % (ref 0–5)
HYPOCHROMIA: ABNORMAL
IGA: 1034 MG/DL (ref 70–400)
IGG: 2747 MG/DL (ref 700–1600)
IGM: 207 MG/DL (ref 40–230)
LAB: ABNORMAL
LYMPHOCYTES ABSOLUTE: 0.61 E9/L (ref 1.5–4)
LYMPHOCYTES RELATIVE PERCENT: 8.8 % (ref 20–42)
Lab: ABNORMAL
MAGNESIUM: 1.6 MG/DL (ref 1.6–2.6)
MCH RBC QN AUTO: 36.5 PG (ref 26–35)
MCHC RBC AUTO-ENTMCNC: 33.3 % (ref 32–34.5)
MCV RBC AUTO: 109.6 FL (ref 80–99.9)
METAMYELOCYTES RELATIVE PERCENT: 0.9 % (ref 0–1)
METHB: 0.6 % (ref 0–1.5)
MODE: ABNORMAL
MONOCYTES ABSOLUTE: 0.75 E9/L (ref 0.1–0.95)
MONOCYTES RELATIVE PERCENT: 10.5 % (ref 2–12)
MYELOCYTE PERCENT: 0.9 % (ref 0–0)
NEUTROPHILS ABSOLUTE: 4.9 E9/L (ref 1.8–7.3)
NEUTROPHILS RELATIVE PERCENT: 70.2 % (ref 43–80)
O2 CONTENT: 11.5 ML/DL
O2 SATURATION: 96.3 % (ref 92–98.5)
O2HB: 93.7 % (ref 94–97)
OPERATOR ID: 274
ORGANISM: ABNORMAL
PATIENT TEMP: 37
PCO2: 74.8 MMHG (ref 35–45)
PDW BLD-RTO: 19.5 FL (ref 11.5–15)
PEEP/CPAP: 6 CMH2O
PH BLOOD GAS: 7.36 (ref 7.35–7.45)
PHOSPHORUS: 3.7 MG/DL (ref 2.5–4.5)
PLATELET # BLD: 46 E9/L (ref 130–450)
PLATELET CONFIRMATION: NORMAL
PMV BLD AUTO: 9 FL (ref 7–12)
PO2: 93 MMHG (ref 75–100)
POIKILOCYTES: ABNORMAL
POLYCHROMASIA: ABNORMAL
POTASSIUM SERPL-SCNC: 3 MMOL/L (ref 3.5–5)
PS: 15 CMH20
RBC # BLD: 2.08 E12/L (ref 3.8–5.8)
SODIUM BLD-SCNC: 134 MMOL/L (ref 132–146)
SOURCE, BLOOD GAS: ABNORMAL
TARGET CELLS: ABNORMAL
THB: 8.6 G/DL (ref 11.5–16.5)
TIME ANALYZED: 635
TOTAL PROTEIN: 6.9 G/DL (ref 6.4–8.3)
URINE CULTURE, ROUTINE: ABNORMAL
URINE CULTURE, ROUTINE: ABNORMAL
URINE CULTURE, ROUTINE: NORMAL
WBC # BLD: 6.8 E9/L (ref 4.5–11.5)

## 2020-02-05 PROCEDURE — 2580000003 HC RX 258: Performed by: INTERNAL MEDICINE

## 2020-02-05 PROCEDURE — 99231 SBSQ HOSP IP/OBS SF/LOW 25: CPT | Performed by: INTERNAL MEDICINE

## 2020-02-05 PROCEDURE — 6360000002 HC RX W HCPCS: Performed by: INTERNAL MEDICINE

## 2020-02-05 PROCEDURE — 80053 COMPREHEN METABOLIC PANEL: CPT

## 2020-02-05 PROCEDURE — 94660 CPAP INITIATION&MGMT: CPT

## 2020-02-05 PROCEDURE — 6370000000 HC RX 637 (ALT 250 FOR IP): Performed by: INTERNAL MEDICINE

## 2020-02-05 PROCEDURE — 80074 ACUTE HEPATITIS PANEL: CPT

## 2020-02-05 PROCEDURE — 85025 COMPLETE CBC W/AUTO DIFF WBC: CPT

## 2020-02-05 PROCEDURE — 71045 X-RAY EXAM CHEST 1 VIEW: CPT

## 2020-02-05 PROCEDURE — 97110 THERAPEUTIC EXERCISES: CPT

## 2020-02-05 PROCEDURE — 83735 ASSAY OF MAGNESIUM: CPT

## 2020-02-05 PROCEDURE — 2500000003 HC RX 250 WO HCPCS: Performed by: INTERNAL MEDICINE

## 2020-02-05 PROCEDURE — 36415 COLL VENOUS BLD VENIPUNCTURE: CPT

## 2020-02-05 PROCEDURE — 36600 WITHDRAWAL OF ARTERIAL BLOOD: CPT

## 2020-02-05 PROCEDURE — 2060000000 HC ICU INTERMEDIATE R&B

## 2020-02-05 PROCEDURE — 82805 BLOOD GASES W/O2 SATURATION: CPT

## 2020-02-05 PROCEDURE — 84100 ASSAY OF PHOSPHORUS: CPT

## 2020-02-05 PROCEDURE — 94640 AIRWAY INHALATION TREATMENT: CPT

## 2020-02-05 PROCEDURE — 99233 SBSQ HOSP IP/OBS HIGH 50: CPT | Performed by: FAMILY MEDICINE

## 2020-02-05 RX ORDER — POTASSIUM CHLORIDE 29.8 MG/ML
20 INJECTION INTRAVENOUS EVERY 8 HOURS
Status: DISCONTINUED | OUTPATIENT
Start: 2020-02-05 | End: 2020-02-06

## 2020-02-05 RX ORDER — MAGNESIUM SULFATE IN WATER 40 MG/ML
2 INJECTION, SOLUTION INTRAVENOUS ONCE
Status: COMPLETED | OUTPATIENT
Start: 2020-02-05 | End: 2020-02-05

## 2020-02-05 RX ORDER — POTASSIUM CHLORIDE 29.8 MG/ML
20 INJECTION INTRAVENOUS EVERY 8 HOURS
Status: DISCONTINUED | OUTPATIENT
Start: 2020-02-05 | End: 2020-02-05 | Stop reason: SDUPTHER

## 2020-02-05 RX ORDER — BUMETANIDE 0.25 MG/ML
0.5 INJECTION, SOLUTION INTRAMUSCULAR; INTRAVENOUS DAILY
Status: DISCONTINUED | OUTPATIENT
Start: 2020-02-06 | End: 2020-02-07

## 2020-02-05 RX ADMIN — AMPICILLIN SODIUM AND SULBACTAM SODIUM 3 G: 2; 1 INJECTION, POWDER, FOR SOLUTION INTRAMUSCULAR; INTRAVENOUS at 09:30

## 2020-02-05 RX ADMIN — RIFAXIMIN 550 MG: 550 TABLET ORAL at 08:30

## 2020-02-05 RX ADMIN — AMPICILLIN SODIUM AND SULBACTAM SODIUM 3 G: 2; 1 INJECTION, POWDER, FOR SOLUTION INTRAMUSCULAR; INTRAVENOUS at 17:12

## 2020-02-05 RX ADMIN — MEROPENEM 500 MG: 500 INJECTION, POWDER, FOR SOLUTION INTRAVENOUS at 17:12

## 2020-02-05 RX ADMIN — MEROPENEM 500 MG: 500 INJECTION, POWDER, FOR SOLUTION INTRAVENOUS at 01:45

## 2020-02-05 RX ADMIN — RIFAXIMIN 550 MG: 550 TABLET ORAL at 21:30

## 2020-02-05 RX ADMIN — IPRATROPIUM BROMIDE AND ALBUTEROL SULFATE 1 AMPULE: 2.5; .5 SOLUTION RESPIRATORY (INHALATION) at 13:15

## 2020-02-05 RX ADMIN — POTASSIUM CHLORIDE 20 MEQ: 29.8 INJECTION, SOLUTION INTRAVENOUS at 10:17

## 2020-02-05 RX ADMIN — MIDODRINE HYDROCHLORIDE 5 MG: 5 TABLET ORAL at 08:30

## 2020-02-05 RX ADMIN — POTASSIUM CHLORIDE 20 MEQ: 29.8 INJECTION, SOLUTION INTRAVENOUS at 17:12

## 2020-02-05 RX ADMIN — BUMETANIDE 1 MG: 0.25 INJECTION INTRAMUSCULAR; INTRAVENOUS at 08:29

## 2020-02-05 RX ADMIN — MIDODRINE HYDROCHLORIDE 5 MG: 5 TABLET ORAL at 17:17

## 2020-02-05 RX ADMIN — CYANOCOBALAMIN TAB 1000 MCG 3000 MCG: 1000 TAB at 08:28

## 2020-02-05 RX ADMIN — IPRATROPIUM BROMIDE AND ALBUTEROL SULFATE 1 AMPULE: 2.5; .5 SOLUTION RESPIRATORY (INHALATION) at 02:35

## 2020-02-05 RX ADMIN — POTASSIUM CHLORIDE 20 MEQ: 29.8 INJECTION, SOLUTION INTRAVENOUS at 17:58

## 2020-02-05 RX ADMIN — IPRATROPIUM BROMIDE AND ALBUTEROL SULFATE 1 AMPULE: 2.5; .5 SOLUTION RESPIRATORY (INHALATION) at 16:50

## 2020-02-05 RX ADMIN — IPRATROPIUM BROMIDE AND ALBUTEROL SULFATE 1 AMPULE: 2.5; .5 SOLUTION RESPIRATORY (INHALATION) at 22:43

## 2020-02-05 RX ADMIN — POTASSIUM CHLORIDE 20 MEQ: 29.8 INJECTION, SOLUTION INTRAVENOUS at 09:30

## 2020-02-05 RX ADMIN — IPRATROPIUM BROMIDE AND ALBUTEROL SULFATE 1 AMPULE: 2.5; .5 SOLUTION RESPIRATORY (INHALATION) at 06:39

## 2020-02-05 RX ADMIN — FOLIC ACID 1 MG: 1 TABLET ORAL at 08:29

## 2020-02-05 RX ADMIN — MEROPENEM 500 MG: 500 INJECTION, POWDER, FOR SOLUTION INTRAVENOUS at 09:30

## 2020-02-05 RX ADMIN — RIFAXIMIN 550 MG: 550 TABLET ORAL at 14:30

## 2020-02-05 RX ADMIN — LACTULOSE 30 G: 20 SOLUTION ORAL at 21:23

## 2020-02-05 RX ADMIN — AMPICILLIN SODIUM AND SULBACTAM SODIUM 3 G: 2; 1 INJECTION, POWDER, FOR SOLUTION INTRAMUSCULAR; INTRAVENOUS at 01:45

## 2020-02-05 RX ADMIN — MAGNESIUM SULFATE HEPTAHYDRATE 2 G: 40 INJECTION, SOLUTION INTRAVENOUS at 09:39

## 2020-02-05 RX ADMIN — ASPIRIN 81 MG 81 MG: 81 TABLET ORAL at 08:28

## 2020-02-05 RX ADMIN — IPRATROPIUM BROMIDE AND ALBUTEROL SULFATE 1 AMPULE: 2.5; .5 SOLUTION RESPIRATORY (INHALATION) at 09:38

## 2020-02-05 RX ADMIN — MIDODRINE HYDROCHLORIDE 5 MG: 5 TABLET ORAL at 12:23

## 2020-02-05 ASSESSMENT — PAIN SCALES - GENERAL
PAINLEVEL_OUTOF10: 0

## 2020-02-05 NOTE — PROGRESS NOTES
Pharmacy Consultation Note  (Antibiotic Dosing and Monitoring)    Initial consult date: 1/30/20 Nicole Christie), re-consulted 2/3 Olvin Salguero)  Drug(s): Vancomycin  Indication: Septic shock    Ht Readings from Last 1 Encounters:   01/30/20 5' 9\" (1.753 m)     Wt Readings from Last 1 Encounters:   02/04/20 (!) 358 lb 14.4 oz (162.8 kg)     Age/  Gender IBW DW  Allergy Information   59 y.o.   male 70.7 kg 104.3 kg  Latex; Aldactone [spironolactone]; and Thimerosal          Date  Tmax WBC BUN/CR UOP  (mL/kg/hr) Drug/Dose Time   Given Level(s)   (Time) Comments   1/29  (#1) afebrile 16 77/1.9 -- Vancomycin 3000 mg IV x 1 2234     1/30  (#2) afebrile 23.1 82/2.1 310 mL -- --     1/31  (#3) afebrile 16.3 84/2.2 0.4 Vancomycin 1250 mg IV x 1 0950 Random AM level @ 0535 = 17.3 mcg/mL    2/1  (#4) afebrile 13.3 85/1.9 0.6 Vancomycin 1250 mg IV x 1 2126 Random AM level @ 0640 = 20.9 mcg/mL    2/2  (#5) afebrile 9.9 87/1.8 0.6 -- --     2/3  (#6) afebrile 7.1 89/1.8 0.7 Hold vancomycin -- Random level @ 1130 = 20.5 mcg/mL    2/4  (#7) afebrile 6.2 88/1.9 0.6 Vancomycin 1250 mg IV x 1 0555     2/5  (#8) afebrile 6.8 85/1.7 0.8 Hold vancomycin --            Random AM level                             Estimated Creatinine Clearance: 67 mL/min (A) (based on SCr of 1.7 mg/dL (H)). UOP over the past 24 hours:     Intake/Output Summary (Last 24 hours) at 2/5/2020 1400  Last data filed at 2/5/2020 1354  Gross per 24 hour   Intake 1260 ml   Output 3950 ml   Net -2690 ml     Other anti-infectives: Anti-infective Dose Date Initiated Date Stopped   Cefepime 2g IV q8hr 1/29 1/31   Levofloxacin 750 mg every other day 1/31 2/2   Unasyn 3 g Q6H  3g Q8H 1/31  2/3 2/2   Cefoxitin 1 g Q6H 2/2 2/2   Meropenem 500 mg q8hr 2/3            Cultures:  available culture and sensitivity results were reviewed in EPIC  Cultures sent and are pending.   Culture Date Result    Rapid flu 1/29 Not detected   Blood cx 1 1/29 Acinetobacter baumannii  MRSE   Blood cx 2 1/29 Acinetobacter baumannii  MRSE   Respiratory film array 1/29 Not detected   Urine cx 1/30 <10,000 CFU/mL Proteus mirabilis  <10,000 CFU/mL GNR   MRSA nares 1/30 Not isolated   Blood cx 1 1/31 NGTD   Blood cx 2 1/31 NGTD               Assessment:  · Consulted by Dr. Kandis Weathers to dose/monitor vancomycin  · Goal trough level:  15-20 mcg/mL  · Pt is a 59 yoM who [facility] from SNF with septic shock. Hx of cirrhosis, recently admitted with alcohol withdrawal in 12/2019. · Serum creatinine today: 1.7 mg/dL; CrCl ~ 60 mL/min; baseline Scr ~ 0.7-0.8 mg/dL  · Vancomycin was discontinued on 2/2, re-started 2/3 as blood cultures are positive for MRSE in addition to Acinetobacter baumannii    Plan:  · Random AM level tomorrow.  Re-dose then if level < 20 mcg/mL  · Follow renal function, continue to dose based on levels  · Pharmacist will follow and monitor/adjust dosing as necessary      Thank you for the consult,    Annabel eRad, PharmD, BCPS 2/5/2020 2:00 PM   Pager: [phone]  Ext: [phone]

## 2020-02-05 NOTE — PROGRESS NOTES
[id number]  Patient Name: Ruby Helms  MRN: [id number]  : 1955  ROOM #: IC03/IC03-01    Occupational Therapy order received, chart reviewed and evaluation attempted this date. Patient is unavailable for OT evaluation due to BiPAP and having just rolled in bed with nursing. Nursing requesting we try back  tomorrow. Will attempt OT evaluation at a later time. Thank you.    Mary Travis OTR/L #[id number]

## 2020-02-05 NOTE — PROGRESS NOTES
HOSPITAL   PROGRESS NOTE. SUBJECTIVE:  Real President, 59 y.o., male C/O  STILL FEELS WEAK AND TIRED. STARTED TAKING ORAL DIET BUT FEELS NAUSEATED. CONDITION DISCUSSED WITH  PATIENT  AND NURSING   STAFF. CONSULT NOTES REVIEWED. ROS:GENERAL- APPETITE- GOOD. BOWEL MOVEMENTS- NORMAL. NO URINE    PROBLEM. EENT: NORMAL            CVS: NORMAL. NO CHEST PAIN OR PALPITATION. RESPIRATORY:NO SOB. GI/: NO ABDOMINAL PAINS            MUSCULOSKELETAL: NO COMPLAIN            CNS: NO  COMPLAIN            OBJECTIVE:    GENERAL:Temperature:  Current - Temp: 98.1 °F (36.7 °C); Max - Temp  Av.8 °F (36.6 °C)  Min: 97.6 °F (36.4 °C)  Max: 98.1 °F (36.7 °C)  Respiratory Rate : Resp  Av.8  Min: 9  Max: 24  Pulse Range: Pulse  Av.3  Min: 78  Max: 91  Blood Presuure Range:  Systolic (24DMY), XVA:704 , Min:100 , VMM:461   ; Diastolic (83AKI), BGN:09, Min:41, Max:75    Pulse ox Range: SpO2  Av.4 %  Min: 90 %  Max: 100 %  24hr I & O:      Intake/Output Summary (Last 24 hours) at 2020 1224  Last data filed at 2020 0900  Gross per 24 hour   Intake 940 ml   Output 4200 ml   Net -3260 ml       CONSTITUTIONALl:MORBIDLY OBESE,ORIENTED,CO-OPERATIVE  HEENT:NORMAL. NECK:  supple, symmetrical, trachea midline,Carotids- normal,JVP- flat  HEMATOLOGIC/LYMPHATICS:  no cervical lymphadenopathy  LUNGS:clear  CARDIOVASCULAR:  Normal apical impulse, regular rate and rhythm, normal S1 and S2, no S3 or S4, and no murmur noted  ABDOMEN:  normal bowel sounds, non-distended, non-tender, no masses palpated  EXTREMITIES: ARTHRITIC CHANGES. MOVEMENTS-LIMITED. PEDAL PULSES-FAIR.   SKIN:  normal skin color, texture, turgor    Data    CBC:   Lab Results   Component Value Date    WBC 6.8 2020    RBC 2.08 2020    HGB 7.6 2020    HCT 22.8 2020    .6 2020    MCH 36.5 2020    MCHC 33.3 2020    RDW

## 2020-02-05 NOTE — PROGRESS NOTES
chloride, perflutren lipid microspheres      REVIEW OF SYSTEMS:  Constitutional: Denies fever, weight loss, night sweats, and fatigue  Skin: Denies pigmentation, dark lesions, and rashes   HEENT: Denies hearing loss, tinnitus, ear drainage, epistaxis, sore throat, and hoarseness. Cardiovascular: Denies palpitations, chest pain, and chest pressure. Respiratory: Denies cough, dyspnea at rest, hemoptysis, apnea, and choking. Gastrointestinal: Denies nausea, vomiting, poor appetite, diarrhea, heartburn or reflux  Genitourinary: Denies dysuria, frequency, urgency or hematuria  Musculoskeletal: Denies myalgias, muscle weakness, and bone pain  Neurological: Denies dizziness, vertigo, headache, and focal weakness  Psychological: Denies anxiety and depression  Endocrine: Denies heat intolerance and cold intolerance  Hematopoietic/Lymphatic: Denies bleeding problems and blood transfusions    OBJECTIVE:  Vitals:    02/05/20 0800   BP: (!) 119/53   Pulse: 87   Resp: 17   Temp: 98.1 °F (36.7 °C)   SpO2: 90%     FiO2 : 40 %  O2 Flow Rate (L/min): 35 L/min  O2 Device: Heated high flow cannula    PHYSICAL EXAM:  Constitutional: No fever, chills, diaphoresis  Skin: Chronic venous stasis changes lower extremities question cellulitic infiltrate  HEENT: Small oropharyngeal opening  Neck: No JVD, lymphadenopathy, thyromegaly  Cardiovascular: S1, S2 normal.  No S3-S4 murmurs rubs present  Respiratory: Few inspiratory crackles over both posterior lower lung fields. No significant wheezing is heard  Gastrointestinal: Soft, markedly obese, nontender  Genitourinary: No grossly bloody urine  Extremities: Minimal edema in ankles. Venous stasis changes as described above. No clubbing, no cyanosis  Neurological: Awake, alert, oriented x3.   No focal motor or sensory deficits  Psychological: Appropriate affect considering his overall condition    LABS:  WBC   Date Value Ref Range Status   02/05/2020 6.8 4.5 - 11.5 E9/L Final   02/04/2020 6.2 4.5 - 11.5 E9/L Final   02/03/2020 7.1 4.5 - 11.5 E9/L Final     Hemoglobin   Date Value Ref Range Status   02/05/2020 7.6 (L) 12.5 - 16.5 g/dL Final   02/04/2020 7.3 (L) 12.5 - 16.5 g/dL Final   02/04/2020 6.9 (L) 12.5 - 16.5 g/dL Final     Hematocrit   Date Value Ref Range Status   02/05/2020 22.8 (L) 37.0 - 54.0 % Final   02/04/2020 22.3 (L) 37.0 - 54.0 % Final   02/04/2020 21.5 (L) 37.0 - 54.0 % Final     MCV   Date Value Ref Range Status   02/05/2020 109.6 (H) 80.0 - 99.9 fL Final   02/04/2020 113.2 (H) 80.0 - 99.9 fL Final   02/03/2020 113.6 (H) 80.0 - 99.9 fL Final     Platelets   Date Value Ref Range Status   02/05/2020 46 (L) 130 - 450 E9/L Final   02/04/2020 51 (L) 130 - 450 E9/L Final   02/03/2020 57 (L) 130 - 450 E9/L Final     Sodium   Date Value Ref Range Status   02/05/2020 134 132 - 146 mmol/L Final   02/04/2020 134 132 - 146 mmol/L Final   02/03/2020 137 132 - 146 mmol/L Final     Potassium   Date Value Ref Range Status   02/05/2020 3.0 (L) 3.5 - 5.0 mmol/L Final   02/04/2020 3.0 (L) 3.5 - 5.0 mmol/L Final   02/03/2020 3.0 (L) 3.5 - 5.0 mmol/L Final     Potassium reflex Magnesium   Date Value Ref Range Status   08/08/2019 3.3 (L) 3.5 - 5.0 mmol/L Final   08/06/2019 3.9 3.5 - 5.0 mmol/L Final     Chloride   Date Value Ref Range Status   02/05/2020 90 (L) 98 - 107 mmol/L Final   02/04/2020 89 (L) 98 - 107 mmol/L Final   02/03/2020 92 (L) 98 - 107 mmol/L Final     CO2   Date Value Ref Range Status   02/05/2020 37 (H) 22 - 29 mmol/L Final   02/04/2020 34 (H) 22 - 29 mmol/L Final   02/03/2020 35 (H) 22 - 29 mmol/L Final     BUN   Date Value Ref Range Status   02/05/2020 85 (H) 8 - 23 mg/dL Final   02/04/2020 88 (H) 8 - 23 mg/dL Final   02/03/2020 89 (H) 8 - 23 mg/dL Final     CREATININE   Date Value Ref Range Status   02/05/2020 1.7 (H) 0.7 - 1.2 mg/dL Final   02/04/2020 1.9 (H) 0.7 - 1.2 mg/dL Final   02/03/2020 1.8 (H) 0.7 - 1.2 mg/dL Final     Glucose   Date Value Ref Range Status   02/05/2020 138 (H) 74 - 99 mg/dL Final   02/04/2020 120 (H) 74 - 99 mg/dL Final   02/03/2020 116 (H) 74 - 99 mg/dL Final     Calcium   Date Value Ref Range Status   02/05/2020 8.1 (L) 8.6 - 10.2 mg/dL Final   02/04/2020 8.6 8.6 - 10.2 mg/dL Final   02/03/2020 8.7 8.6 - 10.2 mg/dL Final     Total Protein   Date Value Ref Range Status   02/05/2020 6.9 6.4 - 8.3 g/dL Final   02/04/2020 7.1 6.4 - 8.3 g/dL Final   02/03/2020 7.1 6.4 - 8.3 g/dL Final     Alb   Date Value Ref Range Status   02/05/2020 2.1 (L) 3.5 - 5.2 g/dL Final   02/04/2020 2.2 (L) 3.5 - 5.2 g/dL Final   02/03/2020 2.4 (L) 3.5 - 5.2 g/dL Final     Total Bilirubin   Date Value Ref Range Status   02/05/2020 4.1 (H) 0.0 - 1.2 mg/dL Final   02/04/2020 3.4 (H) 0.0 - 1.2 mg/dL Final   02/03/2020 3.7 (H) 0.0 - 1.2 mg/dL Final     Alkaline Phosphatase   Date Value Ref Range Status   02/05/2020 94 40 - 129 U/L Final   02/04/2020 112 40 - 129 U/L Final   02/03/2020 96 40 - 129 U/L Final     AST   Date Value Ref Range Status   02/05/2020 60 (H) 0 - 39 U/L Final   02/04/2020 74 (H) 0 - 39 U/L Final   02/03/2020 80 (H) 0 - 39 U/L Final     ALT   Date Value Ref Range Status   02/05/2020 33 0 - 40 U/L Final   02/04/2020 38 0 - 40 U/L Final   02/03/2020 39 0 - 40 U/L Final     GFR Non-   Date Value Ref Range Status   02/05/2020 41 >=60 mL/min/1.73 Final     Comment:     Chronic Kidney Disease: less than 60 ml/min/1.73 sq.m. Kidney Failure: less than 15 ml/min/1.73 sq.m. Results valid for patients 18 years and older. 02/04/2020 36 >=60 mL/min/1.73 Final     Comment:     Chronic Kidney Disease: less than 60 ml/min/1.73 sq.m. Kidney Failure: less than 15 ml/min/1.73 sq.m. Results valid for patients 18 years and older. 02/03/2020 38 >=60 mL/min/1.73 Final     Comment:     Chronic Kidney Disease: less than 60 ml/min/1.73 sq.m. Kidney Failure: less than 15 ml/min/1.73 sq.m. Results valid for patients 18 years and older.        GFR  American   Date Value Ref Range Status   02/05/2020 49  Final   02/04/2020 43  Final   02/03/2020 46  Final     Magnesium   Date Value Ref Range Status   02/05/2020 1.6 1.6 - 2.6 mg/dL Final   02/04/2020 1.8 1.6 - 2.6 mg/dL Final   02/03/2020 1.9 1.6 - 2.6 mg/dL Final     Phosphorus   Date Value Ref Range Status   02/05/2020 3.7 2.5 - 4.5 mg/dL Final   02/04/2020 4.2 2.5 - 4.5 mg/dL Final   02/03/2020 4.6 (H) 2.5 - 4.5 mg/dL Final     Recent Labs     02/05/20  0635   PH 7.358   PO2 93.0   PCO2 74.8*   HCO3 41.1*   BE 13.6*   O2SAT 96.3       RADIOLOGY:  XR CHEST PORTABLE   Final Result   1. Small bilateral pleural effusions. 2. Bilateral lung infiltrates and/or atelectasis, with slight interval   improvement within the right lung base and interval worsening within   the right upper to midlung. Left lung densities are stable            IR GUIDED THORACENTESIS PLEURAL   Final Result   1. Successful ultrasound-guided therapeutic thoracentesis. XR CHEST 1 VW   Final Result   1. Post right thoracentesis with slight interval decrease in size of a   right pleural effusion. No pneumothorax. 2. Slight interval improvement of bilateral perihilar airspace disease   with a stable small left pleural effusion. XR CHEST PORTABLE   Final Result   1. No change from XR CHEST PORTABLE with report dated 2/2/2020 11:39   AM.          XR CHEST PORTABLE   Final Result      XR CHEST PORTABLE   Final Result   Relative severe congestive heart failure with right   pleural effusion and bilateral airspace disease. CT FACIAL BONES WO CONTRAST   Final Result   1. There is no gross periodontal abscess. 2. Minimal mucosal thickening seen within the sphenoid sinuses. US DUP UPPER EXTREMITY RIGHT VENOUS   Final Result   1. There is no right upper extremity deep venous thrombosis. RADIOLOGY REPORT   Final Result      XR CHEST PORTABLE   Final Result   1.  Right internal jugular catheter tip in appropriate position. No   pneumothorax. 2. Stable moderate right pleural effusion with bilateral perihilar   airspace disease. XR CHEST PORTABLE   Final Result   Extensive bilateral airspace disease and pleural   effusions. Possible congestive heart failure. XR CHEST PORTABLE    (Results Pending)           PROBLEM LIST:  Principal Problem:    Septic shock due to Staphylococcus aureus ([facility] [address]  Active Problems:    HTN (hypertension)    Morbid obesity with BMI of 45.0-49.9, adult (HCC)    Bilateral leg edema    Acute diastolic CHF (congestive heart failure) ([facility])    Mental confusion    Iron deficiency anemia due to chronic blood loss    Sepsis due to Acinetobacter ([facility] [address]    Acute on chronic diastolic (congestive) heart failure ([facility])    Sepsis due to pneumonia [facility])  Resolved Problems:    * No resolved hospital problems. *      IMPRESSION:  1. Bilateral pneumonia  2. Status post thoracentesis for 1000 cc of pleural fluid which is a transudate  3. Persistent hypokalemia  4. Obstructive sleep apnea  5. Acinetobacter bacteremia  6. Staphylococcus bacteremia  7. Proteus urinary tract infection  8. Cirrhosis  9. Morbid obesity  10. Cor pulmonale/right ventricular dysfunction      PLAN:  1. Continue multiple antibiotics including Unasyn and meropenem supervised by infectious disease service  2. Reduce dose of IV Bumex  3. Potassium replacement  4. Consider transfer to [address]  5. Aerosol treatments  6. May expand time off BiPAP during the day but still wear it all night  7. Monitor electrolytes and blood gases carefully  8. Check on cytology of pleural fluid    ATTESTATION:  ICU Staff Physician note of personal involvement in Care  As the attending physician, I certify that I personally reviewed the patients history and personally examined the patient to confirm the physical findings described above,  And that I reviewed the relevant imaging studies and available reports.   I also discussed the differential diagnosis and all of the proposed management plans with the patient and individuals accompanying the patient to this visit. They had the opportunity to ask questions about the proposed management plans and to have those questions answered. This patient has a high probability of sudden, clinically significant deterioration, which requires the highest level of physician preparedness to intervene urgently. I managed/supervised life or organ supporting interventions that required frequent physician assessment. I devoted my full attention to the direct care of this patient for the amount of time indicated below. Time I spent with the family or surrogate(s) is included only if the patient was incapable of providing the necessary information or participating in medical decisions - Time devoted to teaching and to any procedures I billed separately is not included.     CRITICAL CARE TIME:  36 minutes    Electronically signed by Spencer Webster MD on 2/5/2020 at 8:59 AM

## 2020-02-05 NOTE — PLAN OF CARE
Problem: Inadequate oral food/beverage intake (NI-2.1)  Goal: Food and/or Nutrient Delivery  Pt will consume 75% or more at meals/ONS  Description  Individualized approach for food/nutrient provision.   Outcome: Met This Shift

## 2020-02-05 NOTE — PROGRESS NOTES
Physical Therapy                      PHYSICAL THERAPY TREATMENT NOTE  2/5/2020  Room #: IC03/IC03-01  Laci Montoya   [id number]  1955   Referring Provider:  Savita Antonio MD     Diagnosis/Problem list:  Sepsis due to pneumonia [facility]) [J18.9, A41.9]       Patient Active Problem List   Diagnosis    HTN (hypertension)    Morbid obesity with BMI of 45.0-49.9, adult ([facility] [address]    Bilateral leg edema    Gout    Acute diastolic CHF (congestive heart failure) ([facility] [address]    Bilateral low back pain without sciatica    Rhabdomyolysis    Mental confusion    Alcoholic cirrhosis of liver without ascites ([facility] [address]    Pneumonia of right upper lobe due to infectious organism ([facility] [address]    Hepatic encephalopathy ([facility] [address] [facility]    Cellulitis of scrotum    Acute on chronic diastolic (congestive) heart failure ([facility] [address]    Sepsis due to pneumonia ([facility] [address]    Iron deficiency anemia due to chronic blood loss    [facility]    Sepsis due to Acinetobacter [facility])       Tentative placement recommendation: [facility] with Physical Therapy  Pt states he wants to work with therapy to return home  Equipment recommendation: To be determined        Plan of care: Patient will be seen   daily  for therapeutic exercise, functional retraining, endurance activities, balance exercises, family and patient education.       AM-PAC Basic Mobility       AM-[facility] Mobility Inpatient   How much difficulty turning over in bed?: Unable  How much difficulty sitting down on / standing up from a chair with arms?: Unable  How much difficulty moving from lying on back to sitting on side of bed?: Unable  How much help from another person moving to and from a bed to a chair?: Total  How much help from another person needed to walk in hospital room?: Total  How much help from another person for climbing 3-5 steps with a railing?: Total  AM-PAC Inpatient Mobility Raw Score : 6  AM-PAC Inpatient T-Scale Score : 23.55  Mobility Inpatient CMS 0-100% Score: 100  Mobility Inpatient CMS G-Code Modifier : CN       Nursing cleared patient for PT treatment and patient agreeable. Pt c/o pain with his L leg. Precautions: falls, alarm and O2 , vasotherm, fecal management system, painful Left lower extremity   Mentation: alert, cooperative, oriented x 3  and follows directions,     PAIN: (measured on a visual analog scale with 0=no pain and 10=excruciating pain) no number given/10. FUNCTIONAL ASSESSMENT   Bed Mobility- Supine to sit- Not assessed          Scooting- Not assessed       Sit to supine- Not assessed          Transfers-Sit to stand- Not assessed     Gait: Not assessed   Steps: Not assessed         Treatment:   Therapist educated and facilitated patient on techniques to increase ROM with exercises. Pt performed LE & UE exercises in supine. Exercises: ankle pumps, heel slides, hip abd/add, SLR, SAQ, shoulder, elbow, wrist, flex/ext, grasp/relax,  x 10 - 15 reps. Verbal/tactile cues for increased participation and ROM. At end of session, patient in bed with call light and phone within reach,  all lines and tubes intact, nursing notified. Patient continues to benefit from skilled PT to improve functional independence and quality of life. A:  Patient able to perform all UE exercises and ankle pumps with AROM. All other LE exercises were PROM/AAROM. GOALS to be met in 5 days. Bed mobility-  Maximal assist of  2                                          Patient able be able to dangle on edge of bed for 10 minutes with min assist         Increase rom in affected joints by 10%  Increase strength in affected mm groups by 1/3 grade  Increase balance to allow for improvement towards functional goals. Increase endurance to allow for improvement towards functional goals.     Time in: 9:38  Time out: 9:56    CPT codes:  Therapeutic exercises (42969)   18 minutes  1 unit(s)      JAREN MAYBERRY,

## 2020-02-05 NOTE — PROGRESS NOTES
[facility]  [facility]  Progress Note    F/U bacteremia  Chief complain:  Pain left le     SUBJECTIVE:  Patient is awake, alert  On o2  NO FAMILY PRESENT  Has left le pain    Tolerating antibiotics    No vomiting, fever, chills, or rash   S/p thoracentesis    ROS:  Otherwise negative except as above     OBJECTIVE:    Vitals:    02/05/20 0400 02/05/20 0500 02/05/20 0600 02/05/20 0700   BP: (!) 105/50 118/63 (!) 107/53 (!) 100/41   Pulse: 80 84 81 81   Resp: 18 18 24 20   Temp: 97.9 °F (36.6 °C)      TempSrc: Core      SpO2: 94% 96% 97% 96%   Weight:       Height:           HEENT :        At/nc NAD   Heart:             RRR,   murmurs  Lungs:            DEC to auscultation ant   Abdomen:       soft, non tender, bowel sounds present + murmur   Extremites:      edema, no ulcers BLE+ lymphedema  Skin dry no erythema  Skin:                No rash   Right IJ 1/29  Scrotal edema   [place]   FMS    MEDS:      sodium chloride  20 mL Intravenous Once    ipratropium-albuterol  1 ampule Inhalation Q4H    meropenem  500 mg Intravenous Q8H    ampicillin-sulbactam  3 g Intravenous Q8H    vancomycin (VANCOCIN) intermittent dosing (placeholder)   Other RX Placeholder    bumetanide  1 mg Intravenous Daily    sodium chloride  20 mL Intravenous Once    vitamin B-12  3,000 mcg Oral Daily    folic acid  1 mg Oral Daily    midodrine  5 mg Oral TID WC    lactulose  30 g Oral BID    rifaximin  550 mg Oral TID    aspirin  81 mg Oral Daily    sodium chloride  250 mL Intravenous Once       LABS    CBC:   Recent Labs     02/03/20  0510 02/04/20  0420 02/04/20  2245 02/05/20  0455   WBC 7.1 6.2  --  6.8   HGB 7.0* 6.9* 7.3* 7.6*   HCT 21.7* 21.5* 22.3* 22.8*   PLT 57* 51*  --  46*       BMP:    Recent Labs     02/03/20  0510 02/04/20  0420 02/05/20  0455    134 134   K 3.0* 3.0* 3.0*   CL 92* 89* 90*   CO2 35* 34* 37*   BUN 89* 88* 85*   CREATININE 1.8* 1.9* 1.7*   GLUCOSE 116* 120* 138* Lab Results   Component Value Date    ALKPHOS 94 02/05/2020    ALT 33 02/05/2020    AST 60 02/05/2020    PROT 6.9 02/05/2020    BILITOT 4.1 02/05/2020    BILIDIR 2.2 01/29/2020    IBILI 2.3 01/29/2020    LABALBU 2.1 02/05/2020          Microbiology :  1/29 blood cx CONS/A baumanii  1/30 urine cx Proteus/GNR  1/31 blood cx NGTD  2/3 urine cx ngtd  2/4 body fluid cx ngtd     Radiology :  IR GUIDED THORACENTESIS PLEURAL   Final Result   1. Successful ultrasound-guided therapeutic thoracentesis. XR CHEST 1 VW   Final Result   1. Post right thoracentesis with slight interval decrease in size of a   right pleural effusion. No pneumothorax. 2. Slight interval improvement of bilateral perihilar airspace disease   with a stable small left pleural effusion. XR CHEST PORTABLE   Final Result   1. No change from XR CHEST PORTABLE with report dated 2/2/2020 11:39   AM.          XR CHEST PORTABLE   Final Result      XR CHEST PORTABLE   Final Result   Relative severe congestive heart failure with right   pleural effusion and bilateral airspace disease. CT FACIAL BONES WO CONTRAST   Final Result   1. There is no gross periodontal abscess. 2. Minimal mucosal thickening seen within the sphenoid sinuses. US DUP UPPER EXTREMITY RIGHT VENOUS   Final Result   1. There is no right upper extremity deep venous thrombosis. RADIOLOGY REPORT   Final Result      XR CHEST PORTABLE   Final Result   1. Right internal jugular catheter tip in appropriate position. No   pneumothorax. 2. Stable moderate right pleural effusion with bilateral perihilar   airspace disease. XR CHEST PORTABLE   Final Result   Extensive bilateral airspace disease and pleural   effusions. Possible congestive heart failure.       XR CHEST PORTABLE    (Results Pending)   XR CHEST PORTABLE    (Results Pending)         ASSESSMENT:   Septic shock improving off pressor wbc better   Acinetobacter/ Staph epi Bacteremia f/u blood cx ngtd   Proteus bacteruria f/u urine cx ngtd  Acute respiratory failure-severe congestive heart failure with right  Pleural effusion and bilateral airspace disease s/p thoracentesis cx ngtd  Thrombocytopenia   Hyperammonemia  Anasarca  CHF  RONNY /ckd  AMS      PLAN:  Unasyn for Carbapenemase resistant Acintebacter bacteremia     ESBL Proteus in urine with another gn      procal 3.54->2.22  On meropnem/ unasyn  Will check extended panel on Abaumanii still pending   Follow cultures  NGTD  Follow labs        Pt had the opportunity to ask questions. All questions were answered.       Electronically signed by Bharat Esteves MD on 2/5/2020 at 7:46 AM    Phone [phone]  Fax [phone]

## 2020-02-05 NOTE — PROGRESS NOTES
Value Date    CHOL 95 12/19/2019    HDL 39 12/19/2019    LDLCALC 46 12/19/2019    TRIG 49 12/19/2019     LIVER PROFILE:  Recent Labs     02/04/20  0420 02/05/20  0455   AST 74* 60*   ALT 38 33   LABALBU 2.2* 2.1*         ASSESSMENT:  1. Septic shock due to bacteremia, HCAP and UTI   2. Acute on chronic diastolic heart failure  3. Acute hypoxic respiratory failure  4. Elevated troponin, likely type II NSTEMI due to demand ischemia given acute anemia, septic shock with bacteremia, RONNY and acute on chronic diastolic heart failure. 5. Morbid obesity. 6. Elevated LFTs. 7. Hepatic encephalopathy improved. Alcoholic liver cirrhosis with anasarca. 8. Acute on chronic anemia, now requiring blood transfusions, hemoglobin still low likely with occult bleeding ? 9. Acute kidney injury, improving         PLAN:  1. Continue diuresis at this time. -3L since admission. Consider Diamox. 2. 2D echo shows normal EF with no significant valvular heart disease. 3. No further cardiac work up indicated at this time. 4. We will sign off and see per request. Call with any questions or concerns.         Electronically signed by Miguelito Alvarado DO on 2/5/2020 at 4:01 PM

## 2020-02-05 NOTE — PROGRESS NOTES
2/5/2020  10:04 AM      Nutrition Assessment    Type and Reason for Visit: Initial(LOS)    Nutrition Recommendations: Change from Renal diet to 2gm Na, as pt hypokalemic and renal diet is K+ restricted    Nutrition Assessment: Pt at nutrition risk 2/2 Sepsis and inadequate oral intake. Will add ONS and recommend change from Renal diet to 2 gm Na, as currently on K+ supp for hypokalemia    Malnutrition Assessment:  · Malnutrition Status: At risk for malnutrition  · Context: Chronic illness  · Findings of the 6 clinical characteristics of malnutrition (Minimum of 2 out of 6 clinical characteristics is required to make the diagnosis of moderate or severe Protein Calorie Malnutrition based on AND/ASPEN Guidelines):  1. Energy Intake-Less than or equal to 75% of estimated energy requirement, Greater than or equal to 7 days    2. Weight Loss-No significant weight loss, in 6 months  3. Fat Loss-No significant subcutaneous fat loss,    4. Muscle Loss-No significant muscle mass loss,    5. Fluid Accumulation-Unable to assess(multiple factors), Ascites, Extremities  6.  Strength-Not measured    Nutrition Risk Level:  Moderate    Nutrient Needs:  · Estimated Daily Total Kcal:  (11-14 osavldo/kg)  · Estimated Daily Protein (g):  (1.2-1.4 g/kg) as john w/hepatic/renal labs  · Estimated Daily Total Fluid (ml/day): per CC    Nutrition Diagnosis:   · Problem: Inadequate oral intake  · Etiology: related to Early satiety(2/2 hypervolemia/ascites)     Signs and symptoms:  as evidenced by Intake 25-50%    Objective Information:  · Nutrition-Focused Physical Findings: A&Ox4, jaundice, +2-+3 edema, ascites, +BS, -I/O, hyperammonemia(last avail 1/2959=139.1- on Lactulose)  · Wound Type: None(rough skin BLE)  · Current Nutrition Therapies:  · Oral Diet Orders: Renal   · Oral Diet intake: 26-50%  · Anthropometric Measures:  · Ht: 5' 9\" (175.3 cm)   · Current Body Wt: 358 lb (162.4 kg)(2/4 bedwt)  · Admission Body Wt: 341 lb (154.7

## 2020-02-06 ENCOUNTER — APPOINTMENT (OUTPATIENT)
Dept: GENERAL RADIOLOGY | Age: 65
DRG: 720 | End: 2020-02-06
Payer: COMMERCIAL

## 2020-02-06 LAB
ALBUMIN SERPL-MCNC: 2.3 G/DL (ref 3.5–5.2)
ALP BLD-CCNC: 98 U/L (ref 40–129)
ALT SERPL-CCNC: 31 U/L (ref 0–40)
ANION GAP SERPL CALCULATED.3IONS-SCNC: 9 MMOL/L (ref 7–16)
ANISOCYTOSIS: ABNORMAL
AST SERPL-CCNC: 55 U/L (ref 0–39)
B.E.: 11.7 MMOL/L (ref -3–3)
BASOPHILIC STIPPLING: ABNORMAL
BASOPHILS ABSOLUTE: 0.05 E9/L (ref 0–0.2)
BASOPHILS RELATIVE PERCENT: 0.9 % (ref 0–2)
BILIRUB SERPL-MCNC: 3.2 MG/DL (ref 0–1.2)
BLOOD CULTURE, ROUTINE: ABNORMAL
BLOOD CULTURE, ROUTINE: ABNORMAL
BODY FLUID CULTURE, STERILE: NORMAL
BUN BLDV-MCNC: 83 MG/DL (ref 8–23)
CALCIUM SERPL-MCNC: 8.2 MG/DL (ref 8.6–10.2)
CHLORIDE BLD-SCNC: 91 MMOL/L (ref 98–107)
CO2: 36 MMOL/L (ref 22–29)
COHB: 2 % (ref 0–1.5)
COMMENT: ABNORMAL
CREAT SERPL-MCNC: 1.7 MG/DL (ref 0.7–1.2)
CRITICAL: ABNORMAL
DATE ANALYZED: ABNORMAL
DATE OF COLLECTION: ABNORMAL
EOSINOPHILS ABSOLUTE: 0.36 E9/L (ref 0.05–0.5)
EOSINOPHILS RELATIVE PERCENT: 6.1 % (ref 0–6)
FIO2: 40 %
GFR AFRICAN AMERICAN: 49
GFR NON-AFRICAN AMERICAN: 41 ML/MIN/1.73
GLUCOSE BLD-MCNC: 150 MG/DL (ref 74–99)
GRAM STAIN RESULT: NORMAL
HCO3: 39 MMOL/L (ref 22–26)
HCT VFR BLD CALC: 22.9 % (ref 37–54)
HEMOGLOBIN: 7.3 G/DL (ref 12.5–16.5)
HHB: 3.4 % (ref 0–5)
HYPOCHROMIA: ABNORMAL
LAB: ABNORMAL
LYMPHOCYTES ABSOLUTE: 0.59 E9/L (ref 1.5–4)
LYMPHOCYTES RELATIVE PERCENT: 9.6 % (ref 20–42)
Lab: ABNORMAL
MAGNESIUM: 1.8 MG/DL (ref 1.6–2.6)
MCH RBC QN AUTO: 35.8 PG (ref 26–35)
MCHC RBC AUTO-ENTMCNC: 31.9 % (ref 32–34.5)
MCV RBC AUTO: 112.3 FL (ref 80–99.9)
METAMYELOCYTES RELATIVE PERCENT: 7 % (ref 0–1)
METHB: 0.3 % (ref 0–1.5)
MODE: ABNORMAL
MONOCYTES ABSOLUTE: 0.53 E9/L (ref 0.1–0.95)
MONOCYTES RELATIVE PERCENT: 8.7 % (ref 2–12)
NEUTROPHILS ABSOLUTE: 4.43 E9/L (ref 1.8–7.3)
NEUTROPHILS RELATIVE PERCENT: 67.8 % (ref 43–80)
NUCLEATED RED BLOOD CELLS: 0.9 /100 WBC
O2 CONTENT: 11.4 ML/DL
O2 SATURATION: 96.5 % (ref 92–98.5)
O2HB: 94.3 % (ref 94–97)
OPERATOR ID: ABNORMAL
ORGANISM: ABNORMAL
ORGANISM: ABNORMAL
OVALOCYTES: ABNORMAL
PATIENT TEMP: 37 C
PCO2: 72.3 MMHG (ref 35–45)
PDW BLD-RTO: 19 FL (ref 11.5–15)
PFO2: 2.18 MMHG/%
PH BLOOD GAS: 7.35 (ref 7.35–7.45)
PHOSPHORUS: 3.5 MG/DL (ref 2.5–4.5)
PLATELET # BLD: 43 E9/L (ref 130–450)
PLATELET CONFIRMATION: NORMAL
PMV BLD AUTO: 8.8 FL (ref 7–12)
PO2: 87.1 MMHG (ref 75–100)
POIKILOCYTES: ABNORMAL
POTASSIUM SERPL-SCNC: 3.5 MMOL/L (ref 3.5–5)
RBC # BLD: 2.04 E12/L (ref 3.8–5.8)
RI(T): 1.21
SCHISTOCYTES: ABNORMAL
SODIUM BLD-SCNC: 136 MMOL/L (ref 132–146)
SOURCE, BLOOD GAS: ABNORMAL
TARGET CELLS: ABNORMAL
THB: 8.5 G/DL (ref 11.5–16.5)
TIME ANALYZED: 529
TOTAL PROTEIN: 6.9 G/DL (ref 6.4–8.3)
VANCOMYCIN RANDOM: 17.9 MCG/ML (ref 5–40)
WBC # BLD: 5.9 E9/L (ref 4.5–11.5)

## 2020-02-06 PROCEDURE — 6370000000 HC RX 637 (ALT 250 FOR IP): Performed by: INTERNAL MEDICINE

## 2020-02-06 PROCEDURE — 2060000000 HC ICU INTERMEDIATE R&B

## 2020-02-06 PROCEDURE — 94660 CPAP INITIATION&MGMT: CPT

## 2020-02-06 PROCEDURE — 97166 OT EVAL MOD COMPLEX 45 MIN: CPT

## 2020-02-06 PROCEDURE — 99232 SBSQ HOSP IP/OBS MODERATE 35: CPT | Performed by: FAMILY MEDICINE

## 2020-02-06 PROCEDURE — 85025 COMPLETE CBC W/AUTO DIFF WBC: CPT

## 2020-02-06 PROCEDURE — 80053 COMPREHEN METABOLIC PANEL: CPT

## 2020-02-06 PROCEDURE — 80202 ASSAY OF VANCOMYCIN: CPT

## 2020-02-06 PROCEDURE — 2580000003 HC RX 258: Performed by: INTERNAL MEDICINE

## 2020-02-06 PROCEDURE — 83735 ASSAY OF MAGNESIUM: CPT

## 2020-02-06 PROCEDURE — 97110 THERAPEUTIC EXERCISES: CPT

## 2020-02-06 PROCEDURE — 6360000002 HC RX W HCPCS: Performed by: INTERNAL MEDICINE

## 2020-02-06 PROCEDURE — 36415 COLL VENOUS BLD VENIPUNCTURE: CPT

## 2020-02-06 PROCEDURE — 2500000003 HC RX 250 WO HCPCS: Performed by: INTERNAL MEDICINE

## 2020-02-06 PROCEDURE — 84100 ASSAY OF PHOSPHORUS: CPT

## 2020-02-06 PROCEDURE — 82805 BLOOD GASES W/O2 SATURATION: CPT

## 2020-02-06 PROCEDURE — 94640 AIRWAY INHALATION TREATMENT: CPT

## 2020-02-06 PROCEDURE — 71045 X-RAY EXAM CHEST 1 VIEW: CPT

## 2020-02-06 RX ORDER — HEPARIN SODIUM (PORCINE) LOCK FLUSH IV SOLN 100 UNIT/ML 100 UNIT/ML
3 SOLUTION INTRAVENOUS EVERY 12 HOURS SCHEDULED
Status: DISCONTINUED | OUTPATIENT
Start: 2020-02-06 | End: 2020-02-12 | Stop reason: HOSPADM

## 2020-02-06 RX ORDER — POTASSIUM CHLORIDE 29.8 MG/ML
40 INJECTION INTRAVENOUS EVERY 12 HOURS SCHEDULED
Status: DISCONTINUED | OUTPATIENT
Start: 2020-02-06 | End: 2020-02-06 | Stop reason: CLARIF

## 2020-02-06 RX ORDER — POTASSIUM CHLORIDE 29.8 MG/ML
20 INJECTION INTRAVENOUS EVERY 12 HOURS SCHEDULED
Status: DISCONTINUED | OUTPATIENT
Start: 2020-02-06 | End: 2020-02-06

## 2020-02-06 RX ORDER — SODIUM CHLORIDE 0.9 % (FLUSH) 0.9 %
10 SYRINGE (ML) INJECTION PRN
Status: DISCONTINUED | OUTPATIENT
Start: 2020-02-06 | End: 2020-02-12 | Stop reason: HOSPADM

## 2020-02-06 RX ORDER — POTASSIUM CHLORIDE 29.8 MG/ML
20 INJECTION INTRAVENOUS ONCE
Status: COMPLETED | OUTPATIENT
Start: 2020-02-06 | End: 2020-02-06

## 2020-02-06 RX ORDER — LIDOCAINE HYDROCHLORIDE 10 MG/ML
5 INJECTION, SOLUTION INFILTRATION; PERINEURAL ONCE
Status: DISCONTINUED | OUTPATIENT
Start: 2020-02-06 | End: 2020-02-12 | Stop reason: HOSPADM

## 2020-02-06 RX ORDER — POTASSIUM CHLORIDE 29.8 MG/ML
20 INJECTION INTRAVENOUS EVERY 12 HOURS SCHEDULED
Status: DISCONTINUED | OUTPATIENT
Start: 2020-02-06 | End: 2020-02-12

## 2020-02-06 RX ORDER — HEPARIN SODIUM (PORCINE) LOCK FLUSH IV SOLN 100 UNIT/ML 100 UNIT/ML
3 SOLUTION INTRAVENOUS PRN
Status: DISCONTINUED | OUTPATIENT
Start: 2020-02-06 | End: 2020-02-12 | Stop reason: HOSPADM

## 2020-02-06 RX ADMIN — IPRATROPIUM BROMIDE AND ALBUTEROL SULFATE 1 AMPULE: 2.5; .5 SOLUTION RESPIRATORY (INHALATION) at 04:50

## 2020-02-06 RX ADMIN — CYANOCOBALAMIN TAB 1000 MCG 3000 MCG: 1000 TAB at 11:11

## 2020-02-06 RX ADMIN — POTASSIUM CHLORIDE 20 MEQ: 29.8 INJECTION, SOLUTION INTRAVENOUS at 02:45

## 2020-02-06 RX ADMIN — POTASSIUM CHLORIDE 20 MEQ: 29.8 INJECTION, SOLUTION INTRAVENOUS at 08:52

## 2020-02-06 RX ADMIN — POTASSIUM CHLORIDE 20 MEQ: 29.8 INJECTION, SOLUTION INTRAVENOUS at 21:11

## 2020-02-06 RX ADMIN — RIFAXIMIN 550 MG: 550 TABLET ORAL at 14:53

## 2020-02-06 RX ADMIN — VANCOMYCIN HYDROCHLORIDE 1250 MG: 10 INJECTION, POWDER, LYOPHILIZED, FOR SOLUTION INTRAVENOUS at 11:10

## 2020-02-06 RX ADMIN — POTASSIUM CHLORIDE 20 MEQ: 29.8 INJECTION, SOLUTION INTRAVENOUS at 09:56

## 2020-02-06 RX ADMIN — FOLIC ACID 1 MG: 1 TABLET ORAL at 08:41

## 2020-02-06 RX ADMIN — MIDODRINE HYDROCHLORIDE 5 MG: 5 TABLET ORAL at 08:41

## 2020-02-06 RX ADMIN — AMPICILLIN SODIUM AND SULBACTAM SODIUM 3 G: 2; 1 INJECTION, POWDER, FOR SOLUTION INTRAMUSCULAR; INTRAVENOUS at 17:06

## 2020-02-06 RX ADMIN — AMPICILLIN SODIUM AND SULBACTAM SODIUM 3 G: 2; 1 INJECTION, POWDER, FOR SOLUTION INTRAMUSCULAR; INTRAVENOUS at 08:42

## 2020-02-06 RX ADMIN — IPRATROPIUM BROMIDE AND ALBUTEROL SULFATE 1 AMPULE: 2.5; .5 SOLUTION RESPIRATORY (INHALATION) at 15:18

## 2020-02-06 RX ADMIN — MEROPENEM 500 MG: 500 INJECTION, POWDER, FOR SOLUTION INTRAVENOUS at 17:06

## 2020-02-06 RX ADMIN — POTASSIUM CHLORIDE 20 MEQ: 29.8 INJECTION, SOLUTION INTRAVENOUS at 22:51

## 2020-02-06 RX ADMIN — IPRATROPIUM BROMIDE AND ALBUTEROL SULFATE 1 AMPULE: 2.5; .5 SOLUTION RESPIRATORY (INHALATION) at 18:30

## 2020-02-06 RX ADMIN — MEROPENEM 500 MG: 500 INJECTION, POWDER, FOR SOLUTION INTRAVENOUS at 08:43

## 2020-02-06 RX ADMIN — BUMETANIDE 0.5 MG: 0.25 INJECTION INTRAMUSCULAR; INTRAVENOUS at 08:42

## 2020-02-06 RX ADMIN — IPRATROPIUM BROMIDE AND ALBUTEROL SULFATE 1 AMPULE: 2.5; .5 SOLUTION RESPIRATORY (INHALATION) at 21:41

## 2020-02-06 RX ADMIN — ASPIRIN 81 MG 81 MG: 81 TABLET ORAL at 08:41

## 2020-02-06 RX ADMIN — MIDODRINE HYDROCHLORIDE 5 MG: 5 TABLET ORAL at 14:53

## 2020-02-06 RX ADMIN — AMPICILLIN SODIUM AND SULBACTAM SODIUM 3 G: 2; 1 INJECTION, POWDER, FOR SOLUTION INTRAMUSCULAR; INTRAVENOUS at 01:25

## 2020-02-06 RX ADMIN — RIFAXIMIN 550 MG: 550 TABLET ORAL at 21:11

## 2020-02-06 RX ADMIN — POTASSIUM CHLORIDE 20 MEQ: 29.8 INJECTION, SOLUTION INTRAVENOUS at 01:45

## 2020-02-06 RX ADMIN — IPRATROPIUM BROMIDE AND ALBUTEROL SULFATE 1 AMPULE: 2.5; .5 SOLUTION RESPIRATORY (INHALATION) at 01:30

## 2020-02-06 RX ADMIN — RIFAXIMIN 550 MG: 550 TABLET ORAL at 11:12

## 2020-02-06 RX ADMIN — MEROPENEM 500 MG: 500 INJECTION, POWDER, FOR SOLUTION INTRAVENOUS at 01:25

## 2020-02-06 ASSESSMENT — PAIN SCALES - GENERAL
PAINLEVEL_OUTOF10: 0

## 2020-02-06 NOTE — PROGRESS NOTES
HOSPITAL   PROGRESS NOTE. SUBJECTIVE:  Zack Vargas, 59 y.o., male C/O  NONE EXPRESSED. SLEEPING QUIETLY AT THIS TIME. PATIENT TIRED AFTER PT THIS MORNING AS PER NS. CONDITION DISCUSSED WITH  PATIENT  AND NURSING   STAFF. CONSULT NOTES REVIEWED. ROS:GENERAL- APPETITE- GOOD. BOWEL MOVEMENTS- NORMAL. NO URINE    PROBLEM. EENT: NORMAL            CVS: NORMAL. NO CHEST PAIN OR PALPITATION. RESPIRATORY:NO SOB. GI/: NO ABDOMINAL PAINS            MUSCULOSKELETAL: NO COMPLAIN            CNS: NO  COMPLAIN            OBJECTIVE:    GENERAL:Temperature:  Current - Temp: 98.8 °F (37.1 °C); Max - Temp  Av.4 °F (36.9 °C)  Min: 98.2 °F (36.8 °C)  Max: 98.8 °F (37.1 °C)  Respiratory Rate : Resp  Av.1  Min: 3  Max: 29  Pulse Range: Pulse  Av.9  Min: 75  Max: 92  Blood Presuure Range:  Systolic (36IUF), YTY:342 , Min:107 , KBI:487   ; Diastolic (97MDB), AQF:97, Min:44, Max:75    Pulse ox Range: SpO2  Av.7 %  Min: 77 %  Max: 100 %  24hr I & O:      Intake/Output Summary (Last 24 hours) at 2020 1138  Last data filed at 2020 1110  Gross per 24 hour   Intake 1950 ml   Output 2550 ml   Net -600 ml       CONSTITUTIONALl:MORBIDLY OBESE,ORIENTED,CO-OPERATIVE  HEENT:NORMAL. NECK:  supple, symmetrical, trachea midline,Carotids- normal,JVP- flat  HEMATOLOGIC/LYMPHATICS:  no cervical lymphadenopathy  LUNGS:clear  CARDIOVASCULAR:  Normal apical impulse, regular rate and rhythm, normal S1 and S2, no S3 or S4, and no murmur noted  ABDOMEN:  normal bowel sounds, non-distended, non-tender, no masses palpated  EXTREMITIES: ARTHRITIC CHANGES. MOVEMENTS-LIMITED. PEDAL PULSES-FAIR.   SKIN:  normal skin color, texture, turgor    Data    CBC:   Lab Results   Component Value Date    WBC 5.9 2020    RBC 2.04 2020    HGB 7.3 2020    HCT 22.9 2020    .3 2020    MCH 35.8 2020    MCHC 31.9 02/06/2020    RDW 19.0 02/06/2020    PLT 43 02/06/2020    MPV 8.8 02/06/2020     CMP:    Lab Results   Component Value Date     02/06/2020    K 3.5 02/06/2020    K 3.3 08/08/2019    CL 91 02/06/2020    CO2 36 02/06/2020    BUN 83 02/06/2020    CREATININE 1.7 02/06/2020    GFRAA 49 02/06/2020    LABGLOM 41 02/06/2020    GLUCOSE 150 02/06/2020    PROT 6.9 02/06/2020    LABALBU 2.3 02/06/2020    CALCIUM 8.2 02/06/2020    BILITOT 3.2 02/06/2020    ALKPHOS 98 02/06/2020    AST 55 02/06/2020    ALT 31 02/06/2020     ABG:    Lab Results   Component Value Date    PH 7.350 02/06/2020    PCO2 72.3 02/06/2020    PO2 87.1 02/06/2020    HCO3 39.0 02/06/2020    BE 11.7 02/06/2020    O2SAT 96.5 02/06/2020         ASSESSMENT:    Active Hospital Problems    Diagnosis Date Noted    Sepsis due to Acinetobacter [facility]) [A41.59] 02/04/2020     Priority: High     Class: Acute    Septic shock due to Staphylococcus aureus ([facility] [A41.01, R65.21] 02/01/2020     Priority: High     Class: Acute    Iron deficiency anemia due to chronic blood loss [D50.0] 01/31/2020     Priority: High     Class: Chronic    Mental confusion [R41.0] 04/28/2019     Priority: High     Class: Acute    Acute diastolic CHF (congestive heart failure) (HCC) [I50.31] 11/23/2015     Priority: High     Class: Acute    Bilateral leg edema [R60.0] 01/12/2015     Priority: High    Morbid obesity with BMI of 45.0-49.9, adult ([facility] [E66.01, Z68.42] 01/12/2015     Priority: High     Class: Chronic    HTN (hypertension) [I10] 01/12/2015     Priority: High     Class: Chronic    Sepsis due to pneumonia ([facility] [J18.9, A41.9] 01/29/2020    Acute on chronic diastolic (congestive) heart failure (HCC) [I50.33] 12/18/2019       PLAN: CONTINUE CURRENT MEDICATIONS AND Rx.AWAIT TRANSFER TO [facility] WHEN BED AVAILABLE.       Electronically signed by Rosalba Farias MD on 2/6/20 at 11:38 AM

## 2020-02-06 NOTE — PROGRESS NOTES
1/29 Not detected   Blood cx 1 1/29 Acinetobacter baumannii  MRSE   Blood cx 2 1/29 Acinetobacter baumannii  MRSE   Respiratory film array 1/29 Not detected   Urine cx 1/30 <10,000 CFU/mL Proteus mirabilis  <10,000 CFU/mL GNR   MRSA nares 1/30 Not isolated   Blood cx 1 1/31 No growth x 5 days   Blood cx 2 1/31 No growth x 5 days               Assessment:  · Consulted by Dr. Ewa Hay to dose/monitor vancomycin  · Goal trough level:  15-20 mcg/mL  · Pt is a 59 yoM who presented from [facility] with septic shock. Hx of cirrhosis, recently admitted with alcohol withdrawal in 12/2019.    · Serum creatinine today: 1.7 mg/dL; CrCl ~ 60 mL/min; baseline Scr ~ 0.7-0.8 mg/dL  · Vancomycin was discontinued on 2/2, re-started 2/3 as blood cultures are positive for MRSE in addition to Acinetobacter baumannii    Plan:  · Random AM level today (~47 hour level) = 17.9 mcg/mL  · Based on levels start vancomycin 1250 mg IV q48hr  · Follow renal function closely  · Pharmacist will follow and monitor/adjust dosing as necessary      Thank you for the consult,    Leonid Harmon PharmD, BCPS 2/6/2020 9:22 AM   Pager: [phone]  Ext: [phone]

## 2020-02-06 NOTE — PROGRESS NOTES
Physical Therapy                      PHYSICAL THERAPY TREATMENT NOTE  2/6/2020  Room #: IC03/IC03-01  Gurmeet Suggs   [id number]  1955   Referring Provider:  Efraín Faye MD     Diagnosis/Problem list:  Sepsis due to pneumonia [facility]) [J18.9, A41.9]       Patient Active Problem List   Diagnosis    HTN (hypertension)    Morbid obesity with BMI of 45.0-49.9, adult ([address]    Bilateral leg edema    Gout    Acute diastolic CHF (congestive heart failure) ([address]    Bilateral low back pain without sciatica    Rhabdomyolysis    Mental confusion    Alcoholic cirrhosis of liver without ascites ([address]    Pneumonia of right upper lobe due to infectious organism ([address]    Hepatic encephalopathy ([address]    Cellulitis of scrotum    Acute on chronic diastolic (congestive) heart failure ([address]    Sepsis due to pneumonia ([address]    Iron deficiency anemia due to chronic blood loss    Septic shock due to Staphylococcus aureus ([address]    Sepsis due to Acinetobacter ([address]       Tentative placement recommendation:Long Term Acute Care vs. Subacute  Equipment recommendation: To be determined        Plan of care: Patient will be seen   daily  for therapeutic exercise, functional retraining, endurance activities, balance exercises, family and patient education.       AM-PAC Basic Mobility       AM-PAC Mobility Inpatient   How much difficulty turning over in bed?: Unable  How much difficulty sitting down on / standing up from a chair with arms?: Unable  How much difficulty moving from lying on back to sitting on side of bed?: Unable  How much help from another person moving to and from a bed to a chair?: Total  How much help from another person needed to walk in hospital room?: Total  How much help from another person for climbing 3-5 steps with a railing?: Total  AM-PAC Inpatient Mobility Raw Score : 6  AM-PAC Inpatient T-Scale Score : 23.55  Mobility Inpatient CMS 0-100% Score: 100  Mobility Inpatient CMS G-Code

## 2020-02-06 NOTE — CARE COORDINATION
2/6/2020  transition of care:   klaudia precert started 1/5/2041. Alternate discharge plan to [facility]. Shalom Chavez at [address] david. PRECERT required. CM to follow.  VIJI  Electronically signed by Hector Deleon RN-BC on 2/6/2020 at 9:19 AM

## 2020-02-06 NOTE — PROGRESS NOTES
OCCUPATIONAL THERAPY  Initial Evaluation  Date:2020  Patient Name: Markos Okeefe  MRN: [id number]  : 1955  ROOM #: IC03/IC03-01     Referring Provider: Ward Green MD  Evaluating OT: Bharati Ya OTR/L [id number]    Placement Recommendation: LTAC vs. Subacute     Recommended Adaptive Equipment: TBD at rehab    [facility]   AM-PAC Daily Activity Inpatient   How much help for putting on and taking off regular lower body clothing?: Total  How much help for Bathing?: A Lot  How much help for Toileting?: Total  How much help for putting on and taking off regular upper body clothing?: A Lot  How much help for taking care of personal grooming?: A Lot  How much help for eating meals?: A Lot  AM-PAC Inpatient Daily Activity Raw Score: 10  AM-PAC Inpatient ADL T-Scale Score : 27.31  ADL Inpatient CMS 0-100% Score: 74.7  ADL Inpatient CMS G-Code Modifier : CL    Diagnosis:   1. Septic shock ([address]    2. Acute respiratory failure with hypoxia (HCC)    3. Hyperammonemia ([address]    4. Anasarca    5. Acute on chronic congestive heart failure, unspecified heart failure type ([address]    6. RONNY (acute kidney injury) ([address]    7. Other pneumothorax      Pertinent Medical History:   Past Medical History:   Diagnosis Date    Acute diastolic CHF (congestive heart failure) ([address] 11/17/15    Echo 11/18/15 EF 73%    Dermatitis     due to thimersol    Heart valve problem     leaky    Hx of cardiovascular stress test 2015    Lexiscan    Hypertension     Obesity         Precautions:  falls, BiPAP, fecal management tube, chronic lymphedema, low air loss bed   Pain Scale: Numeric Rate: no c/o pain; Nursing notified. Social history: alone    Home architecture: mobile home, 1 story, 4 steps to enter B rails, tub shower. PLOF: needs assistance with BADL and IADL, ambulated with cane   Equipment owned: cane  Cognition: oriented x 3; follows 2 step directions.     fair  Problem solving skills   fair  Memory    fair Sequencing  Communication: intact   Visual perceptual skills: intact     Glasses: no   Edema: yes, lymphedema    Sensation: intact   Hand Dominance:  Left     X Right     Left Right Comment   Passive range of motion [facility]     Active range of motion [facility]     Muscle Grade 4/5 4/5    /pinch Strength Intact  Intact     Additional Information: Good  and wfl FMC/dexterity noted during ADL tasks Good  and wfl FMC/dexterity noted during ADL tasks      Functional Assessment:   Initial Eval Status  Date: 2/6/2020 Treatment Status  Date: STGs = LTGs  Time frame: 5-7 days   Feeding Moderate Assist   Independent    Grooming Moderate Assist   Independent    UB Dressing Maximal Assist   Minimal Assist    LB Dressing Dependent   Moderate Assist    Bathing Maximal Assist  Moderate Assist    Toileting Dependent   Minimal Assist    Bed Mobility  Dependent for rolling    Supine to sit: Minimal Assist   Sit to supine: Minimal Assist    Functional Transfers N/T   Moderate Assist    Functional Mobility N/T   Moderate Assist    Activity Tolerance poor  Fair      Balance:   Sitting: poor   Endurance: poor     Comments: Upon arrival to the room the patient was supine. At end of the session, the patient was supine. Call light and phone within reach. All lines and tubes intact. Overall patient demonstrated decreased independence and safety during completion of ADL/functional transfer/mobility tasks. Pt would benefit from continued skilled OT to increase safety and independence with completion of ADL/IADL tasks for functional independence and quality of life. Treatment: Therapist facilitated bed mobility for rolling. BUE AAROM exercises: 15 X in all planes of movement to increase ROM required for functional transfers/ADL participation.  Exercises completed in shoulder and elbow flexion/extension, internal/external rotation, abduction/adduction, supination/pronation, digit and wrist flexion/extension, abduction/adduction and digit opposition. OT services provided to include instruction/training on safety and adapted techniques for completion of transfers and ADL's. Evaluation Complexity:  · Medium Complexity  · History: Expanded review of medical records and additional review of physical, cognitive, or psychosocial history related to current functional performance. · Exam: 3-5 performance deficits  · Assistance/Modification: Min/mod assistance or modifications required to perform tasks. May have comorbidities that affect occupational performance    Assessment of current deficits   Functional mobility [x]        ADLs [x]        Strength [x]      Cognition []  Functional transfers  [x]       IADLs [x]        Safety Awareness []      Endurance [x]  Fine Motor Coordination []       Balance [x]       Vision/perception []     Sensation [x]   Gross Motor Coordination []       ROM []         Delirium []                          Motor Control []    Plan of Care: OT 1-3x/week PRN during hospitalization  ADL retraining [x]   Equipment needs [x]   Neuromuscular re-education [] Energy Conservation Techniques [x]  Functional Transfer training [x] Patient and/or Family Education [x]  Functional Mobility training [x]  Environmental Modifications []  Cognitive re-training []   Compensatory techniques for ADLs []  Splinting Needs []   Positioning to improve overall function []   Therapeutic Activity [x]  Therapeutic Exercise  []  Visual/Perceptual: []    Delirium prevention/treatment  []  Other:  []    Rehab Potential: Good for established goals developed with patient and family. Patient / Family Goal: get out of bed      Patient and/or family were instructed on functional diagnosis, prognosis/goals and OT plan of care. Demonstrated fair understanding.     Treatment Time In: 2:10pm   Treatment Time Out:  2:18pm                Treatment Charges: Mins Units   ADL/Home Mgt                    [id number]     Therapeutic Activities          58275 Therapeutic Exercise          [id number]    Manual Therapy                  02245     Neuro Re-ed                       [id number]     Orthotic manage/training    83264     Total Timed Treatment 8 1     Evaluation time includes thorough review of current medical information, gathering information on past medical history/social history and prior level of function, completion of standardized testing/informal observation of tasks, assessment of data, and development of POC/Goals.     Janet Landers OTR/L [id number]

## 2020-02-06 NOTE — PROGRESS NOTES
Pulmonary/Critical Care Progress Note    We are following patient for acute respiratory failure, septic shock, Acinetobacter bacteremia, Staphylococcus bacteremia, Proteus urinary tract infection, right transudate pleural effusion, morbid obesity, obstructive sleep apnea, alveolar hypoventilation, hypokalemia, cirrhosis    SUBJECTIVE:  Patient is more comfortable and verbal than he has been in days. He is certainly less short of breath. I reviewed the chest x-ray which is said to show more left-sided pleural effusion. I think this is a technique difference and there is really nothing changed from yesterday. His potassium however has improved with extra KCl of which he received 100 mEq yesterday. He may be a reasonable patient for LTAC evaluation. MEDICATIONS:   vancomycin  1,250 mg Intravenous Q48H    potassium chloride  40 mEq Intravenous 2 times per day    bumetanide  0.5 mg Intravenous Daily    sodium chloride  20 mL Intravenous Once    ipratropium-albuterol  1 ampule Inhalation Q4H    meropenem  500 mg Intravenous Q8H    ampicillin-sulbactam  3 g Intravenous Q8H    sodium chloride  20 mL Intravenous Once    vitamin B-12  3,000 mcg Oral Daily    folic acid  1 mg Oral Daily    midodrine  5 mg Oral TID WC    lactulose  30 g Oral BID    rifaximin  550 mg Oral TID    aspirin  81 mg Oral Daily    sodium chloride  250 mL Intravenous Once       perflutren lipid microspheres, ondansetron, sodium chloride, perflutren lipid microspheres      REVIEW OF SYSTEMS:  Constitutional: Denies fever, weight loss, night sweats, and fatigue  Skin: Denies pigmentation, dark lesions, and rashes   HEENT: Denies hearing loss, tinnitus, ear drainage, epistaxis, sore throat, and hoarseness. Cardiovascular: Denies palpitations, chest pain, and chest pressure. Respiratory: Denies cough, dyspnea at rest, hemoptysis, apnea, and choking.   Gastrointestinal: Denies nausea, vomiting, poor appetite, diarrhea, heartburn or (L) 130 - 450 E9/L Final   02/04/2020 51 (L) 130 - 450 E9/L Final     Sodium   Date Value Ref Range Status   02/06/2020 136 132 - 146 mmol/L Final   02/05/2020 134 132 - 146 mmol/L Final   02/04/2020 134 132 - 146 mmol/L Final     Potassium   Date Value Ref Range Status   02/06/2020 3.5 3.5 - 5.0 mmol/L Final   02/05/2020 3.0 (L) 3.5 - 5.0 mmol/L Final   02/04/2020 3.0 (L) 3.5 - 5.0 mmol/L Final     Potassium reflex Magnesium   Date Value Ref Range Status   08/08/2019 3.3 (L) 3.5 - 5.0 mmol/L Final   08/06/2019 3.9 3.5 - 5.0 mmol/L Final     Chloride   Date Value Ref Range Status   02/06/2020 91 (L) 98 - 107 mmol/L Final   02/05/2020 90 (L) 98 - 107 mmol/L Final   02/04/2020 89 (L) 98 - 107 mmol/L Final     CO2   Date Value Ref Range Status   02/06/2020 36 (H) 22 - 29 mmol/L Final   02/05/2020 37 (H) 22 - 29 mmol/L Final   02/04/2020 34 (H) 22 - 29 mmol/L Final     BUN   Date Value Ref Range Status   02/06/2020 83 (H) 8 - 23 mg/dL Final   02/05/2020 85 (H) 8 - 23 mg/dL Final   02/04/2020 88 (H) 8 - 23 mg/dL Final     CREATININE   Date Value Ref Range Status   02/06/2020 1.7 (H) 0.7 - 1.2 mg/dL Final   02/05/2020 1.7 (H) 0.7 - 1.2 mg/dL Final   02/04/2020 1.9 (H) 0.7 - 1.2 mg/dL Final     Glucose   Date Value Ref Range Status   02/06/2020 150 (H) 74 - 99 mg/dL Final   02/05/2020 138 (H) 74 - 99 mg/dL Final   02/04/2020 120 (H) 74 - 99 mg/dL Final     Calcium   Date Value Ref Range Status   02/06/2020 8.2 (L) 8.6 - 10.2 mg/dL Final   02/05/2020 8.1 (L) 8.6 - 10.2 mg/dL Final   02/04/2020 8.6 8.6 - 10.2 mg/dL Final     Total Protein   Date Value Ref Range Status   02/06/2020 6.9 6.4 - 8.3 g/dL Final   02/05/2020 6.9 6.4 - 8.3 g/dL Final   02/04/2020 7.1 6.4 - 8.3 g/dL Final     Alb   Date Value Ref Range Status   02/06/2020 2.3 (L) 3.5 - 5.2 g/dL Final   02/05/2020 2.1 (L) 3.5 - 5.2 g/dL Final   02/04/2020 2.2 (L) 3.5 - 5.2 g/dL Final     Total Bilirubin   Date Value Ref Range Status   02/06/2020 3.2 (H) 0.0 - 1.2 mg/dL Final   02/05/2020 4.1 (H) 0.0 - 1.2 mg/dL Final   02/04/2020 3.4 (H) 0.0 - 1.2 mg/dL Final     Alkaline Phosphatase   Date Value Ref Range Status   02/06/2020 98 40 - 129 U/L Final   02/05/2020 94 40 - 129 U/L Final   02/04/2020 112 40 - 129 U/L Final     AST   Date Value Ref Range Status   02/06/2020 55 (H) 0 - 39 U/L Final   02/05/2020 60 (H) 0 - 39 U/L Final   02/04/2020 74 (H) 0 - 39 U/L Final     ALT   Date Value Ref Range Status   02/06/2020 31 0 - 40 U/L Final   02/05/2020 33 0 - 40 U/L Final   02/04/2020 38 0 - 40 U/L Final     GFR Non-   Date Value Ref Range Status   02/06/2020 41 >=60 mL/min/1.73 Final     Comment:     Chronic Kidney Disease: less than 60 ml/min/1.73 sq.m. Kidney Failure: less than 15 ml/min/1.73 sq.m. Results valid for patients 18 years and older. 02/05/2020 41 >=60 mL/min/1.73 Final     Comment:     Chronic Kidney Disease: less than 60 ml/min/1.73 sq.m. Kidney Failure: less than 15 ml/min/1.73 sq.m. Results valid for patients 18 years and older. 02/04/2020 36 >=60 mL/min/1.73 Final     Comment:     Chronic Kidney Disease: less than 60 ml/min/1.73 sq.m. Kidney Failure: less than 15 ml/min/1.73 sq.m. Results valid for patients 18 years and older. GFR    Date Value Ref Range Status   02/06/2020 49  Final   02/05/2020 49  Final   02/04/2020 43  Final     Magnesium   Date Value Ref Range Status   02/06/2020 1.8 1.6 - 2.6 mg/dL Final   02/05/2020 1.6 1.6 - 2.6 mg/dL Final   02/04/2020 1.8 1.6 - 2.6 mg/dL Final     Phosphorus   Date Value Ref Range Status   02/06/2020 3.5 2.5 - 4.5 mg/dL Final   02/05/2020 3.7 2.5 - 4.5 mg/dL Final   02/04/2020 4.2 2.5 - 4.5 mg/dL Final     Recent Labs     02/06/20  0529   PH 7.350   PO2 87.1   PCO2 72.3*   HCO3 39.0*   BE 11.7*   O2SAT 96.5       RADIOLOGY:  XR CHEST PORTABLE   Final Result   1.  No significant change in appearance of multifocal bilateral lung   infiltrates required frequent physician assessment. I devoted my full attention to the direct care of this patient for the amount of time indicated below. Time I spent with the family or surrogate(s) is included only if the patient was incapable of providing the necessary information or participating in medical decisions - Time devoted to teaching and to any procedures I billed separately is not included.     CRITICAL CARE TIME:  36 minutes    Electronically signed by Maria Esther Wilkinson MD on 2/6/2020 at 9:27 AM

## 2020-02-06 NOTE — PROGRESS NOTES
[facility]  [facility]  Progress Note    F/U bacteremia  Chief complain:       SUBJECTIVE:  Patient is awake, alert  On o2     Tolerating antibiotics    No vomiting, fever, chills, or rash   S/p thoracentesis    ROS:  Otherwise negative except as above     OBJECTIVE:    Vitals:    02/06/20 0500 02/06/20 0600 02/06/20 0842 02/06/20 0843   BP:       Pulse:   89 90   Resp:   23 16   Temp:       TempSrc:       SpO2: 97% 95% 96% 94%   Weight:       Height:           HEENT :        At/nc NAD   Heart:             RRR,   murmurs  Lungs:            DEC to auscultation ant   Abdomen:       soft, non tender, bowel sounds present + murmur   Extremites:      edema, no ulcers BLE+ lymphedema  Skin dry no erythema  Skin:                No rash   Right IJ 1/29  Scrotal edema   [place]ess   [facility]    MEDS:      potassium chloride  20 mEq Intravenous 2 times per day    bumetanide  0.5 mg Intravenous Daily    sodium chloride  20 mL Intravenous Once    ipratropium-albuterol  1 ampule Inhalation Q4H    meropenem  500 mg Intravenous Q8H    ampicillin-sulbactam  3 g Intravenous Q8H    vancomycin (VANCOCIN) intermittent dosing (placeholder)   Other RX Placeholder    sodium chloride  20 mL Intravenous Once    vitamin B-12  3,000 mcg Oral Daily    folic acid  1 mg Oral Daily    midodrine  5 mg Oral TID WC    lactulose  30 g Oral BID    rifaximin  550 mg Oral TID    aspirin  81 mg Oral Daily    sodium chloride  250 mL Intravenous Once       LABS    CBC:   Recent Labs     02/04/20  0420 02/04/20  2245 02/05/20  0455 02/06/20  0455   WBC 6.2  --  6.8 5.9   HGB 6.9* 7.3* 7.6* 7.3*   HCT 21.5* 22.3* 22.8* 22.9*   PLT 51*  --  46* 43*       BMP:    Recent Labs     02/04/20  0420 02/05/20  0455 02/06/20  0455    134 136   K 3.0* 3.0* 3.5   CL 89* 90* 91*   CO2 34* 37* 36*   BUN 88* 85* 83*   CREATININE 1.9* 1.7* 1.7*   GLUCOSE 120* 138* 150*     Lab Results   Component Value Date    ALKPHOS 98 02/06/2020    ALT 31 02/06/2020    AST 55 02/06/2020    PROT 6.9 02/06/2020    BILITOT 3.2 02/06/2020    BILIDIR 2.2 01/29/2020    IBILI 2.3 01/29/2020    LABALBU 2.3 02/06/2020          Microbiology :  1/29 blood cx CONS/A baumanii  1/30 urine cx Proteus/GNR  1/31 blood cx NGTD  2/3 urine cx ngtd  2/4 body fluid cx ngtd     Radiology :  XR CHEST PORTABLE   Final Result   1. No significant change in appearance of multifocal bilateral lung   infiltrates and/or atelectasis. 2. Bilateral pleural effusions, left greater than right, with interval   increase in size of left effusion since previous study of 2/5/2020      XR CHEST PORTABLE   Final Result   1. Small bilateral pleural effusions. 2. Bilateral lung infiltrates and/or atelectasis, with slight interval   improvement within the right lung base and interval worsening within   the right upper to midlung. Left lung densities are stable            IR GUIDED THORACENTESIS PLEURAL   Final Result   1. Successful ultrasound-guided therapeutic thoracentesis. XR CHEST 1 VW   Final Result   1. Post right thoracentesis with slight interval decrease in size of a   right pleural effusion. No pneumothorax. 2. Slight interval improvement of bilateral perihilar airspace disease   with a stable small left pleural effusion. XR CHEST PORTABLE   Final Result   1. No change from XR CHEST PORTABLE with report dated 2/2/2020 11:39   AM.          XR CHEST PORTABLE   Final Result      XR CHEST PORTABLE   Final Result   Relative severe congestive heart failure with right   pleural effusion and bilateral airspace disease. CT FACIAL BONES WO CONTRAST   Final Result   1. There is no gross periodontal abscess. 2. Minimal mucosal thickening seen within the sphenoid sinuses. US DUP UPPER EXTREMITY RIGHT VENOUS   Final Result   1. There is no right upper extremity deep venous thrombosis.       RADIOLOGY REPORT   Final Result      XR CHEST PORTABLE   Final Result   1. Right internal jugular catheter tip in appropriate position. No   pneumothorax. 2. Stable moderate right pleural effusion with bilateral perihilar   airspace disease. XR CHEST PORTABLE   Final Result   Extensive bilateral airspace disease and pleural   effusions. Possible congestive heart failure. XR CHEST PORTABLE    (Results Pending)         ASSESSMENT:   Septic shock improving off pressor wbc better   Acinetobacter/ Staph epi Bacteremia f/u blood cx ngtd   Proteus bacteruria f/u urine cx ngtd  Acute respiratory failure-severe congestive heart failure  HCAP multifocal bilaterallung densities  Pleural effusion  s/p thoracentesis cx ngtd  Thrombocytopenia   RONNY /ckd  AMS better     PLAN:  Unasyn for Carbapenemase resistant Acintebacter bacteremia     ESBL Proteus in urine with another gn      procal 3.54->2.22  On meropnem/ unasyn  vanco pharm dosing level 17.9  Will check extended panel on Abaumanii still pending   Follow cultures  NGTD  Follow labs     ltac precerted     Pt had the opportunity to ask questions. All questions were answered.       Electronically signed by Dave Kuhn MD on 2/6/2020 at 9:20 AM    Phone [phone]  Fax [phone]

## 2020-02-07 ENCOUNTER — APPOINTMENT (OUTPATIENT)
Dept: GENERAL RADIOLOGY | Age: 65
DRG: 720 | End: 2020-02-07
Payer: COMMERCIAL

## 2020-02-07 LAB
ALBUMIN SERPL-MCNC: 2.2 G/DL (ref 3.5–5.2)
ALP BLD-CCNC: 98 U/L (ref 40–129)
ALT SERPL-CCNC: 30 U/L (ref 0–40)
ANION GAP SERPL CALCULATED.3IONS-SCNC: 7 MMOL/L (ref 7–16)
AST SERPL-CCNC: 57 U/L (ref 0–39)
B.E.: 13 MMOL/L (ref -3–3)
BASOPHILS ABSOLUTE: 0.11 E9/L (ref 0–0.2)
BASOPHILS RELATIVE PERCENT: 2 % (ref 0–2)
BILIRUB SERPL-MCNC: 2.9 MG/DL (ref 0–1.2)
BUN BLDV-MCNC: 81 MG/DL (ref 8–23)
CALCIUM SERPL-MCNC: 8.2 MG/DL (ref 8.6–10.2)
CHLORIDE BLD-SCNC: 91 MMOL/L (ref 98–107)
CO2: 37 MMOL/L (ref 22–29)
COHB: 1.8 % (ref 0–1.5)
CREAT SERPL-MCNC: 1.4 MG/DL (ref 0.7–1.2)
CRITICAL: ABNORMAL
DATE ANALYZED: ABNORMAL
DATE OF COLLECTION: ABNORMAL
EOSINOPHILS ABSOLUTE: 0.29 E9/L (ref 0.05–0.5)
EOSINOPHILS RELATIVE PERCENT: 5 % (ref 0–6)
FIO2: 40 %
GFR AFRICAN AMERICAN: >60
GFR NON-AFRICAN AMERICAN: 51 ML/MIN/1.73
GLUCOSE BLD-MCNC: 168 MG/DL (ref 74–99)
HCO3: 39.6 MMOL/L (ref 22–26)
HCT VFR BLD CALC: 22.1 % (ref 37–54)
HEMOGLOBIN: 7.2 G/DL (ref 12.5–16.5)
HHB: 5.1 % (ref 0–5)
LAB: ABNORMAL
LYMPHOCYTES ABSOLUTE: 0.63 E9/L (ref 1.5–4)
LYMPHOCYTES RELATIVE PERCENT: 11 % (ref 20–42)
Lab: ABNORMAL
MAGNESIUM: 1.7 MG/DL (ref 1.6–2.6)
MCH RBC QN AUTO: 36.7 PG (ref 26–35)
MCHC RBC AUTO-ENTMCNC: 32.6 % (ref 32–34.5)
MCV RBC AUTO: 112.8 FL (ref 80–99.9)
METHB: 0.1 % (ref 0–1.5)
MODE: ABNORMAL
MONOCYTES ABSOLUTE: 0.4 E9/L (ref 0.1–0.95)
MONOCYTES RELATIVE PERCENT: 7 % (ref 2–12)
MYELOCYTE PERCENT: 1 % (ref 0–0)
NEUTROPHILS ABSOLUTE: 4.28 E9/L (ref 1.8–7.3)
NEUTROPHILS RELATIVE PERCENT: 74 % (ref 43–80)
O2 CONTENT: 10.8 ML/DL
O2 SATURATION: 94.8 % (ref 92–98.5)
O2HB: 93 % (ref 94–97)
OPERATOR ID: 797
PATIENT TEMP: 37 C
PCO2: 66.1 MMHG (ref 35–45)
PDW BLD-RTO: 18.9 FL (ref 11.5–15)
PEEP/CPAP: 6 CMH2O
PFO2: 1.84 MMHG/%
PH BLOOD GAS: 7.39 (ref 7.35–7.45)
PHOSPHORUS: 2.9 MG/DL (ref 2.5–4.5)
PIP: 15 CMH2O
PLATELET # BLD: 47 E9/L (ref 130–450)
PLATELET CONFIRMATION: NORMAL
PMV BLD AUTO: 9.1 FL (ref 7–12)
PO2: 73.6 MMHG (ref 75–100)
POTASSIUM SERPL-SCNC: 3.8 MMOL/L (ref 3.5–5)
RBC # BLD: 1.96 E12/L (ref 3.8–5.8)
RBC # BLD: NORMAL 10*6/UL
RI(T): 1.71
SODIUM BLD-SCNC: 135 MMOL/L (ref 132–146)
SOURCE, BLOOD GAS: ABNORMAL
THB: 8.2 G/DL (ref 11.5–16.5)
TIME ANALYZED: 522
TOTAL PROTEIN: 6.7 G/DL (ref 6.4–8.3)
TROPONIN: 0.43 NG/ML (ref 0–0.03)
WBC # BLD: 5.7 E9/L (ref 4.5–11.5)

## 2020-02-07 PROCEDURE — 6370000000 HC RX 637 (ALT 250 FOR IP): Performed by: INTERNAL MEDICINE

## 2020-02-07 PROCEDURE — 94660 CPAP INITIATION&MGMT: CPT

## 2020-02-07 PROCEDURE — 36569 INSJ PICC 5 YR+ W/O IMAGING: CPT

## 2020-02-07 PROCEDURE — 36592 COLLECT BLOOD FROM PICC: CPT

## 2020-02-07 PROCEDURE — C1751 CATH, INF, PER/CENT/MIDLINE: HCPCS

## 2020-02-07 PROCEDURE — 80053 COMPREHEN METABOLIC PANEL: CPT

## 2020-02-07 PROCEDURE — 71045 X-RAY EXAM CHEST 1 VIEW: CPT

## 2020-02-07 PROCEDURE — 6360000002 HC RX W HCPCS: Performed by: INTERNAL MEDICINE

## 2020-02-07 PROCEDURE — 82805 BLOOD GASES W/O2 SATURATION: CPT

## 2020-02-07 PROCEDURE — 94640 AIRWAY INHALATION TREATMENT: CPT

## 2020-02-07 PROCEDURE — 83735 ASSAY OF MAGNESIUM: CPT

## 2020-02-07 PROCEDURE — 36415 COLL VENOUS BLD VENIPUNCTURE: CPT

## 2020-02-07 PROCEDURE — 2720000010 HC SURG SUPPLY STERILE

## 2020-02-07 PROCEDURE — 76937 US GUIDE VASCULAR ACCESS: CPT

## 2020-02-07 PROCEDURE — 2500000003 HC RX 250 WO HCPCS: Performed by: INTERNAL MEDICINE

## 2020-02-07 PROCEDURE — 97530 THERAPEUTIC ACTIVITIES: CPT

## 2020-02-07 PROCEDURE — 02HV33Z INSERTION OF INFUSION DEVICE INTO SUPERIOR VENA CAVA, PERCUTANEOUS APPROACH: ICD-10-PCS | Performed by: RADIOLOGY

## 2020-02-07 PROCEDURE — 2060000000 HC ICU INTERMEDIATE R&B

## 2020-02-07 PROCEDURE — 2580000003 HC RX 258: Performed by: INTERNAL MEDICINE

## 2020-02-07 PROCEDURE — 84100 ASSAY OF PHOSPHORUS: CPT

## 2020-02-07 PROCEDURE — 99232 SBSQ HOSP IP/OBS MODERATE 35: CPT | Performed by: FAMILY MEDICINE

## 2020-02-07 PROCEDURE — 84484 ASSAY OF TROPONIN QUANT: CPT

## 2020-02-07 PROCEDURE — 85025 COMPLETE CBC W/AUTO DIFF WBC: CPT

## 2020-02-07 RX ORDER — NITROGLYCERIN 0.4 MG/1
0.4 TABLET SUBLINGUAL EVERY 5 MIN PRN
Status: DISCONTINUED | OUTPATIENT
Start: 2020-02-07 | End: 2020-02-12 | Stop reason: HOSPADM

## 2020-02-07 RX ORDER — BUMETANIDE 0.25 MG/ML
1 INJECTION, SOLUTION INTRAMUSCULAR; INTRAVENOUS EVERY 12 HOURS
Status: DISCONTINUED | OUTPATIENT
Start: 2020-02-07 | End: 2020-02-07

## 2020-02-07 RX ORDER — BUMETANIDE 0.25 MG/ML
1 INJECTION, SOLUTION INTRAMUSCULAR; INTRAVENOUS DAILY
Status: DISCONTINUED | OUTPATIENT
Start: 2020-02-08 | End: 2020-02-11

## 2020-02-07 RX ORDER — CALCIUM CARBONATE 200(500)MG
500 TABLET,CHEWABLE ORAL ONCE
Status: COMPLETED | OUTPATIENT
Start: 2020-02-07 | End: 2020-02-07

## 2020-02-07 RX ORDER — ACETAMINOPHEN 325 MG/1
650 TABLET ORAL EVERY 4 HOURS PRN
Status: DISCONTINUED | OUTPATIENT
Start: 2020-02-07 | End: 2020-02-12 | Stop reason: HOSPADM

## 2020-02-07 RX ADMIN — RIFAXIMIN 550 MG: 550 TABLET ORAL at 09:02

## 2020-02-07 RX ADMIN — POTASSIUM CHLORIDE 20 MEQ: 29.8 INJECTION, SOLUTION INTRAVENOUS at 20:44

## 2020-02-07 RX ADMIN — Medication 300 UNITS: at 09:15

## 2020-02-07 RX ADMIN — AMPICILLIN SODIUM AND SULBACTAM SODIUM 3 G: 2; 1 INJECTION, POWDER, FOR SOLUTION INTRAMUSCULAR; INTRAVENOUS at 17:21

## 2020-02-07 RX ADMIN — NITROGLYCERIN 0.4 MG: 0.4 TABLET SUBLINGUAL at 11:53

## 2020-02-07 RX ADMIN — Medication 300 UNITS: at 20:43

## 2020-02-07 RX ADMIN — RIFAXIMIN 550 MG: 550 TABLET ORAL at 14:00

## 2020-02-07 RX ADMIN — IPRATROPIUM BROMIDE AND ALBUTEROL SULFATE 1 AMPULE: 2.5; .5 SOLUTION RESPIRATORY (INHALATION) at 02:15

## 2020-02-07 RX ADMIN — IPRATROPIUM BROMIDE AND ALBUTEROL SULFATE 1 AMPULE: 2.5; .5 SOLUTION RESPIRATORY (INHALATION) at 20:33

## 2020-02-07 RX ADMIN — POTASSIUM CHLORIDE 20 MEQ: 29.8 INJECTION, SOLUTION INTRAVENOUS at 09:12

## 2020-02-07 RX ADMIN — BUMETANIDE 1 MG: 0.25 INJECTION INTRAMUSCULAR; INTRAVENOUS at 10:15

## 2020-02-07 RX ADMIN — MEROPENEM 500 MG: 500 INJECTION, POWDER, FOR SOLUTION INTRAVENOUS at 09:14

## 2020-02-07 RX ADMIN — IPRATROPIUM BROMIDE AND ALBUTEROL SULFATE 1 AMPULE: 2.5; .5 SOLUTION RESPIRATORY (INHALATION) at 08:53

## 2020-02-07 RX ADMIN — AMPICILLIN SODIUM AND SULBACTAM SODIUM 3 G: 2; 1 INJECTION, POWDER, FOR SOLUTION INTRAMUSCULAR; INTRAVENOUS at 01:15

## 2020-02-07 RX ADMIN — LACTULOSE 30 G: 20 SOLUTION ORAL at 09:04

## 2020-02-07 RX ADMIN — BUMETANIDE 0.5 MG: 0.25 INJECTION INTRAMUSCULAR; INTRAVENOUS at 09:07

## 2020-02-07 RX ADMIN — ACETAMINOPHEN 650 MG: 325 TABLET, FILM COATED ORAL at 10:12

## 2020-02-07 RX ADMIN — CYANOCOBALAMIN TAB 1000 MCG 3000 MCG: 1000 TAB at 09:04

## 2020-02-07 RX ADMIN — MEROPENEM 500 MG: 500 INJECTION, POWDER, FOR SOLUTION INTRAVENOUS at 17:24

## 2020-02-07 RX ADMIN — MIDODRINE HYDROCHLORIDE 5 MG: 5 TABLET ORAL at 08:25

## 2020-02-07 RX ADMIN — AMPICILLIN SODIUM AND SULBACTAM SODIUM 3 G: 2; 1 INJECTION, POWDER, FOR SOLUTION INTRAMUSCULAR; INTRAVENOUS at 09:10

## 2020-02-07 RX ADMIN — IPRATROPIUM BROMIDE AND ALBUTEROL SULFATE 1 AMPULE: 2.5; .5 SOLUTION RESPIRATORY (INHALATION) at 12:27

## 2020-02-07 RX ADMIN — IPRATROPIUM BROMIDE AND ALBUTEROL SULFATE 1 AMPULE: 2.5; .5 SOLUTION RESPIRATORY (INHALATION) at 05:16

## 2020-02-07 RX ADMIN — MEROPENEM 500 MG: 500 INJECTION, POWDER, FOR SOLUTION INTRAVENOUS at 01:16

## 2020-02-07 RX ADMIN — RIFAXIMIN 550 MG: 550 TABLET ORAL at 20:43

## 2020-02-07 RX ADMIN — IPRATROPIUM BROMIDE AND ALBUTEROL SULFATE 1 AMPULE: 2.5; .5 SOLUTION RESPIRATORY (INHALATION) at 16:29

## 2020-02-07 RX ADMIN — POTASSIUM CHLORIDE 20 MEQ: 29.8 INJECTION, SOLUTION INTRAVENOUS at 10:17

## 2020-02-07 RX ADMIN — ASPIRIN 81 MG 81 MG: 81 TABLET ORAL at 09:02

## 2020-02-07 RX ADMIN — CALCIUM CARBONATE (ANTACID) CHEW TAB 500 MG 500 MG: 500 CHEW TAB at 12:04

## 2020-02-07 RX ADMIN — POTASSIUM CHLORIDE 20 MEQ: 29.8 INJECTION, SOLUTION INTRAVENOUS at 21:47

## 2020-02-07 RX ADMIN — FOLIC ACID 1 MG: 1 TABLET ORAL at 09:02

## 2020-02-07 ASSESSMENT — PAIN SCALES - GENERAL
PAINLEVEL_OUTOF10: 0
PAINLEVEL_OUTOF10: 3
PAINLEVEL_OUTOF10: 0

## 2020-02-07 ASSESSMENT — PAIN DESCRIPTION - ONSET: ONSET: SUDDEN

## 2020-02-07 ASSESSMENT — PAIN - FUNCTIONAL ASSESSMENT: PAIN_FUNCTIONAL_ASSESSMENT: ACTIVITIES ARE NOT PREVENTED

## 2020-02-07 ASSESSMENT — PAIN DESCRIPTION - PROGRESSION: CLINICAL_PROGRESSION: GRADUALLY WORSENING

## 2020-02-07 ASSESSMENT — PAIN DESCRIPTION - DESCRIPTORS: DESCRIPTORS: BURNING

## 2020-02-07 ASSESSMENT — PAIN DESCRIPTION - PAIN TYPE: TYPE: ACUTE PAIN

## 2020-02-07 ASSESSMENT — PAIN DESCRIPTION - FREQUENCY: FREQUENCY: CONTINUOUS

## 2020-02-07 ASSESSMENT — PAIN DESCRIPTION - LOCATION: LOCATION: SCROTUM

## 2020-02-07 NOTE — PROGRESS NOTES
[facility]  [facility]  Progress Note    F/U bacteremia  Chief complain:       SUBJECTIVE:  Patient is awake, alert  On o2  C/o scrotal swelling     Tolerating antibiotics    No vomiting, fever, chills, or rash   S/p thoracentesis    ROS:  Otherwise negative except as above     OBJECTIVE:    Vitals:    02/07/20 0600 02/07/20 0700 02/07/20 0800 02/07/20 0900   BP: (!) 97/47 (!) 89/40 (!) 111/54 (!) 116/54   Pulse: 85 78 85 95   Resp: 26 17 21 28   Temp:   98.4 °F (36.9 °C)    TempSrc:   Core    SpO2: 97% 94%     Weight:       Height:           HEENT :        At/nc NAD   Heart:             RRR,   murmurs  Lungs:            DEC to auscultation ant   Abdomen:       soft, non tender, bowel sounds present + murmur   Extremites:      edema, no ulcers BLE+ lymphedema  Skin dry no erythema  Skin:                No rash   Right IJ 1/29  Scrotal edema   [place]   [facility] [place]    MEDS:      [START ON 2/8/2020] bumetanide  1 mg Intravenous Daily    vancomycin  1,250 mg Intravenous Q48H    potassium chloride  20 mEq Intravenous 2 times per day    And    potassium chloride  20 mEq Intravenous 2 times per day    lidocaine  5 mL Intradermal Once    heparin flush  3 mL Intravenous 2 times per day    sodium chloride  20 mL Intravenous Once    ipratropium-albuterol  1 ampule Inhalation Q4H    meropenem  500 mg Intravenous Q8H    ampicillin-sulbactam  3 g Intravenous Q8H    sodium chloride  20 mL Intravenous Once    vitamin B-12  3,000 mcg Oral Daily    folic acid  1 mg Oral Daily    midodrine  5 mg Oral TID WC    lactulose  30 g Oral BID    rifaximin  550 mg Oral TID    aspirin  81 mg Oral Daily    sodium chloride  250 mL Intravenous Once       LABS    CBC:   Recent Labs     02/05/20  0455 02/06/20  0455 02/07/20  0600   WBC 6.8 5.9 5.7   HGB 7.6* 7.3* 7.2*   HCT 22.8* 22.9* 22.1*   PLT 46* 43* 47*       BMP:    Recent Labs     02/05/20  0455 02/06/20  0455 02/07/20  0600    136 135 There is no gross periodontal abscess. 2. Minimal mucosal thickening seen within the sphenoid sinuses. US DUP UPPER EXTREMITY RIGHT VENOUS   Final Result   1. There is no right upper extremity deep venous thrombosis. RADIOLOGY REPORT   Final Result      XR CHEST PORTABLE   Final Result   1. Right internal jugular catheter tip in appropriate position. No   pneumothorax. 2. Stable moderate right pleural effusion with bilateral perihilar   airspace disease. XR CHEST PORTABLE   Final Result   Extensive bilateral airspace disease and pleural   effusions. Possible congestive heart failure. XR CHEST PORTABLE    (Results Pending)         ASSESSMENT:   Septic shock improving off pressor wbc better afebrile   Acinetobacter/ Staph epi Bacteremia f/u blood cx ngtd   Proteus bacteruria f/u urine cx ngtd  Acute respiratory failure-severe congestive heart failure  HCAP multifocal bilaterallung densities  Pleural effusion  s/p thoracentesis cx ngtd  Thrombocytopenia   RONNY /ckd  cr1.4  AMS better     PLAN:  Unasyn for Carbapenemase resistant Acintebacter bacteremia     ESBL Proteus in urine with another gn      procal 3.54->2.22  On meropnem/ unasyn  vanco pharm dosing    Will check extended panel on Abaumanii    Follow cultures  NGTD  Follow labs     ltac precerted     Pt had the opportunity to ask questions. All questions were answered.       Electronically signed by Dipika Cervantes MD on 2/7/2020 at 2:41 PM    Phone [phone]  Fax [phone]

## 2020-02-07 NOTE — PROGRESS NOTES
Pulmonary/Critical Care Progress Note    We are following patient for acute respiratory failure, septic shock, Acinetobacter bacteremia, Staphylococcus bacteremia, Proteus urinary tract infection, transudate right pleural effusion, morbid obesity, obstructive sleep apnea, alveolar hypoventilation, cirrhosis, hypokalemia    SUBJECTIVE:  The patient is doing reasonably well and continues on antibiotics supervised by the infectious disease service for the above problems. He is saturating reasonably well without difficulty on 35 to 40% FiO2 depending on whether he is on Vapotherm or BiPAP. [place] His calcium levels have been much better since Bumex was decreased. We increased it briefly to twice daily 1 mg but now he will do just as well I believe on 1 mg/day. He has less metabolic alkalosis now so the PCO2 is coming down and PO2 is remaining at acceptable ranges. He is having a PICC line placed and plans are being made for LTAC placement.     MEDICATIONS:   [START ON 2/8/2020] bumetanide  1 mg Intravenous Daily    vancomycin  1,250 mg Intravenous Q48H    potassium chloride  20 mEq Intravenous 2 times per day    And    potassium chloride  20 mEq Intravenous 2 times per day    lidocaine  5 mL Intradermal Once    heparin flush  3 mL Intravenous 2 times per day    sodium chloride  20 mL Intravenous Once    ipratropium-albuterol  1 ampule Inhalation Q4H    meropenem  500 mg Intravenous Q8H    ampicillin-sulbactam  3 g Intravenous Q8H    sodium chloride  20 mL Intravenous Once    vitamin B-12  3,000 mcg Oral Daily    folic acid  1 mg Oral Daily    midodrine  5 mg Oral TID WC    lactulose  30 g Oral BID    rifaximin  550 mg Oral TID    aspirin  81 mg Oral Daily    sodium chloride  250 mL Intravenous Once       acetaminophen, nitroGLYCERIN, sodium chloride flush, heparin flush, perflutren lipid microspheres, ondansetron, sodium chloride, perflutren lipid microspheres      REVIEW OF SYSTEMS:  Constitutional: Denies fever, weight loss, night sweats, and fatigue  Skin: Denies pigmentation, dark lesions, and rashes   HEENT: Denies hearing loss, tinnitus, ear drainage, epistaxis, sore throat, and hoarseness. Cardiovascular: Denies palpitations, chest pain, and chest pressure. Respiratory: Denies cough, dyspnea at rest, hemoptysis, apnea, and choking. Gastrointestinal: Denies nausea, vomiting, poor appetite, diarrhea, heartburn or reflux  Genitourinary: Denies dysuria, frequency, urgency or hematuria  Musculoskeletal: Denies myalgias, muscle weakness, and bone pain  Neurological: Denies dizziness, vertigo, headache, and focal weakness  Psychological: Denies anxiety and depression  Endocrine: Denies heat intolerance and cold intolerance  Hematopoietic/Lymphatic: Denies bleeding problems and blood transfusions    OBJECTIVE:  Vitals:    02/07/20 0900   BP: (!) 116/54   Pulse: 95   Resp: 28   Temp:    SpO2:      FiO2 : 40 %  O2 Flow Rate (L/min): 4 L/min  O2 Device: PAP (positive airway pressure)    PHYSICAL EXAM:  Constitutional: No fever, chills, diaphoresis  Skin: No skin rash, no skin breakdown  HEENT: Small oropharyngeal opening  Neck: Thick neck, no JVD lymphadenopathy  Cardiovascular: S1-S2 normal.  No S3 murmurs rubs present  Respiratory: Low pitched anterior and posterior wheezing. Few crackles at bases  Gastrointestinal: Soft, markedly obese, nontender  Genitourinary: No CVA tenderness  Extremities: Minimal edema in ankles  Neurological: Awake, alert, oriented x3.   No evidence of focal motor or sensory deficits  Psychological: Appropriate affect although depressed    LABS:  WBC   Date Value Ref Range Status   02/07/2020 5.7 4.5 - 11.5 E9/L Final   02/06/2020 5.9 4.5 - 11.5 E9/L Final   02/05/2020 6.8 4.5 - 11.5 E9/L Final     Hemoglobin   Date Value Ref Range Status   02/07/2020 7.2 (L) 12.5 - 16.5 g/dL Final   02/06/2020 7.3 (L) 12.5 - 16.5 g/dL Final   02/05/2020 7.6 (L) 12.5 - 16.5 g/dL Final     Hematocrit Date Value Ref Range Status   02/07/2020 22.1 (L) 37.0 - 54.0 % Final   02/06/2020 22.9 (L) 37.0 - 54.0 % Final   02/05/2020 22.8 (L) 37.0 - 54.0 % Final     MCV   Date Value Ref Range Status   02/07/2020 112.8 (H) 80.0 - 99.9 fL Final   02/06/2020 112.3 (H) 80.0 - 99.9 fL Final   02/05/2020 109.6 (H) 80.0 - 99.9 fL Final     Platelets   Date Value Ref Range Status   02/07/2020 47 (L) 130 - 450 E9/L Final   02/06/2020 43 (L) 130 - 450 E9/L Final   02/05/2020 46 (L) 130 - 450 E9/L Final     Sodium   Date Value Ref Range Status   02/07/2020 135 132 - 146 mmol/L Final   02/06/2020 136 132 - 146 mmol/L Final   02/05/2020 134 132 - 146 mmol/L Final     Potassium   Date Value Ref Range Status   02/07/2020 3.8 3.5 - 5.0 mmol/L Final   02/06/2020 3.5 3.5 - 5.0 mmol/L Final   02/05/2020 3.0 (L) 3.5 - 5.0 mmol/L Final     Potassium reflex Magnesium   Date Value Ref Range Status   08/08/2019 3.3 (L) 3.5 - 5.0 mmol/L Final   08/06/2019 3.9 3.5 - 5.0 mmol/L Final     Chloride   Date Value Ref Range Status   02/07/2020 91 (L) 98 - 107 mmol/L Final   02/06/2020 91 (L) 98 - 107 mmol/L Final   02/05/2020 90 (L) 98 - 107 mmol/L Final     CO2   Date Value Ref Range Status   02/07/2020 37 (H) 22 - 29 mmol/L Final   02/06/2020 36 (H) 22 - 29 mmol/L Final   02/05/2020 37 (H) 22 - 29 mmol/L Final     BUN   Date Value Ref Range Status   02/07/2020 81 (H) 8 - 23 mg/dL Final   02/06/2020 83 (H) 8 - 23 mg/dL Final   02/05/2020 85 (H) 8 - 23 mg/dL Final     CREATININE   Date Value Ref Range Status   02/07/2020 1.4 (H) 0.7 - 1.2 mg/dL Final   02/06/2020 1.7 (H) 0.7 - 1.2 mg/dL Final   02/05/2020 1.7 (H) 0.7 - 1.2 mg/dL Final     Glucose   Date Value Ref Range Status   02/07/2020 168 (H) 74 - 99 mg/dL Final   02/06/2020 150 (H) 74 - 99 mg/dL Final   02/05/2020 138 (H) 74 - 99 mg/dL Final     Calcium   Date Value Ref Range Status   02/07/2020 8.2 (L) 8.6 - 10.2 mg/dL Final   02/06/2020 8.2 (L) 8.6 - 10.2 mg/dL Final   02/05/2020 8.1 (L) 8.6 -

## 2020-02-08 ENCOUNTER — APPOINTMENT (OUTPATIENT)
Dept: GENERAL RADIOLOGY | Age: 65
DRG: 720 | End: 2020-02-08
Payer: COMMERCIAL

## 2020-02-08 LAB
ALBUMIN SERPL-MCNC: 2.1 G/DL (ref 3.5–5.2)
ALP BLD-CCNC: 108 U/L (ref 40–129)
ALT SERPL-CCNC: 29 U/L (ref 0–40)
ANION GAP SERPL CALCULATED.3IONS-SCNC: 8 MMOL/L (ref 7–16)
AST SERPL-CCNC: 61 U/L (ref 0–39)
B.E.: 12.5 MMOL/L (ref -3–3)
BASOPHILS ABSOLUTE: 0.04 E9/L (ref 0–0.2)
BASOPHILS RELATIVE PERCENT: 0.8 % (ref 0–2)
BILIRUB SERPL-MCNC: 2.8 MG/DL (ref 0–1.2)
BUN BLDV-MCNC: 81 MG/DL (ref 8–23)
CALCIUM SERPL-MCNC: 8 MG/DL (ref 8.6–10.2)
CHLORIDE BLD-SCNC: 92 MMOL/L (ref 98–107)
CO2: 36 MMOL/L (ref 22–29)
COHB: 1.6 % (ref 0–1.5)
CREAT SERPL-MCNC: 1.3 MG/DL (ref 0.7–1.2)
CRITICAL: ABNORMAL
DATE ANALYZED: ABNORMAL
DATE OF COLLECTION: ABNORMAL
EOSINOPHILS ABSOLUTE: 0.34 E9/L (ref 0.05–0.5)
EOSINOPHILS RELATIVE PERCENT: 6.4 % (ref 0–6)
FIO2: 40 %
GFR AFRICAN AMERICAN: >60
GFR NON-AFRICAN AMERICAN: 56 ML/MIN/1.73
GLUCOSE BLD-MCNC: 166 MG/DL (ref 74–99)
HCO3: 38.7 MMOL/L (ref 22–26)
HCT VFR BLD CALC: 21.8 % (ref 37–54)
HEMOGLOBIN: 7.1 G/DL (ref 12.5–16.5)
HHB: 3.6 % (ref 0–5)
IMMATURE GRANULOCYTES #: 0.14 E9/L
IMMATURE GRANULOCYTES %: 2.6 % (ref 0–5)
LAB: ABNORMAL
LYMPHOCYTES ABSOLUTE: 0.72 E9/L (ref 1.5–4)
LYMPHOCYTES RELATIVE PERCENT: 13.6 % (ref 20–42)
Lab: ABNORMAL
MAGNESIUM: 1.6 MG/DL (ref 1.6–2.6)
MCH RBC QN AUTO: 37 PG (ref 26–35)
MCHC RBC AUTO-ENTMCNC: 32.6 % (ref 32–34.5)
MCV RBC AUTO: 113.5 FL (ref 80–99.9)
METHB: 0.5 % (ref 0–1.5)
MODE: ABNORMAL
MONOCYTES ABSOLUTE: 0.68 E9/L (ref 0.1–0.95)
MONOCYTES RELATIVE PERCENT: 12.9 % (ref 2–12)
NEUTROPHILS ABSOLUTE: 3.37 E9/L (ref 1.8–7.3)
NEUTROPHILS RELATIVE PERCENT: 63.7 % (ref 43–80)
O2 CONTENT: 11.7 ML/DL
O2 SATURATION: 96.3 % (ref 92–98.5)
O2HB: 94.3 % (ref 94–97)
OPERATOR ID: 797
PATIENT TEMP: 37 C
PCO2: 61.5 MMHG (ref 35–45)
PDW BLD-RTO: 18.8 FL (ref 11.5–15)
PEEP/CPAP: 6 CMH2O
PFO2: 2.15 MMHG/%
PH BLOOD GAS: 7.42 (ref 7.35–7.45)
PHOSPHORUS: 2.8 MG/DL (ref 2.5–4.5)
PIP: 15 CMH2O
PLATELET # BLD: 40 E9/L (ref 130–450)
PLATELET CONFIRMATION: NORMAL
PMV BLD AUTO: 9.3 FL (ref 7–12)
PO2: 86 MMHG (ref 75–100)
POTASSIUM SERPL-SCNC: 4 MMOL/L (ref 3.5–5)
RBC # BLD: 1.92 E12/L (ref 3.8–5.8)
RI(T): 1.38
SODIUM BLD-SCNC: 136 MMOL/L (ref 132–146)
SOURCE, BLOOD GAS: ABNORMAL
THB: 8.7 G/DL (ref 11.5–16.5)
TIME ANALYZED: 541
TOTAL PROTEIN: 6.5 G/DL (ref 6.4–8.3)
WBC # BLD: 5.3 E9/L (ref 4.5–11.5)

## 2020-02-08 PROCEDURE — 6360000002 HC RX W HCPCS: Performed by: INTERNAL MEDICINE

## 2020-02-08 PROCEDURE — 2580000003 HC RX 258: Performed by: INTERNAL MEDICINE

## 2020-02-08 PROCEDURE — 6370000000 HC RX 637 (ALT 250 FOR IP): Performed by: INTERNAL MEDICINE

## 2020-02-08 PROCEDURE — 94640 AIRWAY INHALATION TREATMENT: CPT

## 2020-02-08 PROCEDURE — 2700000000 HC OXYGEN THERAPY PER DAY

## 2020-02-08 PROCEDURE — 83735 ASSAY OF MAGNESIUM: CPT

## 2020-02-08 PROCEDURE — 71045 X-RAY EXAM CHEST 1 VIEW: CPT

## 2020-02-08 PROCEDURE — 2060000000 HC ICU INTERMEDIATE R&B

## 2020-02-08 PROCEDURE — 2500000003 HC RX 250 WO HCPCS: Performed by: INTERNAL MEDICINE

## 2020-02-08 PROCEDURE — 6370000000 HC RX 637 (ALT 250 FOR IP): Performed by: FAMILY MEDICINE

## 2020-02-08 PROCEDURE — 85025 COMPLETE CBC W/AUTO DIFF WBC: CPT

## 2020-02-08 PROCEDURE — 84100 ASSAY OF PHOSPHORUS: CPT

## 2020-02-08 PROCEDURE — 80053 COMPREHEN METABOLIC PANEL: CPT

## 2020-02-08 PROCEDURE — 82805 BLOOD GASES W/O2 SATURATION: CPT

## 2020-02-08 PROCEDURE — 94660 CPAP INITIATION&MGMT: CPT

## 2020-02-08 PROCEDURE — 99232 SBSQ HOSP IP/OBS MODERATE 35: CPT | Performed by: FAMILY MEDICINE

## 2020-02-08 PROCEDURE — 36415 COLL VENOUS BLD VENIPUNCTURE: CPT

## 2020-02-08 RX ORDER — CALCIUM CARBONATE 200(500)MG
500 TABLET,CHEWABLE ORAL 3 TIMES DAILY PRN
Status: DISCONTINUED | OUTPATIENT
Start: 2020-02-08 | End: 2020-02-12 | Stop reason: HOSPADM

## 2020-02-08 RX ADMIN — SODIUM CHLORIDE, PRESERVATIVE FREE 10 ML: 5 INJECTION INTRAVENOUS at 08:39

## 2020-02-08 RX ADMIN — POTASSIUM CHLORIDE 20 MEQ: 29.8 INJECTION, SOLUTION INTRAVENOUS at 22:00

## 2020-02-08 RX ADMIN — IPRATROPIUM BROMIDE AND ALBUTEROL SULFATE 1 AMPULE: 2.5; .5 SOLUTION RESPIRATORY (INHALATION) at 01:34

## 2020-02-08 RX ADMIN — MEROPENEM 500 MG: 500 INJECTION, POWDER, FOR SOLUTION INTRAVENOUS at 00:50

## 2020-02-08 RX ADMIN — IPRATROPIUM BROMIDE AND ALBUTEROL SULFATE 1 AMPULE: 2.5; .5 SOLUTION RESPIRATORY (INHALATION) at 09:34

## 2020-02-08 RX ADMIN — VANCOMYCIN HYDROCHLORIDE 1250 MG: 10 INJECTION, POWDER, LYOPHILIZED, FOR SOLUTION INTRAVENOUS at 11:48

## 2020-02-08 RX ADMIN — AMPICILLIN SODIUM AND SULBACTAM SODIUM 3 G: 2; 1 INJECTION, POWDER, FOR SOLUTION INTRAMUSCULAR; INTRAVENOUS at 00:50

## 2020-02-08 RX ADMIN — AMPICILLIN SODIUM AND SULBACTAM SODIUM 3 G: 2; 1 INJECTION, POWDER, FOR SOLUTION INTRAMUSCULAR; INTRAVENOUS at 08:39

## 2020-02-08 RX ADMIN — AMPICILLIN SODIUM AND SULBACTAM SODIUM 3 G: 2; 1 INJECTION, POWDER, FOR SOLUTION INTRAMUSCULAR; INTRAVENOUS at 17:02

## 2020-02-08 RX ADMIN — IPRATROPIUM BROMIDE AND ALBUTEROL SULFATE 1 AMPULE: 2.5; .5 SOLUTION RESPIRATORY (INHALATION) at 13:21

## 2020-02-08 RX ADMIN — Medication 300 UNITS: at 21:07

## 2020-02-08 RX ADMIN — RIFAXIMIN 550 MG: 550 TABLET ORAL at 08:42

## 2020-02-08 RX ADMIN — RIFAXIMIN 550 MG: 550 TABLET ORAL at 14:15

## 2020-02-08 RX ADMIN — POTASSIUM CHLORIDE 20 MEQ: 29.8 INJECTION, SOLUTION INTRAVENOUS at 08:40

## 2020-02-08 RX ADMIN — NITROGLYCERIN 0.4 MG: 0.4 TABLET SUBLINGUAL at 13:22

## 2020-02-08 RX ADMIN — MEROPENEM 500 MG: 500 INJECTION, POWDER, FOR SOLUTION INTRAVENOUS at 17:02

## 2020-02-08 RX ADMIN — POTASSIUM CHLORIDE 20 MEQ: 29.8 INJECTION, SOLUTION INTRAVENOUS at 11:48

## 2020-02-08 RX ADMIN — ASPIRIN 81 MG 81 MG: 81 TABLET ORAL at 08:39

## 2020-02-08 RX ADMIN — IPRATROPIUM BROMIDE AND ALBUTEROL SULFATE 1 AMPULE: 2.5; .5 SOLUTION RESPIRATORY (INHALATION) at 21:31

## 2020-02-08 RX ADMIN — CYANOCOBALAMIN TAB 1000 MCG 3000 MCG: 1000 TAB at 08:42

## 2020-02-08 RX ADMIN — Medication 300 UNITS: at 08:42

## 2020-02-08 RX ADMIN — POTASSIUM CHLORIDE 20 MEQ: 29.8 INJECTION, SOLUTION INTRAVENOUS at 21:07

## 2020-02-08 RX ADMIN — MEROPENEM 500 MG: 500 INJECTION, POWDER, FOR SOLUTION INTRAVENOUS at 08:39

## 2020-02-08 RX ADMIN — LACTULOSE 30 G: 20 SOLUTION ORAL at 21:07

## 2020-02-08 RX ADMIN — IPRATROPIUM BROMIDE AND ALBUTEROL SULFATE 1 AMPULE: 2.5; .5 SOLUTION RESPIRATORY (INHALATION) at 06:05

## 2020-02-08 RX ADMIN — CALCIUM CARBONATE (ANTACID) CHEW TAB 500 MG 500 MG: 500 CHEW TAB at 11:52

## 2020-02-08 RX ADMIN — RIFAXIMIN 550 MG: 550 TABLET ORAL at 21:07

## 2020-02-08 RX ADMIN — IPRATROPIUM BROMIDE AND ALBUTEROL SULFATE 1 AMPULE: 2.5; .5 SOLUTION RESPIRATORY (INHALATION) at 17:47

## 2020-02-08 RX ADMIN — FOLIC ACID 1 MG: 1 TABLET ORAL at 08:39

## 2020-02-08 RX ADMIN — BUMETANIDE 1 MG: 0.25 INJECTION INTRAMUSCULAR; INTRAVENOUS at 08:40

## 2020-02-08 RX ADMIN — ACETAMINOPHEN 650 MG: 325 TABLET, FILM COATED ORAL at 08:45

## 2020-02-08 ASSESSMENT — PAIN SCALES - GENERAL
PAINLEVEL_OUTOF10: 0
PAINLEVEL_OUTOF10: 9
PAINLEVEL_OUTOF10: 5

## 2020-02-08 ASSESSMENT — PAIN DESCRIPTION - PAIN TYPE: TYPE: ACUTE PAIN

## 2020-02-08 ASSESSMENT — PAIN DESCRIPTION - LOCATION: LOCATION: GENERALIZED

## 2020-02-08 NOTE — PROGRESS NOTES
Assessment and Plan  Patient is a 59 y.o. male with the following medical Problems:   1  Acute on chronic hypoxic and hypercapnic respiratory failure  2. Morbid obesity  3. Obesity hypoventilation syndrome  4. Pleural effusions  5. Cirrhosis  6. Chronic anemia and thrombocytopenia  7. Severe Sepsis with septic shock secondary to Acinetobacter and staph bacteremia as well as Proteus bacteriuria. 8. HFpEF  9. Bilateral lower extremities edema  10. Metabolic encephalopathy    Plan of care:  1 continue with antibiotics as per ID recommendation  2. Patient's INR is therapeutic at 2.1  3. Wean BiPAP to high flow oxygen  4. PT OT  5. Speech therapy evaluation  6. Discontinue midodrine    History of Present Illness:   Patient is a 35-year-old man with above-mentioned medical problems who was admitted on January 30, 2020 with worsening shortness of breath. He had septic shock requiring pressor support while in the ICU due to Acinetobacter and staph epidermidis bacteremia. He has been followed by ID and is currently off pressors. Today he is afebrile and has been normotensive. He has no complaints and denies fever, chills, rigors. Patient request using BiPAP most of the time. Past Medical History:  Past Medical History:   Diagnosis Date    Acute diastolic CHF (congestive heart failure) ([facility] [address] 11/17/15    Echo 11/18/15 EF 73%    Dermatitis     due to thimersol    Heart valve problem     leaky    Hx of cardiovascular stress test 12/9/2015    Lexiscan    Hypertension     Obesity         Family History:   Family History   Problem Relation Age of Onset   Emelyn Peñaholdenelin Parkinsonism Mother     COPD Father        Allergies:         Latex;  Aldactone [spironolactone]; and Thimerosal    Social history:  Social History     Socioeconomic History    Marital status: Single     Spouse name: Not on file    Number of children: Not on file    Years of education: Not on file    Highest education level: Not on file   Occupational at 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 HEENT: denies headaches, dizziness, head trauma, visual changes, eye pain, tinnitus, nosebleeds, hoarseness or throat pain    Respiratory: denies chest pain,+ve dyspnea, cough but no hemoptysis  Cardiovascular: denies orthopnea, paroxysmal nocturnal dyspnea, leg swelling, and previous heart attack. Gastrointestinal: denies pain, nausea vomiting, diarrhea, constipation, melena or bleeding. Genitourinary: denies hematuria, frequency, urgency or dysuria  Neurology: denies syncope, seizures, paralysis, paraesthesia   Endocrine: denies polyuria, polydipsia, skin or hair changes, and heat or cold intolerance  Musculoskeletal: denies joint pain, swelling, arthritis or myalgia  Hematologic: denies bleeding, adenopathy and easy bruising  Skin: denies rashes and skin discoloration  Psychiatry: denies depression    Physical Exam:   Vital Signs:  /62   Pulse 93   Temp 98.1 °F (36.7 °C) (Core)   Resp 28   Ht 5' 9\" (1.753 m)   Wt (!) 358 lb 14.4 oz (162.8 kg)   SpO2 100%   BMI 53.00 kg/m²     Input/Output: In: 790 [P.O.:240]  Out: 2475     Oxygen requirements: HFO/BiPAP    Ventilator Information:  Vent Information  Skin Assessment: Clean, dry, & intact  Equipment ID: [id number]  Equipment Changed: Other (comment)(Vapotherm)  FiO2 : 40 %    General appearance:   Morbidly, not in pain or distress, in no respiratory distress    HEENT: Atraumatic/normocephalic, EOMI, BELÉN, pharynx clear, moist mucosa, redness of the uvula appreciated,   Neck: Supple, no jugular venous distension, lymphadenopathy, thyromegaly or carotid bruits  Chest: Equal normal breath sounds, no wheezing, +ve  crackles and no tenderness over ribs   Cardiovascular: Normal S1 , S2, regular rate and rhythm, no murmur, rub or gallop  Abdomen: Normal sounds present, soft, lax with no tenderness, no hepatosplenomegaly, and no masses  Extremities: +ve edema. Pulses are equally present. Skin: intact, no rashes   Neurologic: Alert and oriented x 3, No focal deficit     Investigations:  Labs, radiological imaging and cardiac work up were reviewed        ICU STAFF PHYSICIAN NOTE OF PERSONAL INVOLVEMENT IN CARE  As the attending physician, I certify that I personally reviewed the patient's history and personally examined the patient to confirm the physical findings described above, and that I reviewed the relevant imaging studies and available reports. I also discussed the differential diagnosis and all of the proposed management plans with the patient and individuals accompanying the patient to this visit. They had the opportunity to ask questions about the proposed management plans and to have those questions answered. This patient has a high probability of sudden, clinically significant deterioration, which requires the highest level of physician preparedness to intervene urgently. I managed/supervised life or organ supporting interventions that required frequent physician assessment. I devoted my full attention to the direct care of this patient for the amount of time indicated below. Time I spent with family or surrogate(s) is included only if the patient was incapable of providing the necessary information or participating in medical decision-making. Time devoted to teaching and to any procedures I billed separately is not included.   Critical Care Time: 35 minutes      Electronically signed by Mabel Ferguson MD on 2/8/2020 at 12:59 PM

## 2020-02-08 NOTE — PROGRESS NOTES
[facility]  [facility]  Progress Note    F/U bacteremia  Chief complain:       SUBJECTIVE:  Patient is awake, alert  On o2  C/o scrotal swelling  C/o sob      Tolerating antibiotics    No vomiting, fever, chills, or rash   S/p thoracentesis    ROS:  Otherwise negative except as above     OBJECTIVE:    Vitals:    02/08/20 1100 02/08/20 1200 02/08/20 1300 02/08/20 1400   BP: 130/63 124/62 132/68 139/65   Pulse: 89 93 88 80   Resp: 19 28 20 15   Temp:  98.1 °F (36.7 °C)     TempSrc:  Core     SpO2: 99% 100% 98% 94%   Weight:       Height:           HEENT :        At/nc NAD   Heart:             RRR,   murmurs  Lungs:            DEC to auscultation ant b venti mask   Abdomen:       soft, non tender, bowel sounds present + murmur   Extremites:      edema, no ulcers BLE+ lymphedema  Skin dry no erythema  Skin:                No rash   Right IJ 1/29 left picc 2/7  Scrotal edema   Burgess   FMS    MEDS:      bumetanide  1 mg Intravenous Daily    vancomycin  1,250 mg Intravenous Q48H    potassium chloride  20 mEq Intravenous 2 times per day    And    potassium chloride  20 mEq Intravenous 2 times per day    lidocaine  5 mL Intradermal Once    heparin flush  3 mL Intravenous 2 times per day    ipratropium-albuterol  1 ampule Inhalation Q4H    meropenem  500 mg Intravenous Q8H    ampicillin-sulbactam  3 g Intravenous Q8H    vitamin B-12  3,000 mcg Oral Daily    folic acid  1 mg Oral Daily    lactulose  30 g Oral BID    rifaximin  550 mg Oral TID    aspirin  81 mg Oral Daily       LABS    CBC:   Recent Labs     02/06/20  0455 02/07/20  0600 02/08/20  0600   WBC 5.9 5.7 5.3   HGB 7.3* 7.2* 7.1*   HCT 22.9* 22.1* 21.8*   PLT 43* 47* 40*       BMP:    Recent Labs     02/06/20  0455 02/07/20  0600 02/08/20  0600    135 136   K 3.5 3.8 4.0   CL 91* 91* 92*   CO2 36* 37* 36*   BUN 83* 81* 81*   CREATININE 1.7* 1.4* 1.3*   GLUCOSE 150* 168* 166*     Lab Results   Component Value Date    ALKPHOS 108 02/08/2020    ALT 29 02/08/2020    AST 61 02/08/2020    PROT 6.5 02/08/2020    BILITOT 2.8 02/08/2020    BILIDIR 2.2 01/29/2020    IBILI 2.3 01/29/2020    LABALBU 2.1 02/08/2020          Microbiology :  1/29 blood cx CONS/A baumanii  1/30 urine cx Proteus/GNR  1/31 blood cx NGTD  2/3 urine cx ngtd  2/4 body fluid cx ngtd     Radiology :  XR CHEST PORTABLE   Final Result   Low lung volumes with stable bilateral pleural effusions and patchy   multifocal airspace disease. XR CHEST PORTABLE   Final Result   1. No change from XR CHEST 1 VIEW with report dated 2/4/2020 3:09 PM.          XR CHEST PORTABLE   Final Result   1. No significant change in appearance of multifocal bilateral lung   infiltrates and/or atelectasis. 2. Bilateral pleural effusions, left greater than right, with interval   increase in size of left effusion since previous study of 2/5/2020      XR CHEST PORTABLE   Final Result   1. Small bilateral pleural effusions. 2. Bilateral lung infiltrates and/or atelectasis, with slight interval   improvement within the right lung base and interval worsening within   the right upper to midlung. Left lung densities are stable            IR GUIDED THORACENTESIS PLEURAL   Final Result   1. Successful ultrasound-guided therapeutic thoracentesis. XR CHEST 1 VW   Final Result   1. Post right thoracentesis with slight interval decrease in size of a   right pleural effusion. No pneumothorax. 2. Slight interval improvement of bilateral perihilar airspace disease   with a stable small left pleural effusion. XR CHEST PORTABLE   Final Result   1. No change from XR CHEST PORTABLE with report dated 2/2/2020 11:39   AM.          XR CHEST PORTABLE   Final Result      XR CHEST PORTABLE   Final Result   Relative severe congestive heart failure with right   pleural effusion and bilateral airspace disease. CT FACIAL BONES WO CONTRAST   Final Result   1.  There is no gross periodontal abscess. 2. Minimal mucosal thickening seen within the sphenoid sinuses. US DUP UPPER EXTREMITY RIGHT VENOUS   Final Result   1. There is no right upper extremity deep venous thrombosis. RADIOLOGY REPORT   Final Result      XR CHEST PORTABLE   Final Result   1. Right internal jugular catheter tip in appropriate position. No   pneumothorax. 2. Stable moderate right pleural effusion with bilateral perihilar   airspace disease. XR CHEST PORTABLE   Final Result   Extensive bilateral airspace disease and pleural   effusions. Possible congestive heart failure. XR CHEST PORTABLE    (Results Pending)         ASSESSMENT:   Septic shock improving off pressor wbc better afebrile   Acinetobacter/ Staph epi Bacteremia f/u blood cx ngtd   Proteus bacteruria f/u urine cx ngtd  Acute respiratory failure-severe congestive heart failure  HCAP multifocal bilaterallung densities  Pleural effusion  s/p thoracentesis cx ngtd  Thrombocytopenia   RONNY /ckd  cr1.4  AMS better     PLAN:  Unasyn for Carbapenemase resistant Acintebacter bacteremia     ESBL Proteus in urine with another gn      procal 3.54->2.22  On meropnem/ unasyn  vanco pharm dosing    Will check extended panel on Abaumanii    Follow cultures  NGTD  Follow labs     ltac precerted     Pt had the opportunity to ask questions. All questions were answered.       Electronically signed by Cali Skelton MD on 2/8/2020 at 2:48 PM    Phone [phone]  Fax [phone]

## 2020-02-08 NOTE — PROGRESS NOTES
HOSPITAL   PROGRESS NOTE. SUBJECTIVE:  Linn Iyer, 59 y.o., male C/O  RESTING QUIETLY. CONDITION DISCUSSED WITH  PATIENT  AND NURSING   STAFF. CONSULT NOTES REVIEWED. ROS:GENERAL- APPETITE- GOOD. BOWEL MOVEMENTS- NORMAL. NO URINE    PROBLEM. EENT: NORMAL            CVS: NORMAL. NO CHEST PAIN OR PALPITATION. RESPIRATORY:NO SOB. GI/: NO ABDOMINAL PAINS            MUSCULOSKELETAL: NO COMPLAIN            CNS: NO  COMPLAIN            OBJECTIVE:    GENERAL:Temperature:  Current - Temp: 98.1 °F (36.7 °C); Max - Temp  Av.9 °F (36.6 °C)  Min: 97.7 °F (36.5 °C)  Max: 98.1 °F (36.7 °C)  Respiratory Rate : Resp  Av.7  Min: 8  Max: 26  Pulse Range: Pulse  Av.2  Min: 74  Max: 91  Blood Presuure Range:  Systolic (39RRG), CNN:449 , Min:104 , HKQ:214   ; Diastolic (01FKR), LKP:58, Min:47, Max:86    Pulse ox Range: SpO2  Av %  Min: 93 %  Max: 100 %  24hr I & O:      Intake/Output Summary (Last 24 hours) at 2020 1014  Last data filed at 2020 0840  Gross per 24 hour   Intake 980 ml   Output 4275 ml   Net -3295 ml       CONSTITUTIONALl:MORBIDLY OBESE.,ORIENTED,CO-OPERATIVE  HEENT:NORMAL. NECK:  supple, symmetrical, trachea midline,Carotids- normal,JVP- flat  HEMATOLOGIC/LYMPHATICS:  no cervical lymphadenopathy  LUNGS:clear  CARDIOVASCULAR:  Normal apical impulse, regular rate and rhythm, normal S1 and S2, no S3 or S4, and no murmur noted  ABDOMEN:  normal bowel sounds, non-distended, non-tender, no masses palpated  EXTREMITIES: ARTHRITIC CHANGES. MOVEMENTS-LIMITED. PEDAL PULSES-FAIR.   SKIN:  normal skin color, texture, turgor    Data    CBC:   Lab Results   Component Value Date    WBC 5.3 2020    RBC 1.92 2020    HGB 7.1 2020    HCT 21.8 2020    .5 2020    MCH 37.0 2020    MCHC 32.6 2020    RDW 18.8 2020    PLT 40 2020    MPV 9.3 2020

## 2020-02-09 ENCOUNTER — APPOINTMENT (OUTPATIENT)
Dept: GENERAL RADIOLOGY | Age: 65
DRG: 720 | End: 2020-02-09
Payer: COMMERCIAL

## 2020-02-09 LAB
ALBUMIN SERPL-MCNC: 2.1 G/DL (ref 3.5–5.2)
ALP BLD-CCNC: 96 U/L (ref 40–129)
ALT SERPL-CCNC: 27 U/L (ref 0–40)
ANION GAP SERPL CALCULATED.3IONS-SCNC: 6 MMOL/L (ref 7–16)
AST SERPL-CCNC: 60 U/L (ref 0–39)
B.E.: 12.2 MMOL/L (ref -3–3)
BASOPHILIC STIPPLING: ABNORMAL
BASOPHILS ABSOLUTE: 0.06 E9/L (ref 0–0.2)
BASOPHILS RELATIVE PERCENT: 0.9 % (ref 0–2)
BILIRUB SERPL-MCNC: 3.4 MG/DL (ref 0–1.2)
BUN BLDV-MCNC: 78 MG/DL (ref 8–23)
CALCIUM SERPL-MCNC: 8.1 MG/DL (ref 8.6–10.2)
CHLORIDE BLD-SCNC: 94 MMOL/L (ref 98–107)
CO2: 37 MMOL/L (ref 22–29)
COHB: 1.5 % (ref 0–1.5)
CREAT SERPL-MCNC: 1.1 MG/DL (ref 0.7–1.2)
CRITICAL: ABNORMAL
DATE ANALYZED: ABNORMAL
DATE OF COLLECTION: ABNORMAL
EOSINOPHILS ABSOLUTE: 0.43 E9/L (ref 0.05–0.5)
EOSINOPHILS RELATIVE PERCENT: 6.3 % (ref 0–6)
FIO2: 40 %
GFR AFRICAN AMERICAN: >60
GFR NON-AFRICAN AMERICAN: >60 ML/MIN/1.73
GLUCOSE BLD-MCNC: 123 MG/DL (ref 74–99)
HCO3: 38.2 MMOL/L (ref 22–26)
HCT VFR BLD CALC: 22.4 % (ref 37–54)
HEMOGLOBIN: 7.3 G/DL (ref 12.5–16.5)
HHB: 2.5 % (ref 0–5)
LAB: ABNORMAL
LYMPHOCYTES ABSOLUTE: 0.54 E9/L (ref 1.5–4)
LYMPHOCYTES RELATIVE PERCENT: 8 % (ref 20–42)
Lab: ABNORMAL
MAGNESIUM: 1.6 MG/DL (ref 1.6–2.6)
MCH RBC QN AUTO: 36.7 PG (ref 26–35)
MCHC RBC AUTO-ENTMCNC: 32.6 % (ref 32–34.5)
MCV RBC AUTO: 112.6 FL (ref 80–99.9)
METAMYELOCYTES RELATIVE PERCENT: 0.9 % (ref 0–1)
METHB: 0.7 % (ref 0–1.5)
MODE: ABNORMAL
MONOCYTES ABSOLUTE: 0.34 E9/L (ref 0.1–0.95)
MONOCYTES RELATIVE PERCENT: 5.4 % (ref 2–12)
NEUTROPHILS ABSOLUTE: 5.44 E9/L (ref 1.8–7.3)
NEUTROPHILS RELATIVE PERCENT: 78.6 % (ref 43–80)
O2 CONTENT: 11.5 ML/DL
O2 SATURATION: 97.4 % (ref 92–98.5)
O2HB: 95.3 % (ref 94–97)
OPERATOR ID: ABNORMAL
PATIENT TEMP: 37 C
PCO2: 59.5 MMHG (ref 35–45)
PDW BLD-RTO: 18.9 FL (ref 11.5–15)
PEEP/CPAP: 6 CMH2O
PFO2: 2.59 MMHG/%
PH BLOOD GAS: 7.42 (ref 7.35–7.45)
PHOSPHORUS: 2.7 MG/DL (ref 2.5–4.5)
PLATELET # BLD: 43 E9/L (ref 130–450)
PLATELET CONFIRMATION: NORMAL
PMV BLD AUTO: 9.2 FL (ref 7–12)
PO2: 103.6 MMHG (ref 75–100)
POLYCHROMASIA: ABNORMAL
POTASSIUM SERPL-SCNC: 4.2 MMOL/L (ref 3.5–5)
PS: 15 CMH20
RBC # BLD: 1.99 E12/L (ref 3.8–5.8)
RI(T): 1
SODIUM BLD-SCNC: 137 MMOL/L (ref 132–146)
SOURCE, BLOOD GAS: ABNORMAL
THB: 8.4 G/DL (ref 11.5–16.5)
TIME ANALYZED: 502
TOTAL PROTEIN: 6.6 G/DL (ref 6.4–8.3)
WBC # BLD: 6.8 E9/L (ref 4.5–11.5)

## 2020-02-09 PROCEDURE — 94640 AIRWAY INHALATION TREATMENT: CPT

## 2020-02-09 PROCEDURE — 71045 X-RAY EXAM CHEST 1 VIEW: CPT

## 2020-02-09 PROCEDURE — 1200000000 HC SEMI PRIVATE

## 2020-02-09 PROCEDURE — 6370000000 HC RX 637 (ALT 250 FOR IP): Performed by: FAMILY MEDICINE

## 2020-02-09 PROCEDURE — 2500000003 HC RX 250 WO HCPCS: Performed by: INTERNAL MEDICINE

## 2020-02-09 PROCEDURE — 82805 BLOOD GASES W/O2 SATURATION: CPT

## 2020-02-09 PROCEDURE — 6370000000 HC RX 637 (ALT 250 FOR IP): Performed by: INTERNAL MEDICINE

## 2020-02-09 PROCEDURE — 83735 ASSAY OF MAGNESIUM: CPT

## 2020-02-09 PROCEDURE — 6360000002 HC RX W HCPCS: Performed by: INTERNAL MEDICINE

## 2020-02-09 PROCEDURE — 80053 COMPREHEN METABOLIC PANEL: CPT

## 2020-02-09 PROCEDURE — 99232 SBSQ HOSP IP/OBS MODERATE 35: CPT | Performed by: FAMILY MEDICINE

## 2020-02-09 PROCEDURE — 85025 COMPLETE CBC W/AUTO DIFF WBC: CPT

## 2020-02-09 PROCEDURE — 94660 CPAP INITIATION&MGMT: CPT

## 2020-02-09 PROCEDURE — 2580000003 HC RX 258: Performed by: INTERNAL MEDICINE

## 2020-02-09 PROCEDURE — 84100 ASSAY OF PHOSPHORUS: CPT

## 2020-02-09 PROCEDURE — 2700000000 HC OXYGEN THERAPY PER DAY

## 2020-02-09 PROCEDURE — 36415 COLL VENOUS BLD VENIPUNCTURE: CPT

## 2020-02-09 RX ADMIN — CALCIUM CARBONATE (ANTACID) CHEW TAB 500 MG 500 MG: 500 CHEW TAB at 21:57

## 2020-02-09 RX ADMIN — CALCIUM CARBONATE (ANTACID) CHEW TAB 500 MG 500 MG: 500 CHEW TAB at 02:00

## 2020-02-09 RX ADMIN — SODIUM CHLORIDE, PRESERVATIVE FREE 10 ML: 5 INJECTION INTRAVENOUS at 21:53

## 2020-02-09 RX ADMIN — IPRATROPIUM BROMIDE AND ALBUTEROL SULFATE 1 AMPULE: 2.5; .5 SOLUTION RESPIRATORY (INHALATION) at 16:50

## 2020-02-09 RX ADMIN — SODIUM CHLORIDE, PRESERVATIVE FREE 10 ML: 5 INJECTION INTRAVENOUS at 21:54

## 2020-02-09 RX ADMIN — CYANOCOBALAMIN TAB 1000 MCG 3000 MCG: 1000 TAB at 08:37

## 2020-02-09 RX ADMIN — MEROPENEM 500 MG: 500 INJECTION, POWDER, FOR SOLUTION INTRAVENOUS at 09:12

## 2020-02-09 RX ADMIN — FOLIC ACID 1 MG: 1 TABLET ORAL at 08:24

## 2020-02-09 RX ADMIN — AMPICILLIN SODIUM AND SULBACTAM SODIUM 3 G: 2; 1 INJECTION, POWDER, FOR SOLUTION INTRAMUSCULAR; INTRAVENOUS at 18:14

## 2020-02-09 RX ADMIN — MEROPENEM 500 MG: 500 INJECTION, POWDER, FOR SOLUTION INTRAVENOUS at 01:55

## 2020-02-09 RX ADMIN — POTASSIUM CHLORIDE 20 MEQ: 29.8 INJECTION, SOLUTION INTRAVENOUS at 08:26

## 2020-02-09 RX ADMIN — RIFAXIMIN 550 MG: 550 TABLET ORAL at 14:10

## 2020-02-09 RX ADMIN — CALCIUM CARBONATE (ANTACID) CHEW TAB 500 MG 500 MG: 500 CHEW TAB at 16:34

## 2020-02-09 RX ADMIN — IPRATROPIUM BROMIDE AND ALBUTEROL SULFATE 1 AMPULE: 2.5; .5 SOLUTION RESPIRATORY (INHALATION) at 09:32

## 2020-02-09 RX ADMIN — IPRATROPIUM BROMIDE AND ALBUTEROL SULFATE 1 AMPULE: 2.5; .5 SOLUTION RESPIRATORY (INHALATION) at 05:24

## 2020-02-09 RX ADMIN — AMPICILLIN SODIUM AND SULBACTAM SODIUM 3 G: 2; 1 INJECTION, POWDER, FOR SOLUTION INTRAMUSCULAR; INTRAVENOUS at 01:56

## 2020-02-09 RX ADMIN — IPRATROPIUM BROMIDE AND ALBUTEROL SULFATE 1 AMPULE: 2.5; .5 SOLUTION RESPIRATORY (INHALATION) at 13:07

## 2020-02-09 RX ADMIN — IPRATROPIUM BROMIDE AND ALBUTEROL SULFATE 1 AMPULE: 2.5; .5 SOLUTION RESPIRATORY (INHALATION) at 00:44

## 2020-02-09 RX ADMIN — POTASSIUM CHLORIDE 20 MEQ: 29.8 INJECTION, SOLUTION INTRAVENOUS at 09:36

## 2020-02-09 RX ADMIN — AMPICILLIN SODIUM AND SULBACTAM SODIUM 3 G: 2; 1 INJECTION, POWDER, FOR SOLUTION INTRAMUSCULAR; INTRAVENOUS at 09:12

## 2020-02-09 RX ADMIN — RIFAXIMIN 550 MG: 550 TABLET ORAL at 08:37

## 2020-02-09 RX ADMIN — IPRATROPIUM BROMIDE AND ALBUTEROL SULFATE 1 AMPULE: 2.5; .5 SOLUTION RESPIRATORY (INHALATION) at 21:06

## 2020-02-09 RX ADMIN — MEROPENEM 500 MG: 500 INJECTION, POWDER, FOR SOLUTION INTRAVENOUS at 17:34

## 2020-02-09 RX ADMIN — BUMETANIDE 1 MG: 0.25 INJECTION INTRAMUSCULAR; INTRAVENOUS at 08:25

## 2020-02-09 RX ADMIN — ASPIRIN 81 MG 81 MG: 81 TABLET ORAL at 08:24

## 2020-02-09 RX ADMIN — RIFAXIMIN 550 MG: 550 TABLET ORAL at 21:56

## 2020-02-09 ASSESSMENT — PAIN SCALES - GENERAL: PAINLEVEL_OUTOF10: 9

## 2020-02-09 ASSESSMENT — PAIN DESCRIPTION - PAIN TYPE: TYPE: ACUTE PAIN

## 2020-02-09 ASSESSMENT — PAIN - FUNCTIONAL ASSESSMENT: PAIN_FUNCTIONAL_ASSESSMENT: PREVENTS OR INTERFERES SOME ACTIVE ACTIVITIES AND ADLS

## 2020-02-09 ASSESSMENT — PAIN DESCRIPTION - LOCATION: LOCATION: NECK

## 2020-02-09 NOTE — PROGRESS NOTES
mg, 81 mg, Oral, Daily, Benito Patel MD, 81 mg at 02/09/20 0170    perflutren lipid microspheres (DEFINITY) injection 1.65 mg, 1.5 mL, Intravenous, ONCE PRN, Benito Patel MD    Review of Systems:   General: denies weight gain, denies loss of appetite, fever, chills, night sweats. HEENT: denies headaches, dizziness, head trauma, visual changes, eye pain, tinnitus, nosebleeds, hoarseness or throat pain    Respiratory: denies chest pain,+ve dyspnea, cough but no hemoptysis  Cardiovascular: denies orthopnea, paroxysmal nocturnal dyspnea, leg swelling, and previous heart attack. Gastrointestinal: denies pain, nausea vomiting, diarrhea, constipation, melena or bleeding. Genitourinary: denies hematuria, frequency, urgency or dysuria  Neurology: denies syncope, seizures, paralysis, paraesthesia   Endocrine: denies polyuria, polydipsia, skin or hair changes, and heat or cold intolerance  Musculoskeletal: denies joint pain, swelling, arthritis or myalgia  Hematologic: denies bleeding, adenopathy and easy bruising  Skin: denies rashes and skin discoloration  Psychiatry: denies depression    Physical Exam:   Vital Signs:  BP (!) 148/67   Pulse 83   Temp 97.5 °F (36.4 °C) (Bladder)   Resp 14   Ht 5' 9\" (1.753 m)   Wt (!) 358 lb 14.4 oz (162.8 kg)   SpO2 98%   BMI 53.00 kg/m²     Input/Output: In: 540 [P.O.:240]  Out: 1500     Oxygen requirements: HFO/BiPAP    Ventilator Information:  Vent Information  Skin Assessment: Clean, dry, & intact  Equipment ID: [id number]  Equipment Changed: Other (comment)(Vapotherm)  FiO2 : 40 %    General appearance:   Morbidly obese, not in pain or distress, in no respiratory distress    HEENT: Atraumatic/normocephalic, EOMI, BELÉN, pharynx clear, moist mucosa, redness of the uvula appreciated,   Neck: Supple, no jugular venous distension, lymphadenopathy, thyromegaly or carotid bruits  Chest: Equal normal breath sounds, no wheezing, +ve  crackles and no tenderness over ribs   Cardiovascular: Normal S1 , S2, regular rate and rhythm, no murmur, rub or gallop  Abdomen: Normal sounds present, soft, lax with no tenderness, no hepatosplenomegaly, and no masses  Extremities: +ve edema and lyphedema. Pulses are equally present. Skin: intact, no rashes   Neurologic: Alert and oriented x 3, No focal deficit     Investigations:  Labs, radiological imaging and cardiac work up were reviewed        ICU STAFF PHYSICIAN NOTE OF PERSONAL INVOLVEMENT IN CARE  As the attending physician, I certify that I personally reviewed the patient's history and personally examined the patient to confirm the physical findings described above, and that I reviewed the relevant imaging studies and available reports. I also discussed the differential diagnosis and all of the proposed management plans with the patient and individuals accompanying the patient to this visit. They had the opportunity to ask questions about the proposed management plans and to have those questions answered. This patient has a high probability of sudden, clinically significant deterioration, which requires the highest level of physician preparedness to intervene urgently. I managed/supervised life or organ supporting interventions that required frequent physician assessment. I devoted my full attention to the direct care of this patient for the amount of time indicated below. Time I spent with family or surrogate(s) is included only if the patient was incapable of providing the necessary information or participating in medical decision-making. Time devoted to teaching and to any procedures I billed separately is not included.   Critical Care Time: 35 minutes      Electronically signed by Gabbi Hunt MD on 2/9/2020 at 11:51 AM

## 2020-02-10 ENCOUNTER — APPOINTMENT (OUTPATIENT)
Dept: GENERAL RADIOLOGY | Age: 65
DRG: 720 | End: 2020-02-10
Payer: COMMERCIAL

## 2020-02-10 LAB
ABO/RH: NORMAL
ALBUMIN SERPL-MCNC: 1.9 G/DL (ref 3.5–5.2)
ALP BLD-CCNC: 108 U/L (ref 40–129)
ALT SERPL-CCNC: 24 U/L (ref 0–40)
ANION GAP SERPL CALCULATED.3IONS-SCNC: 5 MMOL/L (ref 7–16)
ANISOCYTOSIS: ABNORMAL
ANTIBODY SCREEN: NORMAL
AST SERPL-CCNC: 54 U/L (ref 0–39)
B.E.: 16.1 MMOL/L (ref -3–3)
BASOPHILIC STIPPLING: ABNORMAL
BASOPHILS ABSOLUTE: 0.06 E9/L (ref 0–0.2)
BASOPHILS RELATIVE PERCENT: 0.7 % (ref 0–2)
BILIRUB SERPL-MCNC: 3.1 MG/DL (ref 0–1.2)
BLOOD BANK DISPENSE STATUS: NORMAL
BLOOD BANK DISPENSE STATUS: NORMAL
BLOOD BANK PRODUCT CODE: NORMAL
BLOOD BANK PRODUCT CODE: NORMAL
BPU ID: NORMAL
BPU ID: NORMAL
BUN BLDV-MCNC: 72 MG/DL (ref 8–23)
CALCIUM SERPL-MCNC: 8 MG/DL (ref 8.6–10.2)
CHLORIDE BLD-SCNC: 93 MMOL/L (ref 98–107)
CO2: 37 MMOL/L (ref 22–29)
COHB: 1.1 % (ref 0–1.5)
CREAT SERPL-MCNC: 0.9 MG/DL (ref 0.7–1.2)
CRITICAL: ABNORMAL
DATE ANALYZED: ABNORMAL
DATE OF COLLECTION: ABNORMAL
DESCRIPTION BLOOD BANK: NORMAL
DESCRIPTION BLOOD BANK: NORMAL
EOSINOPHILS ABSOLUTE: 0.43 E9/L (ref 0.05–0.5)
EOSINOPHILS RELATIVE PERCENT: 5.3 % (ref 0–6)
FIO2: 40 %
GFR AFRICAN AMERICAN: >60
GFR NON-AFRICAN AMERICAN: >60 ML/MIN/1.73
GLUCOSE BLD-MCNC: 108 MG/DL (ref 74–99)
HCO3: 41.8 MMOL/L (ref 22–26)
HCT VFR BLD CALC: 21.1 % (ref 37–54)
HEMOGLOBIN: 6.8 G/DL (ref 12.5–16.5)
HHB: 3.9 % (ref 0–5)
HYPOCHROMIA: ABNORMAL
IMMATURE GRANULOCYTES #: 0.06 E9/L
IMMATURE GRANULOCYTES %: 0.7 % (ref 0–5)
LAB: ABNORMAL
LYMPHOCYTES ABSOLUTE: 0.82 E9/L (ref 1.5–4)
LYMPHOCYTES RELATIVE PERCENT: 10.2 % (ref 20–42)
Lab: ABNORMAL
MAGNESIUM: 1.5 MG/DL (ref 1.6–2.6)
MCH RBC QN AUTO: 36.8 PG (ref 26–35)
MCHC RBC AUTO-ENTMCNC: 32.2 % (ref 32–34.5)
MCV RBC AUTO: 114.1 FL (ref 80–99.9)
METHB: 0.5 % (ref 0–1.5)
MODE: ABNORMAL
MONOCYTES ABSOLUTE: 0.81 E9/L (ref 0.1–0.95)
MONOCYTES RELATIVE PERCENT: 10.1 % (ref 2–12)
NEUTROPHILS ABSOLUTE: 5.87 E9/L (ref 1.8–7.3)
NEUTROPHILS RELATIVE PERCENT: 73 % (ref 43–80)
O2 CONTENT: 10 ML/DL
O2 SATURATION: 96 % (ref 92–98.5)
O2HB: 94.5 % (ref 94–97)
OPERATOR ID: 377
OVALOCYTES: ABNORMAL
PATIENT TEMP: 37 C
PCO2: 61.4 MMHG (ref 35–45)
PDW BLD-RTO: 18.6 FL (ref 11.5–15)
PEEP/CPAP: 6 CMH2O
PFO2: 2.06 MMHG/%
PH BLOOD GAS: 7.45 (ref 7.35–7.45)
PHOSPHORUS: 2.6 MG/DL (ref 2.5–4.5)
PLATELET # BLD: 39 E9/L (ref 130–450)
PLATELET CONFIRMATION: NORMAL
PMV BLD AUTO: 9.2 FL (ref 7–12)
PO2: 82.3 MMHG (ref 75–100)
POIKILOCYTES: ABNORMAL
POLYCHROMASIA: ABNORMAL
POTASSIUM SERPL-SCNC: 4 MMOL/L (ref 3.5–5)
PS: 15 CMH20
RBC # BLD: 1.85 E12/L (ref 3.8–5.8)
RI(T): 1.49
SODIUM BLD-SCNC: 135 MMOL/L (ref 132–146)
SOURCE, BLOOD GAS: ABNORMAL
THB: 7.4 G/DL (ref 11.5–16.5)
TIME ANALYZED: 532
TOTAL PROTEIN: 6.4 G/DL (ref 6.4–8.3)
VANCOMYCIN TROUGH: 15.1 MCG/ML (ref 5–16)
WBC # BLD: 8.1 E9/L (ref 4.5–11.5)

## 2020-02-10 PROCEDURE — 6370000000 HC RX 637 (ALT 250 FOR IP): Performed by: INTERNAL MEDICINE

## 2020-02-10 PROCEDURE — P9016 RBC LEUKOCYTES REDUCED: HCPCS

## 2020-02-10 PROCEDURE — 83735 ASSAY OF MAGNESIUM: CPT

## 2020-02-10 PROCEDURE — 86850 RBC ANTIBODY SCREEN: CPT

## 2020-02-10 PROCEDURE — 6360000002 HC RX W HCPCS: Performed by: INTERNAL MEDICINE

## 2020-02-10 PROCEDURE — 2700000000 HC OXYGEN THERAPY PER DAY

## 2020-02-10 PROCEDURE — 94660 CPAP INITIATION&MGMT: CPT

## 2020-02-10 PROCEDURE — 2500000003 HC RX 250 WO HCPCS: Performed by: INTERNAL MEDICINE

## 2020-02-10 PROCEDURE — 85025 COMPLETE CBC W/AUTO DIFF WBC: CPT

## 2020-02-10 PROCEDURE — 80053 COMPREHEN METABOLIC PANEL: CPT

## 2020-02-10 PROCEDURE — 2580000003 HC RX 258: Performed by: INTERNAL MEDICINE

## 2020-02-10 PROCEDURE — 36415 COLL VENOUS BLD VENIPUNCTURE: CPT

## 2020-02-10 PROCEDURE — 86900 BLOOD TYPING SEROLOGIC ABO: CPT

## 2020-02-10 PROCEDURE — 2580000003 HC RX 258: Performed by: FAMILY MEDICINE

## 2020-02-10 PROCEDURE — 94640 AIRWAY INHALATION TREATMENT: CPT

## 2020-02-10 PROCEDURE — 80202 ASSAY OF VANCOMYCIN: CPT

## 2020-02-10 PROCEDURE — 36430 TRANSFUSION BLD/BLD COMPNT: CPT

## 2020-02-10 PROCEDURE — 86923 COMPATIBILITY TEST ELECTRIC: CPT

## 2020-02-10 PROCEDURE — 71045 X-RAY EXAM CHEST 1 VIEW: CPT

## 2020-02-10 PROCEDURE — 84100 ASSAY OF PHOSPHORUS: CPT

## 2020-02-10 PROCEDURE — 86901 BLOOD TYPING SEROLOGIC RH(D): CPT

## 2020-02-10 PROCEDURE — 2060000000 HC ICU INTERMEDIATE R&B

## 2020-02-10 PROCEDURE — 36600 WITHDRAWAL OF ARTERIAL BLOOD: CPT

## 2020-02-10 PROCEDURE — 82805 BLOOD GASES W/O2 SATURATION: CPT

## 2020-02-10 PROCEDURE — 99233 SBSQ HOSP IP/OBS HIGH 50: CPT | Performed by: FAMILY MEDICINE

## 2020-02-10 PROCEDURE — 36592 COLLECT BLOOD FROM PICC: CPT

## 2020-02-10 RX ORDER — 0.9 % SODIUM CHLORIDE 0.9 %
20 INTRAVENOUS SOLUTION INTRAVENOUS ONCE
Status: COMPLETED | OUTPATIENT
Start: 2020-02-10 | End: 2020-02-10

## 2020-02-10 RX ORDER — FUROSEMIDE 10 MG/ML
40 INJECTION INTRAMUSCULAR; INTRAVENOUS ONCE
Status: DISCONTINUED | OUTPATIENT
Start: 2020-02-10 | End: 2020-02-10

## 2020-02-10 RX ADMIN — SODIUM CHLORIDE 20 ML: 9 INJECTION, SOLUTION INTRAVENOUS at 12:43

## 2020-02-10 RX ADMIN — FOLIC ACID 1 MG: 1 TABLET ORAL at 09:07

## 2020-02-10 RX ADMIN — ASPIRIN 81 MG 81 MG: 81 TABLET ORAL at 09:07

## 2020-02-10 RX ADMIN — AMPICILLIN SODIUM AND SULBACTAM SODIUM 3 G: 2; 1 INJECTION, POWDER, FOR SOLUTION INTRAMUSCULAR; INTRAVENOUS at 02:00

## 2020-02-10 RX ADMIN — AMPICILLIN SODIUM AND SULBACTAM SODIUM 3 G: 2; 1 INJECTION, POWDER, FOR SOLUTION INTRAMUSCULAR; INTRAVENOUS at 10:34

## 2020-02-10 RX ADMIN — IPRATROPIUM BROMIDE AND ALBUTEROL SULFATE 1 AMPULE: 2.5; .5 SOLUTION RESPIRATORY (INHALATION) at 22:46

## 2020-02-10 RX ADMIN — MEROPENEM 500 MG: 500 INJECTION, POWDER, FOR SOLUTION INTRAVENOUS at 12:14

## 2020-02-10 RX ADMIN — RIFAXIMIN 550 MG: 550 TABLET ORAL at 09:07

## 2020-02-10 RX ADMIN — LACTULOSE 30 G: 20 SOLUTION ORAL at 09:07

## 2020-02-10 RX ADMIN — POTASSIUM CHLORIDE 20 MEQ: 29.8 INJECTION, SOLUTION INTRAVENOUS at 09:14

## 2020-02-10 RX ADMIN — IPRATROPIUM BROMIDE AND ALBUTEROL SULFATE 1 AMPULE: 2.5; .5 SOLUTION RESPIRATORY (INHALATION) at 05:57

## 2020-02-10 RX ADMIN — IPRATROPIUM BROMIDE AND ALBUTEROL SULFATE 1 AMPULE: 2.5; .5 SOLUTION RESPIRATORY (INHALATION) at 14:38

## 2020-02-10 RX ADMIN — MEROPENEM 500 MG: 500 INJECTION, POWDER, FOR SOLUTION INTRAVENOUS at 03:56

## 2020-02-10 RX ADMIN — CYANOCOBALAMIN TAB 1000 MCG 3000 MCG: 1000 TAB at 09:07

## 2020-02-10 RX ADMIN — IPRATROPIUM BROMIDE AND ALBUTEROL SULFATE 1 AMPULE: 2.5; .5 SOLUTION RESPIRATORY (INHALATION) at 09:41

## 2020-02-10 RX ADMIN — VANCOMYCIN HYDROCHLORIDE 1250 MG: 10 INJECTION, POWDER, LYOPHILIZED, FOR SOLUTION INTRAVENOUS at 12:42

## 2020-02-10 RX ADMIN — POTASSIUM CHLORIDE 20 MEQ: 29.8 INJECTION, SOLUTION INTRAVENOUS at 10:05

## 2020-02-10 RX ADMIN — SODIUM CHLORIDE, PRESERVATIVE FREE 10 ML: 5 INJECTION INTRAVENOUS at 02:56

## 2020-02-10 RX ADMIN — AMPICILLIN SODIUM AND SULBACTAM SODIUM 3 G: 2; 1 INJECTION, POWDER, FOR SOLUTION INTRAMUSCULAR; INTRAVENOUS at 18:50

## 2020-02-10 RX ADMIN — IPRATROPIUM BROMIDE AND ALBUTEROL SULFATE 1 AMPULE: 2.5; .5 SOLUTION RESPIRATORY (INHALATION) at 01:06

## 2020-02-10 RX ADMIN — BUMETANIDE 1 MG: 0.25 INJECTION INTRAMUSCULAR; INTRAVENOUS at 09:07

## 2020-02-10 RX ADMIN — RIFAXIMIN 550 MG: 550 TABLET ORAL at 13:41

## 2020-02-10 ASSESSMENT — PAIN SCALES - GENERAL: PAINLEVEL_OUTOF10: 0

## 2020-02-10 NOTE — PROGRESS NOTES
OT BEDSIDE TREATMENT NOTE      Date:2/10/2020  Patient Name: Emilie Shaikh  MRN: [id number]  : 1955  Room: [address] [place] [address]          Attempted occupational therapy this date, however pt receiving blood with decreased blood count per nursing. Will continue to assess pt at later date/time when appropriate. Continue current POC.      Tio YEH/JUAN [id number]

## 2020-02-10 NOTE — PROGRESS NOTES
[facility]  [facility]  Progress Note    F/U bacteremia  Chief complain:       SUBJECTIVE:  Patient is awake, alert C/O SOB INFORMED STAFF  IN BED        ROS:  Otherwise negative except as above     OBJECTIVE:    Vitals:    02/10/20 1315 02/10/20 1349 02/10/20 1700 02/10/20 1734   BP: (!) 108/53 (!) 110/53 (!) 111/58 (!) 121/56   Pulse: 88 86 87 92   Resp: 16 16 18 18   Temp: 97.6 °F (36.4 °C) 98 °F (36.7 °C) 97.6 °F (36.4 °C) 97.8 °F (36.6 °C)   TempSrc: Axillary Axillary Axillary Axillary   SpO2: 98% 99% 95% 94%   Weight:       Height:           HEENT :        At/nc NAD   Heart:             RRR,   murmurs  Lungs:            DEC to auscultation ant  NO WHEEZE  Abdomen:       soft, non tender, bowel sounds present + murmur   Extremites:      edema, no ulcers BLE+ lymphedema  Skin dry no erythema  Skin:                No rash   left picc 2/7  Scrotal edema   Burgess  YELLOW       MEDS:      meropenem  500 mg Intravenous Q8H    bumetanide  1 mg Intravenous Daily    vancomycin  1,250 mg Intravenous Q48H    potassium chloride  20 mEq Intravenous 2 times per day    And    potassium chloride  20 mEq Intravenous 2 times per day    lidocaine  5 mL Intradermal Once    heparin flush  3 mL Intravenous 2 times per day    ipratropium-albuterol  1 ampule Inhalation Q4H    ampicillin-sulbactam  3 g Intravenous Q8H    vitamin B-12  3,000 mcg Oral Daily    folic acid  1 mg Oral Daily    lactulose  30 g Oral BID    rifaximin  550 mg Oral TID    aspirin  81 mg Oral Daily       LABS    CBC:   Recent Labs     02/08/20  0600 02/09/20  0610 02/10/20  0620   WBC 5.3 6.8 8.1   HGB 7.1* 7.3* 6.8*   HCT 21.8* 22.4* 21.1*   PLT 40* 43* 39*       BMP:    Recent Labs     02/08/20  0600 02/09/20  0610 02/10/20  0620    137 135   K 4.0 4.2 4.0   CL 92* 94* 93*   CO2 36* 37* 37*   BUN 81* 78* 72*   CREATININE 1.3* 1.1 0.9   GLUCOSE 166* 123* 108*     Lab Results   Component Value Date

## 2020-02-10 NOTE — PLAN OF CARE
Problem: Falls - Risk of:  Goal: Will remain free from falls  Description  Will remain free from falls  Outcome: Met This Shift  Goal: Absence of physical injury  Description  Absence of physical injury  Outcome: Met This Shift     Problem: Pain:  Goal: Pain level will decrease  Description  Pain level will decrease  Outcome: Met This Shift  Goal: Control of acute pain  Description  Control of acute pain  Outcome: Met This Shift  Goal: Control of chronic pain  Description  Control of chronic pain  Outcome: Met This Shift     Problem: Mobility - Impaired:  Goal: Mobility will improve  Description  Mobility will improve  Outcome: Met This Shift

## 2020-02-10 NOTE — PROGRESS NOTES
[facility] Quality Flow/Interdisciplinary Rounds Progress Note        Quality Flow Rounds held on February 10, 2020    Disciplines Attending:  Bedside Nurse, ,  and Nursing Unit Leadership    Casi Barlow was admitted on 1/29/2020  7:47 PM    Anticipated Discharge Date:  Expected Discharge Date: 02/08/20    Disposition:    Yohan Score:  Yohan Scale Score: 14    Readmission Risk              Risk of Unplanned Readmission:        34           Discussed patient goal for the day, patient clinical progression, and barriers to discharge.   The following Goal(s) of the Day/Commitment(s) have been identified:  Transfer to  , Kieran Kaur  February 10, 2020

## 2020-02-10 NOTE — PROGRESS NOTES
PULMONARY MEDICINE FOLLOW UP    59year old man with PMH of obesit, hypertension, valvulopathy,  acute on chronic heart failure, admitted to hospital for management of acute on chronic hypoxemic and hypercapnic respiratory failure due to acute decompensated heart failure, cirrhosis in the setting of OHS, MINOR, sepsis due to Acinetobacter and staphylococcus bacteremia and proteus bacteremia.      --Today has worsening anemia, receiving transfusions of PRBC  --ON BPAP alternating with HFNC    MEDICATIONS:   meropenem  500 mg Intravenous Q8H    bumetanide  1 mg Intravenous Daily    vancomycin  1,250 mg Intravenous Q48H    potassium chloride  20 mEq Intravenous 2 times per day    And    potassium chloride  20 mEq Intravenous 2 times per day    lidocaine  5 mL Intradermal Once    heparin flush  3 mL Intravenous 2 times per day    ipratropium-albuterol  1 ampule Inhalation Q4H    ampicillin-sulbactam  3 g Intravenous Q8H    vitamin B-12  3,000 mcg Oral Daily    folic acid  1 mg Oral Daily    lactulose  30 g Oral BID    rifaximin  550 mg Oral TID    aspirin  81 mg Oral Daily       calcium carbonate, acetaminophen, nitroGLYCERIN, sodium chloride flush, heparin flush, ondansetron, sodium chloride, perflutren lipid microspheres    OBJECTIVE:  Vitals:    02/10/20 1349   BP: (!) 110/53   Pulse: 86   Resp: 16   Temp: 98 °F (36.7 °C)   SpO2: 99%     FiO2 : 40 %  O2 Flow Rate (L/min): 4 L/min  O2 Device: PAP (positive airway pressure)        LABS:  WBC   Date Value Ref Range Status   02/10/2020 8.1 4.5 - 11.5 E9/L Final   02/09/2020 6.8 4.5 - 11.5 E9/L Final   02/08/2020 5.3 4.5 - 11.5 E9/L Final     Hemoglobin   Date Value Ref Range Status   02/10/2020 6.8 (L) 12.5 - 16.5 g/dL Final   02/09/2020 7.3 (L) 12.5 - 16.5 g/dL Final   02/08/2020 7.1 (L) 12.5 - 16.5 g/dL Final     Hematocrit   Date Value Ref Range Status   02/10/2020 21.1 (L) 37.0 - 54.0 % Final   02/09/2020 22.4 (L) 37.0 - 54.0 % Final   02/08/2020 21.8 (L) 37.0 - 54.0 % Final     MCV   Date Value Ref Range Status   02/10/2020 114.1 (H) 80.0 - 99.9 fL Final   02/09/2020 112.6 (H) 80.0 - 99.9 fL Final   02/08/2020 113.5 (H) 80.0 - 99.9 fL Final     Platelets   Date Value Ref Range Status   02/10/2020 39 (L) 130 - 450 E9/L Final   02/09/2020 43 (L) 130 - 450 E9/L Final   02/08/2020 40 (L) 130 - 450 E9/L Final     Sodium   Date Value Ref Range Status   02/10/2020 135 132 - 146 mmol/L Final   02/09/2020 137 132 - 146 mmol/L Final   02/08/2020 136 132 - 146 mmol/L Final     Potassium   Date Value Ref Range Status   02/10/2020 4.0 3.5 - 5.0 mmol/L Final   02/09/2020 4.2 3.5 - 5.0 mmol/L Final   02/08/2020 4.0 3.5 - 5.0 mmol/L Final     Potassium reflex Magnesium   Date Value Ref Range Status   08/08/2019 3.3 (L) 3.5 - 5.0 mmol/L Final   08/06/2019 3.9 3.5 - 5.0 mmol/L Final     Chloride   Date Value Ref Range Status   02/10/2020 93 (L) 98 - 107 mmol/L Final   02/09/2020 94 (L) 98 - 107 mmol/L Final   02/08/2020 92 (L) 98 - 107 mmol/L Final     CO2   Date Value Ref Range Status   02/10/2020 37 (H) 22 - 29 mmol/L Final   02/09/2020 37 (H) 22 - 29 mmol/L Final   02/08/2020 36 (H) 22 - 29 mmol/L Final     BUN   Date Value Ref Range Status   02/10/2020 72 (H) 8 - 23 mg/dL Final   02/09/2020 78 (H) 8 - 23 mg/dL Final   02/08/2020 81 (H) 8 - 23 mg/dL Final     CREATININE   Date Value Ref Range Status   02/10/2020 0.9 0.7 - 1.2 mg/dL Final   02/09/2020 1.1 0.7 - 1.2 mg/dL Final   02/08/2020 1.3 (H) 0.7 - 1.2 mg/dL Final     Glucose   Date Value Ref Range Status   02/10/2020 108 (H) 74 - 99 mg/dL Final   02/09/2020 123 (H) 74 - 99 mg/dL Final   02/08/2020 166 (H) 74 - 99 mg/dL Final     Calcium   Date Value Ref Range Status   02/10/2020 8.0 (L) 8.6 - 10.2 mg/dL Final   02/09/2020 8.1 (L) 8.6 - 10.2 mg/dL Final   02/08/2020 8.0 (L) 8.6 - 10.2 mg/dL Final     Total Protein   Date Value Ref Range Status   02/10/2020 6.4 6.4 - 8.3 g/dL Final   02/09/2020 6.6 6.4 - 8.3 g/dL Final 02/08/2020 6.5 6.4 - 8.3 g/dL Final     Alb   Date Value Ref Range Status   02/10/2020 1.9 (L) 3.5 - 5.2 g/dL Final   02/09/2020 2.1 (L) 3.5 - 5.2 g/dL Final   02/08/2020 2.1 (L) 3.5 - 5.2 g/dL Final     Total Bilirubin   Date Value Ref Range Status   02/10/2020 3.1 (H) 0.0 - 1.2 mg/dL Final   02/09/2020 3.4 (H) 0.0 - 1.2 mg/dL Final   02/08/2020 2.8 (H) 0.0 - 1.2 mg/dL Final     Alkaline Phosphatase   Date Value Ref Range Status   02/10/2020 108 40 - 129 U/L Final   02/09/2020 96 40 - 129 U/L Final   02/08/2020 108 40 - 129 U/L Final     AST   Date Value Ref Range Status   02/10/2020 54 (H) 0 - 39 U/L Final   02/09/2020 60 (H) 0 - 39 U/L Final   02/08/2020 61 (H) 0 - 39 U/L Final     ALT   Date Value Ref Range Status   02/10/2020 24 0 - 40 U/L Final   02/09/2020 27 0 - 40 U/L Final   02/08/2020 29 0 - 40 U/L Final     GFR Non-   Date Value Ref Range Status   02/10/2020 >60 >=60 mL/min/1.73 Final     Comment:     Chronic Kidney Disease: less than 60 ml/min/1.73 sq.m. Kidney Failure: less than 15 ml/min/1.73 sq.m. Results valid for patients 18 years and older. 02/09/2020 >60 >=60 mL/min/1.73 Final     Comment:     Chronic Kidney Disease: less than 60 ml/min/1.73 sq.m. Kidney Failure: less than 15 ml/min/1.73 sq.m. Results valid for patients 18 years and older. 02/08/2020 56 >=60 mL/min/1.73 Final     Comment:     Chronic Kidney Disease: less than 60 ml/min/1.73 sq.m. Kidney Failure: less than 15 ml/min/1.73 sq.m. Results valid for patients 18 years and older.        GFR    Date Value Ref Range Status   02/10/2020 >60  Final   02/09/2020 >60  Final   02/08/2020 >60  Final     Magnesium   Date Value Ref Range Status   02/10/2020 1.5 (L) 1.6 - 2.6 mg/dL Final   02/09/2020 1.6 1.6 - 2.6 mg/dL Final   02/08/2020 1.6 1.6 - 2.6 mg/dL Final     Phosphorus   Date Value Ref Range Status   02/10/2020 2.6 2.5 - 4.5 mg/dL Final   02/09/2020 2.7 2.5 - 4.5 mg/dL CONTRAST   Final Result   1. There is no gross periodontal abscess. 2. Minimal mucosal thickening seen within the sphenoid sinuses. US DUP UPPER EXTREMITY RIGHT VENOUS   Final Result   1. There is no right upper extremity deep venous thrombosis. RADIOLOGY REPORT   Final Result      XR CHEST PORTABLE   Final Result   1. Right internal jugular catheter tip in appropriate position. No   pneumothorax. 2. Stable moderate right pleural effusion with bilateral perihilar   airspace disease. XR CHEST PORTABLE   Final Result   Extensive bilateral airspace disease and pleural   effusions. Possible congestive heart failure. PROBLEM LIST:  Principal Problem:    Septic shock due to Staphylococcus aureus ([facility] [address]  Active Problems:    HTN (hypertension)    Morbid obesity with BMI of 45.0-49.9, adult (HCC)    Bilateral leg edema    Acute diastolic CHF (congestive heart failure) ([facility])    Mental confusion    Iron deficiency anemia due to chronic blood loss    Sepsis due to Acinetobacter ([facility] [address]    Acute on chronic diastolic (congestive) heart failure ([facility])    Sepsis due to pneumonia [facility])  Resolved Problems:    * No resolved hospital problems.  *    BP (!) 110/53   Pulse 86   Temp 98 °F (36.7 °C) (Axillary)   Resp 16   Ht 5' 9\" (1.753 m)   Wt (!) 358 lb 14.4 oz (162.8 kg)   SpO2 99%   BMI 53.00 kg/m²   General: Awake, oriented to place, time and person  HEENT: No head lesions, PERRL, EOMI, mouth without lesions, no nasal lesions, no cervical adenopathy palpated  Respiratory: Lungs with decreased breath sounds bilaterally, no adventitious sounds auscultated, no accessory muscle use  CV: Regular rate, no murmurs, no JVD, 1+ leg edema  Abdomen: Soft, non tender, + bowel sounds, no lesions  Skin: Hydrated, adequate turgor, no rash, capillary refill <2 seconds  Extremities: Muscular strength 4/5 in 4 limbs, moves 4 limbs spontaneously, distal pulses present  Neurology: Awake and alert, follows commands,

## 2020-02-10 NOTE — PLAN OF CARE
Problem: Falls - Risk of:  Goal: Will remain free from falls  Description  Will remain free from falls  Outcome: Met This Shift     Problem: Pain:  Goal: Pain level will decrease  Description  Pain level will decrease  Outcome: Met This Shift     Problem: Breathing Pattern - Ineffective:  Goal: Ability to achieve and maintain a regular respiratory rate will improve  Description  Ability to achieve and maintain a regular respiratory rate will improve  Outcome: Met This Shift

## 2020-02-10 NOTE — CARE COORDINATION
CM note: Requested update from [place] on status of LTAC P2P, no update available at this time. Patient on 3 IV antibiotics, on CPAP and is to receive blood transfusion today.   Per CM note on 2/7/2020, alternate discharge plan is [address], [employer] following, will NEED PRECERT and signed [address]

## 2020-02-10 NOTE — PROGRESS NOTES
Levofloxacin 750 mg every other day 1/31 2/2   Unasyn 3 g Q6H  3g Q8H 1/31  2/3 2/2   Cefoxitin 1 g Q6H 2/2 2/2   Meropenem 500 mg q8hr 2/3            Cultures:  available culture and sensitivity results were reviewed in EPIC  Cultures sent and are pending. Culture Date Result    Rapid flu 1/29 Not detected   Blood cx 1 1/29 Acinetobacter baumannii  MRSE   Blood cx 2 1/29 Acinetobacter baumannii  MRSE   Respiratory film array 1/29 Not detected   Urine cx 1/30 <10,000 CFU/mL Proteus mirabilis  <10,000 CFU/mL GNR   MRSA nares 1/30 Not isolated   Blood cx 1 1/31 No growth x 5 days   Blood cx 2 1/31 No growth x 5 days               Assessment:  · Consulted by Dr. Darling Daniels to dose/monitor vancomycin  · Goal trough level:  15-20 mcg/mL  · Pt is a 59 yoM who presented from [facility] with septic shock. Hx of cirrhosis, recently admitted with alcohol withdrawal in 12/2019.    · Serum creatinine today: 0.9 mg/dL; CrCl > 100 mL/min; baseline Scr ~ 0.7-0.8 mg/dL  · Vancomycin was discontinued on 2/2, re-started 2/3 as blood cultures are positive for MRSE in addition to Acinetobacter baumannii  · 2/10: trough = 15.1 mcg/mL    Plan:  · Continue vancomycin 1250 mg IV q48hr  · Follow renal function   · Pharmacist will follow and monitor/adjust dosing as necessary      Thank you for the consult,    Aleksandra Huber PharmD, BCPS 2/10/2020 8:08 AM   Pager: [phone]  Ext: [phone]

## 2020-02-11 ENCOUNTER — TELEPHONE (OUTPATIENT)
Dept: FAMILY MEDICINE CLINIC | Age: 65
End: 2020-02-11

## 2020-02-11 LAB
HCT VFR BLD CALC: 24.3 % (ref 37–54)
HEMOGLOBIN: 8.1 G/DL (ref 12.5–16.5)
PLATELET # BLD: 42 E9/L (ref 130–450)
PLATELET CONFIRMATION: NORMAL

## 2020-02-11 PROCEDURE — 97535 SELF CARE MNGMENT TRAINING: CPT

## 2020-02-11 PROCEDURE — 99232 SBSQ HOSP IP/OBS MODERATE 35: CPT | Performed by: FAMILY MEDICINE

## 2020-02-11 PROCEDURE — 6360000002 HC RX W HCPCS: Performed by: INTERNAL MEDICINE

## 2020-02-11 PROCEDURE — 85014 HEMATOCRIT: CPT

## 2020-02-11 PROCEDURE — 6370000000 HC RX 637 (ALT 250 FOR IP): Performed by: INTERNAL MEDICINE

## 2020-02-11 PROCEDURE — 85049 AUTOMATED PLATELET COUNT: CPT

## 2020-02-11 PROCEDURE — 94660 CPAP INITIATION&MGMT: CPT

## 2020-02-11 PROCEDURE — 2580000003 HC RX 258: Performed by: INTERNAL MEDICINE

## 2020-02-11 PROCEDURE — 6360000002 HC RX W HCPCS: Performed by: SPECIALIST

## 2020-02-11 PROCEDURE — 84145 PROCALCITONIN (PCT): CPT

## 2020-02-11 PROCEDURE — 2580000003 HC RX 258: Performed by: SPECIALIST

## 2020-02-11 PROCEDURE — 97110 THERAPEUTIC EXERCISES: CPT

## 2020-02-11 PROCEDURE — 94640 AIRWAY INHALATION TREATMENT: CPT

## 2020-02-11 PROCEDURE — 85018 HEMOGLOBIN: CPT

## 2020-02-11 PROCEDURE — 2500000003 HC RX 250 WO HCPCS: Performed by: INTERNAL MEDICINE

## 2020-02-11 PROCEDURE — 36415 COLL VENOUS BLD VENIPUNCTURE: CPT

## 2020-02-11 PROCEDURE — 2060000000 HC ICU INTERMEDIATE R&B

## 2020-02-11 PROCEDURE — 97530 THERAPEUTIC ACTIVITIES: CPT

## 2020-02-11 RX ORDER — BUMETANIDE 0.25 MG/ML
2 INJECTION, SOLUTION INTRAMUSCULAR; INTRAVENOUS DAILY
Status: DISCONTINUED | OUTPATIENT
Start: 2020-02-12 | End: 2020-02-12 | Stop reason: HOSPADM

## 2020-02-11 RX ADMIN — POTASSIUM CHLORIDE 20 MEQ: 29.8 INJECTION, SOLUTION INTRAVENOUS at 01:06

## 2020-02-11 RX ADMIN — RIFAXIMIN 550 MG: 550 TABLET ORAL at 11:24

## 2020-02-11 RX ADMIN — ASPIRIN 81 MG 81 MG: 81 TABLET ORAL at 11:25

## 2020-02-11 RX ADMIN — MEROPENEM 500 MG: 500 INJECTION, POWDER, FOR SOLUTION INTRAVENOUS at 17:11

## 2020-02-11 RX ADMIN — BUMETANIDE 1 MG: 0.25 INJECTION INTRAMUSCULAR; INTRAVENOUS at 11:26

## 2020-02-11 RX ADMIN — RIFAXIMIN 550 MG: 550 TABLET ORAL at 22:42

## 2020-02-11 RX ADMIN — AMPICILLIN SODIUM AND SULBACTAM SODIUM 3 G: 2; 1 INJECTION, POWDER, FOR SOLUTION INTRAMUSCULAR; INTRAVENOUS at 11:23

## 2020-02-11 RX ADMIN — POTASSIUM CHLORIDE 20 MEQ: 29.8 INJECTION, SOLUTION INTRAVENOUS at 22:34

## 2020-02-11 RX ADMIN — AMPICILLIN SODIUM AND SULBACTAM SODIUM 3 G: 2; 1 INJECTION, POWDER, FOR SOLUTION INTRAMUSCULAR; INTRAVENOUS at 23:25

## 2020-02-11 RX ADMIN — RIFAXIMIN 550 MG: 550 TABLET ORAL at 00:12

## 2020-02-11 RX ADMIN — MEROPENEM 500 MG: 500 INJECTION, POWDER, FOR SOLUTION INTRAVENOUS at 13:14

## 2020-02-11 RX ADMIN — IPRATROPIUM BROMIDE AND ALBUTEROL SULFATE 1 AMPULE: 2.5; .5 SOLUTION RESPIRATORY (INHALATION) at 02:40

## 2020-02-11 RX ADMIN — IPRATROPIUM BROMIDE AND ALBUTEROL SULFATE 1 AMPULE: 2.5; .5 SOLUTION RESPIRATORY (INHALATION) at 21:16

## 2020-02-11 RX ADMIN — AMPICILLIN SODIUM AND SULBACTAM SODIUM 3 G: 2; 1 INJECTION, POWDER, FOR SOLUTION INTRAMUSCULAR; INTRAVENOUS at 18:44

## 2020-02-11 RX ADMIN — IPRATROPIUM BROMIDE AND ALBUTEROL SULFATE 1 AMPULE: 2.5; .5 SOLUTION RESPIRATORY (INHALATION) at 07:03

## 2020-02-11 RX ADMIN — CYANOCOBALAMIN TAB 1000 MCG 3000 MCG: 1000 TAB at 11:25

## 2020-02-11 RX ADMIN — POTASSIUM CHLORIDE 20 MEQ: 29.8 INJECTION, SOLUTION INTRAVENOUS at 16:35

## 2020-02-11 RX ADMIN — POTASSIUM CHLORIDE 20 MEQ: 29.8 INJECTION, SOLUTION INTRAVENOUS at 11:23

## 2020-02-11 RX ADMIN — FOLIC ACID 1 MG: 1 TABLET ORAL at 11:25

## 2020-02-11 RX ADMIN — LACTULOSE 30 G: 20 SOLUTION ORAL at 00:12

## 2020-02-11 RX ADMIN — AMPICILLIN SODIUM AND SULBACTAM SODIUM 3 G: 2; 1 INJECTION, POWDER, FOR SOLUTION INTRAMUSCULAR; INTRAVENOUS at 02:20

## 2020-02-11 RX ADMIN — POTASSIUM CHLORIDE 20 MEQ: 29.8 INJECTION, SOLUTION INTRAVENOUS at 00:12

## 2020-02-11 RX ADMIN — IPRATROPIUM BROMIDE AND ALBUTEROL SULFATE 1 AMPULE: 2.5; .5 SOLUTION RESPIRATORY (INHALATION) at 13:38

## 2020-02-11 RX ADMIN — MEROPENEM 500 MG: 500 INJECTION, POWDER, FOR SOLUTION INTRAVENOUS at 00:12

## 2020-02-11 RX ADMIN — IPRATROPIUM BROMIDE AND ALBUTEROL SULFATE 1 AMPULE: 2.5; .5 SOLUTION RESPIRATORY (INHALATION) at 09:41

## 2020-02-11 RX ADMIN — IPRATROPIUM BROMIDE AND ALBUTEROL SULFATE 1 AMPULE: 2.5; .5 SOLUTION RESPIRATORY (INHALATION) at 17:06

## 2020-02-11 ASSESSMENT — PAIN SCALES - GENERAL
PAINLEVEL_OUTOF10: 0

## 2020-02-11 NOTE — PLAN OF CARE
Problem: Falls - Risk of:  Goal: Will remain free from falls  Description  Will remain free from falls  2/11/2020 1550 by Jena Ramos  Outcome: Ongoing  2/11/2020 0500 by Akbar Mann RN  Outcome: Met This Shift     Problem: Falls - Risk of:  Goal: Absence of physical injury  Description  Absence of physical injury  2/11/2020 1550 by Jena Ramos  Outcome: Ongoing  2/11/2020 0500 by Akbar Mann RN  Outcome: Met This Shift     Problem: Pain:  Goal: Pain level will decrease  Description  Pain level will decrease  2/11/2020 1550 by Jena Ramos  Outcome: Ongoing  2/11/2020 0500 by Akbar Mann RN  Outcome: Met This Shift     Problem: Pain:  Goal: Control of chronic pain  Description  Control of chronic pain  2/11/2020 1550 by Jena Ramos  Outcome: Ongoing  2/11/2020 0500 by Akbar Mann RN  Outcome: Met This Shift     Problem: Mobility - Impaired:  Goal: Mobility will improve  Description  Mobility will improve  2/11/2020 1550 by Jena Ramos  Outcome: Ongoing  2/11/2020 0500 by Akbar Mann RN  Outcome: Met This Shift     Problem: Gas Exchange - Impaired:  Goal: Levels of oxygenation will improve  Description  Levels of oxygenation will improve  2/11/2020 1550 by Jena Ramos  Outcome: Ongoing  2/11/2020 0500 by Akbar Mann RN  Outcome: Met This Shift

## 2020-02-11 NOTE — PLAN OF CARE
Problem: Falls - Risk of:  Goal: Will remain free from falls  Description  Will remain free from falls  2/11/2020 0500 by Talisha Santiago RN  Outcome: Met This Shift    Goal: Absence of physical injury  Description  Absence of physical injury  Outcome: Met This Shift     Problem: Pain:  Description  Pain management should include both nonpharmacologic and pharmacologic interventions.   Goal: Pain level will decrease  Description  Pain level will decrease  2/11/2020 0500 by Talisha Santiago RN  Outcome: Met This Shift    Goal: Control of acute pain  Description  Control of acute pain  Outcome: Met This Shift  Goal: Control of chronic pain  Description  Control of chronic pain  Outcome: Met This Shift     Problem: Mobility - Impaired:  Goal: Mobility will improve  Description  Mobility will improve  Outcome: Met This Shift     Problem: Gas Exchange - Impaired:  Goal: Levels of oxygenation will improve  Description  Levels of oxygenation will improve  Outcome: Met This Shift     Problem: Breathing Pattern - Ineffective:  Goal: Ability to achieve and maintain a regular respiratory rate will improve  Description  Ability to achieve and maintain a regular respiratory rate will improve  2/11/2020 0500 by Talisha Santiago RN  Outcome: Met This Shift

## 2020-02-11 NOTE — TELEPHONE ENCOUNTER
I received a call from [facility] and she said she wanted to know if it was okay if Dr. Alber Pastor follows the pts care while he is in the hospital. [facility] said that Dr. Mary Marcum did not follow with their hospital. Please advise, thank you.

## 2020-02-11 NOTE — PROGRESS NOTES
Pharmacy Consultation Note  (Antibiotic Dosing and Monitoring)    Initial consult date: 1/30/20 Aldair Owens), re-consulted 2/3 Judi Vasquez)  Drug(s): Vancomycin  Indication: Septic shock    Ht Readings from Last 1 Encounters:   01/30/20 5' 9\" (1.753 m)     Wt Readings from Last 1 Encounters:   02/04/20 (!) 358 lb 14.4 oz (162.8 kg)     Age/  Gender IBW DW  Allergy Information   59 y.o.   male 70.7 kg 104.3 kg  Latex; Aldactone [spironolactone]; and Thimerosal          Date  Tmax WBC BUN/CR UOP  (mL/kg/hr) Drug/Dose Time   Given Level(s)   (Time) Comments   1/29  (#1) afebrile 16 77/1.9 -- Vancomycin 3000 mg IV x 1 2234     1/30  (#2) afebrile 23.1 82/2.1 310 mL -- --     1/31  (#3) afebrile 16.3 84/2.2 0.4 Vancomycin 1250 mg IV x 1 0950 Random AM level @ 0535 = 17.3 mcg/mL    2/1  (#4) afebrile 13.3 85/1.9 0.6 Vancomycin 1250 mg IV x 1 2126 Random AM level @ 0640 = 20.9 mcg/mL    2/2  (#5) afebrile 9.9 87/1.8 0.6 -- --     2/3  (#6) afebrile 7.1 89/1.8 0.7 -- -- Random level @ 1130 = 20.5 mcg/mL    2/4  (#7) afebrile 6.2 88/1.9 0.6 Vancomycin 1250 mg IV x 1 0555     2/5  (#8) afebrile 6.8 85/1.7 0.8 -- --     2/6  (#9) afebrile 5.9 83/1.7 0.6 Vancomycin 1250 mg IV q48hr 1110 Random AM level @ 0455 = 17.9 mcg/mL    2/7  (#10) afebrile 5.7 81/1.4 0.5 Vancomycin 1250 mg IV q48hr --     2/8  (#11) afebrile 5.3 81/1.3 1.1 Vancomycin 1250 mg IV q48hr 1148     2/9  (#12) afebrile 6.8 78/1.1 0.3 Vancomycin 1250 mg IV q48hr --     2/10  (#13) afebrile 8.1 72/0.9 0.7 Vancomycin 1250 mg IV q48hr 1242 Trough @ 0900 = 15.1 mcg/mL    2/11  (#14) afebrile -- -- 0.6 Vancomycin 1250 mg IV q48hr --                             Estimated Creatinine Clearance: 126 mL/min (based on SCr of 0.9 mg/dL). UOP over the past 24 hours:     Intake/Output Summary (Last 24 hours) at 2/11/2020 1051  Last data filed at 2/11/2020 0615  Gross per 24 hour   Intake 960 ml   Output 2200 ml   Net -1240 ml     Other anti-infectives:   Anti-infective Dose Date Initiated Date Stopped   Cefepime 2g IV q8hr 1/29 1/31   Levofloxacin 750 mg every other day 1/31 2/2   Unasyn 3 g Q6H  3g Q8H 1/31  2/3 2/2   Cefoxitin 1 g Q6H 2/2 2/2   Meropenem 500 mg q8hr 2/3            Cultures:  available culture and sensitivity results were reviewed in EPIC  Cultures sent and are pending. Culture Date Result    Rapid flu 1/29 Not detected   Blood cx 1 1/29 Acinetobacter baumannii  MRSE   Blood cx 2 1/29 Acinetobacter baumannii  MRSE   Respiratory film array 1/29 Not detected   Urine cx 1/30 <10,000 CFU/mL Proteus mirabilis  <10,000 CFU/mL GNR   MRSA nares 1/30 Not isolated   Blood cx 1 1/31 No growth x 5 days   Blood cx 2 1/31 No growth x 5 days               Assessment:  · Consulted by Dr. Jemma Velazquez to dose/monitor vancomycin  · Goal trough level:  15-20 mcg/mL  · Pt is a 59 yoM who presented from [facility] with septic shock. Hx of cirrhosis, recently admitted with alcohol withdrawal in 12/2019.    · Serum creatinine 2/10: 0.9 mg/dL; CrCl > 100 mL/min; baseline Scr ~ 0.7-0.8 mg/dL  · Vancomycin was discontinued on 2/2, re-started 2/3 as blood cultures are positive for MRSE in addition to Acinetobacter baumannii  · 2/10: trough = 15.1 mcg/mL    Plan:  · Continue vancomycin 1250 mg IV q48hr  · Follow renal function   · Pharmacist will follow and monitor/adjust dosing as necessary      Thank you for the consult,    Candance Noe, PharmD, BCPS 2/11/2020 10:51 AM   Pager: [phone]  Ext: [phone]

## 2020-02-11 NOTE — PROGRESS NOTES
OT BEDSIDE TREATMENT NOTE      Date:2020  Patient Name: Ruby Helms  MRN: [id number]  : 1955  Room: [address] [place] [address]     Referring Provider: Tray Green MD  Evaluating OT: Mary Travis OTR/L [id number]     Placement Recommendation: LTAC vs. Subacute     Recommended Adaptive Equipment: TBD at rehab     [facility]   AM-PAC Daily Activity Inpatient   How much help for putting on and taking off regular lower body clothing?: Total  How much help for Bathing?: A Lot  How much help for Toileting?: Total  How much help for putting on and taking off regular upper body clothing?: A Lot  How much help for taking care of personal grooming?: A Lot  How much help for eating meals?: A Lot  AM-PAC Inpatient Daily Activity Raw Score: 10  AM-PAC Inpatient ADL T-Scale Score : 27.31  ADL Inpatient CMS 0-100% Score: 74.7  ADL Inpatient CMS G-Code Modifier : CL     Diagnosis:   1. Septic shock ([address]    2. Acute respiratory failure with hypoxia (HCC)    3. Hyperammonemia ([facility]    4. Anasarca    5. Acute on chronic congestive heart failure, unspecified heart failure type ([address]    6. RONNY (acute kidney injury) ([address]    7. Other pneumothorax       Pertinent Medical History:   Past Medical History        Past Medical History:   Diagnosis Date    Acute diastolic CHF (congestive heart failure) ([address] 11/17/15     Echo 11/18/15 EF 73%    Dermatitis       due to thimersol    Heart valve problem       leaky    Hx of cardiovascular stress test 2015     Lexiscan    Hypertension      Obesity              Precautions:  falls, BiPAP, fecal management tube, chronic lymphedema, low air loss bed   Pain Scale: Numeric Rate: no c/o pain; Nursing notified. Social history: alone    Home architecture: mobile home, 1 story, 4 steps to enter B rails, tub shower. PLOF: needs assistance with BADL and IADL, ambulated with cane   Equipment owned: cane  Cognition: oriented x 3; follows 2 step directions.                fair  Problem solving skills              fair  Memory               fair  Sequencing  Communication: intact   Visual perceptual skills: intact                Glasses: no   Edema: yes, lymphedema    Sensation: intact   Hand Dominance:  Left     X Right      Functional Assessment:   Initial Eval Status  Date: 2/6/2020 Treatment Status  Date: 2/11/2020 STGs = LTGs  Time frame: 5-7 days   Feeding Moderate Assist  Set up/SBA long sitting in bed to eat meal  Independent    Grooming Moderate Assist  N/t  Independent    UB Dressing Maximal Assist  Max  A to [facility] gown supine in bed  Minimal Assist    LB Dressing Dependent  Dep to don socks  Moderate Assist    Bathing Maximal Assist  N/T Moderate Assist    Toileting Dependent  Dep; pt having episode of incontinence of bowel requiring total assist with hygiene and clothing management  Minimal Assist    Bed Mobility  Dependent for rolling   Supine>sit MOD A x2   Sit>supine Dep x2   Pt requiring assist to bring B LE out of bed; pt requiring total assist to return to supine in bed due to c/o SOB and increased anxiety  Supine to sit: Minimal Assist   Sit to supine: Minimal Assist    Functional Transfers N/T  N/T - pt returning to supine immediately due to increased anxiety due to c/o SOB  Moderate Assist    Functional Mobility N/T  N/T; pt has not ambulated in over 10 months Moderate Assist    Activity Tolerance poor Poor pt SPO2 decreasing to 85% with activity transfer to EOB; requesting to be placed back on Bipap; pt while supine in bed with light ADL tasks and conversation with nursing SPO2 97% and holding; Fair       Pt long sitting in bed completed L UE ex's fist pumps, elbow flexion/extension, shoulder flexion/extension focusing on AROM, edema management to increase independence with grooming and UE dressing taks; Comments: Patient cleared by nursing staff. Upon arrival pt supine in bed. At end of session RTB with  all lines and tubes intact, call light within reach.  Overall, pt demonstrated decreased independence and safety during completion of ADL/functional transfers/mobility tasks. Pt would benefit from continued skilled OT to increase safety and independence with completion of ADL/IADL tasks for functional independence and quality of life.     · Pt has made fair minus progress towards set goals(as noted through pt tolerated sitting EOB at Dep x 2 level for ~ 2 minutes); nsg present and placed pt on Bipap machine at end of session; pt's O2 sats dropped to 84-85% at EOB; pt SPO2 returning to 95% supine in bed with Bipap; nursing removing Bipap after ~5 minutes to nc to eat meal with SPO2 at 97%   · Continue with current plan of care    Treatment Time In:1120        Treatment Time Out: 1200             Treatment Charges: Mins Units   ADL/Home Mgt     49980 30   2   Thera Activities     29706      Ther Ex                 72217 10 1   Manual Therapy    [id number]     Neuro Re-ed         15106     Orthotic manage/training                               79085     Non Billable Time     Total Timed Treatment 40   [facility], [address] [place] [address]

## 2020-02-11 NOTE — CARE COORDINATION
SOCIAL WORK / DISCHARGE PLANNING:  Peer to peer completed and denial was overturned and approved for [employer]. Electronic SUKUMAR in pt's EPIC Chart for physician signature.                    Electronically signed by EVON Carvalho on 2/11/2020 at 2:10 PM

## 2020-02-11 NOTE — PROGRESS NOTES
Physical Therapy                      PHYSICAL THERAPY TREATMENT NOTE  2/11/2020  Room #: 0621/0621-02  Eddie Martinez   [id number]  1955   Referring Provider:  William Silverman MD     Diagnosis/Problem list:  Sepsis due to pneumonia [facility]) [J18.9, A41.9]       Patient Active Problem List   Diagnosis    HTN (hypertension)    Morbid obesity with BMI of 45.0-49.9, adult ([address]    Bilateral leg edema    Gout    Acute diastolic CHF (congestive heart failure) ([address]    Bilateral low back pain without sciatica    Rhabdomyolysis    Mental confusion    Alcoholic cirrhosis of liver without ascites ([address]    Pneumonia of right upper lobe due to infectious organism ([address]    Hepatic encephalopathy ([address]    Cellulitis of scrotum    Acute on chronic diastolic (congestive) heart failure ([address]    Sepsis due to pneumonia ([address]    Iron deficiency anemia due to chronic blood loss    Septic shock due to Staphylococcus aureus ([address]    Sepsis due to Acinetobacter ([address]       Tentative placement recommendation:Long Term Acute Care vs. Subacute  Equipment recommendation: To be determined        Plan of care: Patient will be seen   daily  for therapeutic exercise, functional retraining, endurance activities, balance exercises, family and patient education.       AM-PAC Basic Mobility       AM-PAC Mobility Inpatient   How much difficulty turning over in bed?: A Little  How much difficulty sitting down on / standing up from a chair with arms?: Unable  How much difficulty moving from lying on back to sitting on side of bed?: A Lot  How much help from another person moving to and from a bed to a chair?: Total  How much help from another person needed to walk in hospital room?: Total  How much help from another person for climbing 3-5 steps with a railing?: Total  AM-PAC Inpatient Mobility Raw Score : 9  AM-PAC Inpatient T-Scale Score : 30.55  Mobility Inpatient CMS 0-100% Score: 81.38  Mobility Inpatient CMS G-Code

## 2020-02-11 NOTE — PROGRESS NOTES
HOSPITAL   PROGRESS NOTE. SUBJECTIVE:  Real President, 59 y.o., male C/O  FEEL WEAK AND TIRED. NO CHEST PAIN OR SHORTNESS OF BREATH. CONDITION DISCUSSED WITH  PATIENT  AND NURSING   STAFF. CONSULT NOTES REVIEWED. ROS:GENERAL- APPETITE- GOOD. BOWEL MOVEMENTS- NORMAL. NO URINE    PROBLEM. EENT: NORMAL            CVS: NORMAL. NO CHEST PAIN OR PALPITATION. RESPIRATORY:NO SOB. GI/: NO ABDOMINAL PAINS            MUSCULOSKELETAL: NO COMPLAIN            CNS: NO  COMPLAIN            OBJECTIVE:    GENERAL:Temperature:  Current - Temp: 98.7 °F (37.1 °C); Max - Temp  Av.9 °F (36.6 °C)  Min: 97.6 °F (36.4 °C)  Max: 98.7 °F (37.1 °C)  Respiratory Rate : Resp  Av.3  Min: 16  Max: 30  Pulse Range: Pulse  Av.2  Min: 86  Max: 92  Blood Presuure Range:  Systolic (22LAB), TFS:894 , Min:108 , VNN:128   ; Diastolic (07HZY), JENY:87, Min:53, Max:74    Pulse ox Range: SpO2  Av.6 %  Min: 94 %  Max: 99 %  24hr I & O:      Intake/Output Summary (Last 24 hours) at 2020 1138  Last data filed at 2020 0615  Gross per 24 hour   Intake 960 ml   Output 2200 ml   Net -1240 ml       CONSTITUTIONALl:MORBIDLY OBESE,ORIENTED,CO-OPERATIVE  HEENT:NORMAL. NECK:  supple, symmetrical, trachea midline,Carotids- normal,JVP- flat  HEMATOLOGIC/LYMPHATICS:  no cervical lymphadenopathy  LUNGS:clear  CARDIOVASCULAR:  Normal apical impulse, regular rate and rhythm, normal S1 and S2, no S3 or S4, and no murmur noted  ABDOMEN:  normal bowel sounds, non-distended, non-tender, no masses palpated  EXTREMITIES: ARTHRITIC CHANGES. MOVEMENTS-LIMITED. PEDAL PULSES-FAIR.   SKIN:  normal skin color, texture, turgor    Data    CBC:   Lab Results   Component Value Date    WBC 8.1 02/10/2020    RBC 1.85 02/10/2020    HGB 8.1 2020    HCT 24.3 2020    .1 02/10/2020    MCH 36.8 02/10/2020    MCHC 32.2 02/10/2020    RDW 18.6

## 2020-02-11 NOTE — PROGRESS NOTES
Measures:  · Ht: 5' 9\" (175.3 cm)   · Current Body Wt: 358 lb (162.4 kg)(2/4 bedwt -UTO wt 2/11-pt OOB)  · Admission Body Wt: 341 lb (154.7 kg)(1/30 bedwt)  · Usual Body Wt: 346 lb (156.9 kg)(per EMR hx x 10 mo)  · % Weight Change:  ,  none significant  · Ideal Body Wt: 160 lb (72.6 kg), % Ideal Body 213% admit wt  · BMI Classification: BMI > or equal to 40.0 Obese Class III    Nutrition Interventions:   Modify current diet, Continue current ONS(Recommend Low Na diet)  Continued Inpatient Monitoring, Education Initiated    Nutrition Evaluation:   · Evaluation: No progress toward goals   · Goals: PO >75% at meals and ONS    · Monitoring: Meal Intake, Supplement Intake, Skin Integrity, I&O, Mental Status/Confusion, Weight, Pertinent Labs, Monitor Bowel Function      Electronically signed by Carmen Tafoya RD, CNSC, LD on 2/11/20 at 3:17 PM    Contact Number: [phone]

## 2020-02-11 NOTE — PROGRESS NOTES
Date    ALKPHOS 108 02/10/2020    ALT 24 02/10/2020    AST 54 02/10/2020    PROT 6.4 02/10/2020    BILITOT 3.1 02/10/2020    BILIDIR 2.2 01/29/2020    IBILI 2.3 01/29/2020    LABALBU 1.9 02/10/2020          Microbiology :  1/29 blood cx CONS/A baumanii  1/30 urine cx Proteus/GNR  1/31 blood cx NGTD  2/3 urine cx ngtd  2/4 body fluid cx ngtd     Radiology :  XR CHEST PORTABLE   Final Result   1. No change from XR CHEST PORTABLE with report dated 2/7/2020 7:38   AM.          XR CHEST PORTABLE   Final Result   1. No change from XR CHEST PORTABLE with report dated 2/6/2020 7:47   AM.          XR CHEST PORTABLE   Final Result   Low lung volumes with stable bilateral pleural effusions and patchy   multifocal airspace disease. XR CHEST PORTABLE   Final Result   1. No change from XR CHEST 1 VIEW with report dated 2/4/2020 3:09 PM.          XR CHEST PORTABLE   Final Result   1. No significant change in appearance of multifocal bilateral lung   infiltrates and/or atelectasis. 2. Bilateral pleural effusions, left greater than right, with interval   increase in size of left effusion since previous study of 2/5/2020      XR CHEST PORTABLE   Final Result   1. Small bilateral pleural effusions. 2. Bilateral lung infiltrates and/or atelectasis, with slight interval   improvement within the right lung base and interval worsening within   the right upper to midlung. Left lung densities are stable            IR GUIDED THORACENTESIS PLEURAL   Final Result   1. Successful ultrasound-guided therapeutic thoracentesis. XR CHEST 1 VW   Final Result   1. Post right thoracentesis with slight interval decrease in size of a   right pleural effusion. No pneumothorax. 2. Slight interval improvement of bilateral perihilar airspace disease   with a stable small left pleural effusion. XR CHEST PORTABLE   Final Result   1.  No change from XR CHEST PORTABLE with report dated 2/2/2020 11:39   AM.          XR CHEST

## 2020-02-12 VITALS
DIASTOLIC BLOOD PRESSURE: 58 MMHG | WEIGHT: 315 LBS | SYSTOLIC BLOOD PRESSURE: 147 MMHG | OXYGEN SATURATION: 95 % | HEART RATE: 90 BPM | HEIGHT: 69 IN | BODY MASS INDEX: 46.65 KG/M2 | RESPIRATION RATE: 21 BRPM | TEMPERATURE: 98.2 F

## 2020-02-12 LAB
ANION GAP SERPL CALCULATED.3IONS-SCNC: 5 MMOL/L (ref 7–16)
BUN BLDV-MCNC: 61 MG/DL (ref 8–23)
CALCIUM SERPL-MCNC: 8.1 MG/DL (ref 8.6–10.2)
CHLORIDE BLD-SCNC: 94 MMOL/L (ref 98–107)
CO2: 37 MMOL/L (ref 22–29)
CREAT SERPL-MCNC: 0.9 MG/DL (ref 0.7–1.2)
GFR AFRICAN AMERICAN: >60
GFR NON-AFRICAN AMERICAN: >60 ML/MIN/1.73
GLUCOSE BLD-MCNC: 129 MG/DL (ref 74–99)
POTASSIUM SERPL-SCNC: 4.5 MMOL/L (ref 3.5–5)
PROCALCITONIN: 0.31 NG/ML (ref 0–0.08)
SODIUM BLD-SCNC: 136 MMOL/L (ref 132–146)

## 2020-02-12 PROCEDURE — 6370000000 HC RX 637 (ALT 250 FOR IP): Performed by: INTERNAL MEDICINE

## 2020-02-12 PROCEDURE — 99239 HOSP IP/OBS DSCHRG MGMT >30: CPT | Performed by: FAMILY MEDICINE

## 2020-02-12 PROCEDURE — 6360000002 HC RX W HCPCS: Performed by: SPECIALIST

## 2020-02-12 PROCEDURE — 94640 AIRWAY INHALATION TREATMENT: CPT

## 2020-02-12 PROCEDURE — 2500000003 HC RX 250 WO HCPCS: Performed by: FAMILY MEDICINE

## 2020-02-12 PROCEDURE — 36592 COLLECT BLOOD FROM PICC: CPT

## 2020-02-12 PROCEDURE — 36415 COLL VENOUS BLD VENIPUNCTURE: CPT

## 2020-02-12 PROCEDURE — 6360000002 HC RX W HCPCS: Performed by: INTERNAL MEDICINE

## 2020-02-12 PROCEDURE — 2580000003 HC RX 258: Performed by: SPECIALIST

## 2020-02-12 PROCEDURE — 80048 BASIC METABOLIC PNL TOTAL CA: CPT

## 2020-02-12 PROCEDURE — 2580000003 HC RX 258: Performed by: INTERNAL MEDICINE

## 2020-02-12 PROCEDURE — 94660 CPAP INITIATION&MGMT: CPT

## 2020-02-12 PROCEDURE — 6370000000 HC RX 637 (ALT 250 FOR IP): Performed by: FAMILY MEDICINE

## 2020-02-12 RX ORDER — ASPIRIN 81 MG/1
81 TABLET, CHEWABLE ORAL DAILY
Qty: 30 TABLET | Refills: 3 | Status: ON HOLD
Start: 2020-02-13 | End: 2020-03-16 | Stop reason: HOSPADM

## 2020-02-12 RX ORDER — CALCIUM CARBONATE 200(500)MG
500 TABLET,CHEWABLE ORAL 3 TIMES DAILY PRN
Qty: 90 TABLET | Refills: 1 | Status: ON HOLD | OUTPATIENT
Start: 2020-02-12 | End: 2020-03-16 | Stop reason: HOSPADM

## 2020-02-12 RX ADMIN — IPRATROPIUM BROMIDE AND ALBUTEROL SULFATE 1 AMPULE: 2.5; .5 SOLUTION RESPIRATORY (INHALATION) at 05:39

## 2020-02-12 RX ADMIN — IPRATROPIUM BROMIDE AND ALBUTEROL SULFATE 1 AMPULE: 2.5; .5 SOLUTION RESPIRATORY (INHALATION) at 12:15

## 2020-02-12 RX ADMIN — IPRATROPIUM BROMIDE AND ALBUTEROL SULFATE 1 AMPULE: 2.5; .5 SOLUTION RESPIRATORY (INHALATION) at 09:01

## 2020-02-12 RX ADMIN — MEROPENEM 500 MG: 500 INJECTION, POWDER, FOR SOLUTION INTRAVENOUS at 00:54

## 2020-02-12 RX ADMIN — MEROPENEM 500 MG: 500 INJECTION, POWDER, FOR SOLUTION INTRAVENOUS at 12:48

## 2020-02-12 RX ADMIN — RIFAXIMIN 550 MG: 550 TABLET ORAL at 11:10

## 2020-02-12 RX ADMIN — AMPICILLIN SODIUM AND SULBACTAM SODIUM 3 G: 2; 1 INJECTION, POWDER, FOR SOLUTION INTRAMUSCULAR; INTRAVENOUS at 11:31

## 2020-02-12 RX ADMIN — FOLIC ACID 1 MG: 1 TABLET ORAL at 11:10

## 2020-02-12 RX ADMIN — Medication 300 UNITS: at 11:09

## 2020-02-12 RX ADMIN — IPRATROPIUM BROMIDE AND ALBUTEROL SULFATE 1 AMPULE: 2.5; .5 SOLUTION RESPIRATORY (INHALATION) at 01:17

## 2020-02-12 RX ADMIN — ASPIRIN 81 MG 81 MG: 81 TABLET ORAL at 11:10

## 2020-02-12 RX ADMIN — CYANOCOBALAMIN TAB 1000 MCG 3000 MCG: 1000 TAB at 11:30

## 2020-02-12 RX ADMIN — AMPICILLIN SODIUM AND SULBACTAM SODIUM 3 G: 2; 1 INJECTION, POWDER, FOR SOLUTION INTRAMUSCULAR; INTRAVENOUS at 05:46

## 2020-02-12 RX ADMIN — ACETAMINOPHEN 650 MG: 325 TABLET, FILM COATED ORAL at 02:06

## 2020-02-12 RX ADMIN — POTASSIUM CHLORIDE 20 MEQ: 29.8 INJECTION, SOLUTION INTRAVENOUS at 01:01

## 2020-02-12 RX ADMIN — RIFAXIMIN 550 MG: 550 TABLET ORAL at 14:36

## 2020-02-12 RX ADMIN — BUMETANIDE 2 MG: 0.25 INJECTION INTRAMUSCULAR; INTRAVENOUS at 11:10

## 2020-02-12 RX ADMIN — CALCIUM CARBONATE (ANTACID) CHEW TAB 500 MG 500 MG: 500 CHEW TAB at 02:06

## 2020-02-12 RX ADMIN — VANCOMYCIN HYDROCHLORIDE 1250 MG: 10 INJECTION, POWDER, LYOPHILIZED, FOR SOLUTION INTRAVENOUS at 11:08

## 2020-02-12 ASSESSMENT — PAIN SCALES - GENERAL
PAINLEVEL_OUTOF10: 0
PAINLEVEL_OUTOF10: 7

## 2020-02-12 NOTE — DISCHARGE SUMMARY
every 6 hours as needed for Pain     B-12 3000 MCG Caps     bumetanide 1 MG tablet  Commonly known as:  BUMEX  Take 1 tablet by mouth 2 times daily     folic acid 1 MG tablet  Commonly known as:  FOLVITE  Take 1 tablet by mouth daily     Full Kit Nebulizer Set Misc  Use as directed with nebulized medication. ipratropium-albuterol 0.5-2.5 (3) MG/3ML Soln nebulizer solution  Commonly known as:  DUONEB  Inhale 3 mLs into the lungs 4 times daily     lactulose 10 GM/15ML solution  Commonly known as:  CHRONULAC  Take 30 mLs by mouth 2 times daily     magnesium oxide 400 (241.3 Mg) MG Tabs tablet  Commonly known as:  MAG-OX  Take 1 tablet by mouth daily     melatonin 3 MG Tabs tablet  Take 1 tablet by mouth nightly as needed (sleep)     metOLazone 5 MG tablet  Commonly known as:  ZAROXOLYN  Take 1 tablet by mouth three times a week     mometasone-formoterol 200-5 MCG/ACT inhaler  Commonly known as:  DULERA  Inhale 2 puffs into the lungs 2 times daily     ondansetron 4 MG disintegrating tablet  Commonly known as:  Zofran ODT  Place 2 tablets under the tongue every 8 hours as needed for Nausea or Vomiting        ASK your doctor about these medications    potassium chloride 20 MEQ extended release tablet  Commonly known as:  KLOR-CON M  Take 1 tablet by mouth 2 times daily (with meals)           Where to Get Your Medications      These medications were sent to [address] [place] [address] Dipika Raza, [facility] [phone]  [address] [place] [address], [address] [place] [address]    Phone:  [phone]   · aspirin 81 MG chewable tablet  · calcium carbonate 500 MG chewable tablet         Disposition:   [facility]. CONDITION AT DISCHARGE: STABLE.   Patient Instructions:   Current Discharge Medication List      START taking these medications    Details   aspirin 81 MG chewable tablet Take 1 tablet by mouth daily  Qty: 30 tablet, Refills: 3      calcium carbonate (TUMS) 500 MG chewable tablet Take 1

## 2020-02-12 NOTE — PROGRESS NOTES
[address] [place] [address] [facility]  HOLDENIDA  Progress Note    F/U bacteremia  Chief complain:       SUBJECTIVE:  Patient is awake, alert   On bipapa  For select        ROS:  Otherwise negative except as above     OBJECTIVE:    Vitals:    02/11/20 2116 02/11/20 2341 02/12/20 0118 02/12/20 0830   BP:  116/62  (!) 147/58   Pulse:  85  90   Resp: (!) 32 18 22 21   Temp:  98.6 °F (37 °C)  98.2 °F (36.8 °C)   TempSrc:  Axillary  Axillary   SpO2:  99%  95%   Weight:       Height:           HEENT :        At/nc NAD  Inc bmi  Heart:             RRR,   murmurs  Lungs:            DEC to auscultation ant  NO WHEEZE biapa  Abdomen:       soft, non tender, bowel sounds present + murmur   Extremites:      edema, no ulcers BLE+ lymphedema  Skin dry no erythema  Skin:                No rash   AKUA awake and alert  left picc 2/7  Scrotal edema   [place]  YELLOW       MEDS:      bumetanide  2 mg Intravenous Daily    ampicillin-sulbactam  3 g Intravenous Q6H    meropenem  500 mg Intravenous Q8H    vancomycin  1,250 mg Intravenous Q48H    lidocaine  5 mL Intradermal Once    heparin flush  3 mL Intravenous 2 times per day    ipratropium-albuterol  1 ampule Inhalation Q4H    vitamin B-12  3,000 mcg Oral Daily    folic acid  1 mg Oral Daily    lactulose  30 g Oral BID    rifaximin  550 mg Oral TID    aspirin  81 mg Oral Daily       LABS    CBC:   Recent Labs     02/10/20  0620 02/11/20  0337   WBC 8.1  --    HGB 6.8* 8.1*   HCT 21.1* 24.3*   PLT 39* 42*       BMP:    Recent Labs     02/10/20  0620 02/12/20  0025    136   K 4.0 4.5   CL 93* 94*   CO2 37* 37*   BUN 72* 61*   CREATININE 0.9 0.9   GLUCOSE 108* 129*     Lab Results   Component Value Date    ALKPHOS 108 02/10/2020    ALT 24 02/10/2020    AST 54 02/10/2020    PROT 6.4 02/10/2020    BILITOT 3.1 02/10/2020    BILIDIR 2.2 01/29/2020    IBILI 2.3 01/29/2020    LABALBU 1.9 02/10/2020          Microbiology :  1/29 blood cx CONS/A baumanii  1/30 urine cx Proteus/GNR  1/31 blood cx NGTD  2/3 urine cx ngtd  2/4 body fluid cx ngtd     Radiology :  XR CHEST PORTABLE   Final Result   1. No change from XR CHEST PORTABLE with report dated 2/7/2020 7:38   AM.          XR CHEST PORTABLE   Final Result   1. No change from XR CHEST PORTABLE with report dated 2/6/2020 7:47   AM.          XR CHEST PORTABLE   Final Result   Low lung volumes with stable bilateral pleural effusions and patchy   multifocal airspace disease. XR CHEST PORTABLE   Final Result   1. No change from XR CHEST 1 VIEW with report dated 2/4/2020 3:09 PM.          XR CHEST PORTABLE   Final Result   1. No significant change in appearance of multifocal bilateral lung   infiltrates and/or atelectasis. 2. Bilateral pleural effusions, left greater than right, with interval   increase in size of left effusion since previous study of 2/5/2020      XR CHEST PORTABLE   Final Result   1. Small bilateral pleural effusions. 2. Bilateral lung infiltrates and/or atelectasis, with slight interval   improvement within the right lung base and interval worsening within   the right upper to midlung. Left lung densities are stable            IR GUIDED THORACENTESIS PLEURAL   Final Result   1. Successful ultrasound-guided therapeutic thoracentesis. XR CHEST 1 VW   Final Result   1. Post right thoracentesis with slight interval decrease in size of a   right pleural effusion. No pneumothorax. 2. Slight interval improvement of bilateral perihilar airspace disease   with a stable small left pleural effusion. XR CHEST PORTABLE   Final Result   1. No change from XR CHEST PORTABLE with report dated 2/2/2020 11:39   AM.          XR CHEST PORTABLE   Final Result      XR CHEST PORTABLE   Final Result   Relative severe congestive heart failure with right   pleural effusion and bilateral airspace disease. CT FACIAL BONES WO CONTRAST   Final Result   1. There is no gross periodontal abscess.    2. Minimal mucosal thickening seen within the sphenoid sinuses. US DUP UPPER EXTREMITY RIGHT VENOUS   Final Result   1. There is no right upper extremity deep venous thrombosis. RADIOLOGY REPORT   Final Result      XR CHEST PORTABLE   Final Result   1. Right internal jugular catheter tip in appropriate position. No   pneumothorax. 2. Stable moderate right pleural effusion with bilateral perihilar   airspace disease. XR CHEST PORTABLE   Final Result   Extensive bilateral airspace disease and pleural   effusions. Possible congestive heart failure. ASSESSMENT:   Septic shock improving off pressor wbc better afebrile   Acinetobacter/ Staph epi Bacteremia f/u blood cx ngtd   Proteus bacteruria f/u urine cx ngtd  Acute respiratory failure-severe congestive heart failure  HCAP multifocal bilaterallung densities  Pleural effusion  s/p thoracentesis cx ngtd  Thrombocytopenia   RONNY /ckd  cr1.4  AMS better     PLAN:  Unasyn for Carbapenemase resistant Acintebacter bacteremia     ESBL Proteus in urine with another gn      Stop meropenem day 9  Cont unasyn  And vanco pharm dosing   PLAN 2 WEEKS  Follow cultures  NGTD  Follow labs           Pt had the opportunity to ask questions. All questions were answered.       Electronically signed by Cristal Luz MD on 2/12/2020 at 2:31 PM    Phone [phone]  Fax [phone]

## 2020-02-12 NOTE — PROGRESS NOTES
PULMONARY MEDICINE FOLLOW UP    59year old man with PMH of obesit, hypertension, valvulopathy,  acute on chronic heart failure, admitted to hospital for management of acute on chronic hypoxemic and hypercapnic respiratory failure due to acute decompensated heart failure, cirrhosis in the setting of OHS, MINOR, sepsis due to Acinetobacter and staphylococcus bacteremia and proteus bacteremia.      --Today has worsening anemia, receiving transfusions of PRBC  --ON BPAP alternating with HFNC    MEDICATIONS:   bumetanide  2 mg Intravenous Daily    ampicillin-sulbactam  3 g Intravenous Q6H    meropenem  500 mg Intravenous Q8H    vancomycin  1,250 mg Intravenous Q48H    lidocaine  5 mL Intradermal Once    heparin flush  3 mL Intravenous 2 times per day    ipratropium-albuterol  1 ampule Inhalation Q4H    vitamin B-12  3,000 mcg Oral Daily    folic acid  1 mg Oral Daily    lactulose  30 g Oral BID    rifaximin  550 mg Oral TID    aspirin  81 mg Oral Daily       calcium carbonate, acetaminophen, nitroGLYCERIN, sodium chloride flush, heparin flush, ondansetron, sodium chloride, perflutren lipid microspheres    OBJECTIVE:  Vitals:    02/12/20 0830   BP: (!) 147/58   Pulse: 90   Resp: 21   Temp: 98.2 °F (36.8 °C)   SpO2: 95%     FiO2 : 40 %  O2 Flow Rate (L/min): 4 L/min  O2 Device: PAP (positive airway pressure)    LABS:  WBC   Date Value Ref Range Status   02/10/2020 8.1 4.5 - 11.5 E9/L Final   02/09/2020 6.8 4.5 - 11.5 E9/L Final   02/08/2020 5.3 4.5 - 11.5 E9/L Final     Hemoglobin   Date Value Ref Range Status   02/11/2020 8.1 (L) 12.5 - 16.5 g/dL Final   02/10/2020 6.8 (L) 12.5 - 16.5 g/dL Final   02/09/2020 7.3 (L) 12.5 - 16.5 g/dL Final     Hematocrit   Date Value Ref Range Status   02/11/2020 24.3 (L) 37.0 - 54.0 % Final   02/10/2020 21.1 (L) 37.0 - 54.0 % Final   02/09/2020 22.4 (L) 37.0 - 54.0 % Final     MCV   Date Value Ref Range Status   02/10/2020 114.1 (H) 80.0 - 99.9 fL Final   02/09/2020 112.6 (H) 80.0 - 99.9 fL Final   02/08/2020 113.5 (H) 80.0 - 99.9 fL Final     Platelets   Date Value Ref Range Status   02/11/2020 42 (L) 130 - 450 E9/L Final   02/10/2020 39 (L) 130 - 450 E9/L Final   02/09/2020 43 (L) 130 - 450 E9/L Final     Sodium   Date Value Ref Range Status   02/12/2020 136 132 - 146 mmol/L Final   02/10/2020 135 132 - 146 mmol/L Final   02/09/2020 137 132 - 146 mmol/L Final     Potassium   Date Value Ref Range Status   02/12/2020 4.5 3.5 - 5.0 mmol/L Final   02/10/2020 4.0 3.5 - 5.0 mmol/L Final   02/09/2020 4.2 3.5 - 5.0 mmol/L Final     Potassium reflex Magnesium   Date Value Ref Range Status   08/08/2019 3.3 (L) 3.5 - 5.0 mmol/L Final   08/06/2019 3.9 3.5 - 5.0 mmol/L Final     Chloride   Date Value Ref Range Status   02/12/2020 94 (L) 98 - 107 mmol/L Final   02/10/2020 93 (L) 98 - 107 mmol/L Final   02/09/2020 94 (L) 98 - 107 mmol/L Final     CO2   Date Value Ref Range Status   02/12/2020 37 (H) 22 - 29 mmol/L Final   02/10/2020 37 (H) 22 - 29 mmol/L Final   02/09/2020 37 (H) 22 - 29 mmol/L Final     BUN   Date Value Ref Range Status   02/12/2020 61 (H) 8 - 23 mg/dL Final   02/10/2020 72 (H) 8 - 23 mg/dL Final   02/09/2020 78 (H) 8 - 23 mg/dL Final     CREATININE   Date Value Ref Range Status   02/12/2020 0.9 0.7 - 1.2 mg/dL Final   02/10/2020 0.9 0.7 - 1.2 mg/dL Final   02/09/2020 1.1 0.7 - 1.2 mg/dL Final     Glucose   Date Value Ref Range Status   02/12/2020 129 (H) 74 - 99 mg/dL Final   02/10/2020 108 (H) 74 - 99 mg/dL Final   02/09/2020 123 (H) 74 - 99 mg/dL Final     Calcium   Date Value Ref Range Status   02/12/2020 8.1 (L) 8.6 - 10.2 mg/dL Final   02/10/2020 8.0 (L) 8.6 - 10.2 mg/dL Final   02/09/2020 8.1 (L) 8.6 - 10.2 mg/dL Final     Total Protein   Date Value Ref Range Status   02/10/2020 6.4 6.4 - 8.3 g/dL Final   02/09/2020 6.6 6.4 - 8.3 g/dL Final   02/08/2020 6.5 6.4 - 8.3 g/dL Final     Alb   Date Value Ref Range Status   02/10/2020 1.9 (L) 3.5 - 5.2 g/dL Final   02/09/2020 2.1 US DUP UPPER EXTREMITY RIGHT VENOUS   Final Result   1. There is no right upper extremity deep venous thrombosis. RADIOLOGY REPORT   Final Result      XR CHEST PORTABLE   Final Result   1. Right internal jugular catheter tip in appropriate position. No   pneumothorax. 2. Stable moderate right pleural effusion with bilateral perihilar   airspace disease. XR CHEST PORTABLE   Final Result   Extensive bilateral airspace disease and pleural   effusions. Possible congestive heart failure. PROBLEM LIST:  Principal Problem:    Septic shock due to Staphylococcus aureus ([facility]  Active Problems:    HTN (hypertension)    Morbid obesity with BMI of 45.0-49.9, adult (HCC)    Bilateral leg edema    Acute diastolic CHF (congestive heart failure) ([facility])    Mental confusion    Iron deficiency anemia due to chronic blood loss    Sepsis due to Acinetobacter ([facility]    Acute on chronic diastolic (congestive) heart failure ([facility])    Sepsis due to pneumonia [facility])  Resolved Problems:    * No resolved hospital problems.  *    BP (!) 147/58   Pulse 90   Temp 98.2 °F (36.8 °C) (Axillary)   Resp 21   Ht 5' 9\" (1.753 m)   Wt (!) 358 lb 14.4 oz (162.8 kg)   SpO2 95%   BMI 53.00 kg/m²   General: Awake, alert, oriented to place, time and person  HEENT: No head lesions, PERRL, EOMI, mouth without lesions, no nasal lesions, no cervical adenopathy palpated  Respiratory: Lungs with decreased breath sounds bilaterally, no adventitious sounds auscultated, no accessory muscle use  CV: Regular rate, no murmurs, n oJVD, 1+ leg edema  Abdomen: Soft, non tender, + bowel sounds, no lesions  Skin: Hydrated, adequate turgor, no rash, capillary refill <2 seconds  Extremities: Muscular strength 4/5 in 4 limbs, moves 4 limbs spontaneously, distal pulses present  Neurology: Awake and alert, follows commands, moves 4 limbs on command and spontaneously, equal sensation, no dysmetria, neck is supple, no meningitic signs present. A/P:  1) Acute on chronic hypoxemic and hypercapnic respiratory failure of multifactorial etiology including  due to acute heart failure, OHS, MINOR in a patient with sepsis and worsening anemia. --Oxygen supplementation to keep sats >89% with HFNC; BPAP at night and with naps. --Airway clearance:  1) Incentive spirometry and PEP/flutter valve 10 times per hour while awake    2) Out of bed and to chair as much as possible    --Antimicrobial therapy as per ID.     I will follow on Monday, Wednesday and Friday, please call if there are any issues    Malik Roberts MD  Pulmonary and Critical Care Medicine

## 2020-02-15 LAB
EKG ATRIAL RATE: 88 BPM
EKG P-R INTERVAL: 180 MS
EKG Q-T INTERVAL: 366 MS
EKG QRS DURATION: 84 MS
EKG QTC CALCULATION (BAZETT): 442 MS
EKG R AXIS: 7 DEGREES
EKG T AXIS: 70 DEGREES
EKG VENTRICULAR RATE: 88 BPM

## 2020-02-17 NOTE — TELEPHONE ENCOUNTER
PATIENT NEEDS TO BE DISCHARGED BY THE HOSPITALIST  TAKING CARE OF HIM AND NOT ME. I HAVE NOTHING TO DO WITH THE [facility].

## 2020-02-20 ENCOUNTER — HOSPITAL ENCOUNTER (OUTPATIENT)
Age: 65
Setting detail: OUTPATIENT SURGERY
Discharge: NURSING, REHAB OR LONG-TERM CARE | End: 2020-02-20
Attending: INTERNAL MEDICINE | Admitting: INTERNAL MEDICINE
Payer: COMMERCIAL

## 2020-02-20 ENCOUNTER — ANESTHESIA (OUTPATIENT)
Dept: ENDOSCOPY | Age: 65
End: 2020-02-20
Payer: COMMERCIAL

## 2020-02-20 ENCOUNTER — ANESTHESIA EVENT (OUTPATIENT)
Dept: ENDOSCOPY | Age: 65
End: 2020-02-20
Payer: COMMERCIAL

## 2020-02-20 VITALS — OXYGEN SATURATION: 91 % | SYSTOLIC BLOOD PRESSURE: 129 MMHG | DIASTOLIC BLOOD PRESSURE: 67 MMHG

## 2020-02-20 VITALS
HEART RATE: 88 BPM | TEMPERATURE: 97.6 F | DIASTOLIC BLOOD PRESSURE: 66 MMHG | OXYGEN SATURATION: 100 % | RESPIRATION RATE: 21 BRPM | SYSTOLIC BLOOD PRESSURE: 134 MMHG

## 2020-02-20 PROCEDURE — 2700000000 HC OXYGEN THERAPY PER DAY

## 2020-02-20 PROCEDURE — 3609017100 HC EGD: Performed by: INTERNAL MEDICINE

## 2020-02-20 PROCEDURE — 6360000002 HC RX W HCPCS: Performed by: NURSE ANESTHETIST, CERTIFIED REGISTERED

## 2020-02-20 PROCEDURE — 3700000000 HC ANESTHESIA ATTENDED CARE: Performed by: INTERNAL MEDICINE

## 2020-02-20 PROCEDURE — 51702 INSERT TEMP BLADDER CATH: CPT

## 2020-02-20 PROCEDURE — 7100000011 HC PHASE II RECOVERY - ADDTL 15 MIN: Performed by: INTERNAL MEDICINE

## 2020-02-20 PROCEDURE — 2709999900 HC NON-CHARGEABLE SUPPLY: Performed by: INTERNAL MEDICINE

## 2020-02-20 PROCEDURE — 7100000010 HC PHASE II RECOVERY - FIRST 15 MIN: Performed by: INTERNAL MEDICINE

## 2020-02-20 PROCEDURE — 2580000003 HC RX 258: Performed by: NURSE ANESTHETIST, CERTIFIED REGISTERED

## 2020-02-20 PROCEDURE — 3700000001 HC ADD 15 MINUTES (ANESTHESIA): Performed by: INTERNAL MEDICINE

## 2020-02-20 PROCEDURE — 2500000003 HC RX 250 WO HCPCS: Performed by: NURSE ANESTHETIST, CERTIFIED REGISTERED

## 2020-02-20 RX ORDER — PROPOFOL 10 MG/ML
INJECTION, EMULSION INTRAVENOUS PRN
Status: DISCONTINUED | OUTPATIENT
Start: 2020-02-20 | End: 2020-02-20 | Stop reason: SDUPTHER

## 2020-02-20 RX ORDER — LIDOCAINE HYDROCHLORIDE 10 MG/ML
INJECTION, SOLUTION INFILTRATION; PERINEURAL PRN
Status: DISCONTINUED | OUTPATIENT
Start: 2020-02-20 | End: 2020-02-20 | Stop reason: SDUPTHER

## 2020-02-20 RX ORDER — SODIUM CHLORIDE 9 MG/ML
INJECTION, SOLUTION INTRAVENOUS CONTINUOUS PRN
Status: DISCONTINUED | OUTPATIENT
Start: 2020-02-20 | End: 2020-02-20 | Stop reason: SDUPTHER

## 2020-02-20 RX ADMIN — LIDOCAINE HYDROCHLORIDE 40 MG: 10 INJECTION, SOLUTION INFILTRATION; PERINEURAL at 16:46

## 2020-02-20 RX ADMIN — PROPOFOL 50 MG: 10 INJECTION, EMULSION INTRAVENOUS at 16:46

## 2020-02-20 RX ADMIN — SODIUM CHLORIDE: 9 INJECTION, SOLUTION INTRAVENOUS at 16:36

## 2020-02-20 ASSESSMENT — PAIN SCALES - GENERAL
PAINLEVEL_OUTOF10: 0
PAINLEVEL_OUTOF10: 0

## 2020-02-20 NOTE — ANESTHESIA POSTPROCEDURE EVALUATION
Department of Anesthesiology  Postprocedure Note    Patient: Casi Barlow  MRN: [id number]  YOB: 1955  Date of evaluation: 2/20/2020  Time:  5:36 PM     Procedure Summary     Date:  02/20/20 Room / Location:  [address] [place] [address] / [facility]    Anesthesia Start:  1636 Anesthesia Stop:  1577    Procedure:  EGD ESOPHAGOGASTRODUODENOSCOPY (N/A ) Diagnosis:  (ANEMIA, GI BLEED)    Surgeon:  Severino Mann MD Responsible Provider:  Denise Hines MD    Anesthesia Type:  MAC ASA Status:  4          Anesthesia Type: MAC    Julia Phase I:      Julia Phase II: Julia Score: 9    Last vitals: Reviewed and per EMR flowsheets.        Anesthesia Post Evaluation    Patient location during evaluation: PACU  Patient participation: complete - patient participated  Level of consciousness: awake and alert  Airway patency: patent  Nausea & Vomiting: no nausea and no vomiting  Complications: no  Cardiovascular status: hemodynamically stable  Respiratory status: acceptable and nasal cannula  Hydration status: euvolemic

## 2020-02-20 NOTE — ANESTHESIA PRE PROCEDURE
Department of Anesthesiology  Preprocedure Note       Name:  Markos Okeefe   Age:  59 y.o.  :  1955                                          MRN:  [id number]         Date:  2020      Surgeon: Caron Hodges):  Augie Gaines MD    Procedure: EGD ESOPHAGOGASTRODUODENOSCOPY (N/A )    Medications prior to admission:   Prior to Admission medications    Medication Sig Start Date End Date Taking? Authorizing Provider   aspirin 81 MG chewable tablet Take 1 tablet by mouth daily 20   Ward Green MD   calcium carbonate (TUMS) 500 MG chewable tablet Take 1 tablet by mouth 3 times daily as needed for Heartburn 2/12/20 3/13/20  Ward Green MD   rifaximin (XIFAXAN) 550 MG tablet Take 1 tablet by mouth 3 times daily 20   Ward Green MD   vancomycin (VANCOCIN) infusion Infuse 1,250 mg intravenously every 48 hours for 14 days Compound per protocol.  20  Willi Hemphill MD   ampicillin-sulbactam (UNASYN) infusion Infuse 3,000 mg intravenously every 6 hours for 14 days Compound per protocol 20  Willi Hemphill MD   bumetanide (BUMEX) 1 MG tablet Take 1 tablet by mouth 2 times daily 19   Deann Madrigal, DO   lactulose [facility]) 10 GM/15ML solution Take 30 mLs by mouth 2 times daily 19   Deann Madrigal, DO   acetaminophen (TYLENOL) 500 MG tablet Take 1 tablet by mouth every 6 hours as needed for Pain 19   Deann Madrigal, DO   ipratropium-albuterol (DUONEB) 0.5-2.5 (3) MG/3ML SOLN nebulizer solution Inhale 3 mLs into the lungs 4 times daily 19   Deann Madrigal, DO   mometasone-formoterol [facility]) 200-5 MCG/ACT inhaler Inhale 2 puffs into the lungs 2 times daily 19   Deann Madrigal, DO   metOLazone (ZAROXOLYN) 5 MG tablet Take 1 tablet by mouth three times a week 19   Deann Madrigal, DO   folic acid (FOLVITE) 1 MG tablet Take 1 tablet by mouth daily 19   Deann Madrigal, DO   melatonin 3 MG TABS tablet Take 1 tablet by mouth nightly as needed (sleep) 12/24/19   Beverly Holguin DO   Respiratory Therapy Supplies (FULL KIT NEBULIZER SET) MISC Use as directed with nebulized medication. 12/24/19   Beverly Holguin DO   Cyanocobalamin (B-12) 3000 MCG CAPS Take 3,000 mg by mouth daily    Historical Provider, MD   ondansetron (ZOFRAN ODT) 4 MG disintegrating tablet Place 2 tablets under the tongue every 8 hours as needed for Nausea or Vomiting 8/19/19   Ave Showers, DO   potassium chloride (KLOR-CON M) 20 MEQ extended release tablet Take 1 tablet by mouth 2 times daily (with meals) 8/11/19   Astrid Bridges MD   magnesium oxide (MAG-OX) 400 (241.3 Mg) MG TABS tablet Take 1 tablet by mouth daily 5/7/19   Radha Costello MD       Current medications:    Current Outpatient Medications   Medication Sig Dispense Refill    aspirin 81 MG chewable tablet Take 1 tablet by mouth daily 30 tablet 3    calcium carbonate (TUMS) 500 MG chewable tablet Take 1 tablet by mouth 3 times daily as needed for Heartburn 90 tablet 1    rifaximin (XIFAXAN) 550 MG tablet Take 1 tablet by mouth 3 times daily 42 tablet 0    vancomycin (VANCOCIN) infusion Infuse 1,250 mg intravenously every 48 hours for 14 days Compound per protocol.  8750 mg 0    ampicillin-sulbactam (UNASYN) infusion Infuse 3,000 mg intravenously every 6 hours for 14 days Compound per protocol 168 g 0    bumetanide (BUMEX) 1 MG tablet Take 1 tablet by mouth 2 times daily 30 tablet 3    lactulose (CHRONULAC) 10 GM/15ML solution Take 30 mLs by mouth 2 times daily  1    acetaminophen (TYLENOL) 500 MG tablet Take 1 tablet by mouth every 6 hours as needed for Pain 120 tablet 3    ipratropium-albuterol (DUONEB) 0.5-2.5 (3) MG/3ML SOLN nebulizer solution Inhale 3 mLs into the lungs 4 times daily 360 mL     mometasone-formoterol (DULERA) 200-5 MCG/ACT inhaler Inhale 2 puffs into the lungs 2 times daily      metOLazone (ZAROXOLYN) 5 MG tablet Take 1 tablet by mouth three times a week 30 tablet 3    folic acid (FOLVITE) 1 MG tablet Take 1 tablet by mouth daily 30 tablet 3    melatonin 3 MG TABS tablet Take 1 tablet by mouth nightly as needed (sleep)  3    Respiratory Therapy Supplies (FULL KIT NEBULIZER SET) MISC Use as directed with nebulized medication. 1 each 0    Cyanocobalamin (B-12) 3000 MCG CAPS Take 3,000 mg by mouth daily      ondansetron (ZOFRAN ODT) 4 MG disintegrating tablet Place 2 tablets under the tongue every 8 hours as needed for Nausea or Vomiting 10 tablet 0    potassium chloride (KLOR-CON M) 20 MEQ extended release tablet Take 1 tablet by mouth 2 times daily (with meals) 60 tablet 3    magnesium oxide (MAG-OX) 400 (241.3 Mg) MG TABS tablet Take 1 tablet by mouth daily 30 tablet 1     No current facility-administered medications for this visit. Allergies:     Allergies   Allergen Reactions    Latex Itching    Aldactone [Spironolactone] Other (See Comments)     Confusion    Thimerosal Hives       Problem List:    Patient Active Problem List   Diagnosis Code    HTN (hypertension) I10    Morbid obesity with BMI of 45.0-49.9, adult ([facility] E66.01, Z68.42    Bilateral leg edema R60.0    Gout M10.9    Acute diastolic CHF (congestive heart failure) (HCC) I50.31    Bilateral low back pain without sciatica M54.5    Rhabdomyolysis M62.82    Mental confusion U94.3    Alcoholic cirrhosis of liver without ascites (HCC) K70.30    Pneumonia of right upper lobe due to infectious organism ([facility] J18.1    Hepatic encephalopathy (HCC) K72.90    Cellulitis of scrotum N49.2    Acute on chronic diastolic (congestive) heart failure (HCC) I50.33    Sepsis due to pneumonia (HCC) J18.9, A41.9    Iron deficiency anemia due to chronic blood loss D50.0    Septic shock due to Staphylococcus aureus (HCC) A41.01, R65.21    Sepsis due to Acinetobacter ([facility] A41.59       Past Medical History:        Diagnosis Date    Acute diastolic CHF (congestive heart failure) ([facility] 11/17/15    Echo 11/18/15

## 2020-02-20 NOTE — PROCEDURES
Jeremiah Jones is a 59 y.o. male patient. No diagnosis found. Past Medical History:   Diagnosis Date    Acute diastolic CHF (congestive heart failure) ([address] 11/17/15    Echo 11/18/15 EF 73%    Dermatitis     due to thimersol    Heart valve problem     leaky    Hx of cardiovascular stress test 12/9/2015    Lexiscan    Hypertension     Obesity      There were no vitals taken for this visit. Procedures    Procedure:  Esophagogastroduodenoscopy    Indication:  Acute Blood loss Anemia Hx of Cirrhosis     Sedation:  MAC    Estimated Blood Loss: 0cc     Endoscope was advanced easily through mouth to second portion of duodenum      Oropharynx views are limited but grossly normal.    Esophagus:   Mucosa is normal.  GEJ at 37 cm. No esophageal varices were noted. Stomach:   Antrum is normal    Gastric body is normal.    Retroflexed views show mild PHG changes in the cardia and cyrus         Duodenum: Bulb is normal     Second portion of duodenum is normal.    No fresh or old blood was seen     No biopsies were taken secondary to patient increase INR and thrombocytopenia. IMPRESSION AND PLAN:     1. Mild PHG the in cardia and cyrus no active bleeding present. . No evidence of gastric or esophageal varices. Continue to monitor Hgb. Consider colonoscopy once pts respiratory status is improved for  evaluation. Continue Protonix 40 mg po daily, general diet, and d/c octreotide.        Pt was seen and procedure was performed with Dr. Jeff Oleary present for the entire procedure    Curtis Gonzalez DO   GI Fellow   2/20/2020     I was present for entire duration of procedure; discussed findings with resident and agree with recommendations above    India Ferrara MD  Gastroenterology

## 2020-02-27 PROCEDURE — 32555 ASPIRATE PLEURA W/ IMAGING: CPT | Performed by: RADIOLOGY

## 2020-02-28 PROCEDURE — 32555 ASPIRATE PLEURA W/ IMAGING: CPT | Performed by: RADIOLOGY

## 2020-03-06 ENCOUNTER — APPOINTMENT (OUTPATIENT)
Dept: GENERAL RADIOLOGY | Age: 65
DRG: 720 | End: 2020-03-06
Attending: INTERNAL MEDICINE
Payer: COMMERCIAL

## 2020-03-06 ENCOUNTER — HOSPITAL ENCOUNTER (INPATIENT)
Age: 65
LOS: 10 days | Discharge: HOSPICE | DRG: 720 | End: 2020-03-16
Attending: INTERNAL MEDICINE | Admitting: INTERNAL MEDICINE
Payer: COMMERCIAL

## 2020-03-06 PROBLEM — R62.7 FAILURE TO THRIVE IN ADULT: Status: ACTIVE | Noted: 2020-03-06

## 2020-03-06 PROBLEM — J96.22 ACUTE AND CHRONIC RESPIRATORY FAILURE WITH HYPERCAPNIA (HCC): Status: ACTIVE | Noted: 2020-03-06

## 2020-03-06 PROBLEM — J96.22 ACUTE ON CHRONIC RESPIRATORY FAILURE WITH HYPOXIA AND HYPERCAPNIA (HCC): Status: ACTIVE | Noted: 2020-03-06

## 2020-03-06 PROBLEM — G93.41 METABOLIC ENCEPHALOPATHY: Status: ACTIVE | Noted: 2019-05-02

## 2020-03-06 PROBLEM — K70.31 ALCOHOLIC CIRRHOSIS OF LIVER WITH ASCITES (HCC): Status: ACTIVE | Noted: 2019-04-28

## 2020-03-06 PROBLEM — R93.89 ABNORMAL CXR: Status: ACTIVE | Noted: 2020-03-06

## 2020-03-06 PROBLEM — E66.2 MORBID OBESITY WITH ALVEOLAR HYPOVENTILATION (HCC): Status: ACTIVE | Noted: 2020-03-06

## 2020-03-06 PROBLEM — J96.21 ACUTE ON CHRONIC RESPIRATORY FAILURE WITH HYPOXIA AND HYPERCAPNIA (HCC): Status: ACTIVE | Noted: 2020-03-06

## 2020-03-06 LAB
B.E.: 11.4 MMOL/L (ref -3–3)
COHB: 0.6 % (ref 0–1.5)
CRITICAL: ABNORMAL
DATE ANALYZED: ABNORMAL
DATE OF COLLECTION: ABNORMAL
FIO2: 100 %
HCO3: 37 MMOL/L (ref 22–26)
HHB: 1.1 % (ref 0–5)
LAB: ABNORMAL
Lab: ABNORMAL
METHB: 0.6 % (ref 0–1.5)
MODE: AC
O2 CONTENT: 12.2 ML/DL
O2 SATURATION: 98.9 % (ref 92–98.5)
O2HB: 97.7 % (ref 94–97)
OPERATOR ID: 3
PATIENT TEMP: 37 C
PCO2: 56 MMHG (ref 35–45)
PEEP/CPAP: 5 CMH2O
PFO2: 2.43 MMHG/%
PH BLOOD GAS: 7.44 (ref 7.35–7.45)
PO2: 242.7 MMHG (ref 75–100)
RI(T): 1.6
RR MECHANICAL: 16 B/MIN
SOURCE, BLOOD GAS: ABNORMAL
THB: 8.4 G/DL (ref 11.5–16.5)
TIME ANALYZED: 1057
VT MECHANICAL: 400 ML

## 2020-03-06 PROCEDURE — C9113 INJ PANTOPRAZOLE SODIUM, VIA: HCPCS | Performed by: INTERNAL MEDICINE

## 2020-03-06 PROCEDURE — 94002 VENT MGMT INPAT INIT DAY: CPT

## 2020-03-06 PROCEDURE — 2000000000 HC ICU R&B

## 2020-03-06 PROCEDURE — 2500000003 HC RX 250 WO HCPCS: Performed by: INTERNAL MEDICINE

## 2020-03-06 PROCEDURE — 6360000002 HC RX W HCPCS: Performed by: INTERNAL MEDICINE

## 2020-03-06 PROCEDURE — 94664 DEMO&/EVAL PT USE INHALER: CPT

## 2020-03-06 PROCEDURE — 71045 X-RAY EXAM CHEST 1 VIEW: CPT

## 2020-03-06 PROCEDURE — 82805 BLOOD GASES W/O2 SATURATION: CPT

## 2020-03-06 PROCEDURE — 6370000000 HC RX 637 (ALT 250 FOR IP): Performed by: INTERNAL MEDICINE

## 2020-03-06 PROCEDURE — 2580000003 HC RX 258: Performed by: INTERNAL MEDICINE

## 2020-03-06 PROCEDURE — 94640 AIRWAY INHALATION TREATMENT: CPT

## 2020-03-06 PROCEDURE — 5A1955Z RESPIRATORY VENTILATION, GREATER THAN 96 CONSECUTIVE HOURS: ICD-10-PCS | Performed by: INTERNAL MEDICINE

## 2020-03-06 PROCEDURE — 0BH17EZ INSERTION OF ENDOTRACHEAL AIRWAY INTO TRACHEA, VIA NATURAL OR ARTIFICIAL OPENING: ICD-10-PCS | Performed by: INTERNAL MEDICINE

## 2020-03-06 RX ORDER — BUDESONIDE 0.5 MG/2ML
500 INHALANT ORAL 2 TIMES DAILY
Status: DISCONTINUED | OUTPATIENT
Start: 2020-03-06 | End: 2020-03-14

## 2020-03-06 RX ORDER — PANTOPRAZOLE SODIUM 40 MG/10ML
40 INJECTION, POWDER, LYOPHILIZED, FOR SOLUTION INTRAVENOUS DAILY
Status: DISCONTINUED | OUTPATIENT
Start: 2020-03-06 | End: 2020-03-14

## 2020-03-06 RX ORDER — ROCURONIUM BROMIDE 10 MG/ML
100 INJECTION, SOLUTION INTRAVENOUS ONCE
Status: COMPLETED | OUTPATIENT
Start: 2020-03-06 | End: 2020-03-06

## 2020-03-06 RX ORDER — IPRATROPIUM BROMIDE AND ALBUTEROL SULFATE 2.5; .5 MG/3ML; MG/3ML
1 SOLUTION RESPIRATORY (INHALATION) EVERY 6 HOURS
Status: DISCONTINUED | OUTPATIENT
Start: 2020-03-06 | End: 2020-03-14

## 2020-03-06 RX ORDER — ETOMIDATE 2 MG/ML
20 INJECTION INTRAVENOUS ONCE
Status: COMPLETED | OUTPATIENT
Start: 2020-03-06 | End: 2020-03-06

## 2020-03-06 RX ORDER — DEXTROSE AND SODIUM CHLORIDE 5; .45 G/100ML; G/100ML
INJECTION, SOLUTION INTRAVENOUS CONTINUOUS
Status: DISCONTINUED | OUTPATIENT
Start: 2020-03-06 | End: 2020-03-07

## 2020-03-06 RX ORDER — ARFORMOTEROL TARTRATE 15 UG/2ML
15 SOLUTION RESPIRATORY (INHALATION) 2 TIMES DAILY
Status: DISCONTINUED | OUTPATIENT
Start: 2020-03-06 | End: 2020-03-13

## 2020-03-06 RX ORDER — MIDAZOLAM HYDROCHLORIDE 1 MG/ML
4 INJECTION INTRAMUSCULAR; INTRAVENOUS ONCE
Status: COMPLETED | OUTPATIENT
Start: 2020-03-06 | End: 2020-03-06

## 2020-03-06 RX ADMIN — ARFORMOTEROL TARTRATE 15 MCG: 15 SOLUTION RESPIRATORY (INHALATION) at 22:31

## 2020-03-06 RX ADMIN — BUDESONIDE 500 MCG: 0.5 INHALANT RESPIRATORY (INHALATION) at 22:32

## 2020-03-06 RX ADMIN — Medication 100 MCG/HR: at 22:55

## 2020-03-06 RX ADMIN — ROCURONIUM BROMIDE 100 MG: 10 SOLUTION INTRAVENOUS at 10:50

## 2020-03-06 RX ADMIN — IPRATROPIUM BROMIDE AND ALBUTEROL SULFATE 1 AMPULE: .5; 3 SOLUTION RESPIRATORY (INHALATION) at 22:32

## 2020-03-06 RX ADMIN — MIDAZOLAM 4 MG: 1 INJECTION INTRAMUSCULAR; INTRAVENOUS at 10:49

## 2020-03-06 RX ADMIN — PANTOPRAZOLE SODIUM 40 MG: 40 INJECTION, POWDER, FOR SOLUTION INTRAVENOUS at 18:03

## 2020-03-06 RX ADMIN — Medication 25 MCG/HR: at 10:15

## 2020-03-06 RX ADMIN — ETOMIDATE 20 MG: 2 INJECTION INTRAVENOUS at 10:49

## 2020-03-06 RX ADMIN — DEXTROSE AND SODIUM CHLORIDE: 5; 450 INJECTION, SOLUTION INTRAVENOUS at 17:54

## 2020-03-06 ASSESSMENT — PULMONARY FUNCTION TESTS
PIF_VALUE: 32
PIF_VALUE: 35
PIF_VALUE: 27

## 2020-03-06 ASSESSMENT — PAIN SCALES - WONG BAKER: WONGBAKER_NUMERICALRESPONSE: 0

## 2020-03-06 ASSESSMENT — PAIN SCALES - GENERAL
PAINLEVEL_OUTOF10: 0

## 2020-03-06 NOTE — PLAN OF CARE
Problem: Restraint Use - Nonviolent/Non-Self-Destructive Behavior:  Goal: Absence of restraint-related injury  Description  Absence of restraint-related injury  Outcome: Met This Shift     Problem: Restraint Use - Nonviolent/Non-Self-Destructive Behavior:  Goal: Absence of restraint indications  Description  Absence of restraint indications  Outcome: Ongoing

## 2020-03-06 NOTE — CONSULTS
activity: Not Currently   Lifestyle    Physical activity:     Days per week: Not on file     Minutes per session: Not on file    Stress: Not on file   Relationships    Social connections:     Talks on phone: Not on file     Gets together: Not on file     Attends Druze service: Not on file     Active member of club or organization: Not on file     Attends meetings of clubs or organizations: Not on file     Relationship status: Not on file    Intimate partner violence:     Fear of current or ex partner: Not on file     Emotionally abused: Not on file     Physically abused: Not on file     Forced sexual activity: Not on file   Other Topics Concern    Not on file   Social History Narrative    Not on file       MEDICAL HISTORY:  Past Medical History:   Diagnosis Date    Acute diastolic CHF (congestive heart failure) ([facility] [address] 11/17/15    Echo 11/18/15 EF 73%    Dermatitis     due to thimersol    Heart valve problem     leaky    Hx of cardiovascular stress test 12/9/2015    Lexiscan    Hypertension     Obesity        MEDICATIONS:     fentaNYL 5 mcg/ml in 0.9%  ml infusion 25 mcg/hr (03/06/20 1015)         REVIEW OF SYSTEMS:  Unable to obtain history or review of systems secondary to clinical circumstances    PHYSICAL EXAM:  Vitals:    03/06/20 1100   BP: (!) 145/78   Pulse: 95   Resp: 29   Temp:    SpO2: 100%     FiO2 : 100 %     O2 Device: Ventilator    General: Intubated and sedated  Skin: Chronic venous stasis and of lower extremities  HEENT: Head atraumatic and normocephalic, pupils equal and reactive to light, external ears normal, mouth with endotracheal tube in place. Neck: Supple, no palpable lymphadenopathy or thyromegaly  Cardiovascular: Regular rate and rhythm, no murmurs noted  Respiratory: Coarse bilateral breath sounds  Gastrointestinal: Abdomen is distended but soft.   Extremities: Well perfused, lower extremity edema noted  Neurological: Sedate  Psychological: Unable to Range Status   03/06/2020 1.4 (H) 0.7 - 1.2 mg/dL Final   03/04/2020 1.5 (H) 0.7 - 1.2 mg/dL Final   03/02/2020 1.3 (H) 0.7 - 1.2 mg/dL Final     Glucose   Date Value Ref Range Status   03/06/2020 93 74 - 99 mg/dL Final   03/04/2020 108 (H) 74 - 99 mg/dL Final   03/02/2020 97 74 - 99 mg/dL Final     Calcium   Date Value Ref Range Status   03/06/2020 10.1 8.6 - 10.2 mg/dL Final   03/04/2020 9.6 8.6 - 10.2 mg/dL Final   03/02/2020 8.9 8.6 - 10.2 mg/dL Final     Total Protein   Date Value Ref Range Status   03/06/2020 7.4 6.4 - 8.3 g/dL Final   03/04/2020 7.4 6.4 - 8.3 g/dL Final   02/29/2020 6.5 6.4 - 8.3 g/dL Final     Alb   Date Value Ref Range Status   03/06/2020 3.6 3.5 - 5.2 g/dL Final   03/04/2020 3.6 3.5 - 5.2 g/dL Final   02/29/2020 3.2 (L) 3.5 - 5.2 g/dL Final     Total Bilirubin   Date Value Ref Range Status   03/06/2020 3.7 (H) 0.0 - 1.2 mg/dL Final   03/04/2020 3.5 (H) 0.0 - 1.2 mg/dL Final   02/29/2020 3.1 (H) 0.0 - 1.2 mg/dL Final     Alkaline Phosphatase   Date Value Ref Range Status   03/06/2020 75 40 - 129 U/L Final   03/04/2020 73 40 - 129 U/L Final   02/29/2020 73 40 - 129 U/L Final     AST   Date Value Ref Range Status   03/06/2020 37 0 - 39 U/L Final   03/04/2020 32 0 - 39 U/L Final   02/29/2020 34 0 - 39 U/L Final     ALT   Date Value Ref Range Status   03/06/2020 17 0 - 40 U/L Final   03/04/2020 14 0 - 40 U/L Final   02/29/2020 13 0 - 40 U/L Final     GFR Non-   Date Value Ref Range Status   03/06/2020 51 >=60 mL/min/1.73 Final     Comment:     Chronic Kidney Disease: less than 60 ml/min/1.73 sq.m. Kidney Failure: less than 15 ml/min/1.73 sq.m. Results valid for patients 18 years and older. 03/04/2020 47 >=60 mL/min/1.73 Final     Comment:     Chronic Kidney Disease: less than 60 ml/min/1.73 sq.m. Kidney Failure: less than 15 ml/min/1.73 sq.m. Results valid for patients 18 years and older.      03/02/2020 56 >=60 mL/min/1.73 Final     Comment:     Chronic has a history of cirrhosis for which she is being treated for hepatic encephalopathy with rifaximin and lactulose. 3. Morbid obesity with alveolar hypoventilation: Currently treated with endotracheal tube and vent. As above, if the medical decision maker is desirous of aggressive care, the patient will require tracheostomy tube placement. 4. 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 Review of prior abdominal imaging revealed only trace ascites. He has had an EGD which did not show esophageal or gastric varices. He has associated thrombocytopenia and elevated INR. 5. Abnormal CXR: Patient has impressive bilateral infiltrative changes. This is not improved despite aggressive diuresis. Certainly acute lung injury related to recurrent aspiration is in the differential diagnosis. A bland alveolar hemorrhage related to his thrombocytopenia, coagulopathy, and heart failure also in the differential diagnosis. Currently have a low clinical suspicion for infection and we will monitor off antibiotics. 6. Failure to thrive in adult: Reportedly, there is no available medical decision maker such as next of kin. We will consult  to assist with potential guardianship. Regardless, the patient's overall prognosis is poor, and he is hospice appropriate in my opinion. He is currently full code. ATTESTATION:  ICU Staff Physician note of personal involvement in care. As the attending physician, I certify that I personally reviewed the patients history and personally examined the patient to confirm the physical findings described above,  And that I reviewed the relevant imaging studies and available reports. I also discussed the differential diagnosis and all of the proposed management plans with the patient and individuals accompanying the patient to this visit.   They had the opportunity to ask questions about the proposed management plans and to have those questions

## 2020-03-06 NOTE — CARE COORDINATION
3/6/2020 [facility] from [facility]. Currently intubated and sedated. PRECERT NEEDED to return to [facility] [address] [place] [address]. Potential alternate discharge plan [facility]- as pt was previously wanting this location- however need to review with patient when he is off the vent. K     Readmission screening questions: pt intubated currently   1. Before your last discharge, were you feeling improved? 2. When did you start not feeling well? 3. Where did you go after being discharged? [facility]   4. Were you given instructions on medications and how to care for yourself?n/a  5. If you went home, did you have help?n/a  6. Did you get your medications filled?n/a  7. Did you follow up with your doctor at the office?n/a  8. Who instructed you to come back to the hospital? (self-referral, physician's office)  Pt brought from [address] [place] [address]- intubated      [facility] [address] [place] [address]- spoke to Justin Mon- pt is non service connected and does have travel benefits. Using his [employer] at this time.         Electronically signed by SENTHIL Baron on 3/6/2020 at 11:19 AM

## 2020-03-06 NOTE — CONSULTS
Bhargav Monet M.D. [facility]  Dr. Willa Garcia M.D.,  Dr. Augie Gaines M.D.,  Dr. Nola Brink D.O.,  Dr Adriana Issa D.O. ,  Dr Dylan Gomez M.D. Markos Okeefe  59 y.o.  male      Re: Anemia; cirrhosis  Requesting physician: Dr. Crystal Fried  Date:3:23 PM 3/6/2020          HPI: 80-year-old male patient seen in the hospital for above-described issues. He is known to me from previous hospital admissions and most [facility].  Recently patient underwent upper endoscopy on 20 February revealing mild PHG but no active bleeding. There were no esophageal or gastric varices. Patient was planned for colonoscopy tentatively when his respiratory status improves however he remained in a pretty precarious condition requiring almost continuous noninvasive positive pressure ventilation. Patient did develop worsening respiratory failure requiring transfer to ICU with orotracheal intubation. Currently patient is intubated and as a result nonverbal.  He opens eyes to voice and appears to deny abdominal pain. Laboratory data reveals hemoglobin of 6.4 yesterday with hemoglobin of 7.3 today a hematocrit of 22.2. Patient did not had any overt gastrointestinal bleed in the past several [facility]. He has history of above-described cirrhosis with above-described EGD. Additional laboratory work reveals decreased platelet count of 43 which is consistent with patient baseline. His INR most recently was 2.3 on 4 March.       Information sources:   -Patient  -medical record  -health care team    PMHx:  Past Medical History:   Diagnosis Date    Acute diastolic CHF (congestive heart failure) ([address] 11/17/15    Echo 11/18/15 EF 73%    Dermatitis     due to thimersol    Heart valve problem     leaky    Hx of cardiovascular stress test 12/9/2015    Lexiscan    Hypertension     Obesity        PSHx:  Past Surgical History:   Procedure Laterality Date    CARPAL TUNNEL RELEASE      ELBOW SURGERY Right     HERNIA REPAIR      mesh in abd    NECK SURGERY      UPPER GASTROINTESTINAL ENDOSCOPY N/A 4/30/2019    EGD ESOPHAGOGASTRODUODENOSCOPY performed by Luis Stone MD at [facility] N/A 2/20/2020    EGD ESOPHAGOGASTRODUODENOSCOPY performed by [facility] MD at [facility]:  Current Facility-Administered Medications   Medication Dose Route Frequency Provider Last Rate Last Dose    fentaNYL 5 mcg/ml in 0.9%  ml infusion  25 mcg/hr Intravenous Continuous Charles Aranda MD 20 mL/hr at 03/06/20 1319 100 mcg/hr at 03/06/20 1319       SocHx:  Social History     Socioeconomic History    Marital status: Single     Spouse name: Not on file    Number of children: 0    Years of education: Not on file    Highest education level: Not on file   Occupational History    Occupation: Retired-[employer]   Social Needs    Financial resource strain: Not on file    Food insecurity:     Worry: Not on file     Inability: Not on file   Junko Tada needs:     Medical: Not on file     Non-medical: Not on file   Tobacco Use    Smoking status: Never Smoker    Smokeless tobacco: Never Used   Substance and Sexual Activity    Alcohol use: Not Currently    Drug use: No    Sexual activity: Not Currently   Lifestyle    Physical activity:     Days per week: Not on file     Minutes per session: Not on file    Stress: Not on file   Relationships    Social connections:     Talks on phone: Not on file     Gets together: Not on file     Attends Hindu service: Not on file     Active member of club or organization: Not on file     Attends meetings of clubs or organizations: Not on file     Relationship status: Not on file    Intimate partner violence:     Fear of current or ex partner: Not on file     Emotionally abused: Not on file     Physically abused: Not on file     Forced sexual activity: Not on file   Other Topics Concern    Not on file   Social History Narrative    Not on file       FamHx:  Family History   Problem Relation Age of Onset    Parkinsonism Mother     COPD Father     Heart Disease Father        Allergy:  Allergies   Allergen Reactions    Latex Itching    Aldactone [Spironolactone] Other (See Comments)     Confusion    Thimerosal Hives         ROS: As described in the HPI and in addition is negative upon detailed review of systems or unobtainable unless otherwise stated in this dictation. PE:  BP (!) 145/78   Pulse 99   Temp 99 °F (37.2 °C) (Axillary)   Resp 29   Ht 5' 9\" (1.753 m)   Wt (!) 343 lb (155.6 kg)   SpO2 99%   BMI 50.65 kg/m²     Gen.: Orbitally obese  male. Intubated and sedated. Opens eyes to voice  Head: Atraumatic/normocephalic  Eyes: Anicteric sclera/no conjunctival erythema  ENT: Endotracheal and orogastric tubes  Neck: Supple with trachea midline  Chest: CTA B/symmetrical excursions  Cor: Regular/S1-S2. At times tachycardic  Abd.: Often obese. Appears nontender.   BS +/4  Extr.:  3+ edema BLE with chronic indurative changes of the skin  Muscles: Decreased tone and bulk throughout  Skin:Warm and dry/anicteric        DATA:     Lab Results   Component Value Date    WBC 4.3 03/06/2020    RBC 2.14 03/06/2020    HGB 7.3 03/06/2020    HCT 22.2 03/06/2020    .7 03/06/2020    MCH 34.1 03/06/2020    MCHC 32.9 03/06/2020    RDW 18.2 03/06/2020    PLT 43 03/06/2020    MPV 9.4 03/06/2020     Lab Results   Component Value Date     03/06/2020    K 3.9 03/06/2020    K 3.3 08/08/2019    CL 92 03/06/2020    CO2 40 03/06/2020    BUN 29 03/06/2020    CREATININE 1.4 03/06/2020    CALCIUM 10.1 03/06/2020    PROT 7.4 03/06/2020    LABALBU 3.6 03/06/2020    BILITOT 3.7 03/06/2020    ALKPHOS 75 03/06/2020    AST 37 03/06/2020    ALT 17 03/06/2020     Lab Results   Component Value Date    LIPASE 72 08/06/2019     No results found for:

## 2020-03-06 NOTE — PROCEDURES
Ann Napier is a 59 y.o. male patient. No diagnosis found. Past Medical History:   Diagnosis Date    Acute diastolic CHF (congestive heart failure) ([address] 11/17/15    Echo 11/18/15 EF 73%    Dermatitis     due to thimersol    Heart valve problem     leaky    Hx of cardiovascular stress test 12/9/2015    Lexiscan    Hypertension     Obesity      Blood pressure (!) 124/46, pulse 88, temperature 99 °F (37.2 °C), temperature source Axillary, resp. rate 19, height 5' 9\" (1.753 m), weight (!) 343 lb (155.6 kg), SpO2 100 %. Intubation  Date/Time: 3/6/2020 10:18 AM  Performed by: Ann Alexandre MD  Authorized by: Ann Alexandre MD   Consent: Verbal consent not obtained. Written consent not obtained. Indications: respiratory failure  Intubation method: video-assisted  Patient status: awake  Sedatives: midazolam  Paralytic: rocuronium  Tube size: 8.0 mm  Tube type: cuffed  Number of attempts: 1  Cricoid pressure: no  Post-procedure assessment: CO2 detector and chest rise  Breath sounds: equal  Cuff inflated: yes  ETT to lip: 23 cm  Comments: Unable to obtain informed consent as patient is encephalopathic without no next of kin. Intubation CXR is pending.           Tomás Goodman MD  3/6/2020

## 2020-03-07 ENCOUNTER — APPOINTMENT (OUTPATIENT)
Dept: GENERAL RADIOLOGY | Age: 65
DRG: 720 | End: 2020-03-07
Attending: INTERNAL MEDICINE
Payer: COMMERCIAL

## 2020-03-07 LAB
ABO/RH: NORMAL
ALBUMIN SERPL-MCNC: 3 G/DL (ref 3.5–5.2)
ALP BLD-CCNC: 64 U/L (ref 40–129)
ALT SERPL-CCNC: 14 U/L (ref 0–40)
ANION GAP SERPL CALCULATED.3IONS-SCNC: 8 MMOL/L (ref 7–16)
ANISOCYTOSIS: ABNORMAL
ANTIBODY SCREEN: NORMAL
AST SERPL-CCNC: 35 U/L (ref 0–39)
BASOPHILS ABSOLUTE: 0 E9/L (ref 0–0.2)
BASOPHILS RELATIVE PERCENT: 0.9 % (ref 0–2)
BILIRUB SERPL-MCNC: 3.9 MG/DL (ref 0–1.2)
BUN BLDV-MCNC: 27 MG/DL (ref 8–23)
BURR CELLS: ABNORMAL
CALCIUM SERPL-MCNC: 9.3 MG/DL (ref 8.6–10.2)
CHLORIDE BLD-SCNC: 97 MMOL/L (ref 98–107)
CO2: 35 MMOL/L (ref 22–29)
CREAT SERPL-MCNC: 1.3 MG/DL (ref 0.7–1.2)
EOSINOPHILS ABSOLUTE: 0.56 E9/L (ref 0.05–0.5)
EOSINOPHILS RELATIVE PERCENT: 12.8 % (ref 0–6)
GFR AFRICAN AMERICAN: >60
GFR NON-AFRICAN AMERICAN: 56 ML/MIN/1.73
GLUCOSE BLD-MCNC: 98 MG/DL (ref 74–99)
HCT VFR BLD CALC: 21.1 % (ref 37–54)
HEMOGLOBIN: 7.1 G/DL (ref 12.5–16.5)
INR BLD: 2.9
LYMPHOCYTES ABSOLUTE: 0.4 E9/L (ref 1.5–4)
LYMPHOCYTES RELATIVE PERCENT: 8.5 % (ref 20–42)
MCH RBC QN AUTO: 34.5 PG (ref 26–35)
MCHC RBC AUTO-ENTMCNC: 33.6 % (ref 32–34.5)
MCV RBC AUTO: 102.4 FL (ref 80–99.9)
MONOCYTES ABSOLUTE: 0.57 E9/L (ref 0.1–0.95)
MONOCYTES RELATIVE PERCENT: 12.8 % (ref 2–12)
NEUTROPHILS ABSOLUTE: 2.9 E9/L (ref 1.8–7.3)
NEUTROPHILS RELATIVE PERCENT: 66 % (ref 43–80)
OVALOCYTES: ABNORMAL
PDW BLD-RTO: 18.8 FL (ref 11.5–15)
PLATELET # BLD: 38 E9/L (ref 130–450)
PLATELET CONFIRMATION: NORMAL
PMV BLD AUTO: 9.3 FL (ref 7–12)
POIKILOCYTES: ABNORMAL
POLYCHROMASIA: ABNORMAL
POTASSIUM SERPL-SCNC: 3.4 MMOL/L (ref 3.5–5)
PROCALCITONIN: 0.3 NG/ML (ref 0–0.08)
PROTHROMBIN TIME: 33.8 SEC (ref 9.3–12.4)
RBC # BLD: 2.06 E12/L (ref 3.8–5.8)
SODIUM BLD-SCNC: 140 MMOL/L (ref 132–146)
TOTAL PROTEIN: 6.3 G/DL (ref 6.4–8.3)
WBC # BLD: 4.4 E9/L (ref 4.5–11.5)

## 2020-03-07 PROCEDURE — 86850 RBC ANTIBODY SCREEN: CPT

## 2020-03-07 PROCEDURE — 87070 CULTURE OTHR SPECIMN AEROBIC: CPT

## 2020-03-07 PROCEDURE — 85025 COMPLETE CBC W/AUTO DIFF WBC: CPT

## 2020-03-07 PROCEDURE — 87206 SMEAR FLUORESCENT/ACID STAI: CPT

## 2020-03-07 PROCEDURE — 6370000000 HC RX 637 (ALT 250 FOR IP): Performed by: INTERNAL MEDICINE

## 2020-03-07 PROCEDURE — 0DH67UZ INSERTION OF FEEDING DEVICE INTO STOMACH, VIA NATURAL OR ARTIFICIAL OPENING: ICD-10-PCS | Performed by: RADIOLOGY

## 2020-03-07 PROCEDURE — 85610 PROTHROMBIN TIME: CPT

## 2020-03-07 PROCEDURE — C9113 INJ PANTOPRAZOLE SODIUM, VIA: HCPCS | Performed by: INTERNAL MEDICINE

## 2020-03-07 PROCEDURE — 86901 BLOOD TYPING SEROLOGIC RH(D): CPT

## 2020-03-07 PROCEDURE — 94003 VENT MGMT INPAT SUBQ DAY: CPT

## 2020-03-07 PROCEDURE — 80053 COMPREHEN METABOLIC PANEL: CPT

## 2020-03-07 PROCEDURE — 84145 PROCALCITONIN (PCT): CPT

## 2020-03-07 PROCEDURE — 36415 COLL VENOUS BLD VENIPUNCTURE: CPT

## 2020-03-07 PROCEDURE — 86923 COMPATIBILITY TEST ELECTRIC: CPT

## 2020-03-07 PROCEDURE — 2500000003 HC RX 250 WO HCPCS: Performed by: INTERNAL MEDICINE

## 2020-03-07 PROCEDURE — P9059 PLASMA, FRZ BETWEEN 8-24HOUR: HCPCS

## 2020-03-07 PROCEDURE — 86900 BLOOD TYPING SEROLOGIC ABO: CPT

## 2020-03-07 PROCEDURE — 6360000002 HC RX W HCPCS: Performed by: INTERNAL MEDICINE

## 2020-03-07 PROCEDURE — 71045 X-RAY EXAM CHEST 1 VIEW: CPT

## 2020-03-07 PROCEDURE — 2580000003 HC RX 258: Performed by: INTERNAL MEDICINE

## 2020-03-07 PROCEDURE — 87040 BLOOD CULTURE FOR BACTERIA: CPT

## 2020-03-07 PROCEDURE — P9016 RBC LEUKOCYTES REDUCED: HCPCS

## 2020-03-07 PROCEDURE — 2000000000 HC ICU R&B

## 2020-03-07 PROCEDURE — 94640 AIRWAY INHALATION TREATMENT: CPT

## 2020-03-07 RX ORDER — POTASSIUM CHLORIDE 7.45 MG/ML
10 INJECTION INTRAVENOUS PRN
Status: DISCONTINUED | OUTPATIENT
Start: 2020-03-07 | End: 2020-03-14

## 2020-03-07 RX ORDER — POTASSIUM CHLORIDE 20 MEQ/1
40 TABLET, EXTENDED RELEASE ORAL PRN
Status: DISCONTINUED | OUTPATIENT
Start: 2020-03-07 | End: 2020-03-14

## 2020-03-07 RX ADMIN — IPRATROPIUM BROMIDE AND ALBUTEROL SULFATE 1 AMPULE: .5; 3 SOLUTION RESPIRATORY (INHALATION) at 06:31

## 2020-03-07 RX ADMIN — PHYTONADIONE 1 MG: 10 INJECTION, EMULSION INTRAMUSCULAR; INTRAVENOUS; SUBCUTANEOUS at 21:10

## 2020-03-07 RX ADMIN — POTASSIUM CHLORIDE 40 MEQ: 20 TABLET, EXTENDED RELEASE ORAL at 12:03

## 2020-03-07 RX ADMIN — Medication 100 MCG/HR: at 13:26

## 2020-03-07 RX ADMIN — BUDESONIDE 500 MCG: 0.5 INHALANT RESPIRATORY (INHALATION) at 06:31

## 2020-03-07 RX ADMIN — BUDESONIDE 500 MCG: 0.5 INHALANT RESPIRATORY (INHALATION) at 18:26

## 2020-03-07 RX ADMIN — IPRATROPIUM BROMIDE AND ALBUTEROL SULFATE 1 AMPULE: .5; 3 SOLUTION RESPIRATORY (INHALATION) at 13:41

## 2020-03-07 RX ADMIN — PANTOPRAZOLE SODIUM 40 MG: 40 INJECTION, POWDER, FOR SOLUTION INTRAVENOUS at 09:12

## 2020-03-07 RX ADMIN — IPRATROPIUM BROMIDE AND ALBUTEROL SULFATE 1 AMPULE: .5; 3 SOLUTION RESPIRATORY (INHALATION) at 18:25

## 2020-03-07 RX ADMIN — ARFORMOTEROL TARTRATE 15 MCG: 15 SOLUTION RESPIRATORY (INHALATION) at 06:30

## 2020-03-07 RX ADMIN — IPRATROPIUM BROMIDE AND ALBUTEROL SULFATE 1 AMPULE: .5; 3 SOLUTION RESPIRATORY (INHALATION) at 21:22

## 2020-03-07 RX ADMIN — ARFORMOTEROL TARTRATE 15 MCG: 15 SOLUTION RESPIRATORY (INHALATION) at 18:25

## 2020-03-07 ASSESSMENT — PULMONARY FUNCTION TESTS
PIF_VALUE: 31
PIF_VALUE: 30
PIF_VALUE: 33
PIF_VALUE: 34
PIF_VALUE: 24

## 2020-03-07 ASSESSMENT — PAIN SCALES - WONG BAKER
WONGBAKER_NUMERICALRESPONSE: 0

## 2020-03-07 ASSESSMENT — PAIN SCALES - GENERAL
PAINLEVEL_OUTOF10: 0

## 2020-03-07 NOTE — PROGRESS NOTES
Impaired respiratory function-inability to consume food     Signs and symptoms:  as evidenced by NPO status due to medical condition, Intubation, Intake 0-25%    Objective Information:  · Nutrition-Focused Physical Findings: pt vented, abd distended, audible BS, -I/Os, +3 generalized edema, NG tube & endotracheal tube x-ray verified, renal labs elevated, MAP variable 74-81 24hrs  · Wound Type: (noted BLE brusing & abrasions)  · Current Nutrition Therapies:  · Oral Diet Orders: NPO   · Oral Diet intake: NPO  · Oral Nutrition Supplement (ONS) Orders: (NPO)  · ONS intake: NPO  · Anthropometric Measures:  · Ht: 5' 9\" (175.3 cm)   · Current Body Wt: 292 lb 8 oz (132.7 kg)(3/7 bed scale)  · Admission Body Wt: 343 lb (155.6 kg)(3/6 bed scale)  · Usual Body Wt: 346 lb (156.9 kg)(4/27/19 bed scale per EMR)  · % Weight Change: -50.5 lbs, wt loss since admit likely 2/2 fluid with current edema and fluid status and noted h/o fluid fluctuations per EMR. However, quetions I/O's d/t -1.1L despite significant wt lose(using admit wt)  · Ideal Body Wt: 160 lb (72.6 kg), % Ideal Body 245%  · BMI Classification: BMI > or equal to 40.0 Obese Class III    Nutrition Interventions:   Continue NPO, Start Tube Feeding(Recommend initiation of EN w/ Immune Enhancing(Pivot1.5cal) at 55mL/hr w/ 1 pro mod daily to provide: 1320mL, 1980kcal, 124 gm pro (w/pro moddaily 2084kcal, 150 gm pro).  If IV fluid discontinued use 1082mL total H2O flush w/ 180mL H2O flush Q 6 hrs.)  Continued Inpatient Monitoring, Education not appropriate at this time    Nutrition Evaluation:   · Evaluation: Goals set   · Goals: Nutrition Progression    · Monitoring: Nutrition Progression, Skin Integrity, I&O, Mental Status/Confusion, Weight, Pertinent Labs, Monitor Hemodynamic Status, Monitor Bowel Function      Electronically signed by Elva Jonas on 3/7/20 at 1:10 PM EST    Contact Number: [phone]

## 2020-03-07 NOTE — PROGRESS NOTES
Blood & respiratory cultures obtained per order.     BC 1 from Left forearm IV  BC 2 from Right forearm IV    Respiratory culture obtained by ETT suction by Respiratory Therapist.    Jackson Doran  3/7/2020

## 2020-03-07 NOTE — PLAN OF CARE
Problem: Restraint Use - Nonviolent/Non-Self-Destructive Behavior:  Goal: Absence of restraint indications  Description: Absence of restraint indications  3/7/2020 0423 by John Chase RN  Outcome: Not Met This Shift  3/6/2020 1850 by Chari Brewster RN  Outcome: Ongoing  Goal: Absence of restraint-related injury  Description: Absence of restraint-related injury  3/7/2020 0423 by John Chase RN  Outcome: Met This Shift  3/6/2020 1850 by Chari Brewster RN  Outcome: Met This Shift    Patient still reaching for ETT

## 2020-03-07 NOTE — PROGRESS NOTES
Internal Medicine Progress Note    Iva Mcgrath., & [address] Shae Mcgrath., F.A.C.O.I. Vicky Beck D.O., F.A.C.O.I. Primary Care Physician: Marvel Hull MD   Admitting Physician:  Perry Vasquez DO  Admission date and time: 3/6/2020 10:03 AM    Room:  [place] [address]  Admitting diagnosis: Acute and chronic respiratory failure with hypercapnia [facility]) [J96.22]    Patient Name: Narcisa Sylvester  MRN: [id number]    Date of Service: 3/7/2020     Subjective:    Blaire Taylor is a 59 y. o.  male who was seen and examined today,3/7/2020, at the bedside. The patient remained relatively stable since was transferred from [facility] yesterday. He is currently on a fentanyl drip and seems to be relatively comfortable on the ventilator. We do feel at the present time that he will require a tracheostomy due to his chronic hypercapnic respiratory failure. While he was in [facility] he was predominantly BiPAP dependent because of his respiratory status. Unfortunately he has no family members and we are attempting to see emergency and guardianship to proceed with that of a tracheostomy. He is currently responsive but did not seem to grasp the severity of the situation. He has multiple other comorbidities at hand. No family present during my examination. Review of System: Detailed review of systems unobtainable due to sedation      Physical Exam:  No intake/output data recorded. Intake/Output Summary (Last 24 hours) at 3/7/2020 1627  Last data filed at 3/7/2020 1439  Gross per 24 hour   Intake 1681.33 ml   Output 2050 ml   Net -368.67 ml   I/O last 3 completed shifts:   In: 1681.3 [I.V.:1672.3; NG/GT:9]  Out: 3100 [Urine:2650; Emesis/NG output:450]  Patient Vitals for the past 96 hrs (Last 3 readings):   Weight   03/07/20 1130 292 lb 8 oz (132.7 kg)   03/06/20 0952 (!) 343 lb (155.6 kg)       Vital Signs:   Blood pressure (!) 107/57, Orders:   · Feeding Route:    · Formula:    · Rate (ml/hr): · Volume (ml/day):    · Duration:    · Additives/Modulars:    · Water Flushes:    · Current TF & Flush Orders Provides:    · Goal TF & Flush Orders Provides:        Objective Data:  CBC:   Recent Labs     03/06/20  0244 03/07/20 0445   WBC 4.3* 4.4*   HGB 7.3* 7.1*   PLT 43* 38*            BMP:    Recent Labs     03/06/20  0244 03/07/20  0445    140   K 3.9 3.4*   CL 92* 97*   CO2 40* 35*   BUN 29* 27*   CREATININE 1.4* 1.3*   GLUCOSE 93 98     CMP:    Lab Results   Component Value Date     03/07/2020    K 3.4 03/07/2020    K 3.3 08/08/2019    CL 97 03/07/2020    CO2 35 03/07/2020    BUN 27 03/07/2020    CREATININE 1.3 03/07/2020    GFRAA >60 03/07/2020    LABGLOM 56 03/07/2020    GLUCOSE 98 03/07/2020    PROT 6.3 03/07/2020    LABALBU 3.0 03/07/2020    CALCIUM 9.3 03/07/2020    BILITOT 3.9 03/07/2020    ALKPHOS 64 03/07/2020    AST 35 03/07/2020    ALT 14 03/07/2020     Hepatic:   Recent Labs     03/06/20  0244 03/07/20 0445   AST 37 35   ALT 17 14   BILITOT 3.7* 3.9*   ALKPHOS 75 64     Troponin: No results for input(s): TROPONINI in the last 72 hours. BNP: No results for input(s): BNP in the last 72 hours. Lipids: No results for input(s): CHOL, HDL in the last 72 hours. Invalid input(s): LDLCALCU  ABGs:   Lab Results   Component Value Date    PO2ART 129.1 02/02/2020    CEZ2QSE 64.4 02/02/2020     INR:   Recent Labs     03/07/20 0445   INR 2.9   PROTIME 33.8*     RAD: Xr Chest Portable    Result Date: 3/6/2020  LOCATION: 200 EXAM: XR CHEST PORTABLE, XR CHEST ABDOMEN NG PLACEMENT COMPARISON: March 3, 2020 HISTORY: Support device placement TECHNIQUE: Single view chest and abdomen. FINDINGS:  Endotracheal tube seen in the mid trachea just below the clavicles. The nasogastric tube is seen in the proximal stomach. Widespread parenchymal opacities are noted with some improved aeration in the right lung.  A paucity of bowel gas is noted on bacteremia. · History of healthcare-associated pneumonia with multifocal bilateral  · lung infiltrates. · Obstructive sleep apnea. · Acute-on-chronic kidney disease. · Coagulopathy secondary to underlying cirrhosis, probably secondary  · to alcoholism. · Coagulopathy secondary to alcoholic liver disease. · Anemia of chronic disease with superimposed folic acid deficiency. · Morbid obesity. · Gout. · Pancytopenia multifactorial nature  · Protein and caloric malnutrition due to increased catabolic needs    Plan:     · From a respiratory standpoint of view the patient appear to be more stable today. He was intubated yesterday and is maintained on ventilatory support. Unfortunately prior to coming to the intensive care unit the patient was basically BiPAP dependent. His PCO2 ran in the range of 90 to 110. We did attempt to discuss tracheostomy with him in the past but he did not grasp the severity of the situation. Unfortunately at the present time we must proceed with guardianship and decide on tracheostomy in hopes that he will become more alert at which time he can have better insight into his underlying problem. · Antibiotics are employed at this time for underlying infection  · Due to the present of  liver disease he does suffer from auto anticoagulation and pancytopenia. We will need to give vitamin K and fresh frozen plasma if tracheostomy is to be performed  · The patient does suffer from protein caloric malnutrition due to increased catabolic needs. NG tube has been inserted and patient will be started on tube feedings  · The patient will be observed closely for any sign of wound development along with low volume mattress,  frequent inspection, and wound care  · Extensive discussion will be given with subspecialist on the case on their input. Consideration towards ethics consult might be necessary  · GI prophylaxis will be employed using PPIs since he is at risk for bleeding.   GI has been consulted in case of esophageal varices. .      30 minutes of critical care time was spent with the patient. This includes chart review, , reviewing rhythm strips, and discussion with those consultants involved in the patient's care. I reviewed the patient's past medical, surgical history and medication. Patient's medications were reviewed/continued/adjusted. Labs as ordered. Please see orders for further plan of care. Rhythm strips reviewed as well as consultant recommendations/notes and/or discussion. I reviewed the  course of events since last visit. More than 50% of my  time was spent at the bedside counseling and/or coordination of care with the patient and/or family with face to face contact. This time was spent reviewing notes and laboratory data, instructing and counseling the patient. Time I spent with the family or surrogate(s) is included only if the patient was incapable of providing the necessary information or participating in medical decisionsI also discussed the differential diagnosis and all of the proposed management plans with the patient and individuals accompanying the patient. Climmie Leanna requires this high level of physician care and nursing in the ICU due the complexity of decision management and chance of rapid decline or death. I am ready available for decision making and intervention. I reviewed the relevant imaging studies and available reports. I also discussed the differential diagnosis and all of the proposed management plans with the patient and individuals accompanying the patient to this visit. I reviewed the relevant imaging studies and available reports. Yuriy Pinto DO, FCAROLYN.C.O.I.   On 3/7/2020  4:27 PM

## 2020-03-07 NOTE — H&P
[address]                              HISTORY AND PHYSICAL    PATIENT NAME: Nancy Webber                  :        1955  MED REC NO:   [id number]                            ROOM:       08  ACCOUNT NO:   [de-identified]                           ADMIT DATE: 2020  PROVIDER:     Flor Gibson DO    HISTORY OF PRESENT ILLNESS:  The patient is a 62-year white male who was  admitted to [address] [place] [address]. The patient was admitted to the  hospital here from [facility].  The patient was  apparently at [facility] in 2020 and was transferred to  [facility] on 2020 through 2020. At that  time, he was admitted with a diagnosis of sepsis secondary to  Acinetobacter bacteremia along with urinary tract infection, positive  for ESBL Proteus. During that period of time, he has been followed by  multidisciplinary approach at that time and began to develop  symptomatology of progressive respiratory decompensation. The patient  on admission there, was significantly volume overload due to prior  history of alcoholic cirrhosis. He underwent extensive diuresis while  he was in [facility].  At that time, the patient did not  improve and was on BiPAP pretty much consistently. Radiographic finding  of the chest did show evidence of bilateral pleural effusions. He  underwent thoracentesis. The patient at this time has been observed  closely with pretty much becoming BiPAP dependent since that period of  time. It was felt this time that the patient would require a permanent  tracheostomy, but due to progressive altered level of consciousness at  this time along with no family member, we did discuss transferring him  to the intensive care unit, intubation at this time due to his  respiratory status and attempting to obtain emergency guardianship.

## 2020-03-07 NOTE — CONSULTS
blastic osseous lesion. There is lower lumbar facet arthrosis. 1. Suggestion of a cirrhotic liver with mild portal hypertension and trace amount of ascites. 2. Cholelithiasis. 3. Diffuse anasarca. Ct Chest Wo Contrast    Result Date: 2/23/2020  Location: 200 Indication: Shortness of breath Comparison: CT chest from 4/27/2019. Technique: Multidetector CT imaging of the chest was preformed without administration of intravenous contrast. Coronal and sagittal reformatted images were obtained. Automated dose exposure control was used for this exam. FINDINGS: The study is mildly motion degraded. The central airways are patent. There is no bronchiectasis. There are moderate-sized bilateral pleural effusions with bilateral lower lobe dependent consolidations with air bronchograms. There are patchy groundglass and consolidative opacities within bilateral upper lobes. There is no pneumothorax. A PICC line catheter tip directed to the superior vena cava. The heart is stable in size. There is no large pericardial effusion. The thoracic aorta is normal in caliber. The main pulmonary artery is mildly enlarged. There is no mediastinal or hilar lymphadenopathy. There is no axillary lymphadenopathy. There is bilateral gynecomastia. There is diffuse anasarca. Refer to dedicated CT abdomen performed for abdominal findings. There is no suspicious lytic or blastic osseous lesion. There is lower cervical fusion hardware. 1. Moderate bilateral pleural effusions with bilateral lower lobe dependent consolidations, likely compressive atelectasis versus pneumonia. 2. Patchy groundglass and consolidative opacities within bilateral upper lobes, possibly edema versus pneumonia. 3. Enlarged main pulmonary artery, suggestive of underlying pulmonary hypertension. 4. Diffuse anasarca.     Xr Chest Portable    Result Date: 3/6/2020  LOCATION: 200 EXAM: XR CHEST PORTABLE, XR CHEST ABDOMEN NG PLACEMENT COMPARISON: March 3, 2020 HISTORY: Support device placement TECHNIQUE: Single view chest and abdomen. FINDINGS:  Endotracheal tube seen in the mid trachea just below the clavicles. The nasogastric tube is seen in the proximal stomach. Widespread parenchymal opacities are noted with some improved aeration in the right lung. A paucity of bowel gas is noted on the abdominal radiograph. 1. Nasogastric and endotracheal tubes are in satisfactory position. Xr Chest Portable    Result Date: 3/3/2020  LOCATION: 200 EXAM: XR CHEST PORTABLE COMPARISON: March 2, 2020 HISTORY: Shortness of breath TECHNIQUE: Single frontal view of the chest was obtained. FINDINGS:  SUPPORT DEVICES: None. LUNGS: Confluent airspace disease and low lung volumes again noted. PLEURA: No pneumothorax visualized. LUNG VOLUMES: Low lung volumes present, limiting the examination. MEDIASTINAL STRUCTURES: No lymphadenopathy. Normal aortic contour. HEART SIZE: Stable. BONES AND SOFT TISSUES: No fracture or soft tissue abnormality. 1. No change from XR CHEST PORTABLE with report dated 3/2/2020 2:37 PM.      Xr Chest Portable    Result Date: 3/2/2020  LOCATION: 200 EXAM: XR CHEST PORTABLE COMPARISON: 2/29/2020 HISTORY: Shortness of breath TECHNIQUE: Single frontal view of the chest was obtained. FINDINGS:  SUPPORT DEVICES: None. LUNGS: Prominent interstitial markings are again noted with right effusion, unchanged. PLEURA: No pneumothorax visualized. LUNG VOLUMES: Satisfactory inspirator effort. MEDIASTINAL STRUCTURES: No lymphadenopathy. Normal aortic contour. HEART SIZE: Stable. BONES AND SOFT TISSUES: No fracture or soft tissue abnormality. 1. No change from XR CHEST PORTABLE with report dated 2/29/2020 7:45 AM.      [facility] Chest Portable    Result Date: 2/29/2020  Reading location:  100 INDICATION: Dyspnea FINDINGS: Portable AP upright view the chest compared with 2/28/2020. Interval increase right perihilar basilar pleural-parenchymal opacities.  Extensive parenchymal opacity elsewhere in each lung as well as distended pulmonary vessels. Cardiac enlargement. Congestive heart failure with increasing right pleural effusion and associated airspace disease. Xr Chest Portable    Result Date: 2/28/2020  Reading location: 200 INDICATION: Post thoracentesis FINDINGS: AP portable view the chest compared earlier exam same date. Current study 1216 hours. No interval pneumothorax or change in the bilateral interstitial and airspace disease and right pleural effusion. No interval change. Xr Chest Portable    Result Date: 2/19/2020  Reading location: 200 Indication: PICC line placement Comparison: Prior chest radiograph from 2/18/2020 Technique: Portable AP upright chest radiograph was obtained. Findings: A left-sided PICC line catheter tip is unchanged in position. The cardiomediastinal silhouette is stable in size and contours. There are stable bilateral perihilar airspace disease with a small right pleural effusion. There is no evidence of pneumothorax. There is cervical fusion hardware. 1. Left-sided PICC line catheter tip in appropriate position. 2. Stable right pleural effusion with bibasilar airspace disease. Xr Chest Portable    Result Date: 2/18/2020  LOCATION: 200 EXAM: XR CHEST PORTABLE COMPARISON: 2/13/2020 HISTORY: Shortness of breath TECHNIQUE: Single frontal view of the chest was obtained. FINDINGS:  SUPPORT DEVICES: Support devices are stable. LUNGS: Patchy airspace disease is again seen bilaterally similar to the previous study. PLEURA: No pneumothorax visualized. LUNG VOLUMES: Satisfactory inspirator effort. MEDIASTINAL STRUCTURES: No lymphadenopathy. Normal aortic contour. HEART SIZE: Normal. BONES AND SOFT TISSUES: No fracture or soft tissue abnormality. 1. No change from XR CHEST PORTABLE with report dated 2/13/2020 6:51 PM.      [facility]    Result Date: 2/13/2020  LOCATION: 200 EXAM: XR CHEST PORTABLE COMPARISON: 2/10/2020. HISTORY: SOB.  Evaluate Pleural    Result Date: 2/27/2020  READING LOCATION: 200 EXAM: IR GUIDED THORACENTESIS PLEURAL HISTORY: Large right pleural effusion. PROCEDURE:  The thoracentesis procedure was explained to the patient and an informed consent was obtained. The skin was prepped and draped in a sterile fashion and anesthetized with lidocaine. Maximal sterile barrier technique was utilized. Under ultrasound guidance, a #5 [place] Rosalba Yueh needle and catheter were inserted into the fluid collection on the right. 1100 cc of pleural fluid was drained. FINDINGS: Ultrasound images demonstrate a large right pleural effusion. Following thoracentesis, there is minimal residual pleural effusion. The patient tolerated the procedure well and there were no complications. 1. Successful ultrasound-guided therapeutic thoracentesis. LABS  Recent Labs     03/06/20  0244 03/07/20  0445   WBC 4.3* 4.4*   HGB 7.3* 7.1*   HCT 22.2* 21.1*   .7* 102.4*   PLT 43* 38*     Recent Labs     03/06/20  0244 03/07/20  0445    140   K 3.9 3.4*   CL 92* 97*   CO2 40* 35*   BUN 29* 27*   CREATININE 1.4* 1.3*   GFRAA >60 >60   LABGLOM 51 56   GLUCOSE 93 98   PROT 7.4 6.3*   LABALBU 3.6 3.0*   CALCIUM 10.1 9.3   BILITOT 3.7* 3.9*   ALKPHOS 75 64   AST 37 35   ALT 17 14     No results for input(s): PROCAL in the last 72 hours.   Lab Results   Component Value Date    CRP 1.1 (H) 02/14/2020     Lab Results   Component Value Date    SEDRATE 44 (H) 02/23/2020    SEDRATE 50 (H) 02/14/2020     No results found for: QEFRRTW2F1  Lab Results   Component Value Date    HEPAIGM Non-Reactive 02/05/2020    HEPBIGM Non-Reactive 02/05/2020    HCVABI Non-Reactive 02/05/2020     Hep C Ab Interp   Date Value Ref Range Status   02/05/2020 Non-Reactive NON REACT Final     @RESUFASTHCVABI)@      MICROBIOLOGY:  Cultures :   Lab Results   Component Value Date    BC 5 Days- no growth 01/31/2020    BC  01/29/2020     This organism demonstrates a Carbapenemase in addition to  any other resistant mechanism noted in panel. VABOMERE= 6mm (deng mcintosh disc diffusion)      BC  01/29/2020     This isolate is presumed to be resistant based on the  detection of inducible Clindamycin resistance. Clindamycin  may still be effective in some patients. ORG Proteus mirabilis 01/30/2020    ORG Staphylococcus epidermidis 01/29/2020    ORG Acinetobacter baumannii 01/29/2020     Lab Results   Component Value Date    BLOODCULT2 5 Days- no growth 01/31/2020    BLOODCULT2  01/29/2020     Refer to previous culture for susceptibility results  (CXBL r wrist collected 1-29-20 at 2015)      BLOODCULT2 5 Days- no growth 08/19/2019    ORG Proteus mirabilis 01/30/2020    ORG Staphylococcus epidermidis 01/29/2020    ORG Acinetobacter baumannii 01/29/2020       No results found for: WNDABS    No results found for: [facility]      Component Value Date/Time    AFBCX  02/04/2020 1450     No growth after 1 week/s of incubation. No growth after 2 week/s of incubation. No growth after 3 week/s of incubation. FUNGSM No Fungal elements seen 02/04/2020 1450    LABFUNG  02/04/2020 1450     No growth after 1 week/s of incubation. No growth after 2 week/s of incubation. No growth after 3 week/s of incubation. No results found for: CULTRESP  No results found for: CXCATHTIP  Body Fluid Culture, Sterile   Date Value Ref Range Status   02/28/2020 Growth not present  Final     No results found for: CXSURG  URINE CULTURE  Urine Culture, Routine   Date Value Ref Range Status   02/03/2020 Growth not present  Final   01/30/2020 Physician requested workup (A)  Final   01/30/2020   Final    <10,000 CFU/ml  This isolate demonstrated resistance to multiple classes of  antibiotics. Please review results with care and follow  isolation guidelines as indicated.   Mixed morphologies but similar sensitivities       MRSA Culture Only   Date Value Ref Range Status   01/30/2020 Methicillin resistant Staph aureus not isolated  Final 02/24/2017 Methicillin resistant Staph aureus not isolated  Final       No results for input(s): LP1UAG in the last 72 hours. No results for input(s): STREPNEUMAGU in the last 72 hours. Patient is a 59 y.o. male who presented with No chief complaint on file. FINAL IMPRESSION    Pt from [facility] with   1. Acute on chronic respiratory failure with hypercapnia (HCC)        Pancytopenia   S/p rx Abaumanii bacteremia  S/p RX uti ESBL Proteus   Afebrile, wbc4.4  Right mid line 2/29     -check cx  Follow off atbx     Imaging and labs were reviewed per medical records and any ID pertinent labs were addressed with the patient. The patient/FAMILY  was educated about the diagnosis, prognosis, indications, risks and benefits of treatment. An opportunity to ask questions was given to the patient/FAMILY and questions were answered. Thank you for the consult. We will follow with you.        Electronically signed by Ailin Paula MD on 3/7/2020 at 8:10 AM

## 2020-03-07 NOTE — CONSULTS
PROGRESS NOTE  By Kvng Pulido M.D. [facility]  Dr. Poonam Roth M.D.,  Dr. Janny Gomez M.D.,   Dr. Louann Mcmillan D.O.,  Dr. El Kiser M.D.,  Dr Drake Moseley D.O. Cheryle Marc  59 y.o.  male    SUBJECTIVE:  Intubated and sedated as a result nonverbal.  Appears to deny abdominal pain    OBJECTIVE:    /67   Pulse 81   Temp 98.1 °F (36.7 °C) (Axillary)   Resp 28   Ht 5' 9\" (1.753 m)   Wt (!) 343 lb (155.6 kg)   SpO2 95%   BMI 50.65 kg/m²     General: Obese  male  HEENT: Anicteric sclera/moist oral mucosa. OGT -no blood or coffee-ground material.  Bloody secretions from ETT  Neck: Supple/trachea midline  Chest: Decreased BS without wheezing  Cor: Regular/S1-S2  Abd.: Soft and obese appears nontender  Extr.:  BLE 3+ edema  Skin: Warm and dry      DATA:    Monitor data reviewed -sinus rhythm noted. Lab Results   Component Value Date    WBC 4.4 03/07/2020    RBC 2.06 03/07/2020    HGB 7.1 03/07/2020    HCT 21.1 03/07/2020    .4 03/07/2020    MCH 34.5 03/07/2020    MCHC 33.6 03/07/2020    RDW 18.8 03/07/2020    PLT 38 03/07/2020    MPV 9.3 03/07/2020     Lab Results   Component Value Date     03/07/2020    K 3.4 03/07/2020    K 3.3 08/08/2019    CL 97 03/07/2020    CO2 35 03/07/2020    BUN 27 03/07/2020    CREATININE 1.3 03/07/2020    CALCIUM 9.3 03/07/2020    PROT 6.3 03/07/2020    LABALBU 3.0 03/07/2020    BILITOT 3.9 03/07/2020    ALKPHOS 64 03/07/2020    AST 35 03/07/2020    ALT 14 03/07/2020     Lab Results   Component Value Date    LIPASE 72 08/06/2019     No results found for: AMYLASE      ASSESSMENT/PLAN:  1. Cirrhosis  -Recent EGD revealing no esophageal or gastric varices  -No evidence of acute decompensation  -Obtain lab work and calculate meld     2. Anemia  -Monitor H&H  -No clear evidence of overt bleed  -Plan for colonoscopy when more stable -  next week unless overt bleed or precipitous drop over the weekend     3. Respiratory failure/sepsis  -Intubated/mechanically ventilated  -Per admitting/CCM     4. Morbid obesity  -Unchanged    NOTE:  This report was transcribed using voice recognition software. Every effort was made to ensure accuracy; however, inadvertent computerized transcription errors may be present.     Wan Castro MD  3/7/2020  10:28 AM

## 2020-03-07 NOTE — PROGRESS NOTES
Pulmonary/Critical Care Progress Note    We are following patient for acute on chronic respiratory failure and encephalopathy    SUBJECTIVE:  No acute overnight events. Patient remained stable on the mechanical ventilator. MEDICATIONS:   enoxaparin  40 mg Subcutaneous Daily    ipratropium-albuterol  1 ampule Inhalation Q6H    pantoprazole  40 mg Intravenous Daily    Arformoterol Tartrate  15 mcg Nebulization BID    budesonide  500 mcg Nebulization BID      fentaNYL 5 mcg/ml in 0.9%  ml infusion 100 mcg/hr (03/07/20 0517)       REVIEW OF SYSTEMS:  Unable to obtain history or review of systems secondary to clinical circumstances    OBJECTIVE:  Vitals:    03/07/20 0904   BP: 121/67   Pulse: 81   Resp: 28   Temp:    SpO2: 95%     FiO2 : 40 %     O2 Device: Ventilator    PHYSICAL EXAM:  General: Intubated and sedated  Skin: Chronic venous stasis and of lower extremities  HEENT: Head atraumatic and normocephalic, pupils equal and reactive to light, external ears normal, mouth with endotracheal tube in place. Neck: Supple, no palpable lymphadenopathy or thyromegaly  Cardiovascular: Regular rate and rhythm, no murmurs noted  Respiratory: Coarse bilateral breath sounds  Gastrointestinal: Abdomen is distended but soft.   Extremities: Well perfused, lower extremity edema noted  Neurological: Sedate  Psychological: Unable to assess    LABS:  WBC   Date Value Ref Range Status   03/07/2020 4.4 (L) 4.5 - 11.5 E9/L Final   03/06/2020 4.3 (L) 4.5 - 11.5 E9/L Final   03/04/2020 3.7 (L) 4.5 - 11.5 E9/L Final     Hemoglobin   Date Value Ref Range Status   03/07/2020 7.1 (L) 12.5 - 16.5 g/dL Final   03/06/2020 7.3 (L) 12.5 - 16.5 g/dL Final   03/04/2020 6.4 (L) 12.5 - 16.5 g/dL Final     Hematocrit   Date Value Ref Range Status   03/07/2020 21.1 (L) 37.0 - 54.0 % Final   03/06/2020 22.2 (L) 37.0 - 54.0 % Final   03/04/2020 20.2 (L) 37.0 - 54.0 % Final     MCV   Date Value Ref Range Status   03/07/2020 102.4 (H) 80.0 - intervene urgently. I managed/supervised life or organ supporting interventions that required frequent physician assessment. I devoted my full attention to the direct care of this patient for the amount of time indicated below. Time I spent with the family or surrogate(s) is included only if the patient was incapable of providing the necessary information or participating in medical decisions - Time devoted to teaching and to any procedures I billed separately is not included.     CRITICAL CARE TIME:  35 minues    Electronically signed by Chandni Nava MD on 3/7/2020 at 10:35 AM

## 2020-03-08 ENCOUNTER — APPOINTMENT (OUTPATIENT)
Dept: GENERAL RADIOLOGY | Age: 65
DRG: 720 | End: 2020-03-08
Attending: INTERNAL MEDICINE
Payer: COMMERCIAL

## 2020-03-08 LAB
ALBUMIN SERPL-MCNC: 2.8 G/DL (ref 3.5–5.2)
ALP BLD-CCNC: 73 U/L (ref 40–129)
ALT SERPL-CCNC: 16 U/L (ref 0–40)
ANION GAP SERPL CALCULATED.3IONS-SCNC: 9 MMOL/L (ref 7–16)
AST SERPL-CCNC: 43 U/L (ref 0–39)
BASOPHILIC STIPPLING: ABNORMAL
BASOPHILS ABSOLUTE: 0 E9/L (ref 0–0.2)
BASOPHILS RELATIVE PERCENT: 0.9 % (ref 0–2)
BILIRUB SERPL-MCNC: 4.2 MG/DL (ref 0–1.2)
BLOOD BANK DISPENSE STATUS: NORMAL
BLOOD BANK PRODUCT CODE: NORMAL
BPU ID: NORMAL
BUN BLDV-MCNC: 31 MG/DL (ref 8–23)
CALCIUM SERPL-MCNC: 9 MG/DL (ref 8.6–10.2)
CHLORIDE BLD-SCNC: 95 MMOL/L (ref 98–107)
CO2: 34 MMOL/L (ref 22–29)
CREAT SERPL-MCNC: 1.3 MG/DL (ref 0.7–1.2)
DESCRIPTION BLOOD BANK: NORMAL
EOSINOPHILS ABSOLUTE: 0.41 E9/L (ref 0.05–0.5)
EOSINOPHILS RELATIVE PERCENT: 7.8 % (ref 0–6)
GFR AFRICAN AMERICAN: >60
GFR NON-AFRICAN AMERICAN: 56 ML/MIN/1.73
GLUCOSE BLD-MCNC: 156 MG/DL (ref 74–99)
HCT VFR BLD CALC: 23.4 % (ref 37–54)
HEMOGLOBIN: 7.7 G/DL (ref 12.5–16.5)
HYPOCHROMIA: ABNORMAL
INR BLD: 2.3
INR BLD: 2.8
LYMPHOCYTES ABSOLUTE: 0.58 E9/L (ref 1.5–4)
LYMPHOCYTES RELATIVE PERCENT: 11.3 % (ref 20–42)
MCH RBC QN AUTO: 33.3 PG (ref 26–35)
MCHC RBC AUTO-ENTMCNC: 32.9 % (ref 32–34.5)
MCV RBC AUTO: 101.3 FL (ref 80–99.9)
METER GLUCOSE: 174 MG/DL (ref 74–99)
METER GLUCOSE: 197 MG/DL (ref 74–99)
MONOCYTES ABSOLUTE: 0.32 E9/L (ref 0.1–0.95)
MONOCYTES RELATIVE PERCENT: 6.1 % (ref 2–12)
NEUTROPHILS ABSOLUTE: 3.98 E9/L (ref 1.8–7.3)
NEUTROPHILS RELATIVE PERCENT: 74.8 % (ref 43–80)
PDW BLD-RTO: 19.9 FL (ref 11.5–15)
PLATELET # BLD: 42 E9/L (ref 130–450)
PLATELET CONFIRMATION: NORMAL
PMV BLD AUTO: 10.4 FL (ref 7–12)
POLYCHROMASIA: ABNORMAL
POTASSIUM SERPL-SCNC: 3.7 MMOL/L (ref 3.5–5)
PROTHROMBIN TIME: 26 SEC (ref 9.3–12.4)
PROTHROMBIN TIME: 32 SEC (ref 9.3–12.4)
RBC # BLD: 2.31 E12/L (ref 3.8–5.8)
SODIUM BLD-SCNC: 138 MMOL/L (ref 132–146)
TOTAL PROTEIN: 6.4 G/DL (ref 6.4–8.3)
WBC # BLD: 5.3 E9/L (ref 4.5–11.5)

## 2020-03-08 PROCEDURE — 6370000000 HC RX 637 (ALT 250 FOR IP): Performed by: INTERNAL MEDICINE

## 2020-03-08 PROCEDURE — 2000000000 HC ICU R&B

## 2020-03-08 PROCEDURE — 6360000002 HC RX W HCPCS: Performed by: INTERNAL MEDICINE

## 2020-03-08 PROCEDURE — 2580000003 HC RX 258: Performed by: INTERNAL MEDICINE

## 2020-03-08 PROCEDURE — C9113 INJ PANTOPRAZOLE SODIUM, VIA: HCPCS | Performed by: INTERNAL MEDICINE

## 2020-03-08 PROCEDURE — 94640 AIRWAY INHALATION TREATMENT: CPT

## 2020-03-08 PROCEDURE — 80053 COMPREHEN METABOLIC PANEL: CPT

## 2020-03-08 PROCEDURE — 85610 PROTHROMBIN TIME: CPT

## 2020-03-08 PROCEDURE — 85025 COMPLETE CBC W/AUTO DIFF WBC: CPT

## 2020-03-08 PROCEDURE — 94003 VENT MGMT INPAT SUBQ DAY: CPT

## 2020-03-08 PROCEDURE — P9059 PLASMA, FRZ BETWEEN 8-24HOUR: HCPCS

## 2020-03-08 PROCEDURE — P9016 RBC LEUKOCYTES REDUCED: HCPCS

## 2020-03-08 PROCEDURE — 74018 RADEX ABDOMEN 1 VIEW: CPT

## 2020-03-08 PROCEDURE — 36430 TRANSFUSION BLD/BLD COMPNT: CPT

## 2020-03-08 PROCEDURE — 82962 GLUCOSE BLOOD TEST: CPT

## 2020-03-08 RX ORDER — LACTULOSE 10 G/15ML
20 SOLUTION ORAL 3 TIMES DAILY
Status: DISCONTINUED | OUTPATIENT
Start: 2020-03-08 | End: 2020-03-14

## 2020-03-08 RX ORDER — DEXTROSE MONOHYDRATE 50 MG/ML
100 INJECTION, SOLUTION INTRAVENOUS PRN
Status: DISCONTINUED | OUTPATIENT
Start: 2020-03-08 | End: 2020-03-14

## 2020-03-08 RX ORDER — DEXTROSE MONOHYDRATE 25 G/50ML
12.5 INJECTION, SOLUTION INTRAVENOUS PRN
Status: DISCONTINUED | OUTPATIENT
Start: 2020-03-08 | End: 2020-03-14

## 2020-03-08 RX ORDER — NICOTINE POLACRILEX 4 MG
15 LOZENGE BUCCAL PRN
Status: DISCONTINUED | OUTPATIENT
Start: 2020-03-08 | End: 2020-03-14

## 2020-03-08 RX ADMIN — ARFORMOTEROL TARTRATE 15 MCG: 15 SOLUTION RESPIRATORY (INHALATION) at 06:12

## 2020-03-08 RX ADMIN — IPRATROPIUM BROMIDE AND ALBUTEROL SULFATE 1 AMPULE: .5; 3 SOLUTION RESPIRATORY (INHALATION) at 06:12

## 2020-03-08 RX ADMIN — ARFORMOTEROL TARTRATE 15 MCG: 15 SOLUTION RESPIRATORY (INHALATION) at 16:46

## 2020-03-08 RX ADMIN — PANTOPRAZOLE SODIUM 40 MG: 40 INJECTION, POWDER, FOR SOLUTION INTRAVENOUS at 09:08

## 2020-03-08 RX ADMIN — INSULIN LISPRO 1 UNITS: 100 INJECTION, SOLUTION INTRAVENOUS; SUBCUTANEOUS at 19:09

## 2020-03-08 RX ADMIN — IPRATROPIUM BROMIDE AND ALBUTEROL SULFATE 1 AMPULE: .5; 3 SOLUTION RESPIRATORY (INHALATION) at 09:45

## 2020-03-08 RX ADMIN — RIFAXIMIN 200 MG: 200 TABLET ORAL at 12:04

## 2020-03-08 RX ADMIN — IPRATROPIUM BROMIDE AND ALBUTEROL SULFATE 1 AMPULE: .5; 3 SOLUTION RESPIRATORY (INHALATION) at 21:11

## 2020-03-08 RX ADMIN — IPRATROPIUM BROMIDE AND ALBUTEROL SULFATE 1 AMPULE: .5; 3 SOLUTION RESPIRATORY (INHALATION) at 16:41

## 2020-03-08 RX ADMIN — BUDESONIDE 500 MCG: 0.5 INHALANT RESPIRATORY (INHALATION) at 06:12

## 2020-03-08 RX ADMIN — LACTULOSE 20 G: 20 SOLUTION ORAL at 21:50

## 2020-03-08 RX ADMIN — PHYTONADIONE 1 MG: 10 INJECTION, EMULSION INTRAMUSCULAR; INTRAVENOUS; SUBCUTANEOUS at 09:08

## 2020-03-08 RX ADMIN — LACTULOSE 20 G: 20 SOLUTION ORAL at 12:03

## 2020-03-08 RX ADMIN — Medication 125 MCG/HR: at 14:21

## 2020-03-08 RX ADMIN — Medication 100 MCG/HR: at 01:13

## 2020-03-08 RX ADMIN — BUDESONIDE 500 MCG: 0.5 INHALANT RESPIRATORY (INHALATION) at 16:42

## 2020-03-08 RX ADMIN — RIFAXIMIN 200 MG: 200 TABLET ORAL at 22:05

## 2020-03-08 RX ADMIN — INSULIN LISPRO 1 UNITS: 100 INJECTION, SOLUTION INTRAVENOUS; SUBCUTANEOUS at 13:24

## 2020-03-08 ASSESSMENT — PAIN SCALES - GENERAL
PAINLEVEL_OUTOF10: 0

## 2020-03-08 ASSESSMENT — PULMONARY FUNCTION TESTS
PIF_VALUE: 24
PIF_VALUE: 27
PIF_VALUE: 28
PIF_VALUE: 298
PIF_VALUE: 302
PIF_VALUE: 26
PIF_VALUE: 27

## 2020-03-08 ASSESSMENT — PAIN SCALES - WONG BAKER
WONGBAKER_NUMERICALRESPONSE: 0

## 2020-03-08 NOTE — PROGRESS NOTES
Internal Medicine Progress Note    Champ Whyte. Bella Christie., & [address] Bella Christie., F.A.C.O.I. Keerthi Hernández D.O., F.A.C.O.I. Primary Care Physician: Radha Costello MD   Admitting Physician:  Sly Rojas DO  Admission date and time: 3/6/2020 10:03 AM    Room:  [place] [address]  Admitting diagnosis: Acute and chronic respiratory failure with hypercapnia [facility]) [J96.22]    Patient Name: Jere Falcon  MRN: [id number]    Date of Service: 3/8/2020     Subjective:    Ely Acevedo is a 59 y. o.  male who was seen and examined today,3/8/2020, at the bedside. The patient remained relatively comfortable today and is sedated. He is currently on the ventilator and appear to be doing well. Consideration towards that of  moving forward with a tracheostomy. This is  somewhat of a dilemma since there are no family members and no assigned power of . Unfortunately he does have end-stage cirrhosis which is a complicating factor. We will transfuse 1 unit of packed cells today and also give fresh frozen plasma in anticipation of a elective surgical intervention. No family present during my examination. Review of System: Detailed review of systems unobtainable due to sedation      Physical Exam:  No intake/output data recorded. Intake/Output Summary (Last 24 hours) at 3/8/2020 1613  Last data filed at 3/8/2020 1445  Gross per 24 hour   Intake 2432.25 ml   Output 1250 ml   Net 1182.25 ml   I/O last 3 completed shifts: In: 2432.3 [I.V.:324.8; Blood:662.5; NG/GT:1345; IV Piggyback:100]  Out: 1250 [EVEZN:5313]  Patient Vitals for the past 96 hrs (Last 3 readings):   Weight   03/08/20 0600 296 lb (134.3 kg)   03/07/20 1130 292 lb 8 oz (132.7 kg)   03/06/20 0952 (!) 343 lb (155.6 kg)       Vital Signs:   Blood pressure 96/61, pulse 91, temperature 98.8 °F (37.1 °C), temperature source Core, resp.  rate 16, height 5' 9\" (1.753 m), weight 296 lb (134.3 kg), SpO2 92 %.    Sherwin Montague is a 59 y. o.  male who is alert to name only   General appearance:   Appears chronically ill intubated on ventilatory support  Head:  Normocephalic. No masses, lesions or tenderness. Eyes:  PERRLA. EOMI. Sclera clear. Buccal mucosa moist.  ENT:  Ears normal. Mucosa normal.  Nasogastric tube in place. Endotracheal tube in place  Neck:    Supple. Trachea midline. No thyromegaly. No JVD. No bruits. Heart:    Rhythm regular. Rate controlled. 1/6 systolic ejection murmurs. Lungs:    Symmetrical. Clear to auscultation bilaterally. No wheezes. No rhonchi. No rales. Mechanical breath sounds from ventilator  Abdomen:   Soft. Non-tender. Non-distended. Bowel sounds positive. No organomegaly or masses. No pain on palpation. Obese  Extremities:    Peripheral pulses present. No peripheral edema. No ulcers. No cyanosis. No clubbing. SCDs in place  Neurologic:    Alert x 1    Psych:   Sedated but responsive  Musculoskeletal:   Spine ROM normal. Muscular strength intact. Gait not assessed. Skin:    No rashes  Skin normal color and texture.   Genitalia/Breast:  Burgess catheter      Allergy:  Allergies   Allergen Reactions    Latex Itching    Aldactone [Spironolactone] Other (See Comments)     Confusion    Thimerosal Hives        Medication:  Scheduled Meds:   rifaximin  200 mg Oral TID    lactulose  20 g Oral TID    insulin lispro  0-6 Units Subcutaneous Q6H    ipratropium-albuterol  1 ampule Inhalation Q6H    pantoprazole  40 mg Intravenous Daily    Arformoterol Tartrate  15 mcg Nebulization BID    budesonide  500 mcg Nebulization BID     Continuous Infusions:   dextrose      fentaNYL 5 mcg/ml in 0.9%  ml infusion 125 mcg/hr (03/08/20 1421)       TPN  · Parenteral Nutrition Orders:  · Type and Formula:     · Lipids:    · Rate/Volume:    · Duration:    · Current PN Order Provides:    · Goal PN Orders Provides:        Tube feeding  · Tube Feeding (TF) Orders:   · Feeding Route: · Formula:    · Rate (ml/hr): · Volume (ml/day):    · Duration:    · Additives/Modulars:    · Water Flushes:    · Current TF & Flush Orders Provides:    · Goal TF & Flush Orders Provides:        Objective Data:  CBC:   Recent Labs     03/06/20  0244 03/07/20 0445 03/08/20 0613   WBC 4.3* 4.4* 5.3   HGB 7.3* 7.1* 7.7*   PLT 43* 38* 42*            BMP:    Recent Labs     03/06/20  0244 03/07/20 0445 03/08/20 0613    140 138   K 3.9 3.4* 3.7   CL 92* 97* 95*   CO2 40* 35* 34*   BUN 29* 27* 31*   CREATININE 1.4* 1.3* 1.3*   GLUCOSE 93 98 156*     CMP:    Lab Results   Component Value Date     03/08/2020    K 3.7 03/08/2020    K 3.3 08/08/2019    CL 95 03/08/2020    CO2 34 03/08/2020    BUN 31 03/08/2020    CREATININE 1.3 03/08/2020    GFRAA >60 03/08/2020    LABGLOM 56 03/08/2020    GLUCOSE 156 03/08/2020    PROT 6.4 03/08/2020    LABALBU 2.8 03/08/2020    CALCIUM 9.0 03/08/2020    BILITOT 4.2 03/08/2020    ALKPHOS 73 03/08/2020    AST 43 03/08/2020    ALT 16 03/08/2020     Hepatic:   Recent Labs     03/06/20 0244 03/07/20 0445 03/08/20 0613   AST 37 35 43*   ALT 17 14 16   BILITOT 3.7* 3.9* 4.2*   ALKPHOS 75 64 73     Troponin: No results for input(s): TROPONINI in the last 72 hours. BNP: No results for input(s): BNP in the last 72 hours. Lipids: No results for input(s): CHOL, HDL in the last 72 hours. Invalid input(s): LDLCALCU  ABGs:   Lab Results   Component Value Date    PO2ART 129.1 02/02/2020    WSE0LLI 64.4 02/02/2020     INR:   Recent Labs     03/07/20 0445 03/08/20 0613   INR 2.9 2.8   PROTIME 33.8* 32.0*     RAD: Xr Chest Portable    Result Date: 3/6/2020  LOCATION: 200 EXAM: XR CHEST PORTABLE, XR CHEST ABDOMEN NG PLACEMENT COMPARISON: March 3, 2020 HISTORY: Support device placement TECHNIQUE: Single view chest and abdomen. FINDINGS:  Endotracheal tube seen in the mid trachea just below the clavicles. The nasogastric tube is seen in the proximal stomach.  Widespread parenchymal with need for intubation and tracheostomy due to BiPAP dependency  · Sepsis secondary to Acinetobacter bacteremia. · History of healthcare-associated pneumonia with multifocal bilateral lung infiltrates. · Obstructive sleep apnea. · Acute-on-chronic kidney disease. · Coagulopathy secondary to underlying cirrhosis, probably secondary to alcoholism. · Coagulopathy secondary to alcoholic liver disease. · Anemia of chronic disease with superimposed folic acid deficiency. · Morbid obesity. · Gout. · Pancytopenia multifactorial nature  · Protein and caloric malnutrition due to increased catabolic needs    Plan:     · From a respiratory standpoint of view the patient appear to be stable since intubation. We have discussed tracheostomy at the present time. Consultation with Dr. Shari Antonio is underway. We have consulted  for emergency guardianship. · Due to the underlying liver disease and coagulopathy this is problematic for a elective tracheostomy. There has been no improvement with vitamin K administration. Fresh frozen plasma will be giving along with transfusion in anticipation. · Tube feedings will be monitor closely for signs of volume overload  · Monitor other comorbidity closely in the intensive care setting with further adjustment being made accordingly      30 minutes of critical care time was spent with the patient. This includes chart review, , reviewing rhythm strips, and discussion with those consultants involved in the patient's care. I reviewed the patient's past medical, surgical history and medication. Patient's medications were reviewed/continued/adjusted. Labs as ordered. Please see orders for further plan of care. Rhythm strips reviewed as well as consultant recommendations/notes and/or discussion. I reviewed the  course of events since last visit.     More than 50% of my  time was spent at the bedside counseling and/or coordination of care with the patient and/or family with face to face contact. This time was spent reviewing notes and laboratory data, instructing and counseling the patient. Time I spent with the family or surrogate(s) is included only if the patient was incapable of providing the necessary information or participating in medical decisionsI also discussed the differential diagnosis and all of the proposed management plans with the patient and individuals accompanying the patient. Hesham Pulido requires this high level of physician care and nursing in the ICU due the complexity of decision management and chance of rapid decline or death. I am ready available for decision making and intervention. I reviewed the relevant imaging studies and available reports. I also discussed the differential diagnosis and all of the proposed management plans with the patient and individuals accompanying the patient to this visit. I reviewed the relevant imaging studies and available reports. Yuriy Pinto DO FDarrylA.C.O.I.   On 3/8/2020  4:13 PM

## 2020-03-08 NOTE — PROGRESS NOTES
[address] [place] [address] [facility]  Progress Note    NAME:Florin Johnson  1955  DATE:20    F/U: atbx    SUBJECTIVE:  No chief complaint on file. unable to get  Ros pt on vent  More awake friend present    No FEVERS, CHILLS, nausea, vomiting, diarrhea. Patient is tolerating medications. No reported adverse drug reactions. Review of systems:  As stated above in the chief complaint, otherwise negative. Medications:  Scheduled Meds:   rifaximin  200 mg Oral TID    lactulose  20 g Oral TID    ipratropium-albuterol  1 ampule Inhalation Q6H    pantoprazole  40 mg Intravenous Daily    Arformoterol Tartrate  15 mcg Nebulization BID    budesonide  500 mcg Nebulization BID     Continuous Infusions:   fentaNYL 5 mcg/ml in 0.9%  ml infusion 125 mcg/hr (20 1006)     PRN Meds:potassium chloride **OR** potassium alternative oral replacement **OR** potassium chloride    OBJECTIVE:  BP (!) 98/48   Pulse 87   Temp 99.1 °F (37.3 °C) (Axillary)   Resp 14   Ht 5' 9\" (1.753 m)   Wt 296 lb (134.3 kg)   SpO2 93%   BMI 43.71 kg/m²   Temp  Av.6 °F (37 °C)  Min: 98.2 °F (36.8 °C)  Max: 99.1 °F (37.3 °C)  Constitutional:  The patient is awake, alert, and oriented. Skin:    Warm and dry. No rashes were noted. HEENT:   Round and reactive pupils. AT/NC vent  Neck:    Supple to movements. Chest:   No use of accessory muscles to breathe. Symmetrical expansion. Cardiovascular:  S1 and S2 are rhythmic and regular. No murmurs appreciated. Abdomen:   Positive bowel sounds to auscultation. Benign to palpation. ngt  Extremities:   No clubbing, no cyanosis,   edema.   CNS    Responsive follow commands  Lines: picc rue    Radiology:  Laboratory and Tests Review:  Lab Results   Component Value Date    WBC 5.3 2020    WBC 4.4 (L) 2020    WBC 4.3 (L) 2020    HGB 7.7 (L) 2020    HCT 23.4 (L) 2020    .3 (H) 2020    PLT 42 (L) 2020 Status   02/28/2020 Growth not present  Final     MRSA Culture Only   Date Value Ref Range Status   01/30/2020 Methicillin resistant Staph aureus not isolated  Final     CULTURE, RESPIRATORY   Date Value Ref Range Status   03/07/2020 Oral Pharyngeal Jo reduced  Preliminary     ASSESSMENT/PLAN:    Respiratory failure cx pending  Pancytopenia   -leukopenia  -cont to follow temp wbc  thrombocytopenia  · Monitor labs    Imaging and labs were reviewed per medical records. The patient was educated about the diagnosis, prognosis, indications, risks and benefits of treatment. An opportunity to ask questions was given to the patient/FAMILY.       Electronically signed by Wing Tyler MD on 3/8/2020 at 12:42 PM

## 2020-03-08 NOTE — PLAN OF CARE
Problem: Restraint Use - Nonviolent/Non-Self-Destructive Behavior:  Goal: Absence of restraint-related injury  Description: Absence of restraint-related injury  Outcome: Met This Shift     Problem: Risk for Impaired Skin Integrity  Goal: Tissue integrity - skin and mucous membranes  Description: Structural intactness and normal physiological function of skin and  mucous membranes. Outcome: Met This Shift     Problem: Falls - Risk of:  Goal: Will remain free from falls  Description: Will remain free from falls  Outcome: Met This Shift     Problem: Falls - Risk of:  Goal: Absence of physical injury  Description: Absence of physical injury  Outcome: Met This Shift     Problem: Restraint Use - Nonviolent/Non-Self-Destructive Behavior:  Goal: Absence of restraint indications  Description: Absence of restraint indications  Outcome: Not Met This Shift   Patient tried to pull at lines, drains & tubes when released from bilateral soft wrist restraints.

## 2020-03-08 NOTE — PROGRESS NOTES
PROGRESS NOTE  By Rnady Steve M.D. [facility]  Dr. Matthew Jimenez M.D.,  Dr. Cash Shah M.D.,   Dr. Ioana Husain D.O.,  Dr. Barb Brush M.D.,  Dr Rolly Montiel D.O. Vilma Erps  59 y.o.  male    SUBJECTIVE:  Nonverbal/sedated and intubated    OBJECTIVE:    BP (!) 98/48   Pulse 87   Temp 99.1 °F (37.3 °C) (Axillary)   Resp 14   Ht 5' 9\" (1.753 m)   Wt 296 lb (134.3 kg)   SpO2 93%   BMI 43.71 kg/m²     General: Morbidly obese  male. Intubated and sedated. HEENT: NG tube/ETT  Neck: Supple  Chest: symmetrical excursion/No wheezing  Cor: Regular. Abd.: Soft and obese. Appears nontender. BS +  Extr.:  3+ edema BLE  Skin: Warm and dry. Chronic indurative changes BLE        DATA:    Monitor data reviewed -sinus rhythm noted.        Lab Results   Component Value Date    WBC 5.3 03/08/2020    RBC 2.31 03/08/2020    HGB 7.7 03/08/2020    HCT 23.4 03/08/2020    .3 03/08/2020    MCH 33.3 03/08/2020    MCHC 32.9 03/08/2020    RDW 19.9 03/08/2020    PLT 42 03/08/2020    MPV 10.4 03/08/2020     Lab Results   Component Value Date     03/08/2020    K 3.7 03/08/2020    K 3.3 08/08/2019    CL 95 03/08/2020    CO2 34 03/08/2020    BUN 31 03/08/2020    CREATININE 1.3 03/08/2020    CALCIUM 9.0 03/08/2020    PROT 6.4 03/08/2020    LABALBU 2.8 03/08/2020    BILITOT 4.2 03/08/2020    ALKPHOS 73 03/08/2020    AST 43 03/08/2020    ALT 16 03/08/2020     Lab Results   Component Value Date    LIPASE 72 08/06/2019     No results found for: AMYLASE      ASSESSMENT/PLAN:  1.  Cirrhosis  -Recent EGD revealing no esophageal or gastric varices  -No evidence of acute decompensation  -MELD 26     2.  Anemia  -Monitor H&H  -No clear evidence of overt bleed  -Plan for colonoscopy when more stable -  next week unless overt bleed or precipitous drop over the weekend     3.  Respiratory failure/sepsis  -Intubated/mechanically ventilated  -Per admitting/CCM     4.  Morbid

## 2020-03-08 NOTE — PLAN OF CARE
Problem: Restraint Use - Nonviolent/Non-Self-Destructive Behavior:  Goal: Absence of restraint-related injury  Description: Absence of restraint-related injury  Outcome: Met This Shift     Problem: Risk for Impaired Skin Integrity  Goal: Tissue integrity - skin and mucous membranes  Description: Structural intactness and normal physiological function of skin and  mucous membranes. Outcome: Met This Shift     Problem: Falls - Risk of:  Goal: Will remain free from falls  Description: Will remain free from falls  Outcome: Met This Shift     Problem: Falls - Risk of:  Goal: Absence of physical injury  Description: Absence of physical injury  Outcome: Met This Shift     Problem: Restraint Use - Nonviolent/Non-Self-Destructive Behavior:  Goal: Absence of restraint indications  Description: Absence of restraint indications  Outcome: Not Met This Shift   Patient tries to pull at lines, drains, & tubes when released from bilateral soft wrist restraints.

## 2020-03-08 NOTE — PLAN OF CARE
Problem: Restraint Use - Nonviolent/Non-Self-Destructive Behavior:  Goal: Absence of restraint-related injury  Description: Absence of restraint-related injury  3/8/2020 0119 by Ludy Hugo RN  Outcome: Met This Shift  3/7/2020 1957 by Radha Olivier RN  Outcome: Met This Shift        Problem: Restraint Use - Nonviolent/Non-Self-Destructive Behavior:  Goal: Absence of restraint indications  Description: Absence of restraint indications  3/8/2020 0119 by Ludy Hugo RN  Outcome: Not Met This Shift  3/7/2020 1957 by Radha Olivier RN  Outcome: Not Met This Shift  Patient still reaching for Endotracheal tube    Problem: Falls - Risk of:  Goal: Absence of physical injury  Description: Absence of physical injury  3/7/2020 1957 by Radha Olivier RN  Outcome: Met This Shift

## 2020-03-08 NOTE — PROGRESS NOTES
Pulmonary/Critical Care Progress Note    We are following patient for acute on chronic respiratory failure and encephalopathy    SUBJECTIVE:  No acute overnight events. Patient remained stable on the mechanical ventilator. MEDICATIONS:   phytonadione (VITAMIN K)  IVPB  1 mg Intravenous BID    ipratropium-albuterol  1 ampule Inhalation Q6H    pantoprazole  40 mg Intravenous Daily    Arformoterol Tartrate  15 mcg Nebulization BID    budesonide  500 mcg Nebulization BID      fentaNYL 5 mcg/ml in 0.9%  ml infusion 100 mcg/hr (03/08/20 0113)       REVIEW OF SYSTEMS:  Unable to obtain history or review of systems secondary to clinical circumstances    OBJECTIVE:  Vitals:    03/08/20 0900   BP: (!) 97/44   Pulse: 87   Resp: 16   Temp:    SpO2: 92%     FiO2 : 40 %     O2 Device: Ventilator    PHYSICAL EXAM:  General: Intubated and sedated  Skin: Chronic venous stasis and of lower extremities  HEENT: Head atraumatic and normocephalic, pupils equal and reactive to light, external ears normal, mouth with endotracheal tube in place. Neck: Supple, no palpable lymphadenopathy or thyromegaly  Cardiovascular: Regular rate and rhythm, no murmurs noted  Respiratory: Coarse bilateral breath sounds  Gastrointestinal: Abdomen is distended but soft.   Extremities: Well perfused, lower extremity edema noted  Neurological: Sedate  Psychological: Unable to assess    LABS:  WBC   Date Value Ref Range Status   03/08/2020 5.3 4.5 - 11.5 E9/L Final   03/07/2020 4.4 (L) 4.5 - 11.5 E9/L Final   03/06/2020 4.3 (L) 4.5 - 11.5 E9/L Final     Hemoglobin   Date Value Ref Range Status   03/08/2020 7.7 (L) 12.5 - 16.5 g/dL Final   03/07/2020 7.1 (L) 12.5 - 16.5 g/dL Final   03/06/2020 7.3 (L) 12.5 - 16.5 g/dL Final     Hematocrit   Date Value Ref Range Status   03/08/2020 23.4 (L) 37.0 - 54.0 % Final   03/07/2020 21.1 (L) 37.0 - 54.0 % Final   03/06/2020 22.2 (L) 37.0 - 54.0 % Final     MCV   Date Value Ref Range Status   03/08/2020 03/08/2020 2.8 (L) 3.5 - 5.2 g/dL Final   03/07/2020 3.0 (L) 3.5 - 5.2 g/dL Final   03/06/2020 3.6 3.5 - 5.2 g/dL Final     Total Bilirubin   Date Value Ref Range Status   03/08/2020 4.2 (H) 0.0 - 1.2 mg/dL Final   03/07/2020 3.9 (H) 0.0 - 1.2 mg/dL Final   03/06/2020 3.7 (H) 0.0 - 1.2 mg/dL Final     Alkaline Phosphatase   Date Value Ref Range Status   03/08/2020 73 40 - 129 U/L Final   03/07/2020 64 40 - 129 U/L Final   03/06/2020 75 40 - 129 U/L Final     AST   Date Value Ref Range Status   03/08/2020 43 (H) 0 - 39 U/L Final   03/07/2020 35 0 - 39 U/L Final   03/06/2020 37 0 - 39 U/L Final     ALT   Date Value Ref Range Status   03/08/2020 16 0 - 40 U/L Final   03/07/2020 14 0 - 40 U/L Final   03/06/2020 17 0 - 40 U/L Final     GFR Non-   Date Value Ref Range Status   03/08/2020 56 >=60 mL/min/1.73 Final     Comment:     Chronic Kidney Disease: less than 60 ml/min/1.73 sq.m. Kidney Failure: less than 15 ml/min/1.73 sq.m. Results valid for patients 18 years and older. 03/07/2020 56 >=60 mL/min/1.73 Final     Comment:     Chronic Kidney Disease: less than 60 ml/min/1.73 sq.m. Kidney Failure: less than 15 ml/min/1.73 sq.m. Results valid for patients 18 years and older. 03/06/2020 51 >=60 mL/min/1.73 Final     Comment:     Chronic Kidney Disease: less than 60 ml/min/1.73 sq.m. Kidney Failure: less than 15 ml/min/1.73 sq.m. Results valid for patients 18 years and older.        GFR    Date Value Ref Range Status   03/08/2020 >60  Final   03/07/2020 >60  Final   03/06/2020 >60  Final     Magnesium   Date Value Ref Range Status   02/10/2020 1.5 (L) 1.6 - 2.6 mg/dL Final   02/09/2020 1.6 1.6 - 2.6 mg/dL Final   02/08/2020 1.6 1.6 - 2.6 mg/dL Final     Phosphorus   Date Value Ref Range Status   02/10/2020 2.6 2.5 - 4.5 mg/dL Final   02/09/2020 2.7 2.5 - 4.5 mg/dL Final   02/08/2020 2.8 2.5 - 4.5 mg/dL Final     Recent Labs     03/06/20  1057   PH 7.438 medications as able. 3. Morbid obesity with alveolar hypoventilation: Currently treated with endotracheal tube and vent. As above, if the medical decision maker is desirous of aggressive care, the patient will require tracheostomy tube placement. 4. 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 Review of prior abdominal imaging revealed only trace ascites. He has had an EGD which did not show esophageal or gastric varices. He has associated thrombocytopenia and elevated INR. 5. Abnormal CXR: Patient has impressive bilateral infiltrative changes. This has not improved despite aggressive diuresis. Certainly acute lung injury related to recurrent aspiration is in the differential diagnosis. A bland alveolar hemorrhage related to his thrombocytopenia, coagulopathy, and heart failure also in the differential diagnosis. Currently have a low clinical suspicion for infection and we will monitor off antibiotics. Otherwise, care is supportive at this time. 6. Failure to thrive in adult: Reportedly, there is no available medical decision maker such as next of kin. We have consulted  to assist with potential guardianship. Regardless, the patient's overall prognosis is poor, and he is hospice appropriate in my opinion. In light of his comorbidities, he is not an ideal candidate for tracheostomy tube placement. He is currently full code. ATTESTATION:  ICU Staff Physician note of personal involvement in care. As the attending physician, I certify that I personally reviewed the patients history and personally examined the patient to confirm the physical findings described above, And that I reviewed the relevant imaging studies and available reports. I also discussed the differential diagnosis and all of the proposed management plans with the patient and individuals accompanying the patient to this visit.   They had the opportunity to ask questions about the proposed management plans

## 2020-03-09 LAB
ALBUMIN SERPL-MCNC: 3 G/DL (ref 3.5–5.2)
ALP BLD-CCNC: 100 U/L (ref 40–129)
ALT SERPL-CCNC: 18 U/L (ref 0–40)
ANION GAP SERPL CALCULATED.3IONS-SCNC: 8 MMOL/L (ref 7–16)
ANISOCYTOSIS: ABNORMAL
AST SERPL-CCNC: 41 U/L (ref 0–39)
BASOPHILS ABSOLUTE: 0 E9/L (ref 0–0.2)
BASOPHILS RELATIVE PERCENT: 0.7 % (ref 0–2)
BILIRUB SERPL-MCNC: 3.9 MG/DL (ref 0–1.2)
BUN BLDV-MCNC: 37 MG/DL (ref 8–23)
BURR CELLS: ABNORMAL
CALCIUM SERPL-MCNC: 9 MG/DL (ref 8.6–10.2)
CHLORIDE BLD-SCNC: 95 MMOL/L (ref 98–107)
CO2: 35 MMOL/L (ref 22–29)
CREAT SERPL-MCNC: 1.4 MG/DL (ref 0.7–1.2)
CULTURE, RESPIRATORY: NORMAL
EOSINOPHILS ABSOLUTE: 0.67 E9/L (ref 0.05–0.5)
EOSINOPHILS RELATIVE PERCENT: 9.6 % (ref 0–6)
FUNGUS (MYCOLOGY) CULTURE: NORMAL
FUNGUS STAIN: NORMAL
GFR AFRICAN AMERICAN: >60
GFR NON-AFRICAN AMERICAN: 51 ML/MIN/1.73
GLUCOSE BLD-MCNC: 147 MG/DL (ref 74–99)
HCT VFR BLD CALC: 24.7 % (ref 37–54)
HEMOGLOBIN: 8.2 G/DL (ref 12.5–16.5)
HYPOCHROMIA: ABNORMAL
INR BLD: 2.2
LYMPHOCYTES ABSOLUTE: 0.77 E9/L (ref 1.5–4)
LYMPHOCYTES RELATIVE PERCENT: 11.4 % (ref 20–42)
MCH RBC QN AUTO: 33.9 PG (ref 26–35)
MCHC RBC AUTO-ENTMCNC: 33.2 % (ref 32–34.5)
MCV RBC AUTO: 102.1 FL (ref 80–99.9)
METER GLUCOSE: 167 MG/DL (ref 74–99)
METER GLUCOSE: 178 MG/DL (ref 74–99)
METER GLUCOSE: 186 MG/DL (ref 74–99)
MONOCYTES ABSOLUTE: 0.28 E9/L (ref 0.1–0.95)
MONOCYTES RELATIVE PERCENT: 3.5 % (ref 2–12)
MYELOCYTE PERCENT: 0.9 % (ref 0–0)
NEUTROPHILS ABSOLUTE: 5.32 E9/L (ref 1.8–7.3)
NEUTROPHILS RELATIVE PERCENT: 74.6 % (ref 43–80)
OVALOCYTES: ABNORMAL
PDW BLD-RTO: 20.2 FL (ref 11.5–15)
PLATELET # BLD: 41 E9/L (ref 130–450)
PLATELET CONFIRMATION: NORMAL
PMV BLD AUTO: 9.9 FL (ref 7–12)
POIKILOCYTES: ABNORMAL
POLYCHROMASIA: ABNORMAL
POTASSIUM SERPL-SCNC: 3.7 MMOL/L (ref 3.5–5)
PROTHROMBIN TIME: 25.3 SEC (ref 9.3–12.4)
RBC # BLD: 2.42 E12/L (ref 3.8–5.8)
SCHISTOCYTES: ABNORMAL
SMEAR, RESPIRATORY: NORMAL
SODIUM BLD-SCNC: 138 MMOL/L (ref 132–146)
TOTAL PROTEIN: 7.1 G/DL (ref 6.4–8.3)
WBC # BLD: 7 E9/L (ref 4.5–11.5)

## 2020-03-09 PROCEDURE — 2580000003 HC RX 258: Performed by: INTERNAL MEDICINE

## 2020-03-09 PROCEDURE — 6360000002 HC RX W HCPCS: Performed by: INTERNAL MEDICINE

## 2020-03-09 PROCEDURE — 85610 PROTHROMBIN TIME: CPT

## 2020-03-09 PROCEDURE — C9113 INJ PANTOPRAZOLE SODIUM, VIA: HCPCS | Performed by: INTERNAL MEDICINE

## 2020-03-09 PROCEDURE — 94640 AIRWAY INHALATION TREATMENT: CPT

## 2020-03-09 PROCEDURE — 80053 COMPREHEN METABOLIC PANEL: CPT

## 2020-03-09 PROCEDURE — 6370000000 HC RX 637 (ALT 250 FOR IP): Performed by: INTERNAL MEDICINE

## 2020-03-09 PROCEDURE — 2000000000 HC ICU R&B

## 2020-03-09 PROCEDURE — 94003 VENT MGMT INPAT SUBQ DAY: CPT

## 2020-03-09 PROCEDURE — 82962 GLUCOSE BLOOD TEST: CPT

## 2020-03-09 PROCEDURE — 85025 COMPLETE CBC W/AUTO DIFF WBC: CPT

## 2020-03-09 RX ADMIN — RIFAXIMIN 200 MG: 200 TABLET ORAL at 21:39

## 2020-03-09 RX ADMIN — Medication 125 MCG/HR: at 12:23

## 2020-03-09 RX ADMIN — IPRATROPIUM BROMIDE AND ALBUTEROL SULFATE 1 AMPULE: .5; 3 SOLUTION RESPIRATORY (INHALATION) at 09:40

## 2020-03-09 RX ADMIN — IPRATROPIUM BROMIDE AND ALBUTEROL SULFATE 1 AMPULE: .5; 3 SOLUTION RESPIRATORY (INHALATION) at 16:50

## 2020-03-09 RX ADMIN — ARFORMOTEROL TARTRATE 15 MCG: 15 SOLUTION RESPIRATORY (INHALATION) at 16:50

## 2020-03-09 RX ADMIN — IPRATROPIUM BROMIDE AND ALBUTEROL SULFATE 1 AMPULE: .5; 3 SOLUTION RESPIRATORY (INHALATION) at 21:04

## 2020-03-09 RX ADMIN — LACTULOSE 20 G: 20 SOLUTION ORAL at 21:39

## 2020-03-09 RX ADMIN — BUDESONIDE 500 MCG: 0.5 INHALANT RESPIRATORY (INHALATION) at 16:50

## 2020-03-09 RX ADMIN — Medication 125 MCG/HR: at 01:41

## 2020-03-09 RX ADMIN — LACTULOSE 20 G: 20 SOLUTION ORAL at 03:55

## 2020-03-09 RX ADMIN — INSULIN LISPRO 1 UNITS: 100 INJECTION, SOLUTION INTRAVENOUS; SUBCUTANEOUS at 01:38

## 2020-03-09 RX ADMIN — RIFAXIMIN 200 MG: 200 TABLET ORAL at 03:55

## 2020-03-09 RX ADMIN — RIFAXIMIN 200 MG: 200 TABLET ORAL at 12:30

## 2020-03-09 RX ADMIN — IPRATROPIUM BROMIDE AND ALBUTEROL SULFATE 1 AMPULE: .5; 3 SOLUTION RESPIRATORY (INHALATION) at 05:59

## 2020-03-09 RX ADMIN — BUDESONIDE 500 MCG: 0.5 INHALANT RESPIRATORY (INHALATION) at 06:00

## 2020-03-09 RX ADMIN — INSULIN LISPRO 1 UNITS: 100 INJECTION, SOLUTION INTRAVENOUS; SUBCUTANEOUS at 19:04

## 2020-03-09 RX ADMIN — LACTULOSE 20 G: 20 SOLUTION ORAL at 12:37

## 2020-03-09 RX ADMIN — INSULIN LISPRO 1 UNITS: 100 INJECTION, SOLUTION INTRAVENOUS; SUBCUTANEOUS at 13:00

## 2020-03-09 RX ADMIN — ARFORMOTEROL TARTRATE 15 MCG: 15 SOLUTION RESPIRATORY (INHALATION) at 05:59

## 2020-03-09 RX ADMIN — PANTOPRAZOLE SODIUM 40 MG: 40 INJECTION, POWDER, FOR SOLUTION INTRAVENOUS at 09:20

## 2020-03-09 RX ADMIN — Medication 150 MCG/HR: at 23:49

## 2020-03-09 ASSESSMENT — PAIN SCALES - GENERAL
PAINLEVEL_OUTOF10: 0

## 2020-03-09 ASSESSMENT — PULMONARY FUNCTION TESTS
PIF_VALUE: 23
PIF_VALUE: 21
PIF_VALUE: 28
PIF_VALUE: 31
PIF_VALUE: 24
PIF_VALUE: 28
PIF_VALUE: 34

## 2020-03-09 NOTE — CARE COORDINATION
3/9/2020  Request for Emergent Guardianship paperwork is in the soft blue chart- needs completed by pcp then SS/CM can call the courts to  the papers- Rasheed Mathews at [phone] with $161.00 check needed by [facility]. Pt is from [facility]- plans to return.  PRECERT NEEDED for [facility] return.  CM/SS to follow.  Electronically signed by SENTHIL Saldana  on 3/9/2020 at 10:12 AM self-care/home management

## 2020-03-09 NOTE — PROGRESS NOTES
thyromegaly or carotid bruits  Chest: Decreased breath sounds, no wheezing, +ve crackles and no tenderness over ribs   Cardiovascular: Normal S1 , S2, regular rate and rhythm, no murmur, rub or gallop  Abdomen: Normal sounds present, soft, lax with no tenderness, no hepatosplenomegaly, and no masses  Extremities: +ve edema. Pulses are equally present. Skin: intact, no rashes   Neurologic: Awake and follows commands, No focal deficit     Investigations:  Labs, radiological imaging and cardiac work up were reviewed        ICU STAFF PHYSICIAN NOTE OF PERSONAL INVOLVEMENT IN CARE  As the attending physician, I certify that I personally reviewed the patient's history and personally examined the patient to confirm the physical findings described above, and that I reviewed the relevant imaging studies and available reports. I also discussed the differential diagnosis and all of the proposed management plans with the patient and individuals accompanying the patient to this visit. They had the opportunity to ask questions about the proposed management plans and to have those questions answered. This patient has a high probability of sudden, clinically significant deterioration, which requires the highest level of physician preparedness to intervene urgently. I managed/supervised life or organ supporting interventions that required frequent physician assessment. I devoted my full attention to the direct care of this patient for the amount of time indicated below. Time I spent with family or surrogate(s) is included only if the patient was incapable of providing the necessary information or participating in medical decision-making. Time devoted to teaching and to any procedures I billed separately is not included.   Critical Care Time: 35 minutes      Electronically signed by Viola Riggins MD on 3/9/2020 at 2:25 PM

## 2020-03-09 NOTE — FLOWSHEET NOTE
Pulling at ETT with restraint release, unable to redirect. Bilat soft wrist restraints continue for pt safety.

## 2020-03-09 NOTE — CARE COORDINATION
3/9/2020   Spoke to Charisse mota's listed contact. Pt has an aunt and uncle in [place]- however, family is unable to access pts cell phone to see if he has the phone number listed. Call placed by [facility] Liaison to Ghulam Ramirezs- who is a friend listed contact-  who is in [place] visiting his sick family- however, Sundeep Monteiro wife will be in later today. NO listed NOK noted. Guardianship papers in soft blue chart.  Electronically signed by Sonya Saleh RN-BC on 3/9/2020 at 12:47 PM

## 2020-03-09 NOTE — PLAN OF CARE
Problem: Restraint Use - Nonviolent/Non-Self-Destructive Behavior:  Goal: Absence of restraint-related injury  Description: Absence of restraint-related injury  3/9/2020 0014 by Micaela Edwards RN  Outcome: Met This Shift  3/8/2020 1841 by Rocco Jolly RN  Outcome: Met This Shift     Problem: Risk for Impaired Skin Integrity  Goal: Tissue integrity - skin and mucous membranes  Description: Structural intactness and normal physiological function of skin and  mucous membranes.   3/9/2020 0014 by Micaela Edwards RN  Outcome: Ongoing  3/8/2020 1841 by Rocco Jolly RN  Outcome: Met This Shift     Problem: Falls - Risk of:  Goal: Will remain free from falls  Description: Will remain free from falls  3/9/2020 0014 by Micaela Edwards RN  Outcome: Ongoing  3/8/2020 1841 by Rocco Jolly RN  Outcome: Met This Shift     Problem: Restraint Use - Nonviolent/Non-Self-Destructive Behavior:  Goal: Absence of restraint indications  Description: Absence of restraint indications  3/9/2020 0014 by Micaela Edwards RN  Outcome: Not Met This Shift  3/8/2020 1841 by Rocco Jolly RN  Outcome: Not Met This Shift    Patient reaching for Endotracheal Tube

## 2020-03-10 LAB
ALBUMIN SERPL-MCNC: 3.2 G/DL (ref 3.5–5.2)
ALP BLD-CCNC: 99 U/L (ref 40–129)
ALT SERPL-CCNC: 17 U/L (ref 0–40)
ANION GAP SERPL CALCULATED.3IONS-SCNC: 9 MMOL/L (ref 7–16)
AST SERPL-CCNC: 41 U/L (ref 0–39)
BASOPHILS ABSOLUTE: 0.07 E9/L (ref 0–0.2)
BASOPHILS RELATIVE PERCENT: 1 % (ref 0–2)
BILIRUB SERPL-MCNC: 3.4 MG/DL (ref 0–1.2)
BUN BLDV-MCNC: 50 MG/DL (ref 8–23)
CALCIUM SERPL-MCNC: 8.9 MG/DL (ref 8.6–10.2)
CHLORIDE BLD-SCNC: 96 MMOL/L (ref 98–107)
CO2: 34 MMOL/L (ref 22–29)
CREAT SERPL-MCNC: 1.5 MG/DL (ref 0.7–1.2)
EOSINOPHILS ABSOLUTE: 0.67 E9/L (ref 0.05–0.5)
EOSINOPHILS RELATIVE PERCENT: 10 % (ref 0–6)
GFR AFRICAN AMERICAN: 57
GFR NON-AFRICAN AMERICAN: 47 ML/MIN/1.73
GLUCOSE BLD-MCNC: 170 MG/DL (ref 74–99)
HCT VFR BLD CALC: 24 % (ref 37–54)
HEMOGLOBIN: 7.7 G/DL (ref 12.5–16.5)
INR BLD: 2.4
LYMPHOCYTES ABSOLUTE: 0.54 E9/L (ref 1.5–4)
LYMPHOCYTES RELATIVE PERCENT: 8 % (ref 20–42)
MCH RBC QN AUTO: 33.3 PG (ref 26–35)
MCHC RBC AUTO-ENTMCNC: 32.1 % (ref 32–34.5)
MCV RBC AUTO: 103.9 FL (ref 80–99.9)
METER GLUCOSE: 160 MG/DL (ref 74–99)
METER GLUCOSE: 174 MG/DL (ref 74–99)
METER GLUCOSE: 178 MG/DL (ref 74–99)
METER GLUCOSE: 179 MG/DL (ref 74–99)
MONOCYTES ABSOLUTE: 0.87 E9/L (ref 0.1–0.95)
MONOCYTES RELATIVE PERCENT: 13 % (ref 2–12)
NEUTROPHILS ABSOLUTE: 4.56 E9/L (ref 1.8–7.3)
NEUTROPHILS RELATIVE PERCENT: 68 % (ref 43–80)
PDW BLD-RTO: 20.5 FL (ref 11.5–15)
PLATELET # BLD: 39 E9/L (ref 130–450)
PLATELET CONFIRMATION: NORMAL
PMV BLD AUTO: 10 FL (ref 7–12)
POTASSIUM SERPL-SCNC: 4.1 MMOL/L (ref 3.5–5)
PROTHROMBIN TIME: 27 SEC (ref 9.3–12.4)
RBC # BLD: 2.31 E12/L (ref 3.8–5.8)
SODIUM BLD-SCNC: 139 MMOL/L (ref 132–146)
TOTAL PROTEIN: 7.2 G/DL (ref 6.4–8.3)
WBC # BLD: 6.7 E9/L (ref 4.5–11.5)

## 2020-03-10 PROCEDURE — C9113 INJ PANTOPRAZOLE SODIUM, VIA: HCPCS | Performed by: INTERNAL MEDICINE

## 2020-03-10 PROCEDURE — 2500000003 HC RX 250 WO HCPCS: Performed by: INTERNAL MEDICINE

## 2020-03-10 PROCEDURE — 2000000000 HC ICU R&B

## 2020-03-10 PROCEDURE — 94640 AIRWAY INHALATION TREATMENT: CPT

## 2020-03-10 PROCEDURE — 36592 COLLECT BLOOD FROM PICC: CPT

## 2020-03-10 PROCEDURE — 6360000002 HC RX W HCPCS: Performed by: INTERNAL MEDICINE

## 2020-03-10 PROCEDURE — 82962 GLUCOSE BLOOD TEST: CPT

## 2020-03-10 PROCEDURE — 85610 PROTHROMBIN TIME: CPT

## 2020-03-10 PROCEDURE — 6370000000 HC RX 637 (ALT 250 FOR IP): Performed by: INTERNAL MEDICINE

## 2020-03-10 PROCEDURE — 80053 COMPREHEN METABOLIC PANEL: CPT

## 2020-03-10 PROCEDURE — 85025 COMPLETE CBC W/AUTO DIFF WBC: CPT

## 2020-03-10 PROCEDURE — 2580000003 HC RX 258: Performed by: INTERNAL MEDICINE

## 2020-03-10 PROCEDURE — 94003 VENT MGMT INPAT SUBQ DAY: CPT

## 2020-03-10 RX ADMIN — RIFAXIMIN 200 MG: 200 TABLET ORAL at 12:30

## 2020-03-10 RX ADMIN — Medication 175 MCG/HR: at 08:11

## 2020-03-10 RX ADMIN — IPRATROPIUM BROMIDE AND ALBUTEROL SULFATE 1 AMPULE: .5; 3 SOLUTION RESPIRATORY (INHALATION) at 06:55

## 2020-03-10 RX ADMIN — Medication 200 MCG/HR: at 14:52

## 2020-03-10 RX ADMIN — LACTULOSE 20 G: 20 SOLUTION ORAL at 04:48

## 2020-03-10 RX ADMIN — INSULIN LISPRO 1 UNITS: 100 INJECTION, SOLUTION INTRAVENOUS; SUBCUTANEOUS at 13:00

## 2020-03-10 RX ADMIN — PANTOPRAZOLE SODIUM 40 MG: 40 INJECTION, POWDER, FOR SOLUTION INTRAVENOUS at 07:49

## 2020-03-10 RX ADMIN — DEXMEDETOMIDINE 27.5 MCG/HR: 100 INJECTION, SOLUTION, CONCENTRATE INTRAVENOUS at 15:43

## 2020-03-10 RX ADMIN — LACTULOSE 20 G: 20 SOLUTION ORAL at 12:59

## 2020-03-10 RX ADMIN — IPRATROPIUM BROMIDE AND ALBUTEROL SULFATE 1 AMPULE: .5; 3 SOLUTION RESPIRATORY (INHALATION) at 21:27

## 2020-03-10 RX ADMIN — ARFORMOTEROL TARTRATE 15 MCG: 15 SOLUTION RESPIRATORY (INHALATION) at 19:51

## 2020-03-10 RX ADMIN — LACTULOSE 20 G: 20 SOLUTION ORAL at 20:54

## 2020-03-10 RX ADMIN — BUDESONIDE 500 MCG: 0.5 INHALANT RESPIRATORY (INHALATION) at 19:50

## 2020-03-10 RX ADMIN — INSULIN LISPRO 1 UNITS: 100 INJECTION, SOLUTION INTRAVENOUS; SUBCUTANEOUS at 18:47

## 2020-03-10 RX ADMIN — INSULIN LISPRO 1 UNITS: 100 INJECTION, SOLUTION INTRAVENOUS; SUBCUTANEOUS at 01:19

## 2020-03-10 RX ADMIN — RIFAXIMIN 200 MG: 200 TABLET ORAL at 20:54

## 2020-03-10 RX ADMIN — RIFAXIMIN 200 MG: 200 TABLET ORAL at 04:48

## 2020-03-10 RX ADMIN — BUDESONIDE 500 MCG: 0.5 INHALANT RESPIRATORY (INHALATION) at 06:55

## 2020-03-10 RX ADMIN — IPRATROPIUM BROMIDE AND ALBUTEROL SULFATE 1 AMPULE: .5; 3 SOLUTION RESPIRATORY (INHALATION) at 17:56

## 2020-03-10 RX ADMIN — ARFORMOTEROL TARTRATE 15 MCG: 15 SOLUTION RESPIRATORY (INHALATION) at 06:55

## 2020-03-10 RX ADMIN — INSULIN LISPRO 1 UNITS: 100 INJECTION, SOLUTION INTRAVENOUS; SUBCUTANEOUS at 08:11

## 2020-03-10 ASSESSMENT — PULMONARY FUNCTION TESTS
PIF_VALUE: 44
PIF_VALUE: 30
PIF_VALUE: 30
PIF_VALUE: 28

## 2020-03-10 NOTE — PLAN OF CARE
Problem: Restraint Use - Nonviolent/Non-Self-Destructive Behavior:  Goal: Absence of restraint indications  3/10/2020 1644 by Tara Guerrero RN  Outcome: Not Met This Shift     Problem: Restraint Use - Nonviolent/Non-Self-Destructive Behavior:  Goal: Absence of restraint-related injury  3/10/2020 1644 by Tara Guerrero RN  Outcome: Met This Shift     Problem: Risk for Impaired Skin Integrity  Goal: Tissue integrity - skin and mucous membranes  3/10/2020 1644 by Tara Guerrero RN  Outcome: Met This Shift     Problem: Falls - Risk of:  Goal: Will remain free from falls  Outcome: Met This Shift     Problem: Falls - Risk of:  Goal: Absence of physical injury  Outcome: Met This Shift

## 2020-03-10 NOTE — PROGRESS NOTES
Assessment and Plan  Patient is a 59 y.o. male with the following medical Problems:   1  Acute on chronic hypoxic and hypercapnic respiratory failure requiring intubation  2. Morbid obesity  3. Obesity hypoventilation syndrome  4. Pleural effusions  5. Cirrhosis  6. Chronic anemia and thrombocytopenia  7. Hx of Severe Sepsis with septic shock secondary to Acinetobacter and staph bacteremia as well as Proteus bacteriuria. 8. HFpEF  9. Bilateral lower extremities edema  10. Metabolic encephalopathy        Plan of care:  #1 patient was scheduled for a tracheostomy. Will discuss with the general surgeon the suitability of placing a PEG/Trach in this patient who is cirrhotic has bilateral pleural effusions. Alternative is Pleurx catheters and extubation. 2.  Patient may benefit from a Pleurx catheter placement and drainage of pleural effusions. 3.  Transfuse as needed for hemoglobin less than 7. Patient is chronically thrombocytopenic and does not require platelet transfusion except if he is scheduled for procedure  4. rifaximin and PPI. 5.  Guardianship paperwork filled. 6.  Goals of care discussion with an appointed guardian in the near future. History of Present Illness:   Patient is a 75-year-old man with above-mentioned medical problems has multiple recurrent admission and presented from [facility] hospital with acute on chronic hypoxic and hypercapnic respiratory failure. Patient was intubated. CT scan shows large bilateral pleural effusions. Patient has severe coagulopathy.     Past Medical History:  Past Medical History:   Diagnosis Date    Acute diastolic CHF (congestive heart failure) ([address] 11/17/15    Echo 11/18/15 EF 73%    Dermatitis     due to thimersol    Heart valve problem     leaky    Hx of cardiovascular stress test 12/9/2015    Lexiscan    Hypertension     Obesity         Family History:   Family History   Problem Relation Age of Onset    Parkinsonism Mother     COPD Father    Ang Heart Disease Father        Allergies:         Latex;  Aldactone [spironolactone]; and Thimerosal    Social history:  Social History     Socioeconomic History    Marital status: Single     Spouse name: Not on file    Number of children: 0    Years of education: Not on file    Highest education level: Not on file   Occupational History    Occupation: Retired-[employer]   Social Needs    Financial resource strain: Not on file    Food insecurity     Worry: Not on file     Inability: Not on file   [employer] needs     Medical: Not on file     Non-medical: Not on file   Tobacco Use    Smoking status: Never Smoker    Smokeless tobacco: Never Used   Substance and Sexual Activity    Alcohol use: Not Currently    Drug use: No    Sexual activity: Not Currently   Lifestyle    Physical activity     Days per week: Not on file     Minutes per session: Not on file    Stress: Not on file   Relationships    Social connections     Talks on phone: Not on file     Gets together: Not on file     Attends [employer] service: Not on file     Active member of club or organization: Not on file     Attends meetings of clubs or organizations: Not on file     Relationship status: Not on file    Intimate partner violence     Fear of current or ex partner: Not on file     Emotionally abused: Not on file     Physically abused: Not on file     Forced sexual activity: Not on file   Other Topics Concern    Not on file   Social History Narrative    Not on file       Current Medications:     Current Facility-Administered Medications:     rifaximin (XIFAXAN) tablet 200 mg, 200 mg, Oral, TID, Alana Hyman MD, 200 mg at 03/10/20 0448    lactulose (CHRONULAC) 10 GM/15ML solution 20 g, 20 g, Oral, TID, Alana Hyman MD, 20 g at 03/10/20 0448    insulin lispro (HUMALOG) injection vial 0-6 Units, 0-6 Units, Subcutaneous, Q6H, Alana Hyman MD, 1 Units at 03/10/20 0811    glucose (GLUTOSE) 40 % oral gel 15 g, 15 g, Oral, PRN, Ellyn Bartholomew MD    dextrose 50 % IV solution, 12.5 g, Intravenous, PRN, Ellyn Bartholomew MD    glucagon (rDNA) injection 1 mg, 1 mg, Intramuscular, PRN, Ellyn Bartholomew MD    dextrose 5 % solution, 100 mL/hr, Intravenous, PRN, Ellyn Bartholomew MD    potassium chloride (KLOR-CON M) extended release tablet 40 mEq, 40 mEq, Oral, PRN, 40 mEq at 03/07/20 1203 **OR** potassium bicarb-citric acid (EFFER-K) effervescent tablet 40 mEq, 40 mEq, Oral, PRN **OR** potassium chloride 10 mEq/100 mL IVPB (Peripheral Line), 10 mEq, Intravenous, PRN, Ellyn Bartholomew MD    fentaNYL 5 mcg/ml in 0.9%  ml infusion, 25 mcg/hr, Intravenous, Continuous, Ellyn Bartholomew MD, Last Rate: 40 mL/hr at 03/10/20 1141, 200 mcg/hr at 03/10/20 1141    ipratropium-albuterol (DUONEB) nebulizer solution 1 ampule, 1 ampule, Inhalation, Q6H, Yuriy Pinto, DO, 1 ampule at 03/10/20 0655    pantoprazole (PROTONIX) injection 40 mg, 40 mg, Intravenous, Daily, Yuriy E Gilbertoel, DO, 40 mg at 03/10/20 0749    Arformoterol Tartrate (BROVANA) nebulizer solution 15 mcg, 15 mcg, Nebulization, BID, Yuriy E Gilbertoel, DO, 15 mcg at 03/10/20 0655    budesonide (PULMICORT) nebulizer suspension 500 mcg, 500 mcg, Nebulization, BID, Lady Montero Bisel, DO, 500 mcg at 03/10/20 9464    Review of Systems:   Unable to obtain due to medical condition    Physical Exam:   Vital Signs:  BP (!) 107/58   Pulse 77   Temp 98.4 °F (36.9 °C)   Resp 17   Ht 5' 9\" (1.753 m)   Wt (!) 303 lb 3.2 oz (137.5 kg)   SpO2 95%   BMI 44.77 kg/m²     Input/Output:   In: 0450 [I.V.:399; NG/GT:1198]  Out: 480     Oxygen requirements: Intubated    Ventilator Information:  Vent Information  $Ventilation: $Subsequent Day  Ventilator Started: Yes  Skin Assessment: Clean, dry, & intact  Vent Type: 980  Vent Mode: AC/VC  Vt Ordered: 400 mL  Rate Set: 16 bmp  Peak Flow: 60 L/min  FiO2 : 40 %  Sensitivity: 3  PEEP/CPAP: 5  Humidification Source: Heated wire  Humidification Temp:

## 2020-03-10 NOTE — PROGRESS NOTES
ventilated  -Per admitting/CCM     4.  Morbid obesity  -Unchanged    NOTE:  This report was transcribed using voice recognition software. Every effort was made to ensure accuracy; however, inadvertent computerized transcription errors may be present.     Sukhdeep Osuna MD  3/10/2020  12:56 PM

## 2020-03-10 NOTE — PROGRESS NOTES
[address] [place] [address] [facility]  Progress Note    NAME:Florin Short  1955  DATE:03/10/20    F/U: atbx    SUBJECTIVE:  unable to get  Ros pt on vent  More awake RESPONSIVE    ERUH330.2  Patient is tolerating medications. No reported adverse drug reactions. Review of systems:  As stated above in the chief complaint, otherwise negative. Medications:  Scheduled Meds:   rifaximin  200 mg Oral TID    lactulose  20 g Oral TID    insulin lispro  0-6 Units Subcutaneous Q6H    ipratropium-albuterol  1 ampule Inhalation Q6H    pantoprazole  40 mg Intravenous Daily    Arformoterol Tartrate  15 mcg Nebulization BID    budesonide  500 mcg Nebulization BID     Continuous Infusions:   dextrose      fentaNYL 5 mcg/ml in 0.9%  ml infusion 175 mcg/hr (03/10/20 0811)     PRN Meds:glucose, dextrose, glucagon (rDNA), dextrose, potassium chloride **OR** potassium alternative oral replacement **OR** potassium chloride    OBJECTIVE:  BP (!) 89/46   Pulse 79   Temp 99.3 °F (37.4 °C) (Core)   Resp 20   Ht 5' 9\" (1.753 m)   Wt (!) 303 lb 3.2 oz (137.5 kg)   SpO2 96%   BMI 44.77 kg/m²   Temp  Av.8 °F (37.7 °C)  Min: 99.3 °F (37.4 °C)  Max: 100.2 °F (37.9 °C)  Constitutional:  The patient is awake,   Skin:    Warm and dry. No rashes were noted. HEENT:   Round and reactive pupils. AT/NC vent  Neck:    Supple to movements. Chest:   No use of accessory muscles to breathe. Symmetrical expansion. Cardiovascular:  S1 and S2 are rhythmic and regular. No murmurs appreciated. Abdomen:   Positive bowel sounds to auscultation. Benign to palpation. ngt ? PAIN   Extremities:     edema.   CNS    Responsive follow commands  Lines: picc rue    Radiology:  Laboratory and Tests Review:  Lab Results   Component Value Date    WBC 6.7 03/10/2020    WBC 7.0 2020    WBC 5.3 2020    HGB 7.7 (L) 03/10/2020    HCT 24.0 (L) 03/10/2020    .9 (H) 03/10/2020    PLT 39 (L) 03/10/2020 No results found for: [facility]  Lab Results   Component Value Date    ALT 17 03/10/2020    AST 41 (H) 03/10/2020    ALKPHOS 99 03/10/2020    BILITOT 3.4 (H) 03/10/2020     Lab Results   Component Value Date     03/10/2020    K 4.1 03/10/2020    K 3.3 08/08/2019    CL 96 03/10/2020    CO2 34 03/10/2020    BUN 50 03/10/2020    CREATININE 1.5 03/10/2020    CREATININE 1.4 03/09/2020    CREATININE 1.3 03/08/2020    GFRAA 57 03/10/2020    LABGLOM 47 03/10/2020    GLUCOSE 170 03/10/2020    PROT 7.2 03/10/2020    LABALBU 3.2 03/10/2020    CALCIUM 8.9 03/10/2020    BILITOT 3.4 03/10/2020    ALKPHOS 99 03/10/2020    AST 41 03/10/2020    ALT 17 03/10/2020     Lab Results   Component Value Date    CRP 1.1 (H) 02/14/2020     Lab Results   Component Value Date    SEDRATE 44 (H) 02/23/2020    SEDRATE 50 (H) 02/14/2020     Microbiology:   Lab Results   Component Value Date    BLOODCULT2 24 Hours- no growth 03/07/2020    BLOODCULT2 5 Days- no growth 01/31/2020    BLOODCULT2  01/29/2020     Refer to previous culture for susceptibility results  (CXBL r wrist collected 1-29-20 at 2015)      ORG Proteus mirabilis 01/30/2020    ORG Staphylococcus epidermidis 01/29/2020    ORG Acinetobacter baumannii 01/29/2020       Smear, Respiratory   Date Value Ref Range Status   03/07/2020   Final    Group 6: <25 PMN's/LPF and <25 Epithelial cells/LPF  Moderate Polymorphonuclear leukocytes  Epithelial cells not seen  Rare Gram negative rods  Few Gram positive cocci  Few Gram positive diplococci           Component Value Date/Time    AFBCX  02/04/2020 1450     No growth after 1 week/s of incubation. No growth after 2 week/s of incubation. No growth after 3 week/s of incubation. No growth after 4 week/s of incubation. FUNGSM No Fungal elements seen 02/04/2020 1450    LABFUNG No growth after 4 weeks of incubation.  02/04/2020 1450       Body Fluid Culture, Sterile   Date Value Ref Range Status   02/28/2020 Growth not present  Final

## 2020-03-10 NOTE — PROGRESS NOTES
Internal Medicine Progress Note    Martha Liu. Dina Bradley., & [address] [facility] [address] Dina Bradley., F.A.C.O.I. Neo Maciel D.O., F.ANA.C.O.I. Primary Care Physician: Tj Matias MD   Admitting Physician:  Regina Abreu DO  Admission date and time: 3/6/2020 10:03 AM    Room:  [place] [address]    Patient Name: Cheryle Marc  MRN: [id number]    Date of Service: 3/10/2020     Subjective:  Sariah Joya remains mechanically ventilated with underlying sedation. Plans are for thoracentesis with possible Pleurx catheter placement as per the critical care team.  Tracheostomy is being considered as well and I have discussed this with the surgical team.  Multiple subspecialists continue to provide consultation. Review of System:   Unable to be obtained in the patient's current medical condition. Physical Exam:  No intake/output data recorded. Blood pressure (!) 92/44, pulse 101, temperature 99.3 °F (37.4 °C), temperature source Core, resp. rate 21, height 5' 9\" (1.753 m), weight (!) 303 lb 3.2 oz (137.5 kg), SpO2 100 %. HEENT:    PERRLA. EOMI. Sclera clear. Buccal mucosa dry. Endotracheal tube is in place. NG tube is in place. Neck:    Supple. Trachea midline. No thyromegaly. No JVD. No bruits. Heart:    Rhythm regular, rate controlled. Mild systolic murmur. Lungs:    Diminished bilaterally with mechanical breath sounds throughout. Abdomen:   Super morbidly obese. Nontender to palpation. Bowel sounds are hypoactive. Extremities:    Chronic bilateral lower extremity peripheral edema. Neurologic:    Obtunded. He does respond to verbal stimulus. No focal deficits are appreciated. Skin:    No petechia. No hemorrhage. No wounds. Musculokeletal:  Spine ROM normal. Muscular strength intact. Gait not assessed. Genitalia/Breast:  Voiding with the use of a Burgess catheter.     Scheduled Meds:   rifaximin  200 mg Oral TID    lactulose  20 g Oral TID    insulin lispro  0-6 Units hypoventilatory syndrome with chronic CO2 retention. He ultimately failed BiPAP therapy and required intubation. In my discussion with the critical care team yesterday, Pleurx catheter will be attempted for ongoing pleural fluid drainage. Tracheostomy would be in the patient's best interest but he has no power of  to make medical decisions. Emergency guardianship will be necessary. The patient is unable to make any decisions for himself currently. Urinary output has begun to drop off and this will need to be monitored closely as the patient has been requiring aggressive diuresis up until this point. Tube feeds are being provided for caloric support. Rater than 30 minutes of critical care time was spent with the patient. This time included chart review, , and discussion with those consultants involved in the patient's care. More than 50% of my  time was spent at the bedside counseling/coordinating care with the patient and/or family with face to face contact. This time was spent reviewing notes and laboratory data as well as instructing and counseling the patient. Time I spent with the family or surrogate(s) is included only if the patient was incapable of providing the necessary information or participating in medical decisions. I also discussed the differential diagnosis and all of the proposed management plans with the patient and individuals accompanying the patient. Jermaine Padilla requires this high level of physician care and nursing in the ICU due the complexity of decision management and chance of rapid decline or death. Continued cardiac monitoring and higher level of nursing are required. I am readily available for any further decision-making and intervention.        Ian Barreto DO, ERICK.ANA.C.O.I.  3/10/2020  7:44 AM

## 2020-03-10 NOTE — PLAN OF CARE
Problem: Restraint Use - Nonviolent/Non-Self-Destructive Behavior:  Goal: Absence of restraint-related injury  Description: Absence of restraint-related injury  3/10/2020 1047 by Farshad Fang RN  Outcome: Met This Shift     Problem: Restraint Use - Nonviolent/Non-Self-Destructive Behavior:  Goal: Absence of restraint indications  Description: Absence of restraint indications  3/10/2020 1047 by Farshad Fang RN  Outcome: Not Met This Shift

## 2020-03-11 ENCOUNTER — APPOINTMENT (OUTPATIENT)
Dept: INTERVENTIONAL RADIOLOGY/VASCULAR | Age: 65
DRG: 720 | End: 2020-03-11
Attending: INTERNAL MEDICINE
Payer: COMMERCIAL

## 2020-03-11 ENCOUNTER — APPOINTMENT (OUTPATIENT)
Dept: GENERAL RADIOLOGY | Age: 65
DRG: 720 | End: 2020-03-11
Attending: INTERNAL MEDICINE
Payer: COMMERCIAL

## 2020-03-11 LAB
ABO/RH: NORMAL
ANISOCYTOSIS: ABNORMAL
ANTIBODY SCREEN: NORMAL
B.E.: 8.1 MMOL/L (ref -3–3)
BASOPHILS ABSOLUTE: 0.08 E9/L (ref 0–0.2)
BASOPHILS RELATIVE PERCENT: 1.7 % (ref 0–2)
BLOOD BANK DISPENSE STATUS: NORMAL
BLOOD BANK PRODUCT CODE: NORMAL
BPU ID: NORMAL
COHB: 0.3 % (ref 0–1.5)
COMMENT: ABNORMAL
CRITICAL: ABNORMAL
DATE ANALYZED: ABNORMAL
DATE OF COLLECTION: ABNORMAL
DESCRIPTION BLOOD BANK: NORMAL
EOSINOPHILS ABSOLUTE: 0.43 E9/L (ref 0.05–0.5)
EOSINOPHILS RELATIVE PERCENT: 8.7 % (ref 0–6)
FIO2: 100 %
HCO3: 35.7 MMOL/L (ref 22–26)
HCT VFR BLD CALC: 23.7 % (ref 37–54)
HEMOGLOBIN: 7.8 G/DL (ref 12.5–16.5)
HHB: 5.7 % (ref 0–5)
INR BLD: 2.2
LAB: ABNORMAL
LYMPHOCYTES ABSOLUTE: 0.69 E9/L (ref 1.5–4)
LYMPHOCYTES RELATIVE PERCENT: 13.9 % (ref 20–42)
Lab: ABNORMAL
MCH RBC QN AUTO: 34.1 PG (ref 26–35)
MCHC RBC AUTO-ENTMCNC: 32.9 % (ref 32–34.5)
MCV RBC AUTO: 103.5 FL (ref 80–99.9)
METER GLUCOSE: 121 MG/DL (ref 74–99)
METER GLUCOSE: 147 MG/DL (ref 74–99)
METER GLUCOSE: 153 MG/DL (ref 74–99)
METER GLUCOSE: 163 MG/DL (ref 74–99)
METHB: 0.5 % (ref 0–1.5)
MODE: AC
MONOCYTES ABSOLUTE: 0.39 E9/L (ref 0.1–0.95)
MONOCYTES RELATIVE PERCENT: 7.8 % (ref 2–12)
NEUTROPHILS ABSOLUTE: 3.33 E9/L (ref 1.8–7.3)
NEUTROPHILS RELATIVE PERCENT: 67.8 % (ref 43–80)
O2 CONTENT: 12.6 ML/DL
O2 SATURATION: 94.3 % (ref 92–98.5)
O2HB: 93.5 % (ref 94–97)
OPERATOR ID: 901
OVALOCYTES: ABNORMAL
PATIENT TEMP: 37
PCO2: 69.2 MMHG (ref 35–45)
PDW BLD-RTO: 20.5 FL (ref 11.5–15)
PEEP/CPAP: 5 CMH2O
PFO2: 0.77 MMHG/%
PH BLOOD GAS: 7.33 (ref 7.35–7.45)
PLATELET # BLD: 35 E9/L (ref 130–450)
PLATELET CONFIRMATION: NORMAL
PMV BLD AUTO: 10.1 FL (ref 7–12)
PO2: 77.3 MMHG (ref 75–100)
POIKILOCYTES: ABNORMAL
PROTHROMBIN TIME: 25.1 SEC (ref 9.3–12.4)
RBC # BLD: 2.29 E12/L (ref 3.8–5.8)
RR MECHANICAL: 16 B/MIN
SOURCE, BLOOD GAS: ABNORMAL
TARGET CELLS: ABNORMAL
THB: 9.5 G/DL (ref 11.5–16.5)
TIME ANALYZED: 2327
VT MECHANICAL: 400 ML
WBC # BLD: 4.9 E9/L (ref 4.5–11.5)

## 2020-03-11 PROCEDURE — 6360000002 HC RX W HCPCS: Performed by: INTERNAL MEDICINE

## 2020-03-11 PROCEDURE — 36592 COLLECT BLOOD FROM PICC: CPT

## 2020-03-11 PROCEDURE — 71045 X-RAY EXAM CHEST 1 VIEW: CPT

## 2020-03-11 PROCEDURE — 0W9930Z DRAINAGE OF RIGHT PLEURAL CAVITY WITH DRAINAGE DEVICE, PERCUTANEOUS APPROACH: ICD-10-PCS | Performed by: RADIOLOGY

## 2020-03-11 PROCEDURE — 85610 PROTHROMBIN TIME: CPT

## 2020-03-11 PROCEDURE — P9016 RBC LEUKOCYTES REDUCED: HCPCS

## 2020-03-11 PROCEDURE — 2709999900 IR INSERT TUNNELED PLEURA CATH W CUFF

## 2020-03-11 PROCEDURE — 86850 RBC ANTIBODY SCREEN: CPT

## 2020-03-11 PROCEDURE — 94003 VENT MGMT INPAT SUBQ DAY: CPT

## 2020-03-11 PROCEDURE — 2580000003 HC RX 258: Performed by: INTERNAL MEDICINE

## 2020-03-11 PROCEDURE — 2000000000 HC ICU R&B

## 2020-03-11 PROCEDURE — 74018 RADEX ABDOMEN 1 VIEW: CPT

## 2020-03-11 PROCEDURE — 36430 TRANSFUSION BLD/BLD COMPNT: CPT

## 2020-03-11 PROCEDURE — 86901 BLOOD TYPING SEROLOGIC RH(D): CPT

## 2020-03-11 PROCEDURE — 32550 INSERT PLEURAL CATH: CPT | Performed by: RADIOLOGY

## 2020-03-11 PROCEDURE — P9035 PLATELET PHERES LEUKOREDUCED: HCPCS

## 2020-03-11 PROCEDURE — 85025 COMPLETE CBC W/AUTO DIFF WBC: CPT

## 2020-03-11 PROCEDURE — 94640 AIRWAY INHALATION TREATMENT: CPT

## 2020-03-11 PROCEDURE — P9059 PLASMA, FRZ BETWEEN 8-24HOUR: HCPCS

## 2020-03-11 PROCEDURE — 82805 BLOOD GASES W/O2 SATURATION: CPT

## 2020-03-11 PROCEDURE — 82962 GLUCOSE BLOOD TEST: CPT

## 2020-03-11 PROCEDURE — 75989 ABSCESS DRAINAGE UNDER X-RAY: CPT | Performed by: RADIOLOGY

## 2020-03-11 PROCEDURE — 2500000003 HC RX 250 WO HCPCS: Performed by: INTERNAL MEDICINE

## 2020-03-11 PROCEDURE — 2500000003 HC RX 250 WO HCPCS: Performed by: RADIOLOGY

## 2020-03-11 PROCEDURE — 6370000000 HC RX 637 (ALT 250 FOR IP): Performed by: INTERNAL MEDICINE

## 2020-03-11 PROCEDURE — 86923 COMPATIBILITY TEST ELECTRIC: CPT

## 2020-03-11 PROCEDURE — 86900 BLOOD TYPING SEROLOGIC ABO: CPT

## 2020-03-11 PROCEDURE — 71046 X-RAY EXAM CHEST 2 VIEWS: CPT

## 2020-03-11 PROCEDURE — C9113 INJ PANTOPRAZOLE SODIUM, VIA: HCPCS | Performed by: INTERNAL MEDICINE

## 2020-03-11 RX ORDER — 0.9 % SODIUM CHLORIDE 0.9 %
20 INTRAVENOUS SOLUTION INTRAVENOUS ONCE
Status: COMPLETED | OUTPATIENT
Start: 2020-03-11 | End: 2020-03-11

## 2020-03-11 RX ORDER — MIDAZOLAM HYDROCHLORIDE 1 MG/ML
2 INJECTION INTRAMUSCULAR; INTRAVENOUS
Status: DISCONTINUED | OUTPATIENT
Start: 2020-03-11 | End: 2020-03-14

## 2020-03-11 RX ORDER — LIDOCAINE HYDROCHLORIDE 10 MG/ML
20 INJECTION, SOLUTION INFILTRATION; PERINEURAL ONCE
Status: COMPLETED | OUTPATIENT
Start: 2020-03-11 | End: 2020-03-11

## 2020-03-11 RX ADMIN — BUDESONIDE 500 MCG: 0.5 INHALANT RESPIRATORY (INHALATION) at 21:31

## 2020-03-11 RX ADMIN — SODIUM CHLORIDE 20 ML: 9 INJECTION, SOLUTION INTRAVENOUS at 13:18

## 2020-03-11 RX ADMIN — BUDESONIDE 500 MCG: 0.5 INHALANT RESPIRATORY (INHALATION) at 05:00

## 2020-03-11 RX ADMIN — RIFAXIMIN 200 MG: 200 TABLET ORAL at 05:10

## 2020-03-11 RX ADMIN — Medication 100 MCG/HR: at 02:56

## 2020-03-11 RX ADMIN — INSULIN LISPRO 1 UNITS: 100 INJECTION, SOLUTION INTRAVENOUS; SUBCUTANEOUS at 13:19

## 2020-03-11 RX ADMIN — DEXMEDETOMIDINE 0.2 MCG/KG/HR: 100 INJECTION, SOLUTION, CONCENTRATE INTRAVENOUS at 22:50

## 2020-03-11 RX ADMIN — PANTOPRAZOLE SODIUM 40 MG: 40 INJECTION, POWDER, FOR SOLUTION INTRAVENOUS at 08:30

## 2020-03-11 RX ADMIN — Medication 175 MCG/HR: at 17:36

## 2020-03-11 RX ADMIN — PHYTONADIONE 10 MG: 10 INJECTION, EMULSION INTRAMUSCULAR; INTRAVENOUS; SUBCUTANEOUS at 13:00

## 2020-03-11 RX ADMIN — IPRATROPIUM BROMIDE AND ALBUTEROL SULFATE 1 AMPULE: .5; 3 SOLUTION RESPIRATORY (INHALATION) at 04:59

## 2020-03-11 RX ADMIN — ARFORMOTEROL TARTRATE 15 MCG: 15 SOLUTION RESPIRATORY (INHALATION) at 05:00

## 2020-03-11 RX ADMIN — ARFORMOTEROL TARTRATE 15 MCG: 15 SOLUTION RESPIRATORY (INHALATION) at 21:31

## 2020-03-11 RX ADMIN — IPRATROPIUM BROMIDE AND ALBUTEROL SULFATE 1 AMPULE: .5; 3 SOLUTION RESPIRATORY (INHALATION) at 18:52

## 2020-03-11 RX ADMIN — INSULIN LISPRO 1 UNITS: 100 INJECTION, SOLUTION INTRAVENOUS; SUBCUTANEOUS at 01:03

## 2020-03-11 RX ADMIN — INSULIN LISPRO 1 UNITS: 100 INJECTION, SOLUTION INTRAVENOUS; SUBCUTANEOUS at 08:30

## 2020-03-11 RX ADMIN — SODIUM CHLORIDE 20 ML: 9 INJECTION, SOLUTION INTRAVENOUS at 13:17

## 2020-03-11 RX ADMIN — IPRATROPIUM BROMIDE AND ALBUTEROL SULFATE 1 AMPULE: .5; 3 SOLUTION RESPIRATORY (INHALATION) at 09:11

## 2020-03-11 RX ADMIN — LACTULOSE 20 G: 20 SOLUTION ORAL at 05:10

## 2020-03-11 RX ADMIN — LIDOCAINE HYDROCHLORIDE 20 ML: 10 INJECTION, SOLUTION INFILTRATION; PERINEURAL at 15:40

## 2020-03-11 ASSESSMENT — PAIN SCALES - GENERAL
PAINLEVEL_OUTOF10: 5
PAINLEVEL_OUTOF10: 0

## 2020-03-11 ASSESSMENT — PULMONARY FUNCTION TESTS
PIF_VALUE: 22
PIF_VALUE: 22
PIF_VALUE: 26
PIF_VALUE: 33
PIF_VALUE: 30

## 2020-03-11 NOTE — PROGRESS NOTES
Using ultrasound guidance, 15.5 F right Pleural Pleur-X catheter placed without immediate symptomatic complications. Catheter placed into inferior right pleural space medially from posterior approach. 1400 cc of burgundy fluid removed from catheter. Post procedure cxr pending. Full report to follow.

## 2020-03-11 NOTE — PROGRESS NOTES
[address] [place] [address] [facility]  Progress Note    NAME:Florin Sy  1955  DATE:20    F/U: atbx    SUBJECTIVE:  unable to get  Ros pt on vent  More awake RESPONSIVE    Temp better  Patient is tolerating medications. No reported adverse drug reactions. Plans are for Pleurx catheter placement today with the IR team.    Review of systems:  As stated above in the chief complaint, otherwise negative. Medications:  Scheduled Meds:   rifaximin  200 mg Oral TID    lactulose  20 g Oral TID    insulin lispro  0-6 Units Subcutaneous Q6H    ipratropium-albuterol  1 ampule Inhalation Q6H    pantoprazole  40 mg Intravenous Daily    Arformoterol Tartrate  15 mcg Nebulization BID    budesonide  500 mcg Nebulization BID     Continuous Infusions:   dexmedetomidine (PRECEDEX) IV infusion Stopped (20 0524)    dextrose      fentaNYL 5 mcg/ml in 0.9%  ml infusion 150 mcg/hr (20 1458)     PRN Meds:glucose, dextrose, glucagon (rDNA), dextrose, potassium chloride **OR** potassium alternative oral replacement **OR** potassium chloride    OBJECTIVE:  BP (!) 97/45   Pulse 93   Temp 97.3 °F (36.3 °C) (Core)   Resp 17   Ht 5' 9\" (1.753 m)   Wt (!) 306 lb 8 oz (139 kg)   SpO2 93%   BMI 45.26 kg/m²   Temp  Av °F (36.1 °C)  Min: 96.4 °F (35.8 °C)  Max: 97.7 °F (36.5 °C)  Constitutional:  The patient is awake,   Skin:    Warm and dry. No rashes were noted. HEENT:   Round and reactive pupils. AT/NC vent  Neck:    Supple to movements. Chest:   No use of accessory muscles to breathe. Symmetrical expansion. Cardiovascular:  S1 and S2 are rhythmic and regular. No murmurs appreciated. Abdomen:   Positive bowel sounds to auscultation. Benign to palpation. ngt ? PAIN   Extremities:     edema.   CNS    Responsive follow commands  Lines: picc rue    Radiology:  Laboratory and Tests Review:  Lab Results   Component Value Date    WBC 4.9 2020    WBC 6.7 03/10/2020

## 2020-03-11 NOTE — PROGRESS NOTES
Dermatitis     due to thimersol    Heart valve problem     leaky    Hx of cardiovascular stress test 12/9/2015    Lexiscan    Hypertension     Obesity         Family History:   Family History   Problem Relation Age of Onset   [facility] Parkinsonism Mother     COPD Father     Heart Disease Father        Allergies:         Latex;  Aldactone [spironolactone]; and Thimerosal    Social history:  Social History     Socioeconomic History    Marital status: Single     Spouse name: Not on file    Number of children: 0    Years of education: Not on file    Highest education level: Not on file   Occupational History    Occupation: Retired-[employer]   Social Needs    Financial resource strain: Not on file    Food insecurity     Worry: Not on file     Inability: Not on file   [employer] needs     Medical: Not on file     Non-medical: Not on file   Tobacco Use    Smoking status: Never Smoker    Smokeless tobacco: Never Used   Substance and Sexual Activity    Alcohol use: Not Currently    Drug use: No    Sexual activity: Not Currently   Lifestyle    Physical activity     Days per week: Not on file     Minutes per session: Not on file    Stress: Not on file   Relationships    Social connections     Talks on phone: Not on file     Gets together: Not on file     Attends Jew service: Not on file     Active member of club or organization: Not on file     Attends meetings of clubs or organizations: Not on file     Relationship status: Not on file    Intimate partner violence     Fear of current or ex partner: Not on file     Emotionally abused: Not on file     Physically abused: Not on file     Forced sexual activity: Not on file   Other Topics Concern    Not on file   Social History Narrative    Not on file       Current Medications:     Current Facility-Administered Medications:     0.9 % sodium chloride bolus, 20 mL, Intravenous, Once, Gregg Dennis MD    0.9 % sodium chloride bolus, They had the opportunity to ask questions about the proposed management plans and to have those questions answered. This patient has a high probability of sudden, clinically significant deterioration, which requires the highest level of physician preparedness to intervene urgently. I managed/supervised life or organ supporting interventions that required frequent physician assessment. I devoted my full attention to the direct care of this patient for the amount of time indicated below. Time I spent with family or surrogate(s) is included only if the patient was incapable of providing the necessary information or participating in medical decision-making. Time devoted to teaching and to any procedures I billed separately is not included.   Critical Care Time: 35 minutes      Electronically signed by Early Kocher, MD on 3/11/2020 at 11:22 AM

## 2020-03-11 NOTE — PROGRESS NOTES
Internal Medicine Progress Note    [facility]. Kaylah Riggins., & [address] Kaylah Riggins., F.A.C.O.I. Jeanie Ren D.O., F.A.C.O.I. Primary Care Physician: Merary Silvestre MD   Admitting Physician:  Duyen Garcia DO  Admission date and time: 3/6/2020 10:03 AM    Room:  [place] [address]    Patient Name: June Harvey  MRN: [id number]    Date of Service: 3/11/2020     Subjective:  Hesham Pulido awakens to verbal stimulus during my examination. He remains mechanically ventilated with underlying sedation. Plans are for thoracentesis with possible Pleurx catheter placement today. He appears somewhat uncomfortable during my examination. Multiple subspecialists are providing consultation. Review of System:   Unable to be obtained in the patient's current medical condition. Physical Exam:  No intake/output data recorded. Blood pressure (!) 106/59, pulse 88, temperature 96.6 °F (35.9 °C), temperature source Core, resp. rate 20, height 5' 9\" (1.753 m), weight (!) 306 lb 8 oz (139 kg), SpO2 98 %. HEENT:    PERRLA. EOMI. Sclera clear. Buccal mucosa dry. Endotracheal tube is in place. NG tube is in place. Neck:    Supple. Trachea midline. No thyromegaly. No JVD. No bruits. Heart:    Rhythm regular, rate controlled. Mild systolic murmur. Lungs:    Diminished bilaterally with mechanical breath sounds throughout. Abdomen:   Super morbidly obese. Nontender to palpation. Bowel sounds are hypoactive. Extremities:    Chronic bilateral lower extremity peripheral edema. Neurologic:    Obtunded. He does respond to verbal stimulus. No focal deficits are appreciated. Skin:    No petechia. No hemorrhage. No wounds. Musculokeletal:  Spine ROM normal. Muscular strength intact. Gait not assessed. Genitalia/Breast:  Voiding with the use of a Burgess catheter.     Scheduled Meds:   rifaximin  200 mg Oral TID    lactulose  20 g Oral TID    insulin lispro  0-6 Units Subcutaneous Q6H  ipratropium-albuterol  1 ampule Inhalation Q6H    pantoprazole  40 mg Intravenous Daily    Arformoterol Tartrate  15 mcg Nebulization BID    budesonide  500 mcg Nebulization BID       Continuous Infusions:   dexmedetomidine (PRECEDEX) IV infusion Stopped (03/11/20 0524)    dextrose      fentaNYL 5 mcg/ml in 0.9%  ml infusion 75 mcg/hr (03/11/20 1306)       Objective Data:  CBC with Differential:    Lab Results   Component Value Date    WBC 4.9 03/11/2020    RBC 2.29 03/11/2020    HGB 7.8 03/11/2020    HCT 23.7 03/11/2020    PLT 35 03/11/2020    .5 03/11/2020    MCH 34.1 03/11/2020    MCHC 32.9 03/11/2020    RDW 20.5 03/11/2020    NRBC 0.9 02/06/2020    METASPCT 0.9 03/06/2020    LYMPHOPCT 13.9 03/11/2020    PROMYELOPCT SEE NOTE 08/06/2019    MONOPCT 7.8 03/11/2020    MYELOPCT 0.9 03/09/2020    BASOPCT 1.7 03/11/2020    MONOSABS 0.39 03/11/2020    LYMPHSABS 0.69 03/11/2020    EOSABS 0.43 03/11/2020    BASOSABS 0.08 03/11/2020     BMP:    Lab Results   Component Value Date     03/10/2020    K 4.1 03/10/2020    K 3.3 08/08/2019    CL 96 03/10/2020    CO2 34 03/10/2020    BUN 50 03/10/2020    LABALBU 3.2 03/10/2020    CREATININE 1.5 03/10/2020    CALCIUM 8.9 03/10/2020    GFRAA 57 03/10/2020    LABGLOM 47 03/10/2020    GLUCOSE 170 03/10/2020         Assessment:   1. Acute on chronic hypercapnic/hypoxic respiratory failure in the setting of super morbid obesity with obesity hypoventilatory syndrome and severe obstructive sleep apnea  2. Sepsis secondary to Acinetobacter bacteremia. 3. Healthcare associated pneumonia with multifocal lung infiltrates  4. Acute on chronic kidney disease stage III  5. Alcoholic cirrhosis with secondary coagulopathy  6. Anemia of chronic disease with superimposed folic acid deficiency. 7. Moderate protein calorie malnutrition    Plan: This is a very complicated case as the patient's respiratory status remains tenuous.   He is mechanically ventilated with underlying sedation. He suffers from obesity hypoventilatory syndrome with chronic CO2 retention. He ultimately failed BiPAP therapy and required intubation. Plans are for Pleurx catheter placement today with the IR team.  Tracheostomy is being considered at this point as this may be in the patient's best interest.  Emergency guardianship is being attempted. We will continue to monitor the patient's diuretic process. I have discussed the case with the critical care team.    Rater than 30 minutes of critical care time was spent with the patient. This time included chart review, , and discussion with those consultants involved in the patient's care. More than 50% of my  time was spent at the bedside counseling/coordinating care with the patient and/or family with face to face contact. This time was spent reviewing notes and laboratory data as well as instructing and counseling the patient. Time I spent with the family or surrogate(s) is included only if the patient was incapable of providing the necessary information or participating in medical decisions. I also discussed the differential diagnosis and all of the proposed management plans with the patient and individuals accompanying the patient. [facility] requires this high level of physician care and nursing in the ICU due the complexity of decision management and chance of rapid decline or death. Continued cardiac monitoring and higher level of nursing are required. I am readily available for any further decision-making and intervention.        Kitty Rojas DO, F.A.C.O.I.  3/11/2020  7:42 AM

## 2020-03-12 LAB
ACANTHOCYTES: ABNORMAL
ANISOCYTOSIS: ABNORMAL
BASOPHILIC STIPPLING: ABNORMAL
BASOPHILS ABSOLUTE: 0.22 E9/L (ref 0–0.2)
BASOPHILS RELATIVE PERCENT: 2.6 % (ref 0–2)
BLOOD BANK DISPENSE STATUS: NORMAL
BLOOD BANK PRODUCT CODE: NORMAL
BLOOD CULTURE, ROUTINE: NORMAL
BPU ID: NORMAL
BURR CELLS: ABNORMAL
CULTURE, BLOOD 2: NORMAL
DESCRIPTION BLOOD BANK: NORMAL
EOSINOPHILS ABSOLUTE: 0.14 E9/L (ref 0.05–0.5)
EOSINOPHILS RELATIVE PERCENT: 1.7 % (ref 0–6)
HCT VFR BLD CALC: 20.1 % (ref 37–54)
HEMOGLOBIN: 6.7 G/DL (ref 12.5–16.5)
INR BLD: 2.2
LYMPHOCYTES ABSOLUTE: 0.43 E9/L (ref 1.5–4)
LYMPHOCYTES RELATIVE PERCENT: 5.2 % (ref 20–42)
MCH RBC QN AUTO: 35.4 PG (ref 26–35)
MCHC RBC AUTO-ENTMCNC: 33.3 % (ref 32–34.5)
MCV RBC AUTO: 106.3 FL (ref 80–99.9)
METER GLUCOSE: 109 MG/DL (ref 74–99)
METER GLUCOSE: 128 MG/DL (ref 74–99)
METER GLUCOSE: 172 MG/DL (ref 74–99)
MONOCYTES ABSOLUTE: 0.51 E9/L (ref 0.1–0.95)
MONOCYTES RELATIVE PERCENT: 6.1 % (ref 2–12)
NEUTROPHILS ABSOLUTE: 7.14 E9/L (ref 1.8–7.3)
NEUTROPHILS RELATIVE PERCENT: 84.3 % (ref 43–80)
OVALOCYTES: ABNORMAL
PDW BLD-RTO: 20.5 FL (ref 11.5–15)
PLATELET # BLD: 44 E9/L (ref 130–450)
PLATELET CONFIRMATION: NORMAL
PMV BLD AUTO: 9.8 FL (ref 7–12)
POIKILOCYTES: ABNORMAL
PROTHROMBIN TIME: 24.5 SEC (ref 9.3–12.4)
RBC # BLD: 1.89 E12/L (ref 3.8–5.8)
TARGET CELLS: ABNORMAL
WBC # BLD: 8.5 E9/L (ref 4.5–11.5)

## 2020-03-12 PROCEDURE — P9045 ALBUMIN (HUMAN), 5%, 250 ML: HCPCS | Performed by: INTERNAL MEDICINE

## 2020-03-12 PROCEDURE — 6370000000 HC RX 637 (ALT 250 FOR IP): Performed by: INTERNAL MEDICINE

## 2020-03-12 PROCEDURE — 2580000003 HC RX 258: Performed by: INTERNAL MEDICINE

## 2020-03-12 PROCEDURE — 94003 VENT MGMT INPAT SUBQ DAY: CPT

## 2020-03-12 PROCEDURE — 36415 COLL VENOUS BLD VENIPUNCTURE: CPT

## 2020-03-12 PROCEDURE — 82962 GLUCOSE BLOOD TEST: CPT

## 2020-03-12 PROCEDURE — 6360000002 HC RX W HCPCS: Performed by: INTERNAL MEDICINE

## 2020-03-12 PROCEDURE — 85610 PROTHROMBIN TIME: CPT

## 2020-03-12 PROCEDURE — P9016 RBC LEUKOCYTES REDUCED: HCPCS

## 2020-03-12 PROCEDURE — 36430 TRANSFUSION BLD/BLD COMPNT: CPT

## 2020-03-12 PROCEDURE — C9113 INJ PANTOPRAZOLE SODIUM, VIA: HCPCS | Performed by: INTERNAL MEDICINE

## 2020-03-12 PROCEDURE — 2000000000 HC ICU R&B

## 2020-03-12 PROCEDURE — 94640 AIRWAY INHALATION TREATMENT: CPT

## 2020-03-12 PROCEDURE — 85025 COMPLETE CBC W/AUTO DIFF WBC: CPT

## 2020-03-12 RX ORDER — 0.9 % SODIUM CHLORIDE 0.9 %
20 INTRAVENOUS SOLUTION INTRAVENOUS ONCE
Status: COMPLETED | OUTPATIENT
Start: 2020-03-12 | End: 2020-03-12

## 2020-03-12 RX ORDER — ALBUMIN, HUMAN INJ 5% 5 %
25 SOLUTION INTRAVENOUS ONCE
Status: COMPLETED | OUTPATIENT
Start: 2020-03-12 | End: 2020-03-12

## 2020-03-12 RX ORDER — ALBUMIN (HUMAN) 12.5 G/50ML
25 SOLUTION INTRAVENOUS ONCE
Status: DISCONTINUED | OUTPATIENT
Start: 2020-03-12 | End: 2020-03-12

## 2020-03-12 RX ADMIN — SODIUM CHLORIDE 20 ML: 9 INJECTION, SOLUTION INTRAVENOUS at 09:21

## 2020-03-12 RX ADMIN — LACTULOSE 20 G: 20 SOLUTION ORAL at 11:35

## 2020-03-12 RX ADMIN — ARFORMOTEROL TARTRATE 15 MCG: 15 SOLUTION RESPIRATORY (INHALATION) at 16:58

## 2020-03-12 RX ADMIN — RIFAXIMIN 200 MG: 200 TABLET ORAL at 11:35

## 2020-03-12 RX ADMIN — BUDESONIDE 500 MCG: 0.5 INHALANT RESPIRATORY (INHALATION) at 07:00

## 2020-03-12 RX ADMIN — BUDESONIDE 500 MCG: 0.5 INHALANT RESPIRATORY (INHALATION) at 16:58

## 2020-03-12 RX ADMIN — PANTOPRAZOLE SODIUM 40 MG: 40 INJECTION, POWDER, FOR SOLUTION INTRAVENOUS at 08:17

## 2020-03-12 RX ADMIN — Medication 175 MCG/HR: at 08:17

## 2020-03-12 RX ADMIN — IPRATROPIUM BROMIDE AND ALBUTEROL SULFATE 1 AMPULE: .5; 3 SOLUTION RESPIRATORY (INHALATION) at 07:00

## 2020-03-12 RX ADMIN — ARFORMOTEROL TARTRATE 15 MCG: 15 SOLUTION RESPIRATORY (INHALATION) at 07:00

## 2020-03-12 RX ADMIN — IPRATROPIUM BROMIDE AND ALBUTEROL SULFATE 1 AMPULE: .5; 3 SOLUTION RESPIRATORY (INHALATION) at 10:22

## 2020-03-12 RX ADMIN — INSULIN LISPRO 1 UNITS: 100 INJECTION, SOLUTION INTRAVENOUS; SUBCUTANEOUS at 19:28

## 2020-03-12 RX ADMIN — MIDAZOLAM 2 MG: 1 INJECTION INTRAMUSCULAR; INTRAVENOUS at 04:25

## 2020-03-12 RX ADMIN — RIFAXIMIN 200 MG: 200 TABLET ORAL at 21:13

## 2020-03-12 RX ADMIN — IPRATROPIUM BROMIDE AND ALBUTEROL SULFATE 1 AMPULE: .5; 3 SOLUTION RESPIRATORY (INHALATION) at 21:05

## 2020-03-12 RX ADMIN — Medication 200 MCG/HR: at 00:42

## 2020-03-12 RX ADMIN — Medication 125 MCG/HR: at 17:24

## 2020-03-12 RX ADMIN — LACTULOSE 20 G: 20 SOLUTION ORAL at 21:13

## 2020-03-12 RX ADMIN — IPRATROPIUM BROMIDE AND ALBUTEROL SULFATE 1 AMPULE: .5; 3 SOLUTION RESPIRATORY (INHALATION) at 16:58

## 2020-03-12 RX ADMIN — ALBUMIN (HUMAN) 25 G: 12.5 INJECTION, SOLUTION INTRAVENOUS at 16:34

## 2020-03-12 ASSESSMENT — PULMONARY FUNCTION TESTS
PIF_VALUE: 24
PIF_VALUE: 15
PIF_VALUE: 21
PIF_VALUE: 24
PIF_VALUE: 2

## 2020-03-12 ASSESSMENT — PAIN SCALES - GENERAL
PAINLEVEL_OUTOF10: 0

## 2020-03-12 NOTE — PLAN OF CARE
Problem: Restraint Use - Nonviolent/Non-Self-Destructive Behavior:  Goal: Absence of restraint-related injury  Description: Absence of restraint-related injury  3/12/2020 1958 by Aquiles Mcgowan RN  Outcome: Met This Shift  3/12/2020 1922 by Aquiles Mcgowan RN  Outcome: Met This Shift  3/12/2020 1921 by Aquiles Mcgowan RN  Outcome: Met This Shift  3/12/2020 0628 by Brittany Amos RN  Outcome: Met This Shift     Problem: Risk for Impaired Skin Integrity  Goal: Tissue integrity - skin and mucous membranes  Description: Structural intactness and normal physiological function of skin and  mucous membranes.   3/12/2020 1958 by Aquiles Mcgowan RN  Outcome: Met This Shift  3/12/2020 1922 by Aquiles Mcgowan RN  Outcome: Met This Shift  3/12/2020 1921 by Aquiles Mcgowan RN  Outcome: Met This Shift     Problem: Falls - Risk of:  Goal: Will remain free from falls  Description: Will remain free from falls  3/12/2020 1958 by Aquiles Mcgowan RN  Outcome: Met This Shift  3/12/2020 1922 by Aquiles Mcgowan RN  Outcome: Met This Shift  3/12/2020 1921 by Aquiles Mcgowan RN  Outcome: Met This Shift  3/12/2020 0628 by Brittany Amos RN  Outcome: Met This Shift  Goal: Absence of physical injury  Description: Absence of physical injury  3/12/2020 1958 by Aquiles Mcgowan RN  Outcome: Met This Shift  3/12/2020 1922 by Aquiles Mcgowan RN  Outcome: Met This Shift  3/12/2020 1921 by Aquiles Mcgowan RN  Outcome: Met This Shift     Problem: Restraint Use - Nonviolent/Non-Self-Destructive Behavior:  Goal: Absence of restraint indications  Description: Absence of restraint indications  3/12/2020 1958 by Aquiles Mcgowan RN  Outcome: Not Met This Shift  3/12/2020 1922 by Aquiles Mcgowan RN  Outcome: Not Met This Shift  3/12/2020 1921 by Aquiles Mcgowan RN  Outcome: Not Met This Shift  3/12/2020 0628 by Brittany Amos RN  Outcome: Not Met This Shift

## 2020-03-12 NOTE — PROGRESS NOTES
PROGRESS NOTE  By Wan Castro M.D. [facility]  Dr. Fadi Mary M.D.,  Dr. Mack Hill M.D.,   Dr. Sharon Brink D.O.,  Dr. Silvano Hwang M.D.,  Dr Abby Gaytan D.O. Reyna Eliana  59 y.o.  male    SUBJECTIVE:  Remains intubated and as a result nonverbal    OBJECTIVE:    BP (!) 97/50   Pulse 99   Temp 98.4 °F (36.9 °C) (Core)   Resp 19   Ht 5' 9\" (1.753 m)   Wt 299 lb (135.6 kg)   SpO2 95%   BMI 44.15 kg/m²     General: NAD/morbidly obese  male  HEENT: Anicteric sclera/moist oral mucosa. ETT/NGT  Neck: Supple/trachea midline  Chest: Symmetrical excursion/nonlabored respirations. Mechanically ventilated  Cor: Regular   Abd.:soft and obese. BS +/4  Extr.:  BLE 3+ edema  Skin: Warm and dry/anicteric  Other: Burgess catheter      DATA:    Monitor data reviewed -sinus rhythm noted.        Lab Results   Component Value Date    WBC 8.5 03/12/2020    RBC 1.89 03/12/2020    HGB 6.7 03/12/2020    HCT 20.1 03/12/2020    .3 03/12/2020    MCH 35.4 03/12/2020    MCHC 33.3 03/12/2020    RDW 20.5 03/12/2020    PLT 44 03/12/2020    MPV 9.8 03/12/2020     Lab Results   Component Value Date     03/10/2020    K 4.1 03/10/2020    K 3.3 08/08/2019    CL 96 03/10/2020    CO2 34 03/10/2020    BUN 50 03/10/2020    CREATININE 1.5 03/10/2020    CALCIUM 8.9 03/10/2020    PROT 7.2 03/10/2020    LABALBU 3.2 03/10/2020    BILITOT 3.4 03/10/2020    ALKPHOS 99 03/10/2020    AST 41 03/10/2020    ALT 17 03/10/2020     Lab Results   Component Value Date    LIPASE 72 08/06/2019     No results found for: AMYLASE      ASSESSMENT/PLAN:  1.  Cirrhosis  -Recent EGD revealing no esophageal or gastric varices  -No evidence of acute decompensation  -MELD 26     2.  Anemia  -Monitor H&H  -No clear evidence of overt bleed  -Plan for colonoscopy when more stable able to obtain appropriate consent -for emergent procedure in case of overt bleed or precipitous drop     3.  Respiratory failure/sepsis  -Remains intubated/mechanically ventilated  -Per admitting/CCM     4.  Morbid obesity  -Unchanged    NOTE:  This report was transcribed using voice recognition software. Every effort was made to ensure accuracy; however, inadvertent computerized transcription errors may be present.     Alem Simms MD  3/12/2020  10:44 AM

## 2020-03-12 NOTE — PROGRESS NOTES
[address] [place] [address] [facility]  Progress Note    NAME:Florin Sy  1955  DATE:20    F/U: atbx    SUBJECTIVE:  unable to get  Ros pt on vent  Less RESPONSIVE  Temp afebrile  Patient is tolerating medications. No reported adverse drug reactions. Plans are for Pleurx catheter placement today with the IR team.    Review of systems:  As stated above in the chief complaint, otherwise negative. Medications:  Scheduled Meds:   sodium chloride  20 mL Intravenous Once    rifaximin  200 mg Oral TID    lactulose  20 g Oral TID    insulin lispro  0-6 Units Subcutaneous Q6H    ipratropium-albuterol  1 ampule Inhalation Q6H    pantoprazole  40 mg Intravenous Daily    Arformoterol Tartrate  15 mcg Nebulization BID    budesonide  500 mcg Nebulization BID     Continuous Infusions:   dexmedetomidine (PRECEDEX) IV infusion Stopped (20 152)    dextrose      fentaNYL 5 mcg/ml in 0.9%  ml infusion 175 mcg/hr (20 08)     PRN Meds:midazolam, glucose, dextrose, glucagon (rDNA), dextrose, potassium chloride **OR** potassium alternative oral replacement **OR** potassium chloride    OBJECTIVE:  BP (!) 81/38   Pulse 91   Temp 98.4 °F (36.9 °C) (Core)   Resp 16   Ht 5' 9\" (1.753 m)   Wt 299 lb (135.6 kg)   SpO2 96%   BMI 44.15 kg/m²   Temp  Av.5 °F (36.9 °C)  Min: 98.1 °F (36.7 °C)  Max: 99 °F (37.2 °C)  Constitutional:  The patient is awake,   Skin:    Warm and dry. No rashes were noted. HEENT:    AT/NC vent  Neck:    Supple to movements. Chest:   No use of accessory muscles to breathe. Symmetrical expansion. Orally intubated  Cardiovascular:  S1 and S2 are rhythmic and regular. No murmurs appreciated. Abdomen:   Positive bowel sounds to auscultation. Benign to palpation. ngt ? PAIN   Extremities:     edema.   CNS    Responsive follow commands  Lines: picc rue    Radiology:  Laboratory and Tests Review:  Lab Results   Component Value Date    WBC 8.5 growth after 4 weeks of incubation. 02/04/2020 1450       Body Fluid Culture, Sterile   Date Value Ref Range Status   02/28/2020 Growth not present  Final     MRSA Culture Only   Date Value Ref Range Status   01/30/2020 Methicillin resistant Staph aureus not isolated  Final     CULTURE, RESPIRATORY   Date Value Ref Range Status   03/07/2020 Oral Pharyngeal Jo present  Final     ASSESSMENT/PLAN:    Respiratory failure cx  ORAL JO Lung bases are clear. -  Plans are for Pleurx catheter placement today with the IR team.  Pancytopenia    -leukopenia RESOLVED  -cont to follow temp wbc better  Thrombocytopenia  danny  -CURRENTLY OFF ATBX  BLOOD CX NGTD  WILL FOLLOW prn  · Monitor labs  · follow    Imaging and labs were reviewed per medical records. The patient was educated about the diagnosis, prognosis, indications, risks and benefits of treatment. An opportunity to ask questions was given to the patient/FAMILY.       Electronically signed by Amy Potter MD on 3/12/2020 at 9:32 AM

## 2020-03-12 NOTE — PLAN OF CARE
Problem: Restraint Use - Nonviolent/Non-Self-Destructive Behavior:  Goal: Absence of restraint indications  Description: Absence of restraint indications  Outcome: Not Met This Shift   Patient continues to pull ay medical equipment  Problem: Restraint Use - Nonviolent/Non-Self-Destructive Behavior:  Goal: Absence of restraint-related injury  Description: Absence of restraint-related injury  Outcome: Met This Shift     Problem: Falls - Risk of:  Goal: Will remain free from falls  Description: Will remain free from falls  Outcome: Met This Shift

## 2020-03-12 NOTE — PROGRESS NOTES
Assessment and Plan  Patient is a 59 y.o. male with the following medical Problems:   1  Acute on chronic hypoxic and hypercapnic respiratory failure requiring intubation  2. Morbid obesity  3. Obesity hypoventilation syndrome  4. Pleural effusions  5. Cirrhosis  6. Chronic anemia and thrombocytopenia  7. Hx of Severe Sepsis with septic shock secondary to Acinetobacter and staph bacteremia as well as Proteus bacteriuria. 8. HFpEF  9. Bilateral lower extremities edema  10. Metabolic encephalopathy  11. Severe deconditioning        Plan of care:  1. Patient had a Pleurx catheter inserted yesterday. 1400 mL of pleural effusion was drained. We will try to attempt to drain more fluid today for at least 1 L.  2.  Transfuse as needed for hemoglobin less than 7. Patient is chronically thrombocytopenic and does not require platelet transfusion except if he is scheduled for procedure. 1 unit of packed red blood cells was ordered. 3.  rifaximin and PPI. 4.  Emergency guardianship was obtained yesterday. 5.  Goals of care discussion with he  appointed guardian took place and the patient is DNR CCA now. 6.  Spontaneous breathing trial will be attempted today. We will try to extubate the patient. History of Present Illness:   Patient is a 19-year-old man with above-mentioned medical problems has multiple recurrent admission and presented from [facility] hospital with acute on chronic hypoxic and hypercapnic respiratory failure. Patient was intubated. CT scan shows large bilateral pleural effusions. Patient has severe coagulopathy. 03/11/2020  Patient had an uneventful night. He is scheduled for a Pleurx catheter insertion today. We will attempt extubation of the mechanical ventilation tomorrow. He has no fever, chills, rigors. Patient was made DNR CCA based on his poor overall long-term prognosis. 03/12/2020  Patient is awake and following command. He had an uneventful night.   Pleurx catheter was [facility]) nebulizer solution 15 mcg, 15 mcg, Nebulization, BID, Yuriy Pinto, DO, 15 mcg at 03/12/20 0700    budesonide (PULMICORT) nebulizer suspension 500 mcg, 500 mcg, Nebulization, BID, Yuriy Pinto, DO, 500 mcg at 03/12/20 0700    Review of Systems:   Unable to obtain due to medical condition    Physical Exam:   Vital Signs:  BP (!) 95/46   Pulse 90   Temp 98.4 °F (36.9 °C) (Core)   Resp 18   Ht 5' 9\" (1.753 m)   Wt 299 lb (135.6 kg)   SpO2 96%   BMI 44.15 kg/m²     Input/Output: In: 1178.3 [Blood:1178.3]  Out: 800     Oxygen requirements: Intubated    Ventilator Information:  Vent Information  $Ventilation: $Subsequent Day  Ventilator Started: Yes  Skin Assessment: Clean, dry, & intact  Vent Type: 980  Vent Mode: AC/VC  Vt Ordered: 400 mL  Rate Set: 16 bmp  Peak Flow: 60 L/min  FiO2 : 65 %  Sensitivity: 3  PEEP/CPAP: 5  Humidification Source: Heated wire  Humidification Temp: 37  Humidification Temp Measured: 36.4    General appearance:  not in pain or distress, in no respiratory distress    HEENT: Intubated  Neck: Supple, no jugular venous distension, lymphadenopathy, thyromegaly or carotid bruits  Chest: Decreased breath sounds, no wheezing, +ve crackles and no tenderness over ribs   Cardiovascular: Normal S1 , S2, regular rate and rhythm, no murmur, rub or gallop  Abdomen: Normal sounds present, soft, lax with no tenderness, no hepatosplenomegaly, and no masses  Extremities: +ve edema. Pulses are equally present. Skin: intact, no rashes   Neurologic: Awake and follows commands off sedation. , No focal deficit     Investigations:  Labs, radiological imaging and cardiac work up were reviewed        ICU STAFF PHYSICIAN NOTE OF PERSONAL INVOLVEMENT IN CARE  As the attending physician, I certify that I personally reviewed the patient's history and personally examined the patient to confirm the physical findings described above, and that I reviewed the relevant imaging studies and available reports.

## 2020-03-12 NOTE — PROGRESS NOTES
Internal Medicine Progress Note    Ximena Rai. Matt Cruz., & [address] Matt Cruz., F.ANA.MARIA ISABEL.O.I. Pascale Smith D.O., RADHAMESO.I. Primary Care Physician: Tray Green MD   Admitting Physician:  Katheryn Dick DO  Admission date and time: 3/6/2020 10:03 AM    Room:  [place] [address]    Patient Name: Ruby Helms  MRN: [id number]    Date of Service: 3/12/2020     Subjective:  Angely Baum awakens to verbal stimulus during my examination. He remains mechanically ventilated with underlying sedation. The patient had a Pleurx catheter placed with removal of 1400 cc of fluid. Attempts will be made at weaning and extubation. I suspect that this will probably fell due to the patient prior attempt while in [place]. We did perform aggressive diuresis on him with multiple thoracentesis at which time he was still BiPAP dependent. Emergency guardianship has been obtained and possibly might be the best consideration time to proceed a tracheostomy    No family members are present      Review of System:   Unable to be obtained in the patient's current medical condition. Physical Exam:  No intake/output data recorded. Blood pressure (!) 95/46, pulse 90, temperature 98.1 °F (36.7 °C), temperature source Bladder, resp. rate 18, height 5' 9\" (1.753 m), weight 299 lb (135.6 kg), SpO2 96 %. HEENT:    PERRLA. EOMI. Sclera clear. Buccal mucosa dry. Endotracheal tube is in place. NG tube is in place. Neck:    Supple. Trachea midline. No thyromegaly. No JVD. No bruits. Heart:    Rhythm regular, rate controlled. Mild systolic murmur. Lungs:    Diminished bilaterally with mechanical breath sounds throughout. Abdomen:   Super morbidly obese. Nontender to palpation. Bowel sounds are hypoactive. Extremities:    Chronic bilateral lower extremity peripheral edema. Neurologic:    Obtunded. He does respond to verbal stimulus. No focal deficits are appreciated. Skin:    No petechia.  No

## 2020-03-12 NOTE — PLAN OF CARE
Problem: Restraint Use - Nonviolent/Non-Self-Destructive Behavior:  Goal: Absence of restraint-related injury  Description: Absence of restraint-related injury  3/12/2020 1922 by Lucky Frankel, RN  Outcome: Met This Shift  3/12/2020 1921 by Lucky Frankel, RN  Outcome: Met This Shift  3/12/2020 0628 by Trent Monet RN  Outcome: Met This Shift     Problem: Risk for Impaired Skin Integrity  Goal: Tissue integrity - skin and mucous membranes  Description: Structural intactness and normal physiological function of skin and  mucous membranes.   3/12/2020 1922 by Lucky Frankel, RN  Outcome: Met This Shift  3/12/2020 1921 by Lucky Frankel, RN  Outcome: Met This Shift     Problem: Falls - Risk of:  Goal: Will remain free from falls  Description: Will remain free from falls  3/12/2020 1922 by Lucky Frankel, RN  Outcome: Met This Shift  3/12/2020 1921 by Lucky Frankel, RN  Outcome: Met This Shift  3/12/2020 0628 by Trent Monet RN  Outcome: Met This Shift  Goal: Absence of physical injury  Description: Absence of physical injury  3/12/2020 1922 by Lucky Frankel, RN  Outcome: Met This Shift  3/12/2020 1921 by Lucky Frankel, RN  Outcome: Met This Shift

## 2020-03-13 LAB
ANISOCYTOSIS: ABNORMAL
BASOPHILIC STIPPLING: ABNORMAL
BASOPHILS ABSOLUTE: 0.14 E9/L (ref 0–0.2)
BASOPHILS RELATIVE PERCENT: 1.7 % (ref 0–2)
BURR CELLS: ABNORMAL
EOSINOPHILS ABSOLUTE: 0.42 E9/L (ref 0.05–0.5)
EOSINOPHILS RELATIVE PERCENT: 5.2 % (ref 0–6)
HCT VFR BLD CALC: 21.6 % (ref 37–54)
HEMOGLOBIN: 7 G/DL (ref 12.5–16.5)
HYPOCHROMIA: ABNORMAL
INR BLD: 2.3
LYMPHOCYTES ABSOLUTE: 0.32 E9/L (ref 1.5–4)
LYMPHOCYTES RELATIVE PERCENT: 3.5 % (ref 20–42)
MCH RBC QN AUTO: 34.1 PG (ref 26–35)
MCHC RBC AUTO-ENTMCNC: 32.4 % (ref 32–34.5)
MCV RBC AUTO: 105.4 FL (ref 80–99.9)
METER GLUCOSE: 137 MG/DL (ref 74–99)
METER GLUCOSE: 139 MG/DL (ref 74–99)
METER GLUCOSE: 144 MG/DL (ref 74–99)
METER GLUCOSE: 163 MG/DL (ref 74–99)
MONOCYTES ABSOLUTE: 0.57 E9/L (ref 0.1–0.95)
MONOCYTES RELATIVE PERCENT: 7 % (ref 2–12)
NEUTROPHILS ABSOLUTE: 6.72 E9/L (ref 1.8–7.3)
NEUTROPHILS RELATIVE PERCENT: 82.6 % (ref 43–80)
OVALOCYTES: ABNORMAL
PDW BLD-RTO: 21.8 FL (ref 11.5–15)
PLATELET # BLD: 43 E9/L (ref 130–450)
PLATELET CONFIRMATION: NORMAL
PMV BLD AUTO: 10.2 FL (ref 7–12)
POIKILOCYTES: ABNORMAL
PROTHROMBIN TIME: 25.6 SEC (ref 9.3–12.4)
RBC # BLD: 2.05 E12/L (ref 3.8–5.8)
WBC # BLD: 8.1 E9/L (ref 4.5–11.5)

## 2020-03-13 PROCEDURE — 6360000002 HC RX W HCPCS: Performed by: INTERNAL MEDICINE

## 2020-03-13 PROCEDURE — 87040 BLOOD CULTURE FOR BACTERIA: CPT

## 2020-03-13 PROCEDURE — 2000000000 HC ICU R&B

## 2020-03-13 PROCEDURE — 87186 SC STD MICRODIL/AGAR DIL: CPT

## 2020-03-13 PROCEDURE — C9113 INJ PANTOPRAZOLE SODIUM, VIA: HCPCS | Performed by: INTERNAL MEDICINE

## 2020-03-13 PROCEDURE — 2580000003 HC RX 258: Performed by: INTERNAL MEDICINE

## 2020-03-13 PROCEDURE — 87150 DNA/RNA AMPLIFIED PROBE: CPT

## 2020-03-13 PROCEDURE — 87077 CULTURE AEROBIC IDENTIFY: CPT

## 2020-03-13 PROCEDURE — 6370000000 HC RX 637 (ALT 250 FOR IP): Performed by: INTERNAL MEDICINE

## 2020-03-13 PROCEDURE — 85610 PROTHROMBIN TIME: CPT

## 2020-03-13 PROCEDURE — 85025 COMPLETE CBC W/AUTO DIFF WBC: CPT

## 2020-03-13 PROCEDURE — 36592 COLLECT BLOOD FROM PICC: CPT

## 2020-03-13 PROCEDURE — 94003 VENT MGMT INPAT SUBQ DAY: CPT

## 2020-03-13 PROCEDURE — 82962 GLUCOSE BLOOD TEST: CPT

## 2020-03-13 PROCEDURE — 94640 AIRWAY INHALATION TREATMENT: CPT

## 2020-03-13 RX ADMIN — ARFORMOTEROL TARTRATE 15 MCG: 15 SOLUTION RESPIRATORY (INHALATION) at 05:12

## 2020-03-13 RX ADMIN — BUDESONIDE 500 MCG: 0.5 INHALANT RESPIRATORY (INHALATION) at 05:12

## 2020-03-13 RX ADMIN — RIFAXIMIN 200 MG: 200 TABLET ORAL at 22:37

## 2020-03-13 RX ADMIN — IPRATROPIUM BROMIDE AND ALBUTEROL SULFATE 1 AMPULE: .5; 3 SOLUTION RESPIRATORY (INHALATION) at 17:11

## 2020-03-13 RX ADMIN — RIFAXIMIN 200 MG: 200 TABLET ORAL at 12:14

## 2020-03-13 RX ADMIN — INSULIN LISPRO 1 UNITS: 100 INJECTION, SOLUTION INTRAVENOUS; SUBCUTANEOUS at 02:48

## 2020-03-13 RX ADMIN — IPRATROPIUM BROMIDE AND ALBUTEROL SULFATE 1 AMPULE: .5; 3 SOLUTION RESPIRATORY (INHALATION) at 09:17

## 2020-03-13 RX ADMIN — LACTULOSE 20 G: 20 SOLUTION ORAL at 14:12

## 2020-03-13 RX ADMIN — LACTULOSE 20 G: 20 SOLUTION ORAL at 04:05

## 2020-03-13 RX ADMIN — IPRATROPIUM BROMIDE AND ALBUTEROL SULFATE 1 AMPULE: .5; 3 SOLUTION RESPIRATORY (INHALATION) at 22:50

## 2020-03-13 RX ADMIN — Medication 125 MCG/HR: at 04:00

## 2020-03-13 RX ADMIN — RIFAXIMIN 200 MG: 200 TABLET ORAL at 04:05

## 2020-03-13 RX ADMIN — LACTULOSE 20 G: 20 SOLUTION ORAL at 22:37

## 2020-03-13 RX ADMIN — BUDESONIDE 500 MCG: 0.5 INHALANT RESPIRATORY (INHALATION) at 17:11

## 2020-03-13 RX ADMIN — PANTOPRAZOLE SODIUM 40 MG: 40 INJECTION, POWDER, FOR SOLUTION INTRAVENOUS at 08:30

## 2020-03-13 RX ADMIN — IPRATROPIUM BROMIDE AND ALBUTEROL SULFATE 1 AMPULE: .5; 3 SOLUTION RESPIRATORY (INHALATION) at 05:12

## 2020-03-13 ASSESSMENT — PULMONARY FUNCTION TESTS
PIF_VALUE: 27
PIF_VALUE: 29

## 2020-03-13 NOTE — PROGRESS NOTES
03/10/2020    AST 41 (H) 03/10/2020    ALKPHOS 99 03/10/2020    BILITOT 3.4 (H) 03/10/2020     Lab Results   Component Value Date     03/10/2020    K 4.1 03/10/2020    K 3.3 08/08/2019    CL 96 03/10/2020    CO2 34 03/10/2020    BUN 50 03/10/2020    CREATININE 1.5 03/10/2020    CREATININE 1.4 03/09/2020    CREATININE 1.3 03/08/2020    GFRAA 57 03/10/2020    LABGLOM 47 03/10/2020    GLUCOSE 170 03/10/2020    PROT 7.2 03/10/2020    LABALBU 3.2 03/10/2020    CALCIUM 8.9 03/10/2020    BILITOT 3.4 03/10/2020    ALKPHOS 99 03/10/2020    AST 41 03/10/2020    ALT 17 03/10/2020     Lab Results   Component Value Date    CRP 1.1 (H) 02/14/2020     Lab Results   Component Value Date    SEDRATE 44 (H) 02/23/2020    SEDRATE 50 (H) 02/14/2020     Microbiology:   Lab Results   Component Value Date    BLOODCULT2 5 Days- no growth 03/07/2020    BLOODCULT2 5 Days- no growth 01/31/2020    BLOODCULT2  01/29/2020     Refer to previous culture for susceptibility results  (CXBL r wrist collected 1-29-20 at 2015)      ORG Proteus mirabilis 01/30/2020    ORG Staphylococcus epidermidis 01/29/2020    ORG Acinetobacter baumannii 01/29/2020       Smear, Respiratory   Date Value Ref Range Status   03/07/2020   Final    Group 6: <25 PMN's/LPF and <25 Epithelial cells/LPF  Moderate Polymorphonuclear leukocytes  Epithelial cells not seen  Rare Gram negative rods  Few Gram positive cocci  Few Gram positive diplococci           Component Value Date/Time    AFBCX  02/04/2020 1450     No growth after 1 week/s of incubation. No growth after 2 week/s of incubation. No growth after 3 week/s of incubation. No growth after 4 week/s of incubation. FUNGSM No Fungal elements seen 02/04/2020 1450    LABFUNG No growth after 4 weeks of incubation.  02/04/2020 1450       Body Fluid Culture, Sterile   Date Value Ref Range Status   02/28/2020 Growth not present  Final     MRSA Culture Only   Date Value Ref Range Status   01/30/2020 Methicillin resistant Staph aureus not isolated  Final     CULTURE, RESPIRATORY   Date Value Ref Range Status   03/07/2020 Oral Pharyngeal Jo present  Final     ASSESSMENT/PLAN:    Respiratory failure cx  ORAL JO Lung bases are clear. -  Plans are for Pleurx catheter placement today with the IR team.  Pancytopenia    -leukopenia RESOLVED  -cont to follow temp wbc better  Thrombocytopenia  danny  -CURRENTLY OFF ATBX  BLOOD CX NGTD  Repeat blood cx due to dec bp  F/u cmp   WILL FOLLOW prn  · Monitor labs  · follow    Imaging and labs were reviewed per medical records. An opportunity to ask questions was given to the patient/FAMILY.       Electronically signed by Zenia Oliveros MD on 3/13/2020 at 10:18 AM

## 2020-03-13 NOTE — CARE COORDINATION
3/13/2020  Nancy Boone-legal guardian for Mr. Gonsalo Ge- met with   and Dr. Jimmie Brewster in reference to clinical status updates for this patient. Extension paperwork given to Ms. Juan Lloyd and she will file with courts at her 11:00 am hearing. Plans include weaning from vent and changing the code status to [facility]. No further heroics. Hospice will be considered if appropriate after extubation. CM to continue to follow for any discharge planning needs. LTACH remains the plan at this time [address] [place] [address] Dr. HALLMAN Electronically signed by Caryn Bunch RN-BC on 3/13/2020 at 10:52 AM         3/13/2020 guardianship has been extended for 30 days. Court documents being faxed to CM -will place in soft blue chart. After the 30 days the courts will revisit the case- interview the patient and establish if permanent guardianship will be needed at that time.  Electronically signed by Caryn Bunch RN on 3/13/2020 at 11:43 AM

## 2020-03-13 NOTE — PROGRESS NOTES
Assessment and Plan  Patient is a 59 y.o. male with the following medical Problems:   1  Acute on chronic hypoxic and hypercapnic respiratory failure requiring intubation  2. Morbid obesity  3. Obesity hypoventilation syndrome  4. Pleural effusions  5. Cirrhosis  6. Chronic anemia and thrombocytopenia  7. Hx of Severe Sepsis with septic shock secondary to Acinetobacter and staph bacteremia as well as Proteus bacteriuria. 8. HFpEF  9. Bilateral lower extremities edema  10. Metabolic encephalopathy  11. Severe deconditioning        Plan of care:  1. Patient had a Pleurx catheter inserted yesterday. 2400 mL of pleural effusion was drained so far  2. Transfuse as needed for hemoglobin less than 7. Patient is chronically thrombocytopenic and does not require platelet transfusion except if he is scheduled for procedure. 1 unit of packed red blood cells was ordered 03/12/2020  3. rifaximin and PPI. 4.  Emergency guardianship was obtained . Of care were discussed. Patient is DNR CCA with no re-intubation. 5.  Goals of care discussion with he  appointed guardian took place and the patient is DNR CCA. We discussed his goals of care today and the plan was to extubate the patient with no reintubation. If he fails extubation trial patient will be made DNR CCA. We will hold off tracheostomy as his overall prognosis is extremely poor. 6.  Spontaneous breathing trial will be attempted today. We will try to extubate the patient. History of Present Illness:   Patient is a 68-year-old man with above-mentioned medical problems has multiple recurrent admission and presented from [facility] with acute on chronic hypoxic and hypercapnic respiratory failure. Patient was intubated. CT scan shows large bilateral pleural effusions. Patient has severe coagulopathy. 03/11/2020  Patient had an uneventful night. He is scheduled for a Pleurx catheter insertion today.   We will attempt extubation of the mechanical ventilation tomorrow. He has no fever, chills, rigors. Patient was made DNR CCA based on his poor overall long-term prognosis. 03/12/2020  Patient is awake and following command. He had an uneventful night. Pleurx catheter was inserted yesterday and 1400 mL of fluid was drained. Patient is awake and following command today. 03/13/2020  Patient is arousable on fentanyl however he falls asleep. He had an uneventful night. He has no fever, chills, or rigors. I discussed goals of care with his primary care physician and with his guardian. The decision was made that not to proceed with tracheostomy and to extubate the patient however if he fails the extubation process he will be made DNR CC. He should not be reintubated. Past Medical History:  Past Medical History:   Diagnosis Date    Acute diastolic CHF (congestive heart failure) ([facility] [address] 11/17/15    Echo 11/18/15 EF 73%    Dermatitis     due to thimersol    Heart valve problem     leaky    Hx of cardiovascular stress test 12/9/2015    Lexiscan    Hypertension     Obesity         Family History:   Family History   Problem Relation Age of Onset   Lj Rose Parkinsonism Mother     COPD Father     Heart Disease Father        Allergies:         Latex;  Aldactone [spironolactone]; and Thimerosal    Social history:  Social History     Socioeconomic History    Marital status: Single     Spouse name: Not on file    Number of children: 0    Years of education: Not on file    Highest education level: Not on file   Occupational History    Occupation: Retired-[employer]   Social Needs    Financial resource strain: Not on file    Food insecurity     Worry: Not on file     Inability: Not on file   [employer] needs     Medical: Not on file     Non-medical: Not on file   Tobacco Use    Smoking status: Never Smoker    Smokeless tobacco: Never Used   Substance and Sexual Activity    Alcohol use: Not Currently    Drug use: No    Sexual activity: Not

## 2020-03-13 NOTE — PROGRESS NOTES
Internal Medicine Progress Note    Zoe Syed. Blake Salinas., & [address] Blake Salinas., F.A.C.O.I. Beau Kevin D.O., F.A.C.O.I. Primary Care Physician: Miguel Ramsey MD   Admitting Physician:  Tami Guevara DO  Admission date and time: 3/6/2020 10:03 AM    Room:  [place] [address]    Patient Name: Gurmeet Suggs  MRN: [id number]    Date of Service: 3/13/2020     Subjective:  Foster Devries awakens to verbal stimulus during my examination. The patient is currently mechanically ventilated and sedation is being decreased. I did have an extensive discussion with the intensivist about plan and care and treatment. I do agree with him at the present time that best scenario would be attempt at extubating him and not proceeding with a tracheostomy. He did discuss with the power of  that if he fails extubation that he would be made a DNR CC with comfort care. Considering his multiple comorbidity is decompensated and advanced liver disease is multiple medical problems this is appeared to be more reasonable that a lifetime commitment of ventilatory dependency with still having all his other multiple comorbidities. Attempts are being made at weaning him today for possible extubation. No family members are present      Review of System:   Unable to be obtained in the patient's current medical condition. Physical Exam:  I/O this shift:  In: -   Out: 30 [Urine:30]  Blood pressure (!) 92/45, pulse 88, temperature 97.3 °F (36.3 °C), temperature source Core, resp. rate 16, height 5' 9\" (1.753 m), weight 299 lb (135.6 kg), SpO2 98 %. HEENT:    PERRLA. EOMI. Sclera clear. Buccal mucosa dry. Endotracheal tube is in place. NG tube is in place. Neck:    Supple. Trachea midline. No thyromegaly. No JVD. No bruits. Heart:    Rhythm regular, rate controlled. Mild systolic murmur. Lungs:    Diminished bilaterally with mechanical breath sounds throughout.   Abdomen:   Super morbidly obese. Nontender to palpation. Bowel sounds are hypoactive. Extremities:    Chronic bilateral lower extremity peripheral edema. Neurologic:    Obtunded. He does respond to verbal stimulus. No focal deficits are appreciated. Skin:    No petechia. No hemorrhage. No wounds. Musculokeletal:  Spine ROM normal. Muscular strength intact. Gait not assessed. Genitalia/Breast:  Voiding with the use of a Burgess catheter.     Scheduled Meds:   rifaximin  200 mg Oral TID    lactulose  20 g Oral TID    insulin lispro  0-6 Units Subcutaneous Q6H    ipratropium-albuterol  1 ampule Inhalation Q6H    pantoprazole  40 mg Intravenous Daily    budesonide  500 mcg Nebulization BID       Continuous Infusions:   dexmedetomidine (PRECEDEX) IV infusion Stopped (03/12/20 0419)    dextrose      fentaNYL 5 mcg/ml in 0.9%  ml infusion 125 mcg/hr (03/13/20 0400)       Objective Data:  CBC with Differential:    Lab Results   Component Value Date    WBC 8.1 03/13/2020    RBC 2.05 03/13/2020    HGB 7.0 03/13/2020    HCT 21.6 03/13/2020    PLT 43 03/13/2020    .4 03/13/2020    MCH 34.1 03/13/2020    MCHC 32.4 03/13/2020    RDW 21.8 03/13/2020    NRBC 0.9 02/06/2020    METASPCT 0.9 03/06/2020    LYMPHOPCT 3.5 03/13/2020    PROMYELOPCT SEE NOTE 08/06/2019    MONOPCT 7.0 03/13/2020    MYELOPCT 0.9 03/09/2020    BASOPCT 1.7 03/13/2020    MONOSABS 0.57 03/13/2020    LYMPHSABS 0.32 03/13/2020    EOSABS 0.42 03/13/2020    BASOSABS 0.14 03/13/2020     BMP:    Lab Results   Component Value Date     03/10/2020    K 4.1 03/10/2020    K 3.3 08/08/2019    CL 96 03/10/2020    CO2 34 03/10/2020    BUN 50 03/10/2020    LABALBU 3.2 03/10/2020    CREATININE 1.5 03/10/2020    CALCIUM 8.9 03/10/2020    GFRAA 57 03/10/2020    LABGLOM 47 03/10/2020    GLUCOSE 170 03/10/2020         Assessment:       · Acute-on-chronic hypercapnic-hypoxemic respiratory failure with need for intubation and  due to BiPAP dependency  · Sepsis secondary to Acinetobacter bacteremia. · History of healthcare-associated pneumonia with multifocal bilateral lung infiltrates.    · Obstructive sleep apnea. · Acute-on-chronic kidney disease. · Coagulopathy secondary to underlying cirrhosis, probably secondary to alcoholism.   · Coagulopathy secondary to alcoholic liver disease. · Anemia of chronic disease with superimposed folic acid deficiency. · Morbid obesity. · Gout. · Pancytopenia multifactorial nature  · Protein and caloric malnutrition due to increased catabolic needs      Plan:     · Pleurx catheter was placed yesterday and continue to remove fluid in anticipation of optimizing ventilatory capacity and attempt at extubation. Sedation has been lightened up and the patient will be attempted to be extubated. We have discussed this with Dr Pema Julien' He has discussed this with the guardian, and if he fails  extubation he will be made DNR CC with comfort care. · We will hold off on a tracheostomy at the present time due to or change in plan of care at this time also due to the fact of the coagulopathy which is present. · We will continue to multiple his other medical problems. His hemoglobin hematocrit is currently 7 and consideration for transfusion. 30 minutes of critical care time was spent with the patient. This time included chart review, , and discussion with those consultants involved in the patient's care. More than 50% of my  time was spent at the bedside counseling/coordinating care with the patient and/or family with face to face contact. This time was spent reviewing notes and laboratory data as well as instructing and counseling the patient. Time I spent with the family or surrogate(s) is included only if the patient was incapable of providing the necessary information or participating in medical decisions.  I also discussed the differential diagnosis and all of the proposed management plans with the patient and

## 2020-03-13 NOTE — PLAN OF CARE
Noted plan for terminal extubation. No new plans from GI POV. GI will sign off. Please, call with questions/concerns. Thank you for the opportunity to participate in the care of Mr. Kilo Moreno.     Pacheco Guadarrama MD  3/13/2020  1:47 PM

## 2020-03-13 NOTE — PLAN OF CARE
Problem: Restraint Use - Nonviolent/Non-Self-Destructive Behavior:  Goal: Absence of restraint-related injury  Description: Absence of restraint-related injury  3/13/2020 1850 by Rodney Natarajan RN  Outcome: Met This Shift    Problem: Restraint Use - Nonviolent/Non-Self-Destructive Behavior:  Goal: Absence of restraint indications  Description: Absence of restraint indications  3/13/2020 1850 by Rodney Natarajan RN  Outcome: Met This Shift

## 2020-03-14 ENCOUNTER — APPOINTMENT (OUTPATIENT)
Dept: GENERAL RADIOLOGY | Age: 65
DRG: 720 | End: 2020-03-14
Attending: INTERNAL MEDICINE
Payer: COMMERCIAL

## 2020-03-14 PROBLEM — J96.90 RESPIRATORY FAILURE (HCC): Status: ACTIVE | Noted: 2020-03-14

## 2020-03-14 LAB
ACINETOBACTER BAUMANNII BY PCR: NOT DETECTED
ALBUMIN SERPL-MCNC: 2.7 G/DL (ref 3.5–5.2)
ALP BLD-CCNC: 76 U/L (ref 40–129)
ALT SERPL-CCNC: 16 U/L (ref 0–40)
ANION GAP SERPL CALCULATED.3IONS-SCNC: 10 MMOL/L (ref 7–16)
ANISOCYTOSIS: ABNORMAL
AST SERPL-CCNC: 38 U/L (ref 0–39)
BASOPHILIC STIPPLING: ABNORMAL
BASOPHILS ABSOLUTE: 0 E9/L (ref 0–0.2)
BASOPHILS RELATIVE PERCENT: 0.2 % (ref 0–2)
BILIRUB SERPL-MCNC: 4.4 MG/DL (ref 0–1.2)
BOTTLE TYPE: ABNORMAL
BUN BLDV-MCNC: 89 MG/DL (ref 8–23)
BURR CELLS: ABNORMAL
CALCIUM SERPL-MCNC: 9.4 MG/DL (ref 8.6–10.2)
CANDIDA ALBICANS BY PCR: NOT DETECTED
CANDIDA GLABRATA BY PCR: NOT DETECTED
CANDIDA KRUSEI BY PCR: NOT DETECTED
CANDIDA PARAPSILOSIS BY PCR: NOT DETECTED
CANDIDA TROPICALIS BY PCR: NOT DETECTED
CARBAPENEM RESISTANCE KPC BY PCR: NOT DETECTED
CHLORIDE BLD-SCNC: 95 MMOL/L (ref 98–107)
CO2: 32 MMOL/L (ref 22–29)
CREAT SERPL-MCNC: 2.4 MG/DL (ref 0.7–1.2)
ENTEROBACTER CLOACAE COMPLEX BY PCR: NOT DETECTED
ENTEROBACTERALES BY PCR: NOT DETECTED
ENTEROCOCCUS BY PCR: NOT DETECTED
EOSINOPHILS ABSOLUTE: 0 E9/L (ref 0.05–0.5)
EOSINOPHILS RELATIVE PERCENT: 0.5 % (ref 0–6)
ESCHERICHIA COLI BY PCR: NOT DETECTED
GFR AFRICAN AMERICAN: 33
GFR NON-AFRICAN AMERICAN: 27 ML/MIN/1.73
GLUCOSE BLD-MCNC: 124 MG/DL (ref 74–99)
HAEMOPHILUS INFLUENZAE BY PCR: NOT DETECTED
HCT VFR BLD CALC: 19.5 % (ref 37–54)
HEMOGLOBIN: 6.4 G/DL (ref 12.5–16.5)
INR BLD: 2.3
KLEBSIELLA OXYTOCA BY PCR: NOT DETECTED
KLEBSIELLA PNEUMONIAE GROUP BY PCR: NOT DETECTED
LISTERIA MONOCYTOGENES BY PCR: NOT DETECTED
LYMPHOCYTES ABSOLUTE: 0.24 E9/L (ref 1.5–4)
LYMPHOCYTES RELATIVE PERCENT: 2.6 % (ref 20–42)
MCH RBC QN AUTO: 33.9 PG (ref 26–35)
MCHC RBC AUTO-ENTMCNC: 32.8 % (ref 32–34.5)
MCV RBC AUTO: 103.2 FL (ref 80–99.9)
METER GLUCOSE: 136 MG/DL (ref 74–99)
METER GLUCOSE: 141 MG/DL (ref 74–99)
MONOCYTES ABSOLUTE: 0.08 E9/L (ref 0.1–0.95)
MONOCYTES RELATIVE PERCENT: 0.9 % (ref 2–12)
NEISSERIA MENINGITIDIS BY PCR: NOT DETECTED
NEUTROPHILS ABSOLUTE: 7.76 E9/L (ref 1.8–7.3)
NEUTROPHILS RELATIVE PERCENT: 96.5 % (ref 43–80)
ORDER NUMBER: ABNORMAL
OVALOCYTES: ABNORMAL
PDW BLD-RTO: 21.4 FL (ref 11.5–15)
PLATELET # BLD: 36 E9/L (ref 130–450)
PLATELET CONFIRMATION: NORMAL
PMV BLD AUTO: 10.6 FL (ref 7–12)
POIKILOCYTES: ABNORMAL
POLYCHROMASIA: ABNORMAL
POTASSIUM SERPL-SCNC: 4.4 MMOL/L (ref 3.5–5)
PROTEUS BY PCR: NOT DETECTED
PROTHROMBIN TIME: 26.7 SEC (ref 9.3–12.4)
PSEUDOMONAS AERUGINOSA BY PCR: DETECTED
RBC # BLD: 1.89 E12/L (ref 3.8–5.8)
SERRATIA MARCESCENS BY PCR: NOT DETECTED
SODIUM BLD-SCNC: 137 MMOL/L (ref 132–146)
SOURCE OF BLOOD CULTURE: ABNORMAL
STAPHYLOCOCCUS AUREUS BY PCR: NOT DETECTED
STAPHYLOCOCCUS SPECIES BY PCR: NOT DETECTED
STREPTOCOCCUS AGALACTIAE BY PCR: NOT DETECTED
STREPTOCOCCUS PNEUMONIAE BY PCR: NOT DETECTED
STREPTOCOCCUS PYOGENES  BY PCR: NOT DETECTED
STREPTOCOCCUS SPECIES BY PCR: NOT DETECTED
TARGET CELLS: ABNORMAL
TEAR DROP CELLS: ABNORMAL
TOTAL PROTEIN: 6.3 G/DL (ref 6.4–8.3)
WBC # BLD: 8 E9/L (ref 4.5–11.5)

## 2020-03-14 PROCEDURE — 2580000003 HC RX 258: Performed by: INTERNAL MEDICINE

## 2020-03-14 PROCEDURE — 1200000000 HC SEMI PRIVATE

## 2020-03-14 PROCEDURE — 80053 COMPREHEN METABOLIC PANEL: CPT

## 2020-03-14 PROCEDURE — C9113 INJ PANTOPRAZOLE SODIUM, VIA: HCPCS | Performed by: INTERNAL MEDICINE

## 2020-03-14 PROCEDURE — 94640 AIRWAY INHALATION TREATMENT: CPT

## 2020-03-14 PROCEDURE — 6370000000 HC RX 637 (ALT 250 FOR IP): Performed by: INTERNAL MEDICINE

## 2020-03-14 PROCEDURE — 2500000003 HC RX 250 WO HCPCS: Performed by: INTERNAL MEDICINE

## 2020-03-14 PROCEDURE — 82962 GLUCOSE BLOOD TEST: CPT

## 2020-03-14 PROCEDURE — 6360000002 HC RX W HCPCS: Performed by: INTERNAL MEDICINE

## 2020-03-14 PROCEDURE — 74018 RADEX ABDOMEN 1 VIEW: CPT

## 2020-03-14 PROCEDURE — 85610 PROTHROMBIN TIME: CPT

## 2020-03-14 PROCEDURE — 85025 COMPLETE CBC W/AUTO DIFF WBC: CPT

## 2020-03-14 RX ORDER — SODIUM CHLORIDE 9 MG/ML
INJECTION, SOLUTION INTRAVENOUS EVERY 8 HOURS
Status: DISCONTINUED | OUTPATIENT
Start: 2020-03-14 | End: 2020-03-15

## 2020-03-14 RX ORDER — MIDAZOLAM HYDROCHLORIDE 1 MG/ML
0.5 INJECTION INTRAMUSCULAR; INTRAVENOUS
Status: DISCONTINUED | OUTPATIENT
Start: 2020-03-14 | End: 2020-03-15

## 2020-03-14 RX ORDER — HYDROMORPHONE HCL 110MG/55ML
0.6 PATIENT CONTROLLED ANALGESIA SYRINGE INTRAVENOUS
Status: DISCONTINUED | OUTPATIENT
Start: 2020-03-14 | End: 2020-03-15

## 2020-03-14 RX ORDER — HYDROMORPHONE HCL 110MG/55ML
0.3 PATIENT CONTROLLED ANALGESIA SYRINGE INTRAVENOUS
Status: DISCONTINUED | OUTPATIENT
Start: 2020-03-14 | End: 2020-03-15

## 2020-03-14 RX ADMIN — BUDESONIDE 500 MCG: 0.5 INHALANT RESPIRATORY (INHALATION) at 04:44

## 2020-03-14 RX ADMIN — LACTULOSE 20 G: 20 SOLUTION ORAL at 13:14

## 2020-03-14 RX ADMIN — PANTOPRAZOLE SODIUM 40 MG: 40 INJECTION, POWDER, FOR SOLUTION INTRAVENOUS at 07:36

## 2020-03-14 RX ADMIN — RIFAXIMIN 200 MG: 200 TABLET ORAL at 12:58

## 2020-03-14 RX ADMIN — PIPERACILLIN AND TAZOBACTAM 3.38 G: 3; .375 INJECTION, POWDER, LYOPHILIZED, FOR SOLUTION INTRAVENOUS at 21:11

## 2020-03-14 RX ADMIN — MIDAZOLAM 0.5 MG: 1 INJECTION INTRAMUSCULAR; INTRAVENOUS at 21:11

## 2020-03-14 RX ADMIN — PIPERACILLIN AND TAZOBACTAM 3.38 G: 3; .375 INJECTION, POWDER, LYOPHILIZED, FOR SOLUTION INTRAVENOUS at 12:59

## 2020-03-14 RX ADMIN — IPRATROPIUM BROMIDE AND ALBUTEROL SULFATE 1 AMPULE: .5; 3 SOLUTION RESPIRATORY (INHALATION) at 04:44

## 2020-03-14 RX ADMIN — HYDROMORPHONE HYDROCHLORIDE 0.6 MG: 2 INJECTION INTRAMUSCULAR; INTRAVENOUS; SUBCUTANEOUS at 16:18

## 2020-03-14 RX ADMIN — HYDROMORPHONE HYDROCHLORIDE 0.6 MG: 2 INJECTION INTRAMUSCULAR; INTRAVENOUS; SUBCUTANEOUS at 21:11

## 2020-03-14 RX ADMIN — IPRATROPIUM BROMIDE AND ALBUTEROL SULFATE 1 AMPULE: .5; 3 SOLUTION RESPIRATORY (INHALATION) at 09:04

## 2020-03-14 RX ADMIN — DEXMEDETOMIDINE 0.2 MCG/KG/HR: 100 INJECTION, SOLUTION, CONCENTRATE INTRAVENOUS at 01:43

## 2020-03-14 RX ADMIN — INSULIN LISPRO 1 UNITS: 100 INJECTION, SOLUTION INTRAVENOUS; SUBCUTANEOUS at 01:52

## 2020-03-14 ASSESSMENT — PAIN SCALES - GENERAL
PAINLEVEL_OUTOF10: 0
PAINLEVEL_OUTOF10: 10

## 2020-03-14 NOTE — PROGRESS NOTES
F  Facility Name: [facility]    HOT plan:  1. I will follow up tomorrow. 2. Please call [facility] [phone] with any questions. 3. Patient not currently under the care of hospice.     Electronically signed by Dre Altamirano RN on 3/14/2020 at 3:48 PM

## 2020-03-14 NOTE — PROGRESS NOTES
regular, rate controlled. No murmurs. Lungs:    Diminished bilaterally with decreased aeration throughout. Abdomen:   Super morbidly obese. Nontender. Nondistended. Bowel sounds are hypoactive. Extremities:    Bilateral lower extremity lymphedema. Neurologic:    Alert x 3. No focal deficit. Cranial nerves grossly intact. Skin:    Multiple skin excoriations. Psych:   Behavior is normal. Mood appears normal. Speech is not rapid or pressured. Musculokeletal:  Spine ROM normal.  Profoundly weak and deconditioned. Genitalia/Breast:  Burgess catheter is in place.     Scheduled Meds:   piperacillin-tazobactam  3.375 g Intravenous Q8H    rifaximin  200 mg Oral TID    lactulose  20 g Oral TID    insulin lispro  0-6 Units Subcutaneous Q6H    ipratropium-albuterol  1 ampule Inhalation Q6H    pantoprazole  40 mg Intravenous Daily    budesonide  500 mcg Nebulization BID       Continuous Infusions:   sodium chloride      dexmedetomidine (PRECEDEX) IV infusion 0.3 mcg/kg/hr (03/14/20 0215)    dextrose         Objective Data:  CBC with Differential:    Lab Results   Component Value Date    WBC 8.0 03/14/2020    RBC 1.89 03/14/2020    HGB 6.4 03/14/2020    HCT 19.5 03/14/2020    PLT 36 03/14/2020    .2 03/14/2020    MCH 33.9 03/14/2020    MCHC 32.8 03/14/2020    RDW 21.4 03/14/2020    NRBC 0.9 02/06/2020    METASPCT 0.9 03/06/2020    LYMPHOPCT 2.6 03/14/2020    PROMYELOPCT SEE NOTE 08/06/2019    MONOPCT 0.9 03/14/2020    MYELOPCT 0.9 03/09/2020    BASOPCT 0.2 03/14/2020    MONOSABS 0.08 03/14/2020    LYMPHSABS 0.24 03/14/2020    EOSABS 0.00 03/14/2020    BASOSABS 0.00 03/14/2020     BMP:    Lab Results   Component Value Date     03/14/2020    K 4.4 03/14/2020    K 3.3 08/08/2019    CL 95 03/14/2020    CO2 32 03/14/2020    BUN 89 03/14/2020    LABALBU 2.7 03/14/2020    CREATININE 2.4 03/14/2020    CALCIUM 9.4 03/14/2020    GFRAA 33 03/14/2020    LABGLOM 27 03/14/2020    GLUCOSE 124 03/14/2020 Assessment:   1. Acute on chronic hypercapnic/hypoxemic respiratory failure status post terminal extubation  2. Sepsis secondary to Acinetobacter bacteremia with persistent gram-negative rods  3. History of healthcare-associated pneumonia with multifocal bilateral lung infiltrates.    4. Obstructive sleep apnea with morbid obesity hypoventilatory syndrome  5. Acute on chronic kidney disease stage III  6. Coagulopathy secondary to alcoholic liver disease. 7. Anemia of chronic disease with superimposed folic acid deficiency. 8. Pancytopenia multifactorial nature  9. Protein and caloric malnutrition due to increased catabolic needs    Plan:   The patient was terminally extubated yesterday and is maintaining his oxygen saturation on nasal cannula oxygen. Blood cultures remain positive for gram-negative rods. Antibiotic therapy is being adjusted. The hospice team will provide consultation today. As the patient's respiratory status remains labile, his long-term prognosis remains poor. The patient is a DO NOT RESUSCITATE with no reintubation. Emergency guardianship has been obtained and the guardian will be updated. I have discussed the case with the critical care team.  Continue current therapy. See orders for further plan of care. Greater than 30 minutes of critical care time was spent with the patient. This time included chart review, , and discussion with those consultants involved in the patient's care. More than 50% of my  time was spent at the bedside counseling/coordinating care with the patient and/or family with face to face contact. This time was spent reviewing notes and laboratory data as well as instructing and counseling the patient. Time I spent with the family or surrogate(s) is included only if the patient was incapable of providing the necessary information or participating in medical decisions.  I also discussed the differential diagnosis and all of the proposed

## 2020-03-14 NOTE — PROGRESS NOTES
Range Status   03/14/2020 19.5 (L) 37.0 - 54.0 % Final   03/13/2020 21.6 (L) 37.0 - 54.0 % Final   03/12/2020 20.1 (L) 37.0 - 54.0 % Final     MCV   Date Value Ref Range Status   03/14/2020 103.2 (H) 80.0 - 99.9 fL Final   03/13/2020 105.4 (H) 80.0 - 99.9 fL Final   03/12/2020 106.3 (H) 80.0 - 99.9 fL Final     Platelets   Date Value Ref Range Status   03/14/2020 36 (L) 130 - 450 E9/L Final   03/13/2020 43 (L) 130 - 450 E9/L Final   03/12/2020 44 (L) 130 - 450 E9/L Final     Sodium   Date Value Ref Range Status   03/14/2020 137 132 - 146 mmol/L Final   03/10/2020 139 132 - 146 mmol/L Final   03/09/2020 138 132 - 146 mmol/L Final     Potassium   Date Value Ref Range Status   03/14/2020 4.4 3.5 - 5.0 mmol/L Final   03/10/2020 4.1 3.5 - 5.0 mmol/L Final   03/09/2020 3.7 3.5 - 5.0 mmol/L Final     Potassium reflex Magnesium   Date Value Ref Range Status   08/08/2019 3.3 (L) 3.5 - 5.0 mmol/L Final   08/06/2019 3.9 3.5 - 5.0 mmol/L Final     Chloride   Date Value Ref Range Status   03/14/2020 95 (L) 98 - 107 mmol/L Final   03/10/2020 96 (L) 98 - 107 mmol/L Final   03/09/2020 95 (L) 98 - 107 mmol/L Final     CO2   Date Value Ref Range Status   03/14/2020 32 (H) 22 - 29 mmol/L Final   03/10/2020 34 (H) 22 - 29 mmol/L Final   03/09/2020 35 (H) 22 - 29 mmol/L Final     BUN   Date Value Ref Range Status   03/14/2020 89 (H) 8 - 23 mg/dL Final   03/10/2020 50 (H) 8 - 23 mg/dL Final   03/09/2020 37 (H) 8 - 23 mg/dL Final     CREATININE   Date Value Ref Range Status   03/14/2020 2.4 (H) 0.7 - 1.2 mg/dL Final   03/10/2020 1.5 (H) 0.7 - 1.2 mg/dL Final   03/09/2020 1.4 (H) 0.7 - 1.2 mg/dL Final     Glucose   Date Value Ref Range Status   03/14/2020 124 (H) 74 - 99 mg/dL Final   03/10/2020 170 (H) 74 - 99 mg/dL Final   03/09/2020 147 (H) 74 - 99 mg/dL Final     Calcium   Date Value Ref Range Status   03/14/2020 9.4 8.6 - 10.2 mg/dL Final   03/10/2020 8.9 8.6 - 10.2 mg/dL Final   03/09/2020 9.0 8.6 - 10.2 mg/dL Final     Total Protein Date Value Ref Range Status   03/14/2020 6.3 (L) 6.4 - 8.3 g/dL Final   03/10/2020 7.2 6.4 - 8.3 g/dL Final   03/09/2020 7.1 6.4 - 8.3 g/dL Final     Alb   Date Value Ref Range Status   03/14/2020 2.7 (L) 3.5 - 5.2 g/dL Final   03/10/2020 3.2 (L) 3.5 - 5.2 g/dL Final   03/09/2020 3.0 (L) 3.5 - 5.2 g/dL Final     Total Bilirubin   Date Value Ref Range Status   03/14/2020 4.4 (H) 0.0 - 1.2 mg/dL Final   03/10/2020 3.4 (H) 0.0 - 1.2 mg/dL Final   03/09/2020 3.9 (H) 0.0 - 1.2 mg/dL Final     Alkaline Phosphatase   Date Value Ref Range Status   03/14/2020 76 40 - 129 U/L Final   03/10/2020 99 40 - 129 U/L Final   03/09/2020 100 40 - 129 U/L Final     AST   Date Value Ref Range Status   03/14/2020 38 0 - 39 U/L Final   03/10/2020 41 (H) 0 - 39 U/L Final   03/09/2020 41 (H) 0 - 39 U/L Final     ALT   Date Value Ref Range Status   03/14/2020 16 0 - 40 U/L Final   03/10/2020 17 0 - 40 U/L Final   03/09/2020 18 0 - 40 U/L Final     GFR Non-   Date Value Ref Range Status   03/14/2020 27 >=60 mL/min/1.73 Final     Comment:     Chronic Kidney Disease: less than 60 ml/min/1.73 sq.m. Kidney Failure: less than 15 ml/min/1.73 sq.m. Results valid for patients 18 years and older. 03/10/2020 47 >=60 mL/min/1.73 Final     Comment:     Chronic Kidney Disease: less than 60 ml/min/1.73 sq.m. Kidney Failure: less than 15 ml/min/1.73 sq.m. Results valid for patients 18 years and older. 03/09/2020 51 >=60 mL/min/1.73 Final     Comment:     Chronic Kidney Disease: less than 60 ml/min/1.73 sq.m. Kidney Failure: less than 15 ml/min/1.73 sq.m. Results valid for patients 18 years and older.        GFR    Date Value Ref Range Status   03/14/2020 33  Final   03/10/2020 57  Final   03/09/2020 >60  Final     Magnesium   Date Value Ref Range Status   02/10/2020 1.5 (L) 1.6 - 2.6 mg/dL Final   02/09/2020 1.6 1.6 - 2.6 mg/dL Final   02/08/2020 1.6 1.6 - 2.6 mg/dL Final     Phosphorus Date Value Ref Range Status   02/10/2020 2.6 2.5 - 4.5 mg/dL Final   02/09/2020 2.7 2.5 - 4.5 mg/dL Final   02/08/2020 2.8 2.5 - 4.5 mg/dL Final     Recent Labs     03/11/20  2327   PH 7.330*   PO2 77.3   PCO2 69.2*   HCO3 35.7*   BE 8.1*   O2SAT 94.3     RADIOLOGY:  XR ABDOMEN FOR NG/OG/NE TUBE PLACEMENT   Final Result   Tip of the G-tube within the stomach. XR Chest Abdomen Ng Placement   Final Result   1. Satisfactory position of endotracheal and nasogastric tubes. 2. Worsening airspace disease in the right lung. XR CHEST PORTABLE   Final Result   1. Satisfactory position of endotracheal and nasogastric tubes. 2. Worsening airspace disease in the right lung. XR ABDOMEN FOR NG/OG/NE TUBE PLACEMENT   Final Result   Consider advancement of NG tube      XR CHEST STANDARD (2 VW)   Final Result   1. Lines and tubes as described. Consider advancement of NG tube. 2. Marginal clearing of the right lung as compared to prior   examination. IR INSERT TUNNELED PLEURA CATH W CUFF   Final Result   1. Technically successful ultrasound guided placement of right pleural   Pleurx tunneled catheter. 2. Technically successful therapeutic thoracentesis, performed through   right pleural Pleurx catheter      XR Chest Abdomen Ng Placement   Final Result   Findings/IMPRESSION: A nasogastric tube is seen with the tip in the   lower esophagus at the GE junction. Recommend advancing. XR ABDOMEN (KUB) (SINGLE AP VIEW)   Final Result   Nasogastric tube in the stomach. XR Chest Abdomen Ng Placement   Final Result   Findings/IMPRESSION: A nasogastric tube is seen with the tip in the   gastric body. XR Chest Abdomen Ng Placement   Final Result   1. Nasogastric and endotracheal tubes are in satisfactory position. XR CHEST PORTABLE   Final Result   1. Nasogastric and endotracheal tubes are in satisfactory position.         PROBLEM LIST:  Principal Problem:    Acute on chronic respiratory

## 2020-03-15 PROCEDURE — 6360000002 HC RX W HCPCS: Performed by: NURSE PRACTITIONER

## 2020-03-15 PROCEDURE — 6360000002 HC RX W HCPCS: Performed by: INTERNAL MEDICINE

## 2020-03-15 PROCEDURE — 2580000003 HC RX 258: Performed by: INTERNAL MEDICINE

## 2020-03-15 PROCEDURE — 1200000000 HC SEMI PRIVATE

## 2020-03-15 RX ORDER — MORPHINE SULFATE 2 MG/ML
2 INJECTION, SOLUTION INTRAMUSCULAR; INTRAVENOUS
Status: DISCONTINUED | OUTPATIENT
Start: 2020-03-15 | End: 2020-03-16 | Stop reason: HOSPADM

## 2020-03-15 RX ORDER — LORAZEPAM 2 MG/ML
1 INJECTION INTRAMUSCULAR
Status: DISCONTINUED | OUTPATIENT
Start: 2020-03-15 | End: 2020-03-16 | Stop reason: HOSPADM

## 2020-03-15 RX ORDER — GLYCOPYRROLATE 0.2 MG/ML
0.1 INJECTION INTRAMUSCULAR; INTRAVENOUS EVERY 4 HOURS PRN
Status: DISCONTINUED | OUTPATIENT
Start: 2020-03-15 | End: 2020-03-16 | Stop reason: HOSPADM

## 2020-03-15 RX ADMIN — MORPHINE SULFATE 2 MG: 2 INJECTION, SOLUTION INTRAMUSCULAR; INTRAVENOUS at 15:59

## 2020-03-15 RX ADMIN — MIDAZOLAM 0.5 MG: 1 INJECTION INTRAMUSCULAR; INTRAVENOUS at 00:18

## 2020-03-15 RX ADMIN — GLYCOPYRROLATE 0.1 MG: 0.2 INJECTION, SOLUTION INTRAMUSCULAR; INTRAVENOUS at 15:59

## 2020-03-15 RX ADMIN — SODIUM CHLORIDE: 9 INJECTION, SOLUTION INTRAVENOUS at 00:18

## 2020-03-15 RX ADMIN — LORAZEPAM 1 MG: 2 INJECTION INTRAMUSCULAR; INTRAVENOUS at 10:13

## 2020-03-15 RX ADMIN — MIDAZOLAM 0.5 MG: 1 INJECTION INTRAMUSCULAR; INTRAVENOUS at 03:35

## 2020-03-15 RX ADMIN — PIPERACILLIN AND TAZOBACTAM 3.38 G: 3; .375 INJECTION, POWDER, LYOPHILIZED, FOR SOLUTION INTRAVENOUS at 03:35

## 2020-03-15 RX ADMIN — GLYCOPYRROLATE 0.1 MG: 0.2 INJECTION, SOLUTION INTRAMUSCULAR; INTRAVENOUS at 09:04

## 2020-03-15 RX ADMIN — MORPHINE SULFATE 2 MG: 2 INJECTION, SOLUTION INTRAMUSCULAR; INTRAVENOUS at 10:14

## 2020-03-15 RX ADMIN — MIDAZOLAM 0.5 MG: 1 INJECTION INTRAMUSCULAR; INTRAVENOUS at 07:41

## 2020-03-15 RX ADMIN — HYDROMORPHONE HYDROCHLORIDE 0.6 MG: 2 INJECTION INTRAMUSCULAR; INTRAVENOUS; SUBCUTANEOUS at 00:18

## 2020-03-15 RX ADMIN — HYDROMORPHONE HYDROCHLORIDE 0.6 MG: 2 INJECTION INTRAMUSCULAR; INTRAVENOUS; SUBCUTANEOUS at 03:36

## 2020-03-15 RX ADMIN — SODIUM CHLORIDE: 9 INJECTION, SOLUTION INTRAVENOUS at 07:44

## 2020-03-15 RX ADMIN — HYDROMORPHONE HYDROCHLORIDE 0.6 MG: 2 INJECTION INTRAMUSCULAR; INTRAVENOUS; SUBCUTANEOUS at 07:41

## 2020-03-15 RX ADMIN — LORAZEPAM 1 MG: 2 INJECTION INTRAMUSCULAR; INTRAVENOUS at 15:59

## 2020-03-15 ASSESSMENT — PAIN SCALES - PAIN ASSESSMENT IN ADVANCED DEMENTIA (PAINAD)
CONSOLABILITY: 2
BREATHING: 1
BODYLANGUAGE: 0
FACIALEXPRESSION: 0
BODYLANGUAGE: 0
NEGVOCALIZATION: 1
BREATHING: 1
TOTALSCORE: 6
BREATHING: 0
CONSOLABILITY: 0
NEGVOCALIZATION: 0
TOTALSCORE: 3
FACIALEXPRESSION: 0
CONSOLABILITY: 1
FACIALEXPRESSION: 0
CONSOLABILITY: 0
BREATHING: 1
BODYLANGUAGE: 2
TOTALSCORE: 1
TOTALSCORE: 1
NEGVOCALIZATION: 0
BODYLANGUAGE: 0
NEGVOCALIZATION: 2
FACIALEXPRESSION: 0

## 2020-03-15 ASSESSMENT — PAIN SCALES - GENERAL
PAINLEVEL_OUTOF10: 0
PAINLEVEL_OUTOF10: 10
PAINLEVEL_OUTOF10: 0
PAINLEVEL_OUTOF10: 10

## 2020-03-15 NOTE — PROGRESS NOTES
Patient terminally extubated. Evaluated by hospice. ID will sign off. Reconsult if needed. Thank you.

## 2020-03-15 NOTE — PLAN OF CARE
Problem: Restraint Use - Nonviolent/Non-Self-Destructive Behavior:  Goal: Absence of restraint indications  Description: Absence of restraint indications  Outcome: Met This Shift  Goal: Absence of restraint-related injury  Description: Absence of restraint-related injury  Outcome: Met This Shift     Problem: Risk for Impaired Skin Integrity  Goal: Tissue integrity - skin and mucous membranes  Description: Structural intactness and normal physiological function of skin and  mucous membranes.   Outcome: Met This Shift     Problem: Falls - Risk of:  Goal: Will remain free from falls  Description: Will remain free from falls  Outcome: Met This Shift  Goal: Absence of physical injury  Description: Absence of physical injury  Outcome: Met This Shift     Problem: Injury - Risk of, Physical Injury:  Goal: Will remain free from falls  Description: Will remain free from falls  Outcome: Met This Shift  Goal: Absence of physical injury  Description: Absence of physical injury  Outcome: Met This Shift     Problem: Mood - Altered:  Goal: Mood stable  Description: Mood stable  Outcome: Met This Shift  Goal: Absence of abusive behavior  Description: Absence of abusive behavior  Outcome: Met This Shift  Goal: Verbalizations of feeling emotionally comfortable while being cared for will increase  Description: Verbalizations of feeling emotionally comfortable while being cared for will increase  Outcome: Met This Shift     Problem: Psychomotor Activity - Altered:  Goal: Absence of psychomotor disturbance signs and symptoms  Description: Absence of psychomotor disturbance signs and symptoms  Outcome: Met This Shift

## 2020-03-15 NOTE — PROGRESS NOTES
Internal Medicine Progress Note    Sami Doherty. Jona Bee., & [address] Jona Bee., F.A.C.O.I. Prakash Don D.O., F.A.C.O.I. Primary Care Physician: Tim Carter MD   Admitting Physician:  Bhupendra Cervantes DO  Admission date and time: 3/6/2020 10:03 AM    Room:  [address] [place] [address]    Patient Name: Laci Montoya  MRN: [id number]    Date of Service: 3/15/2020     Subjective:  Casi Velazquez was terminally extubated 3/13. Casi Velazquez was transitioned to comfort care only and transferred out of the intensive care unit. He is maintained on IV antibiotics due to the findings of positive blood cultures but otherwise his medication regimen has been transitioned to entirely comfort care. He continues to exhibit poor symptomatic control and we will work on improving comfort management during hospitalization. Review of System: HEENT:  You ear pain, sore throat, sinus or eye problems. Admits to ongoing symptoms of fatigue and malaise. Cardiovascular:   Denies any chest pain, irregular heartbeats, or palpitations. Respiratory:   Admits to ongoing shortness of breath with coughing. Gastrointestinal:   Denies nausea, vomiting, diarrhea, or constipation. Denies any abdominal pain. Extremities:   Admits to bilateral lower extremity edema. Neurology:    Denies any headache or focal neurological deficits. Profound weakness and deconditioning. Derm:    Denies any rashes, ulcers, or excoriations. Denies bruising. Genitourinary:    Denies any urgency, frequency, hematuria. Voiding with the use of a Burgess catheter. Musculoskeletal:  Admits to diffuse myalgias with joint complaints. Physical Exam:  No intake/output data recorded. Blood pressure (!) 96/52, pulse 96, temperature 98 °F (36.7 °C), temperature source Axillary, resp. rate 22, height 5' 9\" (1.753 m), weight 299 lb (135.6 kg), SpO2 96 %. HEENT:    PERRLA. EOMI. Sclera clear.   Buccal mucosa moist.  Nasal cannula Acinetobacter and Pseudomonas bacteremia   3. History of healthcare-associated pneumonia with multifocal bilateral lung infiltrates.    4. Obstructive sleep apnea with morbid obesity hypoventilatory syndrome  5. Acute on chronic kidney disease stage III  6. Coagulopathy secondary to alcoholic liver disease. 7. Anemia of chronic disease with superimposed folic acid deficiency. 8. Pancytopenia multifactorial nature  9. Protein and caloric malnutrition due to increased catabolic needs    Plan:   Rene Obrien was terminally extubated and transferred out of the intensive care unit. He has been transitioned to comfort care only and his medication regimen has been narrowed down to treat the underlying bacteremia but also to treat comfort. Present regimen is not providing adequate results and we will discontinue the low-dose Dilaudid and Versed in favor of morphine and Ativan every 2 hours. Blood cultures remain positive for pseudomonas aeruginosa and patient is presently on Zosyn, as he is now comfort only, this will be discontinued. Hospice team has provided consultation as the patient's respiratory status remains labile, his long-term prognosis remains poor, and will be maintained on comfort management as discussed. The patient is a DO NOT RESUSCITATE with no reintubation. Emergency guardianship has been obtained and the guardian will be updated. Continue current therapy. See orders for further plan of care. Greater than 30 minutes of critical care time was spent with the patient. This time included chart review, , and discussion with those consultants involved in the patient's care. More than 50% of my  time was spent at the bedside counseling/coordinating care with the patient and/or family with face to face contact. This time was spent reviewing notes and laboratory data as well as instructing and counseling the patient.  Time I spent with the family or surrogate(s) is included only if the

## 2020-03-16 VITALS
HEIGHT: 69 IN | TEMPERATURE: 99.2 F | DIASTOLIC BLOOD PRESSURE: 42 MMHG | RESPIRATION RATE: 30 BRPM | HEART RATE: 108 BPM | WEIGHT: 299 LBS | OXYGEN SATURATION: 95 % | SYSTOLIC BLOOD PRESSURE: 90 MMHG | BODY MASS INDEX: 44.28 KG/M2

## 2020-03-16 LAB
BLOOD CULTURE, ROUTINE: ABNORMAL
BLOOD CULTURE, ROUTINE: ABNORMAL
ORGANISM: ABNORMAL

## 2020-03-16 PROCEDURE — 6360000002 HC RX W HCPCS: Performed by: NURSE PRACTITIONER

## 2020-03-16 PROCEDURE — 2500000003 HC RX 250 WO HCPCS: Performed by: INTERNAL MEDICINE

## 2020-03-16 RX ORDER — GLYCOPYRROLATE 0.2 MG/ML
0.1 INJECTION INTRAMUSCULAR; INTRAVENOUS EVERY 4 HOURS PRN
DISCHARGE
Start: 2020-03-16

## 2020-03-16 RX ORDER — MORPHINE SULFATE 2 MG/ML
2 INJECTION, SOLUTION INTRAMUSCULAR; INTRAVENOUS
Refills: 0 | DISCHARGE
Start: 2020-03-16 | End: 2020-03-19

## 2020-03-16 RX ORDER — LORAZEPAM 2 MG/ML
1 INJECTION INTRAMUSCULAR
DISCHARGE
Start: 2020-03-16 | End: 2020-04-15

## 2020-03-16 RX ADMIN — MORPHINE SULFATE 2 MG: 2 INJECTION, SOLUTION INTRAMUSCULAR; INTRAVENOUS at 02:44

## 2020-03-16 RX ADMIN — MORPHINE SULFATE 2 MG: 2 INJECTION, SOLUTION INTRAMUSCULAR; INTRAVENOUS at 10:53

## 2020-03-16 RX ADMIN — MORPHINE SULFATE 2 MG: 2 INJECTION, SOLUTION INTRAMUSCULAR; INTRAVENOUS at 06:34

## 2020-03-16 RX ADMIN — GLYCOPYRROLATE 0.1 MG: 0.2 INJECTION, SOLUTION INTRAMUSCULAR; INTRAVENOUS at 10:19

## 2020-03-16 RX ADMIN — MORPHINE SULFATE 2 MG: 2 INJECTION, SOLUTION INTRAMUSCULAR; INTRAVENOUS at 16:51

## 2020-03-16 RX ADMIN — GLYCOPYRROLATE 0.1 MG: 0.2 INJECTION, SOLUTION INTRAMUSCULAR; INTRAVENOUS at 14:59

## 2020-03-16 ASSESSMENT — PAIN DESCRIPTION - PAIN TYPE: TYPE: ACUTE PAIN

## 2020-03-16 ASSESSMENT — PAIN SCALES - GENERAL
PAINLEVEL_OUTOF10: 5
PAINLEVEL_OUTOF10: 5
PAINLEVEL_OUTOF10: 7
PAINLEVEL_OUTOF10: 7
PAINLEVEL_OUTOF10: 6
PAINLEVEL_OUTOF10: 3
PAINLEVEL_OUTOF10: 7

## 2020-03-16 ASSESSMENT — PAIN SCALES - PAIN ASSESSMENT IN ADVANCED DEMENTIA (PAINAD)
CONSOLABILITY: 0
NEGVOCALIZATION: 0
BREATHING: 1
BODYLANGUAGE: 0
FACIALEXPRESSION: 0
TOTALSCORE: 1

## 2020-03-16 ASSESSMENT — PAIN DESCRIPTION - LOCATION: LOCATION: GENERALIZED

## 2020-03-16 NOTE — PLAN OF CARE
RN  Outcome: Completed     Problem: Confusion - Acute:  Goal: Mental status will be restored to baseline  Description: Mental status will be restored to baseline  3/16/2020 1705 by Scarlet Rios RN  Outcome: Completed     Problem: Discharge Planning:  Goal: Ability to perform activities of daily living will improve  Description: Ability to perform activities of daily living will improve  3/16/2020 1705 by Scarlet Rios RN  Outcome: Completed     Problem: Discharge Planning:  Goal: Participates in care planning  Description: Participates in care planning  3/16/2020 1705 by Scarlet Rios RN  Outcome: Completed     Problem: Injury - Risk of, Physical Injury:  Goal: Will remain free from falls  Description: Will remain free from falls  3/16/2020 1705 by Scarlet Rios RN  Outcome: Completed     Problem: Injury - Risk of, Physical Injury:  Goal: Absence of physical injury  Description: Absence of physical injury  3/16/2020 1705 by Scarlet Rios RN  Outcome: Completed     Problem: Mood - Altered:  Goal: Mood stable  Description: Mood stable  3/16/2020 1705 by Scarlet Rios RN  Outcome: Completed     Problem: Mood - Altered:  Goal: Absence of abusive behavior  Description: Absence of abusive behavior  3/16/2020 1705 by Scarlet Rios RN  Outcome: Completed     Problem: Mood - Altered:  Goal: Absence of abusive behavior  Description: Absence of abusive behavior  3/16/2020 1705 by Scarlet Rios RN  Outcome: Completed     Problem: Mood - Altered:  Goal: Verbalizations of feeling emotionally comfortable while being cared for will increase  Description: Verbalizations of feeling emotionally comfortable while being cared for will increase  Outcome: Completed     Problem: Psychomotor Activity - Altered:  Goal: Absence of psychomotor disturbance signs and symptoms  Description: Absence of psychomotor disturbance signs and symptoms  3/16/2020 1705 by Scarlet Rios RN  Outcome: Completed     Problem: Sensory Perception - Impaired:  Goal: Demonstrations of improved sensory functioning will increase  Description: Demonstrations of improved sensory functioning will increase  3/16/2020 1705 by Lorna Zhou RN  Outcome: Completed     Problem: Sensory Perception - Impaired:  Goal: Decrease in sensory misperception frequency  Description: Decrease in sensory misperception frequency  3/16/2020 1705 by Lorna Zhou RN  Outcome: Completed     Problem: Sensory Perception - Impaired:  Goal: Able to refrain from responding to false sensory perceptions  Description: Able to refrain from responding to false sensory perceptions  3/16/2020 1705 by Lorna Zhou RN  Outcome: Completed     Problem: Sensory Perception - Impaired:  Goal: Demonstrates accurate environmental perceptions  Description: Demonstrates accurate environmental perceptions  3/16/2020 1705 by Lorna Zhou RN  Outcome: Completed     Problem: Sensory Perception - Impaired:  Goal: Able to distinguish between reality-based and nonreality-based thinking  Description: Able to distinguish between reality-based and nonreality-based thinking  3/16/2020 1705 by Lorna Zhou RN  Outcome: Completed     Problem: Sensory Perception - Impaired:  Goal: Able to interrupt nonreality-based thinking  Description: Able to interrupt nonreality-based thinking  3/16/2020 1705 by Lorna Zhou RN  Outcome: Completed     Problem: Sleep Pattern Disturbance:  Goal: Appears well-rested  Description: Appears well-rested  3/16/2020 1705 by Lorna Zhou RN  Outcome: Completed     Problem: Pain:  Goal: Pain level will decrease  Description: Pain level will decrease  Outcome: Completed     Problem: Pain:  Goal: Pain level will decrease  Description: Pain level will decrease  Outcome: Completed     Problem: Pain:  Goal: Control of acute pain  Description: Control of acute pain  Outcome: Completed     Problem: Pain:  Goal: Control of chronic pain  Description: Control of chronic pain  Outcome: Completed

## 2020-03-16 NOTE — PROGRESS NOTES
Hospice consents had not been received in HOT intake. I placed call to Shara Anaya and asked that she fax them again. I also asked \[facility]\"" intake to notify me asap when they do received the consents so that I can arrange for transport.

## 2020-03-16 NOTE — PROGRESS NOTES
OT BEDSIDE TREATMENT NOTE      Date:2020  Patient Name: Hugo Cervantes  MRN: [id number]  : 1955  Room: [place] [address]     Referring Provider: Nadya Tate MD  Evaluating OT: Lenora Media OTR/L [id number]     Placement Recommendation: LTAC vs. Subacute     Recommended Adaptive Equipment: TBD at rehab     [facility]   AM-PAC Daily Activity Inpatient   How much help for putting on and taking off regular lower body clothing?: Total  How much help for Bathing?: A Lot  How much help for Toileting?: Total  How much help for putting on and taking off regular upper body clothing?: A Lot  How much help for taking care of personal grooming?: A Lot  How much help for eating meals?: A Lot  AM-PAC Inpatient Daily Activity Raw Score: 10  AM-PAC Inpatient ADL T-Scale Score : 27.31  ADL Inpatient CMS 0-100% Score: 74.7  ADL Inpatient CMS G-Code Modifier : CL     Diagnosis:   1. Septic shock ([address]    2. Acute respiratory failure with hypoxia (HCC)    3. Hyperammonemia ([address]    4. Anasarca    5. Acute on chronic congestive heart failure, unspecified heart failure type ([address]    6. RONNY (acute kidney injury) ([address]    7. Other pneumothorax       Pertinent Medical History:   Past Medical History        Past Medical History:   Diagnosis Date    Acute diastolic CHF (congestive heart failure) ([address] 11/17/15     Echo 11/18/15 EF 73%    Dermatitis       due to thimersol    Heart valve problem       leaky    Hx of cardiovascular stress test 2015     Lexiscan    Hypertension      Obesity              Precautions:  falls, BiPAP, fecal management tube, chronic lymphedema, low air loss bed   Pain Scale: Numeric Rate: no c/o pain; Nursing notified. Social history: alone    Home architecture: mobile home, 1 story, 4 steps to enter B rails, tub shower. PLOF: needs assistance with BADL and IADL, ambulated with cane   Equipment owned: cane  Cognition: oriented x 3; follows 2 step directions.                fair  Problem solving skills              fair  Memory               fair  Sequencing  Communication: intact   Visual perceptual skills: intact                Glasses: no   Edema: yes, lymphedema    Sensation: intact   Hand Dominance:  Left     X Right      Functional Assessment:   Initial Eval Status  Date: 2/6/2020 Treatment Status  Date: 2/7/2020 STGs = LTGs  Time frame: 5-7 days   Feeding Moderate Assist   N/T Independent    Grooming Moderate Assist  N/T Independent    UB Dressing Maximal Assist  Max  A to tie back of gown; pt holding onto bedrail and EOB Minimal Assist    LB Dressing Dependent  Dep to don socks  Moderate Assist    Bathing Maximal Assist  N/T Moderate Assist    Toileting Dependent  N/T  Minimal Assist    Bed Mobility  Dependent for rolling   Dep x 2 for supine to sit; Dep x 2 for scooting; Dep x 2 for sit to supine; Dep x 3 with bed in trendelenburg to scoot to [facility] with use of TAPS system  Supine to sit: Minimal Assist   Sit to supine: Minimal Assist    Functional Transfers N/T  N/T - poor sitting balance at EOB for ~4 minutes at Dep x 2; posterior and R lateral lean Moderate Assist    Functional Mobility N/T  N/T; pt has not ambulated in over 10 months Moderate Assist    Activity Tolerance poor   Fair         Comments: Patient cleared by nursing staff. Upon arrival pt supine in bed. At end of session RTB with  all lines and tubes intact, call light within reach. Overall, pt demonstrated decreased independence and safety during completion of ADL/functional transfers/mobility tasks. Pt would benefit from continued skilled OT to increase safety and independence with completion of ADL/IADL tasks for functional independence and quality of life.     · Pt has made fair minus progress towards set goals(as noted through pt tolerated sitting EOB at Dep x 2 level for ~ 4 minutes); nsg present and placed pt on Bipap machine at end of session; pt's O2 sats dropped to 80% at EOB; deep breathing with O2 sats returning to 90's prior to RTB  · Continue with current plan of care    Treatment Time In:2:20PM        Treatment Time Out: 2:35 PM              Treatment Charges: Mins Units   ADL/Home Mgt     [id number]     Thera Activities     [id number] 15 1   Ther Ex                 [id number]     Manual Therapy    02226     Neuro Re-ed         [id number]     Orthotic manage/training                               [id number]     Non Billable Time     Total Timed Treatment [address] [facility] [address], [address]

## 2020-03-16 NOTE — PROGRESS NOTES
Nutrition Assessment    Type and Reason for Visit: Reassess    Nutrition Recommendations:Continue NPO per order. Nutrition Assessment: Patient terminally extubated r/t multi system organ failure. Continues NPO, Meds via NG tube. Per notes pat will discharge to [facility]. Malnutrition Assessment:  · Malnutrition Status: At risk for malnutrition  · Context: Chronic illness  · Findings of the 6 clinical characteristics of malnutrition (Minimum of 2 out of 6 clinical characteristics is required to make the diagnosis of moderate or severe Protein Calorie Malnutrition based on AND/ASPEN Guidelines):  1. Energy Intake-Less than or equal to 50% of estimated energy requirement, Greater than or equal to 7 days    2. Weight Loss-10% loss or greater(weight loss from admission weight 3/6), (weight loss triggers from admit weight 3/6)  3. Fat Loss-No significant subcutaneous fat loss,    4. Muscle Loss-No significant muscle mass loss,    5. Fluid Accumulation-No significant fluid accumulation, Generalized  6.   Strength-Not measured    Nutrition Risk Level: High    Nutrient Needs:  · Estimated Daily Total Kcal: 22-2300kcal/d(MSJ 2143 x 1.05)  · Estimated Daily Protein (g): 85-95gm pro/d(x1.2-1.3gm/kg)  · Estimated Daily Total Fluid (ml/day): 22-2300ml/d(x1ml/kg)    Nutrition Diagnosis:   · Problem: Predicted suboptimal energy intake  · Etiology: related to Catabolic illness(multi system organ  failure)     Signs and symptoms:  as evidenced by NPO status due to medical condition    Objective Information:  · Nutrition-Focused Physical Findings: Patient opens eyes to name, Active BS, Edema continues bilateral upper/lower extr., +I/O +6.8L  · Wound Type: None(excoriated buttocks)  · Current Nutrition Therapies:  · Oral Diet Orders: NPO   · Oral Diet intake: NPO  · Oral Nutrition Supplement (ONS) Orders: None  · ONS intake: NPO  · Anthropometric Measures:  · Ht: 5' 9\" (175.3 cm)   · Current Body Wt: 299 lb (135.6 kg)(3/12 bedscale)  · Admission Body Wt: 343 lb (155.6 kg)(3/6 bedscale)  · Usual Body Wt: 346 lb (156.9 kg)(4/27 bedscale emr)  · % Weight Change:   Weight changes likely r/t fluid accumulation  · Ideal Body Wt: 160 lb (72.6 kg), % Ideal Body 187%  · Adjusted Body Wt:   body weight adjusted for    · BMI Classification: BMI > or equal to 40.0 Obese Class III(BMI 44.1)    Nutrition Interventions:   Continue NPO  Continued Inpatient Monitoring    Nutrition Evaluation:   · Evaluation: No progress toward goals   · Goals: Per family wishes    · Monitoring: Nutrition Progression      Electronically signed by Phoenix Godinez RD, LD on 3/16/20 at 1:06 PM EDT    Contact Number: [phone]

## 2020-03-16 NOTE — PROGRESS NOTES
Call placed to Raffi's office again, spoke with  who states Li Hagan was not in the office and was not sure Li Hagan was coming into the office today. I was connected to Raffi's voicemail and left another message. Patient has been accepted at the [facility] for [facility] as per my conversation yesterday with Lyleminal Hagan was the plan for today if patient was appropriate. Li Hagan had stated she would be in the office today to receive the hospice consents. Update to ALICIA and Dr. Saúl Mahmood regarding this. Awaiting return call from Li Hagan. GIP ASSESSMENT NOTE      Patient Name: Ann Napier   :  1955  MRN:  [id number]  Today's Date: 3/16/2020      Current Code Status: DNR-CC      Pain Assessment:  intermittent    Location: patient not able to verbalize  Frequency: intermittent per day. Severity of patient not able to verbalize  Character: patient not able to verbalize  Pain with movement: yes  Current medication regimen for pain: Morphine 2 mg IV every 2 hours as needed  Number of doses in the last 72 hrs 4  Effectiveness some  Comments: Patient had been receiving Dilaudid 0.6 mg IV every 3 hours as needed and was switched to Morphine because it was felt that the Dilaudid was ineffective    Respiratory Assessment: positive for - tachypnea and labored breathing, congestion Respiratory Rate:26  Dyspnea Yes  at rest and on exertion  Current O2 Status:  12 liters/min via nasal cannula     No  Intractable cough   Yes  Audible gurgling   No  Unmanaged secretions as evidenced by foaming, symptoms of discomfort    Current medication regimen for respiratory:  Morphine 2 mg IV every 2 hours as needed  Robinul 0.1 mg IV every 4 hours as needed  Number of doses in last 72 hrs 4 Morphine and 2 Robinul  Effective  some   Comments:     Nausea Assessment: no nausea and no vomiting   Frequency: none per day.  Last Occurred: none  Current medication regimen for nausea/vomiting: none     Doses in the last 72 hrs none

## 2020-03-16 NOTE — PLAN OF CARE
Physical Injury:  Goal: Will remain free from falls  Description: Will remain free from falls  Outcome: Met This Shift

## 2020-03-16 NOTE — PLAN OF CARE
Met This Shift     Problem: Mood - Altered:  Goal: Absence of abusive behavior  Description: Absence of abusive behavior  3/16/2020 1051 by Armando Wallace RN  Outcome: Met This Shift     Problem: Psychomotor Activity - Altered:  Goal: Absence of psychomotor disturbance signs and symptoms  Description: Absence of psychomotor disturbance signs and symptoms  3/16/2020 1051 by Armando Wallace RN  Outcome: Met This Shift     Problem: Psychomotor Activity - Altered:  Goal: Absence of psychomotor disturbance signs and symptoms  Description: Absence of psychomotor disturbance signs and symptoms  3/16/2020 1051 by Armando Wallace RN  Outcome: Met This Shift     Problem: Sensory Perception - Impaired:  Goal: Demonstrations of improved sensory functioning will increase  Description: Demonstrations of improved sensory functioning will increase  3/16/2020 1051 by Armando Wallace RN  Outcome: Met This Shift     Problem: Sensory Perception - Impaired:  Goal: Decrease in sensory misperception frequency  Description: Decrease in sensory misperception frequency  3/16/2020 1051 by Armando Wallace RN  Outcome: Met This Shift     Problem: Sensory Perception - Impaired:  Goal: Decrease in sensory misperception frequency  Description: Decrease in sensory misperception frequency  3/16/2020 1051 by Armando Wallace RN  Outcome: Met This Shift     Problem: Sensory Perception - Impaired:  Goal: Able to refrain from responding to false sensory perceptions  Description: Able to refrain from responding to false sensory perceptions  3/16/2020 1051 by Armando Wallace RN  Outcome: Met This Shift     Problem: Sensory Perception - Impaired:  Goal: Demonstrates accurate environmental perceptions  Description: Demonstrates accurate environmental perceptions  3/16/2020 1051 by Armando Wallace RN  Outcome: Met This Shift     Problem: Sensory Perception - Impaired:  Goal: Able to distinguish between reality-based and nonreality-based thinking  Description: Able

## 2020-03-16 NOTE — DISCHARGE SUMMARY
Internal Medicine  Discharge Summary    NAME: June Harvey  :  1955  MRN:  [id number]  Rodney Medina MD  ADMITTED: 3/6/2020      DISCHARGED: 3/16/20    ADMITTING PHYSICIAN: Diane Pinto DO    CONSULTANT(S):   IP CONSULT TO INFECTIOUS DISEASES  IP CONSULT TO SOCIAL WORK  IP CONSULT TO HOSPICE  IP CONSULT TO CRITICAL CARE     ADMITTING DIAGNOSIS:   Acute and chronic respiratory failure with hypercapnia ([address] [J96.22]  Respiratory failure ([address] [J96.90]     DISCHARGE DIAGNOSES:   1. Acute on chronic hypercapnic/hypoxemic respiratory failure status post terminal extubation  2. Sepsis secondary to Acinetobacter and Pseudomonas bacteremia   3. History of healthcare-associated pneumonia with multifocal bilateral lung infiltrates.    4. Obstructive sleep apnea with morbid obesity hypoventilatory syndrome  5. Acute on chronic kidney disease stage III  6. Coagulopathy secondary to alcoholic liver disease. 7. Anemia of chronic disease with superimposed folic acid deficiency. 8. Pancytopenia multifactorial nature  9. Protein and caloric malnutrition due to increased catabolic needs    BRIEF HISTORY OF PRESENT ILLNESS:   This is a 62-year old  male who was admitted to [facility] on 2020 from [address] [place] [address] where he was admitted on . Patient has history of obesity, hypertension, valvulopathy, alcoholic cirrhosis, hepatic encephalopathy and chronic diastolic heart failure. HPI is obtained from both the patient and medical record review. Patient somewhat poor historian. Patient states he presented to the emergency department with complaint of shortness of breath and worsening edema. Patient stated he came from home however records indicate he came from [place] [address]. Patient was apparently found to be hypoxic and hypotensive. In the emergency department the patient's WBCs were found to be 16,000. Platelet count was 63. Hemoglobin hematocrit were 6.9 and 20.7.  Direct Oral contrast was administered. Coronal and sagittal reformatted images were obtained. Automated dose exposure control was used for this exam. FINDINGS: Refer to dedicated CT chest report for thoracic findings. There is a trace amount of free fluid within the pelvis. There is no evidence of pneumoperitoneum. There is suggestion of nodular contours of the liver, possibly related cirrhosis. The spleen is mildly enlarged. There are calcified gallstones. The pancreas is grossly unremarkable. The distal esophagus, stomach, duodenum are normal in appearance. The small bowel loops are normal in caliber. The appendix is identified and is unremarkable. The colon is normal in caliber. There is no evidence of obstruction. Bilateral adrenal glands are normal in appearance. Bilateral kidneys are normal morphology. There is no renal calculus, hydronephrosis, or hydroureter bilaterally. Urinary bladder is partially collapsed on a Burgess catheter. The prostate is unremarkable. The abdominal aorta is normal in caliber. There are nonspecific mildly prominent retroperitoneal lymph nodes measuring up to 14 mm in short axis. There are small fat-containing bilateral inguinal hernias. There is diffuse anasarca. There is no suspicious lytic or blastic osseous lesion. There is lower lumbar facet arthrosis. 1. Suggestion of a cirrhotic liver with mild portal hypertension and trace amount of ascites. 2. Cholelithiasis. 3. Diffuse anasarca. Xr Chest Standard (2 Vw)    Result Date: 3/11/2020  LOCATION: 200 EXAM: XR CHEST (2 VW) COMPARISON: 3/6/2020. HISTORY: J93.9 Pneumothorax, unspecified type. TECHNIQUE: PA and lateral views of the chest were obtained. FINDINGS:  SUPPORT DEVICES: ET tube tip 4 cm above the scooby. NG tube tip within the stomach. The proximal port is at the GE junction. Recommend advancement to avoid gastroesophageal reflux. There has been partial clearing of the right lung. Continued pulmonary edema of the left lung. 2. Patchy groundglass and consolidative opacities within bilateral upper lobes, possibly edema versus pneumonia. 3. Enlarged main pulmonary artery, suggestive of underlying pulmonary hypertension. 4. Diffuse anasarca. Xr Chest Portable    Result Date: 3/12/2020  LOCATION: 200 EXAM: XR CHEST PORTABLE, XR CHEST ABDOMEN NG PLACEMENT COMPARISON: 3/11/2020 HISTORY: Tube placement TECHNIQUE: Single frontal view of the chest was obtained. FINDINGS:  SUPPORT DEVICES: ET tube is seen in the mid trachea, unchanged in position. Nasogastric tube is seen with the tip in the gastric fundus. LUNGS: Widespread airspace disease has increased in the right lung. PLEURA: No pneumothorax visualized. LUNG VOLUMES: Satisfactory inspirator effort. MEDIASTINAL STRUCTURES: No lymphadenopathy. Normal aortic contour. HEART SIZE: Normal. BONES AND SOFT TISSUES: No fracture or soft tissue abnormality. 1. Satisfactory position of endotracheal and nasogastric tubes. 2. Worsening airspace disease in the right lung. Xr Chest Portable    Result Date: 3/6/2020  LOCATION: 200 EXAM: XR CHEST PORTABLE, XR CHEST ABDOMEN NG PLACEMENT COMPARISON: March 3, 2020 HISTORY: Support device placement TECHNIQUE: Single view chest and abdomen. FINDINGS:  Endotracheal tube seen in the mid trachea just below the clavicles. The nasogastric tube is seen in the proximal stomach. Widespread parenchymal opacities are noted with some improved aeration in the right lung. A paucity of bowel gas is noted on the abdominal radiograph. 1. Nasogastric and endotracheal tubes are in satisfactory position. Xr Chest Portable    Result Date: 3/3/2020  LOCATION: 200 EXAM: XR CHEST PORTABLE COMPARISON: March 2, 2020 HISTORY: Shortness of breath TECHNIQUE: Single frontal view of the chest was obtained. FINDINGS:  SUPPORT DEVICES: None. LUNGS: Confluent airspace disease and low lung volumes again noted. PLEURA: No pneumothorax visualized.  LUNG VOLUMES: Low lung volumes in position. The cardiomediastinal silhouette is stable in size and contours. There are stable bilateral perihilar airspace disease with a small right pleural effusion. There is no evidence of pneumothorax. There is cervical fusion hardware. 1. Left-sided PICC line catheter tip in appropriate position. 2. Stable right pleural effusion with bibasilar airspace disease. Xr Chest Portable    Result Date: 2/18/2020  LOCATION: 200 EXAM: XR CHEST PORTABLE COMPARISON: 2/13/2020 HISTORY: Shortness of breath TECHNIQUE: Single frontal view of the chest was obtained. FINDINGS:  SUPPORT DEVICES: Support devices are stable. LUNGS: Patchy airspace disease is again seen bilaterally similar to the previous study. PLEURA: No pneumothorax visualized. LUNG VOLUMES: Satisfactory inspirator effort. MEDIASTINAL STRUCTURES: No lymphadenopathy. Normal aortic contour. HEART SIZE: Normal. BONES AND SOFT TISSUES: No fracture or soft tissue abnormality. 1. No change from XR CHEST PORTABLE with report dated 2/13/2020 6:51 PM.      [facility] Chest 1 Vw    Result Date: 2/28/2020  Reading location: 200 INDICATION: Post left thoracentesis FINDINGS: PA view the chest compared with 2/27/2020. No evidence of postprocedure pneumothorax. Interval decrease left pleural fluid. Extensive bilateral pulmonary opacity/airspace disease again noted as well as right pleural effusion. No evidence of postprocedure pneumothorax. Xr Chest 1 Vw    Result Date: 2/27/2020  READING LOCATION: 200 HISTORY: Bilateral pleural effusions, bilateral airspace disease. TECHNIQUE: Two frontal views of the chest were obtained following the right thoracentesis procedure. FINDINGS: There is no right pneumothorax. There is a trace residual right pleural effusion. There is a small left pleural effusion. Extensive bilateral airspace disease is noted. 1. There is no right pneumothorax status post right thoracentesis.     Us Dup Upper Extremity Right Venous    Result Date: Normal. BONES AND SOFT TISSUES: No fracture or soft tissue abnormality. 1. Satisfactory position of endotracheal and nasogastric tubes. 2. Worsening airspace disease in the right lung. Xr Chest Abdomen Ng Placement    Result Date: 3/11/2020  LOCATION: 200 EXAM: XR CHEST ABDOMEN NG PLACEMENT COMPARISON: None HISTORY: Nasogastric tube TECHNIQUE: Supine view  of the abdomen. Findings/IMPRESSION: A nasogastric tube is seen with the tip in the lower esophagus at the GE junction. Recommend advancing. Xr Chest Abdomen Ng Placement    Result Date: 3/7/2020  Location: 200  History:  Nasogastric tube TECHNIQUE: Single view abdomen     Findings/IMPRESSION: A nasogastric tube is seen with the tip in the gastric body. Xr Chest Abdomen Ng Placement    Result Date: 3/6/2020  LOCATION: 200 EXAM: XR CHEST PORTABLE, XR CHEST ABDOMEN NG PLACEMENT COMPARISON: March 3, 2020 HISTORY: Support device placement TECHNIQUE: Single view chest and abdomen. FINDINGS:  Endotracheal tube seen in the mid trachea just below the clavicles. The nasogastric tube is seen in the proximal stomach. Widespread parenchymal opacities are noted with some improved aeration in the right lung. A paucity of bowel gas is noted on the abdominal radiograph. 1. Nasogastric and endotracheal tubes are in satisfactory position. Ir Guided Thoracentesis Pleural    Result Date: 2/28/2020  READING LOCATION: 200 EXAM: IR GUIDED THORACENTESIS PLEURAL. HISTORY: Pleural effusion. PROCEDURE:  The thoracentesis procedure was explained to the patient and an informed consent was obtained. The skin was prepped and draped in a sterile fashion and anesthetized with lidocaine. Maximal sterile barrier technique was utilized. Under ultrasound guidance, a #5 Western Rosalba Yueh needle and catheter were inserted into the fluid collection on the left. 1100 cc of pleural fluid was drained. FINDINGS: Ultrasound images demonstrate a large left pleural effusion.  Following right pleural space from a posterior approach. The catheter was placed within the medial aspect of the right pleural space. The needle was removed. After the passage of a safety guidewire and appropriate dilatation, the 16 Indonesian peel-away sheath for the 15.5 Indonesian Pleurx catheter was placed and closed. Subcutaneous tunnel was then created within the posterior aspect of the right lower chest, from a lateral to medial approach. The Pleurx catheter was advanced through this tunnel from a lateral to medial approach. The distal aspect of the catheter was placed within the peel-away sheath and the peel-away sheath was removed. Therapeutic right thoracentesis was then performed through the right Pleurx catheter. A total of 1400 mL of brown/burgundy colored fluid was obtained. Catheter was capped closed after thoracentesis was completed. The medial catheter entrance site into the right pleural space was closed with Vicryl pursestring suture. The catheter entry site laterally into the subcutaneous tunnel was closed with silk suture. Overlying sterile dressing was placed. Postprocedure chest x-ray was obtained to evaluate catheter position. Findings: The patient tolerated the procedure well and there were no immediate symptomatic complications. Initially, moderate to large right pleural effusion was seen with ultrasound imaging. The Pleurx catheter courses into the subcutaneous tunnel over the right lower back from a lateral to medial approach and enters the inferior aspect of the right pleural space from a posterior medial approach. 1400 mL of brown/burgundy colored fluid was obtained through the right Pleurx catheter after catheter placement. 1. Technically successful ultrasound guided placement of right pleural Pleurx tunneled catheter.  2. Technically successful therapeutic thoracentesis, performed through right pleural Pleurx catheter        HOSPITAL COURSE:   Unfortunately, [facility] is had a very complicated hospitalization and has been in [facility], to [facility], and back to [facility]. He suffers from end-stage and terminal respiratory failure. He also suffers from end-stage cirrhosis. He was mechanically ventilated upon presentation to the intensive care unit. He was found to be suffering from polymicrobial infections. Broad-spectrum antibiotic therapy was employed. Complicating the matter, the patient has no family locally. Emergency guardianship was obtained. It was ultimately determined that the patient would undergo terminal extubation. He was terminally extubated and placed on nasal cannula oxygen. Comfort measures were instituted and the hospice team provided consultation. It was ultimately recommended that the patient be transferred to the hospice house for further comfort measures. This is being arranged accordingly. The patient is somnolent during my examination today. He appears comfortable and is not struggling to breathe. He will be transferred to the hospice house for further symptom management and we anticipate his passing in the near future. BRIEF PHYSICAL EXAMINATION AND LABORATORIES ON DAY OF DISCHARGE:  VITALS:  BP (!) 100/49   Pulse 113   Temp 99.4 °F (37.4 °C) (Axillary)   Resp 22   Ht 5' 9\" (1.753 m)   Wt 299 lb (135.6 kg)   SpO2 91%   BMI 44.15 kg/m²     HEENT:  PERRLA. EOMI. Sclera clear. Buccal mucosa dry. Nasal cannula oxygen was in place. Neck:  Supple. Trachea midline. No thyromegaly. No JVD. No bruits. Heart:  Rhythm regular, rate controlled. Underlying systolic murmur. Lungs:  Symmetrical.  Diminished and coarse bilaterally. Abdomen: Soft. Morbidly obese. Bowel sounds are hypoactive    Extremities:  Peripheral pulses present. Chronic lower extremity edema    Neurologic: Obtunded. Unable to answer questions. Skin:  No petechia. No hemorrhage. Tubal skin excoriations      DISPOSITION:  The patient's condition is serious. At this time the patient is without objective evidence of an acute process requiring continuing hospitalization or inpatient management. They are stable for discharge with outpatient follow-up. I have spoken with the patient and discussed the results of the current hospitalization, in addition to providing specific details for the plan of care and counseling regarding the diagnosis and prognosis. The plan has been discussed in detail and they are aware of the specific conditions for emergent return, as well as the importance of follow-up. Their questions are answered at this time and they are agreeable with the plan for transfer to the hospice house for further comfort measures with the anticipated outcome of death. DISCHARGE MEDICATIONS:    [facility]   Home Medication Instructions K:[id number]    Printed on:03/16/20 0746   Medication Information                      glycopyrrolate (ROBINUL) 0.2 MG/ML injection  Infuse 0.5 mLs intravenously every 4 hours as needed for Secretions             LORazepam (ATIVAN) 2 MG/ML injection  Infuse 0.5 mLs intravenously every 2 hours as needed (comfort) for up to 30 days. Morphine Sulfate (MORPHINE, PF,) 2 MG/ML SOLN injection  Infuse 1 mL intravenously every 2 hours as needed (comfort, dyspnea, air hunger) for up to 3 days. Preparing for this patient's discharge, including paperwork, orders, instructions, and meeting with patient did require > 30 minutes.     Ian Barreto DO   3/16/2020  7:46 AM

## 2020-03-19 LAB — ORGANISM: ABNORMAL

## 2020-03-19 NOTE — CARE COORDINATION
3/19/2020  Call from [facility] with the [facility] for discharge information. [phone]  Ext [phone].   Electronically signed by Dorothy Horne RN-BC on 3/19/2020 at 1:34 PM

## 2020-03-24 LAB
AFB CULTURE (MYCOBACTERIA): NORMAL
AFB SMEAR: NORMAL

## 2021-11-26 NOTE — PROGRESS NOTES
Patient transferred to 4th floor accompanied by 2 RNs on telemetry monitor. Belongings sent with patient (glasses, clothing, etc.). Report called to D RN. Upon arrival to 4th floor, patient placed on telemetry box and given call light.  RN aware of arrival. Statement Selected

## 2022-03-26 NOTE — PROGRESS NOTES
55 yoM, PMHx of NIDDM, HLD, on lipitor and metformin p/w 3 days of non radiating burning epigastric pain. No NV, f/c, bowel changes, or resp sx. No lower abd or back pain. Better with pepto. Worse with milk. No surg hx. Smokes tobacco. Last saw PCP, Dr. Fernandez 4-5 months ago, no issues. Internal Medicine Progress Note    [facility]. Kaylah Riggins., & [address] Kaylah Riggins., F.A.C.O.I. Jeanie Ren D.O., F.A.C.O.I. Primary Care Physician: Merary Silvestre MD   Admitting Physician:  Duyen Garcia DO  Admission date and time: 3/6/2020 10:03 AM    Room:  [place] [address]    Patient Name: June Harvey  MRN: [id number]    Date of Service: 3/9/2020     Subjective:  Hesham Pulido remains mechanically ventilated with underlying sedation. He responds to verbal stimulus but is unable to communicate. No family members or friends were present during my examination as we attempt to move forward with the patient's best interest.  Tracheostomy is being considered at this point but would likely require emergency guardianship. The social work team is following. Review of System:   Unable to be obtained in the patient's current medical condition. Physical Exam:  No intake/output data recorded. Blood pressure (!) 103/58, pulse 93, temperature 98.4 °F (36.9 °C), temperature source Core, resp. rate 21, height 5' 9\" (1.753 m), weight (!) 302 lb (137 kg), SpO2 91 %. HEENT:    PERRLA. EOMI. Sclera clear. Buccal mucosa dry. Endotracheal tube is in place. NG tube is in place. Neck:    Supple. Trachea midline. No thyromegaly. No JVD. No bruits. Heart:    Rhythm regular, rate controlled. Mild systolic murmur. Lungs:    Diminished bilaterally with mechanical breath sounds throughout. Abdomen:   Super morbidly obese. Nontender to palpation. Bowel sounds are hypoactive. Extremities:    Chronic bilateral lower extremity peripheral edema. Neurologic:    Obtunded. He does respond to verbal stimulus. No focal deficits are appreciated. Skin:    No petechia. No hemorrhage. No wounds. Musculokeletal:  Spine ROM normal. Muscular strength intact. Gait not assessed. Genitalia/Breast:  Voiding with the use of a Burgess catheter.     Scheduled Meds:   rifaximin  200 mg Oral TID with underlying sedation. He suffers from obesity hypoventilatory syndrome with chronic CO2 retention. He ultimately failed BiPAP therapy and required intubation. Tracheostomy would be in the patient's best interest but he has no power of  to make medical decisions. Emergency guardianship will be necessary. The patient is unable to make any decisions for himself currently. The pulmonary/critical care team continues to follow and I will discuss the case with them. The patient's underlying coagulopathy would make tracheostomy difficult and this is being monitored accordingly. The GI team is also following. The patient's renal function appears to have plateaued. We are continuing to monitor urinary output. Continue current therapy. See orders for further plan of care. Rater than 30 minutes of critical care time was spent with the patient. This time included chart review, , and discussion with those consultants involved in the patient's care. More than 50% of my  time was spent at the bedside counseling/coordinating care with the patient and/or family with face to face contact. This time was spent reviewing notes and laboratory data as well as instructing and counseling the patient. Time I spent with the family or surrogate(s) is included only if the patient was incapable of providing the necessary information or participating in medical decisions. I also discussed the differential diagnosis and all of the proposed management plans with the patient and individuals accompanying the patient. Stephanie Flores requires this high level of physician care and nursing in the ICU due the complexity of decision management and chance of rapid decline or death. Continued cardiac monitoring and higher level of nursing are required. I am readily available for any further decision-making and intervention.        Thom Osorio DO, F.A.C.O.I.  3/9/2020  8:02 AM 55 yoM, PMHx of NIDDM, HLD, on lipitor and metformin p/w 3 days of non radiating burning epigastric pain. No NV, f/c, bowel changes, or resp sx. No lower abd or back pain. Better with pepto. Worse with milk. No surg hx. Smokes tobacco but only rare cigars, no cigarettes. Rare whiksey. No drugs. Last saw PCP, Dr. Fernandez 4-5 months ago, no issues.

## 2023-12-12 NOTE — H&P
Dictate [id number] Would like to know if seeing:         The name of the Dr. Dr.Dvorak Jacques LERNER  Colorectal Surgeon   [address]  (Per Pt: this is the Dr I see for my stoma hernia)    Would be okay instead of seeing GI?

## (undated) DEVICE — CIAGLIA BLUE RHINO PERCUTANEOUS TRACHEOSTOMY INTRODUCER TRAY: Brand: CIAGLIA BLUE RHINO

## (undated) DEVICE — PENCIL ES L3M BTTN SWCH HOLSTER W/ BLDE ELECTRD EDGE

## (undated) DEVICE — BLOCK BITE 60FR CAREGUARD

## (undated) DEVICE — 6 X 9  1.75MIL 4-WALL LABGUARD: Brand: MINIGRIP COMMERCIAL LLC

## (undated) DEVICE — SPONGE,DRAIN,NONWVN,4"X4",6PLY,STRL,LF: Brand: MEDLINE

## (undated) DEVICE — TOWEL,OR,DSP,ST,BLUE,STD,6/PK,12PK/CS: Brand: MEDLINE

## (undated) DEVICE — PACK,LAPAROTOMY,NO GOWNS: Brand: MEDLINE

## (undated) DEVICE — KIT BEDSIDE REVITAL OX 500ML

## (undated) DEVICE — MASK,FACE,MAXFLUIDPROTECT,SHIELD/ERLPS: Brand: MEDLINE

## (undated) DEVICE — YANKAUER,BULB TIP,W/O VENT,RIGID,STERILE: Brand: MEDLINE

## (undated) DEVICE — GOWN ISOLATN REG YEL M WT MULTIPLY SIDETIE LEV 2

## (undated) DEVICE — CONTAINER SPEC COLL 960ML POLYPR TRIANG GRAD INTAKE/OUTPUT

## (undated) DEVICE — Device: Brand: DEFENDO VALVE AND CONNECTOR KIT

## (undated) DEVICE — CHLORAPREP 26ML ORANGE

## (undated) DEVICE — SPONGE GZ 4IN 4IN 4 PLY N WVN AVANT

## (undated) DEVICE — DOUBLE BASIN SET: Brand: MEDLINE INDUSTRIES, INC.

## (undated) DEVICE — ELECTRODE PT RET AD L9FT HI MOIST COND ADH HYDRGEL CORDED

## (undated) DEVICE — GLOVE ORANGE PI 7 1/2   MSG9075

## (undated) DEVICE — NEEDLE HYPO 25GA L1.5IN BLU POLYPR HUB S STL REG BVL STR

## (undated) DEVICE — SET INSTR TRACH

## (undated) DEVICE — FORCEPS BX L240CM JAW DIA2.8MM L CAP W/ NDL MIC MESH TOOTH

## (undated) DEVICE — TUBING, SUCTION, 1/4" X 10', STRAIGHT: Brand: MEDLINE

## (undated) DEVICE — NDL CNTR 40CT FM MAG: Brand: MEDLINE INDUSTRIES, INC.

## (undated) DEVICE — GOWN,SIRUS,NONRNF,SETINSLV,XL,20/CS: Brand: MEDLINE

## (undated) DEVICE — CANNULA IV 18GA L15IN BLNT FILL LUERLOCK HUB MJCT

## (undated) DEVICE — COVER,LIGHT HANDLE,FLX,1/PK: Brand: MEDLINE INDUSTRIES, INC.

## (undated) DEVICE — KENDALL 450 SERIES MONITORING FOAM ELECTRODE - RECTANGULAR SHAPE ( 3/PK): Brand: KENDALL

## (undated) DEVICE — BLADE ES L6IN ELASTOMERIC COAT EXT DURABLE BEND UPTO 90DEG

## (undated) DEVICE — MARKER,SKIN,WI/RULER AND LABELS: Brand: MEDLINE

## (undated) DEVICE — LUBRICANT SURG JELLY ST BACTER TUBE 4.25OZ

## (undated) DEVICE — TRAY ENDO ROOM FLEXIBLE BRONCH

## (undated) DEVICE — CATHETER SUCT TR FL TIP W/ O CTRL 10FR

## (undated) DEVICE — BLADE ES ELASTOMERIC COAT INSUL DURABLE BEND UPTO 90DEG

## (undated) DEVICE — SET SURG INSTR DISSECT

## (undated) DEVICE — GAUZE,SPONGE,4"X4",16PLY,XRAY,STRL,LF: Brand: MEDLINE

## (undated) DEVICE — SPONGE,PEANUT,XRAY,ST,SM,3/8",5/CARD: Brand: MEDLINE INDUSTRIES, INC.

## (undated) DEVICE — SYRINGE MED 10ML TRNSLUC BRL PLUNG BLK MRK POLYPR CTRL